# Patient Record
Sex: MALE | Race: OTHER | NOT HISPANIC OR LATINO
[De-identification: names, ages, dates, MRNs, and addresses within clinical notes are randomized per-mention and may not be internally consistent; named-entity substitution may affect disease eponyms.]

---

## 2017-10-09 ENCOUNTER — APPOINTMENT (OUTPATIENT)
Dept: UROLOGY | Facility: CLINIC | Age: 67
End: 2017-10-09
Payer: COMMERCIAL

## 2017-10-09 VITALS
WEIGHT: 150 LBS | TEMPERATURE: 97.8 F | RESPIRATION RATE: 17 BRPM | BODY MASS INDEX: 21 KG/M2 | SYSTOLIC BLOOD PRESSURE: 171 MMHG | HEIGHT: 71 IN | HEART RATE: 51 BPM | DIASTOLIC BLOOD PRESSURE: 96 MMHG

## 2017-10-09 DIAGNOSIS — Z86.79 PERSONAL HISTORY OF OTHER DISEASES OF THE CIRCULATORY SYSTEM: ICD-10-CM

## 2017-10-09 DIAGNOSIS — N40.1 BENIGN PROSTATIC HYPERPLASIA WITH LOWER URINARY TRACT SYMPMS: ICD-10-CM

## 2017-10-09 DIAGNOSIS — F17.200 NICOTINE DEPENDENCE, UNSPECIFIED, UNCOMPLICATED: ICD-10-CM

## 2017-10-09 DIAGNOSIS — N13.8 BENIGN PROSTATIC HYPERPLASIA WITH LOWER URINARY TRACT SYMPMS: ICD-10-CM

## 2017-10-09 PROCEDURE — 99203 OFFICE O/P NEW LOW 30 MIN: CPT

## 2017-10-12 LAB — PSA SERPL-MCNC: 1.1 NG/ML

## 2017-12-06 ENCOUNTER — APPOINTMENT (OUTPATIENT)
Dept: UROLOGY | Facility: CLINIC | Age: 67
End: 2017-12-06
Payer: COMMERCIAL

## 2017-12-06 ENCOUNTER — OUTPATIENT (OUTPATIENT)
Dept: OUTPATIENT SERVICES | Facility: HOSPITAL | Age: 67
LOS: 1 days | End: 2017-12-06
Payer: COMMERCIAL

## 2017-12-06 VITALS
SYSTOLIC BLOOD PRESSURE: 171 MMHG | HEART RATE: 54 BPM | RESPIRATION RATE: 16 BRPM | TEMPERATURE: 98.7 F | DIASTOLIC BLOOD PRESSURE: 100 MMHG

## 2017-12-06 DIAGNOSIS — R35.0 FREQUENCY OF MICTURITION: ICD-10-CM

## 2017-12-06 PROCEDURE — 51797 INTRAABDOMINAL PRESSURE TEST: CPT | Mod: 26

## 2017-12-06 PROCEDURE — 51741 ELECTRO-UROFLOWMETRY FIRST: CPT | Mod: 26

## 2017-12-06 PROCEDURE — 51728 CYSTOMETROGRAM W/VP: CPT

## 2017-12-06 PROCEDURE — 51728 CYSTOMETROGRAM W/VP: CPT | Mod: 26

## 2017-12-06 PROCEDURE — 51797 INTRAABDOMINAL PRESSURE TEST: CPT

## 2017-12-06 PROCEDURE — 51784 ANAL/URINARY MUSCLE STUDY: CPT | Mod: 26

## 2017-12-06 PROCEDURE — 51741 ELECTRO-UROFLOWMETRY FIRST: CPT

## 2017-12-06 PROCEDURE — 51784 ANAL/URINARY MUSCLE STUDY: CPT

## 2017-12-07 DIAGNOSIS — N32.0 BLADDER-NECK OBSTRUCTION: ICD-10-CM

## 2018-04-09 ENCOUNTER — APPOINTMENT (OUTPATIENT)
Dept: CV DIAGNOSTICS | Facility: HOSPITAL | Age: 68
End: 2018-04-09

## 2018-06-11 ENCOUNTER — APPOINTMENT (OUTPATIENT)
Dept: UROLOGY | Facility: CLINIC | Age: 68
End: 2018-06-11

## 2021-03-30 ENCOUNTER — APPOINTMENT (OUTPATIENT)
Dept: UROLOGY | Facility: CLINIC | Age: 71
End: 2021-03-30

## 2021-10-01 ENCOUNTER — INPATIENT (INPATIENT)
Facility: HOSPITAL | Age: 71
LOS: 7 days | Discharge: ANOTHER HOSPITAL | End: 2021-10-09
Attending: INTERNAL MEDICINE | Admitting: INTERNAL MEDICINE
Payer: MEDICARE

## 2021-10-01 VITALS
TEMPERATURE: 98 F | HEART RATE: 54 BPM | WEIGHT: 160.06 LBS | HEIGHT: 71 IN | RESPIRATION RATE: 16 BRPM | SYSTOLIC BLOOD PRESSURE: 217 MMHG | DIASTOLIC BLOOD PRESSURE: 95 MMHG | OXYGEN SATURATION: 98 %

## 2021-10-01 LAB
ALBUMIN SERPL ELPH-MCNC: 3.8 G/DL — SIGNIFICANT CHANGE UP (ref 3.3–5)
ALP SERPL-CCNC: 109 U/L — SIGNIFICANT CHANGE UP (ref 40–120)
ALT FLD-CCNC: 21 U/L — SIGNIFICANT CHANGE UP (ref 12–78)
ANION GAP SERPL CALC-SCNC: 8 MMOL/L — SIGNIFICANT CHANGE UP (ref 5–17)
APTT BLD: 34.4 SEC — SIGNIFICANT CHANGE UP (ref 27.5–35.5)
AST SERPL-CCNC: 28 U/L — SIGNIFICANT CHANGE UP (ref 15–37)
BILIRUB SERPL-MCNC: 0.8 MG/DL — SIGNIFICANT CHANGE UP (ref 0.2–1.2)
BUN SERPL-MCNC: 35 MG/DL — HIGH (ref 7–23)
CALCIUM SERPL-MCNC: 9.5 MG/DL — SIGNIFICANT CHANGE UP (ref 8.5–10.1)
CHLORIDE SERPL-SCNC: 108 MMOL/L — SIGNIFICANT CHANGE UP (ref 96–108)
CO2 SERPL-SCNC: 23 MMOL/L — SIGNIFICANT CHANGE UP (ref 22–31)
CREAT SERPL-MCNC: 3.1 MG/DL — HIGH (ref 0.5–1.3)
FLUAV AG NPH QL: SIGNIFICANT CHANGE UP
FLUBV AG NPH QL: SIGNIFICANT CHANGE UP
GLUCOSE SERPL-MCNC: 97 MG/DL — SIGNIFICANT CHANGE UP (ref 70–99)
HCT VFR BLD CALC: 43.4 % — SIGNIFICANT CHANGE UP (ref 39–50)
HCT VFR BLD CALC: 44.3 % — SIGNIFICANT CHANGE UP (ref 39–50)
HGB BLD-MCNC: 14 G/DL — SIGNIFICANT CHANGE UP (ref 13–17)
HGB BLD-MCNC: 14.2 G/DL — SIGNIFICANT CHANGE UP (ref 13–17)
INR BLD: 1.14 RATIO — SIGNIFICANT CHANGE UP (ref 0.88–1.16)
MCHC RBC-ENTMCNC: 29.3 PG — SIGNIFICANT CHANGE UP (ref 27–34)
MCHC RBC-ENTMCNC: 29.5 PG — SIGNIFICANT CHANGE UP (ref 27–34)
MCHC RBC-ENTMCNC: 32.1 GM/DL — SIGNIFICANT CHANGE UP (ref 32–36)
MCHC RBC-ENTMCNC: 32.3 GM/DL — SIGNIFICANT CHANGE UP (ref 32–36)
MCV RBC AUTO: 91.4 FL — SIGNIFICANT CHANGE UP (ref 80–100)
MCV RBC AUTO: 91.5 FL — SIGNIFICANT CHANGE UP (ref 80–100)
NRBC # BLD: 0 /100 WBCS — SIGNIFICANT CHANGE UP (ref 0–0)
NRBC # BLD: 0 /100 WBCS — SIGNIFICANT CHANGE UP (ref 0–0)
PLATELET # BLD AUTO: 713 K/UL — HIGH (ref 150–400)
PLATELET # BLD AUTO: 717 K/UL — HIGH (ref 150–400)
POTASSIUM SERPL-MCNC: 4.3 MMOL/L — SIGNIFICANT CHANGE UP (ref 3.5–5.3)
POTASSIUM SERPL-SCNC: 4.3 MMOL/L — SIGNIFICANT CHANGE UP (ref 3.5–5.3)
PROT SERPL-MCNC: 8.8 GM/DL — HIGH (ref 6–8.3)
PROTHROM AB SERPL-ACNC: 13.1 SEC — SIGNIFICANT CHANGE UP (ref 10.6–13.6)
RBC # BLD: 4.75 M/UL — SIGNIFICANT CHANGE UP (ref 4.2–5.8)
RBC # BLD: 4.84 M/UL — SIGNIFICANT CHANGE UP (ref 4.2–5.8)
RBC # FLD: 15.9 % — HIGH (ref 10.3–14.5)
RBC # FLD: 15.9 % — HIGH (ref 10.3–14.5)
SARS-COV-2 RNA SPEC QL NAA+PROBE: SIGNIFICANT CHANGE UP
SODIUM SERPL-SCNC: 139 MMOL/L — SIGNIFICANT CHANGE UP (ref 135–145)
TROPONIN I SERPL-MCNC: 0.1 NG/ML — HIGH (ref 0.01–0.04)
WBC # BLD: 6.91 K/UL — SIGNIFICANT CHANGE UP (ref 3.8–10.5)
WBC # BLD: 7.68 K/UL — SIGNIFICANT CHANGE UP (ref 3.8–10.5)
WBC # FLD AUTO: 6.91 K/UL — SIGNIFICANT CHANGE UP (ref 3.8–10.5)
WBC # FLD AUTO: 7.68 K/UL — SIGNIFICANT CHANGE UP (ref 3.8–10.5)

## 2021-10-01 PROCEDURE — 70450 CT HEAD/BRAIN W/O DYE: CPT | Mod: 26,MA

## 2021-10-01 PROCEDURE — 99223 1ST HOSP IP/OBS HIGH 75: CPT

## 2021-10-01 PROCEDURE — 93010 ELECTROCARDIOGRAM REPORT: CPT

## 2021-10-01 PROCEDURE — 99285 EMERGENCY DEPT VISIT HI MDM: CPT

## 2021-10-01 PROCEDURE — 93010 ELECTROCARDIOGRAM REPORT: CPT | Mod: 77

## 2021-10-01 RX ORDER — HEPARIN SODIUM 5000 [USP'U]/ML
5000 INJECTION INTRAVENOUS; SUBCUTANEOUS EVERY 12 HOURS
Refills: 0 | Status: DISCONTINUED | OUTPATIENT
Start: 2021-10-01 | End: 2021-10-09

## 2021-10-01 RX ORDER — HYDRALAZINE HCL 50 MG
20 TABLET ORAL ONCE
Refills: 0 | Status: COMPLETED | OUTPATIENT
Start: 2021-10-01 | End: 2021-10-01

## 2021-10-01 RX ORDER — HYDRALAZINE HCL 50 MG
50 TABLET ORAL THREE TIMES A DAY
Refills: 0 | Status: DISCONTINUED | OUTPATIENT
Start: 2021-10-01 | End: 2021-10-02

## 2021-10-01 RX ORDER — AMLODIPINE BESYLATE 2.5 MG/1
10 TABLET ORAL DAILY
Refills: 0 | Status: DISCONTINUED | OUTPATIENT
Start: 2021-10-01 | End: 2021-10-05

## 2021-10-01 RX ORDER — SODIUM CHLORIDE 9 MG/ML
1000 INJECTION INTRAMUSCULAR; INTRAVENOUS; SUBCUTANEOUS
Refills: 0 | Status: DISCONTINUED | OUTPATIENT
Start: 2021-10-01 | End: 2021-10-01

## 2021-10-01 RX ORDER — LABETALOL HCL 100 MG
10 TABLET ORAL ONCE
Refills: 0 | Status: COMPLETED | OUTPATIENT
Start: 2021-10-01 | End: 2021-10-01

## 2021-10-01 RX ORDER — ASPIRIN/CALCIUM CARB/MAGNESIUM 324 MG
81 TABLET ORAL DAILY
Refills: 0 | Status: DISCONTINUED | OUTPATIENT
Start: 2021-10-01 | End: 2021-10-09

## 2021-10-01 RX ORDER — ATORVASTATIN CALCIUM 80 MG/1
20 TABLET, FILM COATED ORAL AT BEDTIME
Refills: 0 | Status: DISCONTINUED | OUTPATIENT
Start: 2021-10-01 | End: 2021-10-09

## 2021-10-01 RX ORDER — HYDRALAZINE HCL 50 MG
10 TABLET ORAL ONCE
Refills: 0 | Status: COMPLETED | OUTPATIENT
Start: 2021-10-01 | End: 2021-10-01

## 2021-10-01 RX ADMIN — Medication 10 MILLIGRAM(S): at 14:49

## 2021-10-01 RX ADMIN — Medication 10 MILLIGRAM(S): at 23:20

## 2021-10-01 RX ADMIN — Medication 50 MILLIGRAM(S): at 21:52

## 2021-10-01 RX ADMIN — ATORVASTATIN CALCIUM 20 MILLIGRAM(S): 80 TABLET, FILM COATED ORAL at 21:52

## 2021-10-01 RX ADMIN — AMLODIPINE BESYLATE 10 MILLIGRAM(S): 2.5 TABLET ORAL at 21:53

## 2021-10-01 RX ADMIN — HEPARIN SODIUM 5000 UNIT(S): 5000 INJECTION INTRAVENOUS; SUBCUTANEOUS at 21:51

## 2021-10-01 RX ADMIN — Medication 0.2 MILLIGRAM(S): at 22:00

## 2021-10-01 RX ADMIN — Medication 20 MILLIGRAM(S): at 16:37

## 2021-10-01 NOTE — H&P ADULT - ASSESSMENT
70 year old male PMH HTN, "heart problems" presenting for multiple falls since yesterday.  Patient is poor historian and is not providing context about what is causing the falls. Thinks he struck his head. Not sure of LOC. Poor historian, denies any meds at home, denies any medical problems. Denies all other symptoms. Prefers to sleep   in ER.     Plan: Admit to medicine, frequent falls r/o CVA. MRI brain non-contrast ordered. CT head non-contrast on arrival notes old infarcts, no bleed. Will start   ASA 81 mg/day and Lipitor 20 mg/day for old CVA on initial scan. PT eval requested. COVID negative. EKG is NSR. Add Norvasc 5 mg/day for HTN.     Renal: No prior for comparison. Elevated creatinine 3.10 appears baseline, no anemia of chronic disease.     Will hydrate normal saline at 60/h, follow in AM. Renal sonogram ordered to r/o MRD or hydro.        70 year old male PMH HTN, "heart problems" presenting for multiple falls since yesterday.  Patient is poor historian and is not providing context about what is causing the falls. Thinks he struck his head. Not sure of LOC. Poor historian, denies any meds at home, denies any medical problems. Denies all other symptoms. Prefers to sleep   in ER.     Plan: Admit to medicine, frequent falls r/o CVA. MRI brain non-contrast ordered. CT head non-contrast on arrival notes old infarcts, no bleed. Will start   ASA 81 mg/day and Lipitor 20 mg/day for old CVA on initial scan. PT eval requested. COVID negative. EKG is NSR. Add Norvasc 5 mg/day for HTN.     Renal: No prior for comparison. Elevated creatinine 3.10 on arrival, CKD IV appears baseline, no anemia of chronic disease. Will ordered   renal protein/creatinine ratio. No DM history.   Glucose 97.     Will hydrate normal saline at 60/h, follow in AM. Renal sonogram ordered to r/o MRD or hydro.    Called family contacts in [place], no number is listed.         70 year old male PMH HTN, "heart problems" presenting for multiple falls since yesterday.  Patient is poor historian and is not providing context about what is causing the falls. Thinks he struck his head. Not sure of LOC. Poor historian, denies any meds at home, denies any medical problems. Denies all other symptoms. Prefers to sleep   in ER.     Plan: Admit to medicine, frequent falls r/o CVA. MRI brain non-contrast ordered. CT head non-contrast on arrival notes old infarcts, no bleed. Will start   ASA 81 mg/day and Lipitor 20 mg/day for old CVA on initial scan. PT eval requested. COVID negative. EKG is NSR.     Add Norvasc 10 mg/day for HTN along with hydralazine 50 mg tid and Clonidine .2 mg bid, BP elevated in ER.      Renal: No prior for comparison. Elevated creatinine 3.10 on arrival, CKD IV appears baseline, no anemia of chronic disease. Will ordered   renal protein/creatinine ratio. No DM history.   Glucose 97. Renal sonogram ordered to r/o MRD or hydro.    Called family contacts in [place], no number is listed.         70 year old male PMH HTN, "heart problems" presenting for multiple falls since yesterday.  Patient is poor historian and is not providing context about what is causing the falls. Thinks he struck his head. Not sure of LOC. Poor historian, denies any meds at home, denies any medical problems. Denies all other symptoms. Prefers to sleep   in ER.     Plan: Admit to medicine, frequent falls r/o CVA. MRI brain non-contrast ordered. CT head non-contrast on arrival notes old infarcts, no bleed. Will start   ASA 81 mg/day and Lipitor 20 mg/day for old CVA on initial scan. PT eval requested. COVID negative. EKG is NSR.     Add Norvasc 10 mg/day for HTN along with hydralazine 50 mg tid and Clonidine .2 mg bid, BP elevated in ER.      Renal: No prior for comparison. Elevated creatinine 3.10 on arrival, CKD IV appears baseline, no anemia of chronic disease. Will ordered   renal protein/creatinine ratio. No DM history.   Glucose 97. Renal sonogram ordered to r/o MRD or hydro.    Addendum: NIHSS was 0 on admission.     Called family contacts in [place], no number is listed.

## 2021-10-01 NOTE — ED PROVIDER NOTE - OBJECTIVE STATEMENT
70m pmhx htn, "heart problems" presenting for multiple falls since yesterday. patient poor historian and is not providing context about what is causing the falls. thinks he struck his head. unsure of loc. unsure if anticoagulated. poor historian. denies all other symptoms.

## 2021-10-01 NOTE — ED CLERICAL - NS ED CLERK UNITS
"Updated internal medicine Silvia about pt ongoing htn. Prn clonidine administered at 0850 for bp 179/113. Pt received daily medications at that time as well. BP recheck 149/96. He declines taking the newly ordered lisinopril \"I've heard too many bad side effects\" from this medication was his report. Updated Silvia as well about his declining the lisinopril. Will continue to monitor.      Vitals:    03/06/21 2356 03/07/21 0414 03/07/21 0814 03/07/21 1010   BP: (!) 161/110 (!) 154/98 (!) 179/133 (!) 149/96   BP Location:   Left arm Left arm   Pulse: 70 72 65 67   Resp: 16 18 18    Temp: 97.6  F (36.4  C) 98  F (36.7  C) 97.3  F (36.3  C) 95.9  F (35.5  C)   TempSrc: Temporal Temporal Temporal Temporal   SpO2:   99% 95%     " tele 240W/2C

## 2021-10-01 NOTE — ED PROVIDER NOTE - CLINICAL SUMMARY MEDICAL DECISION MAKING FREE TEXT BOX
poor historian. neurologically grossly intact. w/u significant for significant kidney injury, unknown baseline. patinet will require admission for further evlauation, pt eval and mangement of kidney injury in patient with frequent falls

## 2021-10-01 NOTE — H&P ADULT - NSHPLABSRESULTS_GEN_ALL_CORE
LABS:                        14.0   7.68  )-----------( 717      ( 01 Oct 2021 16:59 )             43.4     10-01    139  |  108  |  35<H>  ----------------------------<  97  4.3   |  23  |  3.10<H>    Ca    9.5      01 Oct 2021 16:59    TPro  8.8<H>  /  Alb  3.8  /  TBili  0.8  /  DBili  x   /  AST  28  /  ALT  21  /  AlkPhos  109  10-01    PT/INR - ( 01 Oct 2021 16:59 )   PT: 13.1 sec;   INR: 1.14 ratio         PTT - ( 01 Oct 2021 16:59 )  PTT:34.4 sec        RADIOLOGY & ADDITIONAL TESTS:

## 2021-10-01 NOTE — ED ADULT NURSE NOTE - OBJECTIVE STATEMENT
pt during the assessment would fall asleep-stated that he was tired and he wanted something to eat. Pt stated that he fell while in the store.

## 2021-10-01 NOTE — H&P ADULT - NSHPPHYSICALEXAM_GEN_ALL_CORE
PHYSICAL EXAMINATION:  Vital Signs Last 24 Hrs  T(C): 36.7 (01 Oct 2021 17:38), Max: 36.7 (01 Oct 2021 17:38)  T(F): 98.1 (01 Oct 2021 17:38), Max: 98.1 (01 Oct 2021 17:38)  HR: 65 (01 Oct 2021 17:38) (54 - 65)  BP: 183/98 (01 Oct 2021 19:46) (124/103 - 217/95)  BP(mean): --  RR: 16 (01 Oct 2021 17:38) (16 - 16)  SpO2: 97% (01 Oct 2021 17:38) (97% - 98%)  CAPILLARY BLOOD GLUCOSE          GENERAL: NAD, well-groomed, well-developed  HEAD:  atraumatic, normocephalic  EYES: sclera anicteric  ENMT: mucous membranes moist  NECK: supple, No JVD  CHEST/LUNG: clear to auscultation bilaterally; no rales, rhonchi, or wheezing b/l  HEART: normal S1, S2  ABDOMEN: BS+, soft, ND, NT   EXTREMITIES:  pulses palpable; no clubbing, cyanosis, or edema b/l LEs  NEURO: awake, alert, interactive; moves all extremities  SKIN: no rashes or lesions PHYSICAL EXAMINATION:  Vital Signs Last 24 Hrs  T(C): 36.7 (01 Oct 2021 17:38), Max: 36.7 (01 Oct 2021 17:38)  T(F): 98.1 (01 Oct 2021 17:38), Max: 98.1 (01 Oct 2021 17:38)  HR: 65 (01 Oct 2021 17:38) (54 - 65)  BP: 183/98 (01 Oct 2021 19:46) (124/103 - 217/95)  BP(mean): --  RR: 16 (01 Oct 2021 17:38) (16 - 16)  SpO2: 97% (01 Oct 2021 17:38) (97% - 98%)  CAPILLARY BLOOD GLUCOSE          GENERAL: NAD, comfortable, in bed, no CP, fevers or SOB  HEAD:  atraumatic, normocephalic  EYES: sclera anicteric  ENMT: mucous membranes moist  NECK: supple, No JVD  CHEST/LUNG: clear to auscultation bilaterally; no rales, rhonchi, or wheezing b/l  HEART: normal S1, S2  ABDOMEN: BS+, soft, ND, NT   EXTREMITIES:  pulses palpable; no clubbing, cyanosis, or edema b/l LEs  NEURO: awake, alert, interactive; moves all extremities  SKIN: no rashes or lesions

## 2021-10-01 NOTE — H&P ADULT - HISTORY OF PRESENT ILLNESS
70 year old Male PMH HTN, "heart problems" presenting for multiple falls since yesterday.  Patient is poor historian and is not providing context about what is causing the falls. Thinks he struck his head. Not sure of LOC. Poor historian. Denies all other symptoms. 70 year old male PMH HTN, "heart problems" presenting for multiple falls since yesterday.  Patient is poor historian and is not providing context about what is causing the falls. Thinks he struck his head. Not sure of LOC. Poor historian, denies any meds at home, denies any medical problems. Denies all other symptoms. Prefers to sleep   in ER.

## 2021-10-02 LAB
ANION GAP SERPL CALC-SCNC: 7 MMOL/L — SIGNIFICANT CHANGE UP (ref 5–17)
BUN SERPL-MCNC: 35 MG/DL — HIGH (ref 7–23)
CALCIUM SERPL-MCNC: 9.1 MG/DL — SIGNIFICANT CHANGE UP (ref 8.5–10.1)
CHLORIDE SERPL-SCNC: 111 MMOL/L — HIGH (ref 96–108)
CHOLEST SERPL-MCNC: 93 MG/DL — SIGNIFICANT CHANGE UP
CO2 SERPL-SCNC: 22 MMOL/L — SIGNIFICANT CHANGE UP (ref 22–31)
COVID-19 SPIKE DOMAIN AB INTERP: POSITIVE
COVID-19 SPIKE DOMAIN ANTIBODY RESULT: >250 U/ML — HIGH
CREAT SERPL-MCNC: 2.92 MG/DL — HIGH (ref 0.5–1.3)
GLUCOSE SERPL-MCNC: 102 MG/DL — HIGH (ref 70–99)
HCV AB S/CO SERPL IA: 12.82 S/CO — HIGH (ref 0–0.99)
HCV AB SERPL-IMP: REACTIVE
HDLC SERPL-MCNC: 50 MG/DL — SIGNIFICANT CHANGE UP
LIPID PNL WITH DIRECT LDL SERPL: 33 MG/DL — SIGNIFICANT CHANGE UP
NON HDL CHOLESTEROL: 42 MG/DL — SIGNIFICANT CHANGE UP
POTASSIUM SERPL-MCNC: 4.1 MMOL/L — SIGNIFICANT CHANGE UP (ref 3.5–5.3)
POTASSIUM SERPL-SCNC: 4.1 MMOL/L — SIGNIFICANT CHANGE UP (ref 3.5–5.3)
SARS-COV-2 IGG+IGM SERPL QL IA: >250 U/ML — HIGH
SARS-COV-2 IGG+IGM SERPL QL IA: POSITIVE
SODIUM SERPL-SCNC: 140 MMOL/L — SIGNIFICANT CHANGE UP (ref 135–145)
TRIGL SERPL-MCNC: 45 MG/DL — SIGNIFICANT CHANGE UP

## 2021-10-02 PROCEDURE — 76775 US EXAM ABDO BACK WALL LIM: CPT | Mod: 26

## 2021-10-02 PROCEDURE — 99232 SBSQ HOSP IP/OBS MODERATE 35: CPT

## 2021-10-02 RX ORDER — HYDRALAZINE HCL 50 MG
100 TABLET ORAL THREE TIMES A DAY
Refills: 0 | Status: DISCONTINUED | OUTPATIENT
Start: 2021-10-02 | End: 2021-10-09

## 2021-10-02 RX ADMIN — Medication 50 MILLIGRAM(S): at 05:03

## 2021-10-02 RX ADMIN — ATORVASTATIN CALCIUM 20 MILLIGRAM(S): 80 TABLET, FILM COATED ORAL at 21:17

## 2021-10-02 RX ADMIN — AMLODIPINE BESYLATE 10 MILLIGRAM(S): 2.5 TABLET ORAL at 05:03

## 2021-10-02 RX ADMIN — Medication 81 MILLIGRAM(S): at 11:28

## 2021-10-02 RX ADMIN — HEPARIN SODIUM 5000 UNIT(S): 5000 INJECTION INTRAVENOUS; SUBCUTANEOUS at 05:04

## 2021-10-02 RX ADMIN — HEPARIN SODIUM 5000 UNIT(S): 5000 INJECTION INTRAVENOUS; SUBCUTANEOUS at 17:42

## 2021-10-02 RX ADMIN — Medication 50 MILLIGRAM(S): at 21:17

## 2021-10-02 RX ADMIN — Medication 0.2 MILLIGRAM(S): at 05:05

## 2021-10-02 RX ADMIN — Medication 50 MILLIGRAM(S): at 13:31

## 2021-10-02 RX ADMIN — Medication 0.2 MILLIGRAM(S): at 17:42

## 2021-10-02 NOTE — PROGRESS NOTE ADULT - ASSESSMENT
70 year old male w/ CKD IV, HTN, "heart problems" presented w/ multiple falls.     Multiple falls  - CT head showed no acute intracranial hemorrhage or acute territorial infarction w/ extensive chronic microvascular ischemic changes and several chronic infarctions  - r/o CVA - MRI brain non-contrast ordered - tried to reach family to answer MRI questionnaire but could not reach them    - c/w ASA and Lipitor     HTN  - c/w Norvasc, hydralazine and Clonidine      Prophylaxis:  DVT: Heparin  GI: PO diet   70 year old male w/ CKD IV, HTN, "heart problems" presented w/ multiple falls.     Multiple falls  - CT head showed no acute intracranial hemorrhage or acute territorial infarction w/ extensive chronic microvascular ischemic changes and several chronic infarctions  - r/o CVA - MRI brain non-contrast ordered - tried to reach family to answer MRI questionnaire but could not reach them    - c/w ASA and Lipitor     HTN  - c/w Norvasc  - will lower Clonidine as it may be causing sinus bradycardia and increase hydralazine to compensate     Prophylaxis:  DVT: Heparin  GI: PO diet

## 2021-10-02 NOTE — PROGRESS NOTE ADULT - SUBJECTIVE AND OBJECTIVE BOX
70 year old male w/ CKD IV, HTN, "heart problems" presented w/ multiple falls. He is lying in bed in NAD.    MEDICATIONS  (STANDING):  amLODIPine   Tablet 10 milliGRAM(s) Oral daily  aspirin enteric coated 81 milliGRAM(s) Oral daily  atorvastatin 20 milliGRAM(s) Oral at bedtime  cloNIDine 0.2 milliGRAM(s) Oral two times a day  heparin   Injectable 5000 Unit(s) SubCutaneous every 12 hours  hydrALAZINE 50 milliGRAM(s) Oral three times a day    MEDICATIONS  (PRN):      Allergies    No Known Allergies    Intolerances        Vital Signs Last 24 Hrs  T(C): 36.8 (02 Oct 2021 11:16), Max: 36.9 (01 Oct 2021 21:30)  T(F): 98.3 (02 Oct 2021 11:16), Max: 98.4 (01 Oct 2021 21:30)  HR: 51 (02 Oct 2021 15:38) (48 - 65)  BP: 163/87 (02 Oct 2021 15:38) (124/103 - 184/88)   RR: 20 (02 Oct 2021 11:16) (16 - 20)  SpO2: 100% (02 Oct 2021 11:16) (96% - 100%)    PHYSICAL EXAM:  GENERAL: NAD, well-groomed, well-developed  HEAD:  Atraumatic, Normocephalic  EYES: EOMI, PERRLA   NECK: Supple   NERVOUS SYSTEM:  Alert & Oriented X3, Good concentration; Motor Strength 5/5 B/L upper and lower extremities; DTRs 2+ intact and symmetric  CHEST/LUNG: Clear to auscultation bilaterally; No rales, rhonchi, wheezing, or rubs  HEART: Regular rate and rhythm; No murmurs, rubs, or gallops  ABDOMEN: Soft, Nontender, Nondistended; Bowel sounds present  EXTREMITIES:  No clubbing, cyanosis, or edema     LABS:                        14.0   7.68  )-----------( 717      ( 01 Oct 2021 16:59 )             43.4     10-02    140  |  111<H>  |  35<H>  ----------------------------<  102<H>  4.1   |  22  |  2.92<H>    Ca    9.1      02 Oct 2021 07:24    TPro  8.8<H>  /  Alb  3.8  /  TBili  0.8  /  DBili  x   /  AST  28  /  ALT  21  /  AlkPhos  109  10-01    PT/INR - ( 01 Oct 2021 16:59 )   PT: 13.1 sec;   INR: 1.14 ratio         PTT - ( 01 Oct 2021 16:59 )  PTT:34.4 sec    CAPILLARY BLOOD GLUCOSE          RADIOLOGY & ADDITIONAL TESTS:    10-01-21 @ 07:01  -  10-02-21 @ 07:00  --------------------------------------------------------  IN:    Oral Fluid: 250 mL  Total IN: 250 mL    OUT:  Total OUT: 0 mL    Total NET: 250 mL       70 year old male w/ CKD IV, HTN, "heart problems" presented w/ multiple falls. He is lying in bed in NAD.    MEDICATIONS  (STANDING):  amLODIPine   Tablet 10 milliGRAM(s) Oral daily  aspirin enteric coated 81 milliGRAM(s) Oral daily  atorvastatin 20 milliGRAM(s) Oral at bedtime  cloNIDine 0.2 milliGRAM(s) Oral two times a day  heparin   Injectable 5000 Unit(s) SubCutaneous every 12 hours  hydrALAZINE 50 milliGRAM(s) Oral three times a day    MEDICATIONS  (PRN):      Allergies    No Known Allergies    Intolerances        Vital Signs Last 24 Hrs  T(C): 36.8 (02 Oct 2021 11:16), Max: 36.9 (01 Oct 2021 21:30)  T(F): 98.3 (02 Oct 2021 11:16), Max: 98.4 (01 Oct 2021 21:30)  HR: 51 (02 Oct 2021 15:38) (48 - 65)  BP: 163/87 (02 Oct 2021 15:38) (124/103 - 184/88)   RR: 20 (02 Oct 2021 11:16) (16 - 20)  SpO2: 100% (02 Oct 2021 11:16) (96% - 100%)    PHYSICAL EXAM:  GENERAL: NAD, well-groomed, well-developed  HEAD:  Atraumatic, Normocephalic  EYES: EOMI, PERRLA   NECK: Supple   NERVOUS SYSTEM:  Alert & confused  CHEST/LUNG: Clear to auscultation bilaterally; No rales, rhonchi, wheezing, or rubs  HEART: Regular rate and rhythm; No murmurs, rubs, or gallops  ABDOMEN: Soft, Nontender, Nondistended; Bowel sounds present  EXTREMITIES:  No clubbing, cyanosis, or edema     LABS:                        14.0   7.68  )-----------( 717      ( 01 Oct 2021 16:59 )             43.4     10-02    140  |  111<H>  |  35<H>  ----------------------------<  102<H>  4.1   |  22  |  2.92<H>    Ca    9.1      02 Oct 2021 07:24    TPro  8.8<H>  /  Alb  3.8  /  TBili  0.8  /  DBili  x   /  AST  28  /  ALT  21  /  AlkPhos  109  10-01    PT/INR - ( 01 Oct 2021 16:59 )   PT: 13.1 sec;   INR: 1.14 ratio         PTT - ( 01 Oct 2021 16:59 )  PTT:34.4 sec    CAPILLARY BLOOD GLUCOSE          RADIOLOGY & ADDITIONAL TESTS:    10-01-21 @ 07:01  -  10-02-21 @ 07:00  --------------------------------------------------------  IN:    Oral Fluid: 250 mL  Total IN: 250 mL    OUT:  Total OUT: 0 mL    Total NET: 250 mL

## 2021-10-03 LAB
ANION GAP SERPL CALC-SCNC: 9 MMOL/L — SIGNIFICANT CHANGE UP (ref 5–17)
BUN SERPL-MCNC: 42 MG/DL — HIGH (ref 7–23)
CALCIUM SERPL-MCNC: 9.5 MG/DL — SIGNIFICANT CHANGE UP (ref 8.5–10.1)
CHLORIDE SERPL-SCNC: 109 MMOL/L — HIGH (ref 96–108)
CO2 SERPL-SCNC: 21 MMOL/L — LOW (ref 22–31)
CREAT SERPL-MCNC: 3.08 MG/DL — HIGH (ref 0.5–1.3)
GLUCOSE SERPL-MCNC: 86 MG/DL — SIGNIFICANT CHANGE UP (ref 70–99)
MAGNESIUM SERPL-MCNC: 2.6 MG/DL — SIGNIFICANT CHANGE UP (ref 1.6–2.6)
PHOSPHATE SERPL-MCNC: 3.2 MG/DL — SIGNIFICANT CHANGE UP (ref 2.5–4.5)
POTASSIUM SERPL-MCNC: 3.8 MMOL/L — SIGNIFICANT CHANGE UP (ref 3.5–5.3)
POTASSIUM SERPL-SCNC: 3.8 MMOL/L — SIGNIFICANT CHANGE UP (ref 3.5–5.3)
SODIUM SERPL-SCNC: 139 MMOL/L — SIGNIFICANT CHANGE UP (ref 135–145)

## 2021-10-03 PROCEDURE — 99232 SBSQ HOSP IP/OBS MODERATE 35: CPT

## 2021-10-03 RX ADMIN — Medication 81 MILLIGRAM(S): at 12:37

## 2021-10-03 RX ADMIN — AMLODIPINE BESYLATE 10 MILLIGRAM(S): 2.5 TABLET ORAL at 05:52

## 2021-10-03 RX ADMIN — Medication 100 MILLIGRAM(S): at 21:26

## 2021-10-03 RX ADMIN — ATORVASTATIN CALCIUM 20 MILLIGRAM(S): 80 TABLET, FILM COATED ORAL at 21:26

## 2021-10-03 RX ADMIN — Medication 100 MILLIGRAM(S): at 05:52

## 2021-10-03 RX ADMIN — Medication 100 MILLIGRAM(S): at 14:34

## 2021-10-03 RX ADMIN — HEPARIN SODIUM 5000 UNIT(S): 5000 INJECTION INTRAVENOUS; SUBCUTANEOUS at 17:39

## 2021-10-03 RX ADMIN — Medication 0.1 MILLIGRAM(S): at 14:34

## 2021-10-03 RX ADMIN — HEPARIN SODIUM 5000 UNIT(S): 5000 INJECTION INTRAVENOUS; SUBCUTANEOUS at 05:52

## 2021-10-03 NOTE — PROGRESS NOTE ADULT - SUBJECTIVE AND OBJECTIVE BOX
70 year old male w/ CKD IV, HTN, "heart problems" presented w/ multiple falls. He is lying in bed in NAD.    MEDICATIONS  (STANDING):  amLODIPine   Tablet 10 milliGRAM(s) Oral daily  aspirin enteric coated 81 milliGRAM(s) Oral daily  atorvastatin 20 milliGRAM(s) Oral at bedtime  cloNIDine 0.1 milliGRAM(s) Oral three times a day  heparin   Injectable 5000 Unit(s) SubCutaneous every 12 hours  hydrALAZINE 100 milliGRAM(s) Oral three times a day    MEDICATIONS  (PRN):      Allergies    No Known Allergies    Intolerances        Vital Signs Last 24 Hrs  T(C): 36.6 (03 Oct 2021 16:15), Max: 36.8 (02 Oct 2021 23:39)  T(F): 97.8 (03 Oct 2021 16:15), Max: 98.3 (02 Oct 2021 23:39)  HR: 44 (03 Oct 2021 16:15) (42 - 52)  BP: 150/84 (03 Oct 2021 16:15) (142/81 - 159/83)   RR: 18 (03 Oct 2021 16:15) (18 - 18)  SpO2: 98% (03 Oct 2021 16:15) (98% - 98%)    PHYSICAL EXAM:  GENERAL: NAD, well-groomed, well-developed  HEAD:  Atraumatic, Normocephalic  EYES: EOMI, PERRLA   NECK: Supple   NERVOUS SYSTEM:  Alert & confused  CHEST/LUNG: Clear to auscultation bilaterally; No rales, rhonchi, wheezing, or rubs  HEART: Regular rate and rhythm; No murmurs, rubs, or gallops  ABDOMEN: Soft, Nontender, Nondistended; Bowel sounds present  EXTREMITIES:  No clubbing, cyanosis, or edema    LABS:    10-03    139  |  109<H>  |  42<H>  ----------------------------<  86  3.8   |  21<L>  |  3.08<H>    Ca    9.5      03 Oct 2021 08:49  Phos  3.2     10-03  Mg     2.6     10-03       RADIOLOGY & ADDITIONAL TESTS:    10-02-21 @ 07:01  -  10-03-21 @ 07:00  --------------------------------------------------------  IN:    Oral Fluid: 250 mL  Total IN: 250 mL    OUT:    Voided (mL): 450 mL  Total OUT: 450 mL    Total NET: -200 mL

## 2021-10-03 NOTE — PROGRESS NOTE ADULT - ASSESSMENT
70 year old male w/ CKD IV, HTN, "heart problems" presented w/ multiple falls.     Multiple falls  - CT head showed no acute intracranial hemorrhage or acute territorial infarction w/ extensive chronic microvascular ischemic changes and several chronic infarctions  - r/o CVA - MRI brain non-contrast ordered - tried to reach family to answer MRI questionnaire but could not reach them    - c/w ASA and Lipitor     HTN  - c/w Norvasc  - will lower Clonidine as it may be causing sinus bradycardia and increase hydralazine to compensate     Prophylaxis:  DVT: Heparin  GI: PO diet    70 year old male w/ CKD IV, HTN, CAD status post presented w/ multiple falls.     Multiple falls  - CT head showed no acute intracranial hemorrhage or acute territorial infarction w/ extensive chronic microvascular ischemic changes and several chronic infarctions  - r/o CVA - MRI brain non-contrast ordered - tried to reach family to answer MRI questionnaire but could not reach them    - c/w ASA and Lipitor   - suspect vascular dementia     HTN  - c/w Norvasc  - will lower Clonidine as it may be causing sinus bradycardia and increase hydralazine to compensate     Prophylaxis:  DVT: Heparin  GI: PO diet     Spoke with Melanie Orona, his niece who is coming up from [place] and is his NOK. I informed her about his status and plan of care.

## 2021-10-04 LAB
ANION GAP SERPL CALC-SCNC: 9 MMOL/L — SIGNIFICANT CHANGE UP (ref 5–17)
BUN SERPL-MCNC: 38 MG/DL — HIGH (ref 7–23)
CALCIUM SERPL-MCNC: 9.5 MG/DL — SIGNIFICANT CHANGE UP (ref 8.5–10.1)
CHLORIDE SERPL-SCNC: 106 MMOL/L — SIGNIFICANT CHANGE UP (ref 96–108)
CO2 SERPL-SCNC: 21 MMOL/L — LOW (ref 22–31)
CREAT SERPL-MCNC: 3.09 MG/DL — HIGH (ref 0.5–1.3)
GLUCOSE SERPL-MCNC: 80 MG/DL — SIGNIFICANT CHANGE UP (ref 70–99)
HCT VFR BLD CALC: 38 % — LOW (ref 39–50)
HGB BLD-MCNC: 12.1 G/DL — LOW (ref 13–17)
MAGNESIUM SERPL-MCNC: 2.4 MG/DL — SIGNIFICANT CHANGE UP (ref 1.6–2.6)
MCHC RBC-ENTMCNC: 29.4 PG — SIGNIFICANT CHANGE UP (ref 27–34)
MCHC RBC-ENTMCNC: 31.8 GM/DL — LOW (ref 32–36)
MCV RBC AUTO: 92.5 FL — SIGNIFICANT CHANGE UP (ref 80–100)
NRBC # BLD: 0 /100 WBCS — SIGNIFICANT CHANGE UP (ref 0–0)
PHOSPHATE SERPL-MCNC: 3 MG/DL — SIGNIFICANT CHANGE UP (ref 2.5–4.5)
PLATELET # BLD AUTO: 681 K/UL — HIGH (ref 150–400)
POTASSIUM SERPL-MCNC: 3.7 MMOL/L — SIGNIFICANT CHANGE UP (ref 3.5–5.3)
POTASSIUM SERPL-SCNC: 3.7 MMOL/L — SIGNIFICANT CHANGE UP (ref 3.5–5.3)
RBC # BLD: 4.11 M/UL — LOW (ref 4.2–5.8)
RBC # FLD: 15.8 % — HIGH (ref 10.3–14.5)
SODIUM SERPL-SCNC: 136 MMOL/L — SIGNIFICANT CHANGE UP (ref 135–145)
WBC # BLD: 5.33 K/UL — SIGNIFICANT CHANGE UP (ref 3.8–10.5)
WBC # FLD AUTO: 5.33 K/UL — SIGNIFICANT CHANGE UP (ref 3.8–10.5)

## 2021-10-04 PROCEDURE — 70551 MRI BRAIN STEM W/O DYE: CPT | Mod: 26

## 2021-10-04 PROCEDURE — 99232 SBSQ HOSP IP/OBS MODERATE 35: CPT

## 2021-10-04 RX ADMIN — AMLODIPINE BESYLATE 10 MILLIGRAM(S): 2.5 TABLET ORAL at 05:44

## 2021-10-04 RX ADMIN — Medication 100 MILLIGRAM(S): at 13:18

## 2021-10-04 RX ADMIN — HEPARIN SODIUM 5000 UNIT(S): 5000 INJECTION INTRAVENOUS; SUBCUTANEOUS at 18:07

## 2021-10-04 RX ADMIN — ATORVASTATIN CALCIUM 20 MILLIGRAM(S): 80 TABLET, FILM COATED ORAL at 21:06

## 2021-10-04 RX ADMIN — Medication 100 MILLIGRAM(S): at 21:06

## 2021-10-04 RX ADMIN — Medication 81 MILLIGRAM(S): at 13:18

## 2021-10-04 RX ADMIN — Medication 100 MILLIGRAM(S): at 05:44

## 2021-10-04 RX ADMIN — Medication 0.1 MILLIGRAM(S): at 18:07

## 2021-10-04 RX ADMIN — HEPARIN SODIUM 5000 UNIT(S): 5000 INJECTION INTRAVENOUS; SUBCUTANEOUS at 05:45

## 2021-10-04 NOTE — DIETITIAN INITIAL EVALUATION ADULT. - OTHER INFO
Pt w/ CKD 4 ; HTN ; Heart problems ; P/W multiple falls ; R/O CVA . Pt is a poor Historian. Alert / confused. Pt reports he rents a room with cooking facilities and he does the food shopping / cooking for himself. Poor dentition but can chew with his gums/ He denies he is food insecure.

## 2021-10-04 NOTE — PROGRESS NOTE ADULT - SUBJECTIVE AND OBJECTIVE BOX
70 year old male w/ CKD IV, HTN, "heart problems" presented w/ multiple falls. He is lying in bed in NAD.     MEDICATIONS  (STANDING):  amLODIPine   Tablet 10 milliGRAM(s) Oral daily  aspirin enteric coated 81 milliGRAM(s) Oral daily  atorvastatin 20 milliGRAM(s) Oral at bedtime  cloNIDine 0.1 milliGRAM(s) Oral two times a day  heparin   Injectable 5000 Unit(s) SubCutaneous every 12 hours  hydrALAZINE 100 milliGRAM(s) Oral three times a day    MEDICATIONS  (PRN):      Allergies    No Known Allergies    Intolerances        Vital Signs Last 24 Hrs  T(C): 36.6 (04 Oct 2021 16:00), Max: 36.6 (04 Oct 2021 11:24)  T(F): 97.8 (04 Oct 2021 16:00), Max: 97.9 (04 Oct 2021 11:24)  HR: 52 (04 Oct 2021 20:01) (48 - 52)  BP: 157/81 (04 Oct 2021 20:01) (144/69 - 170/89)  BP(mean): --  RR: 18 (04 Oct 2021 20:01) (18 - 18)  SpO2: 96% (04 Oct 2021 20:01) (96% - 99%)    PHYSICAL EXAM:  GENERAL: NAD, well-groomed, well-developed  HEAD:  Atraumatic, Normocephalic  EYES: EOMI, PERRLA   NECK: Supple   NERVOUS SYSTEM:  Alert & confused  CHEST/LUNG: Clear to auscultation bilaterally; No rales, rhonchi, wheezing, or rubs  HEART: Regular rate and rhythm; No murmurs, rubs, or gallops  ABDOMEN: Soft, Nontender, Nondistended; Bowel sounds present  EXTREMITIES:  No clubbing, cyanosis, or edema    LABS:                        12.1   5.33  )-----------( 681      ( 04 Oct 2021 07:39 )             38.0     10-04    136  |  106  |  38<H>  ----------------------------<  80  3.7   |  21<L>  |  3.09<H>    Ca    9.5      04 Oct 2021 07:39  Phos  3.0     10-04  Mg     2.4     10-04       RADIOLOGY & ADDITIONAL TESTS:    10-03-21 @ 07:01  -  10-04-21 @ 07:00  --------------------------------------------------------  IN:    Oral Fluid: 200 mL  Total IN: 200 mL    OUT:  Total OUT: 0 mL    Total NET: 200 mL

## 2021-10-04 NOTE — DIETITIAN INITIAL EVALUATION ADULT. - NUTRITION DIAGNOSITC TERMINOLOGY #1
Received request via: Pharmacy    Was the patient seen in the last year in this department? Yes    Does the patient have an active prescription (recently filled or refills available) for medication(s) requested? No  
Malnutrition...

## 2021-10-04 NOTE — DIETITIAN INITIAL EVALUATION ADULT. - PERTINENT MEDS FT
MEDICATIONS  (STANDING):  amLODIPine   Tablet 10 milliGRAM(s) Oral daily  aspirin enteric coated 81 milliGRAM(s) Oral daily  atorvastatin 20 milliGRAM(s) Oral at bedtime  cloNIDine 0.1 milliGRAM(s) Oral two times a day  heparin   Injectable 5000 Unit(s) SubCutaneous every 12 hours  hydrALAZINE 100 milliGRAM(s) Oral three times a day    MEDICATIONS  (PRN):

## 2021-10-04 NOTE — DIETITIAN INITIAL EVALUATION ADULT. - PHYSICAL ASSESSMENT TEMPLES
EXAMINATION TYPE: XR chest 1V portable

 

DATE OF EXAM: 6/29/2020

 

COMPARISON: Today

 

HISTORY: Check tube placement

 

TECHNIQUE:

 

FINDINGS: Endotracheal tube is 3 cm from the clive in good position. There is moderate pulmonary air
space edema. There is nasogastric tube in the stomach.

 

IMPRESSION: Endotracheal tube in good position. Pulmonary edema unchanged. severe

## 2021-10-04 NOTE — PROGRESS NOTE ADULT - ASSESSMENT
70 year old male w/ CKD IV, HTN, CAD status post presented w/ multiple falls.     Multiple falls  - CT head showed no acute intracranial hemorrhage or acute territorial infarction w/ extensive chronic microvascular ischemic changes and several chronic infarctions  - r/o CVA - MRI brain non-contrast ordered - tried to reach family to answer MRI questionnaire but could not reach them    - c/w ASA and Lipitor   - suspect vascular dementia     HTN  - c/w Norvasc & Hydralazine  - taper off Clonidine   - screening Echo     Prophylaxis:  DVT: Heparin  GI: PO diet     Spoke with pt's nephew at bedside today.

## 2021-10-04 NOTE — DIETITIAN INITIAL EVALUATION ADULT. - PERTINENT LABORATORY DATA
10-04 Na136 mmol/L Glu 80 mg/dL K+ 3.7 mmol/L Cr  3.09 mg/dL<H> BUN 38 mg/dL<H> 10-04 Phos 3.0 mg/dL 10-01 Alb 3.8 g/dL 10-02 Chol 93 mg/dL LDL --    HDL 50 mg/dL Trig 45 mg/dL10-01 ALT 21 U/L AST 28 U/L Alkaline Phosphatase 109 U/L

## 2021-10-04 NOTE — DIETITIAN INITIAL EVALUATION ADULT. - DIET TYPE
Suplena 1 can daily=425 calories, 10 grams protein/renal replacement pts:no protein restr,no conc K & phos, low sodium/supplement (specify)

## 2021-10-05 LAB
ANION GAP SERPL CALC-SCNC: 9 MMOL/L — SIGNIFICANT CHANGE UP (ref 5–17)
BUN SERPL-MCNC: 41 MG/DL — HIGH (ref 7–23)
CALCIUM SERPL-MCNC: 9.5 MG/DL — SIGNIFICANT CHANGE UP (ref 8.5–10.1)
CHLORIDE SERPL-SCNC: 109 MMOL/L — HIGH (ref 96–108)
CO2 SERPL-SCNC: 21 MMOL/L — LOW (ref 22–31)
CREAT SERPL-MCNC: 3.34 MG/DL — HIGH (ref 0.5–1.3)
GLUCOSE SERPL-MCNC: 86 MG/DL — SIGNIFICANT CHANGE UP (ref 70–99)
HCT VFR BLD CALC: 39 % — SIGNIFICANT CHANGE UP (ref 39–50)
HGB BLD-MCNC: 12.5 G/DL — LOW (ref 13–17)
MAGNESIUM SERPL-MCNC: 2.5 MG/DL — SIGNIFICANT CHANGE UP (ref 1.6–2.6)
MCHC RBC-ENTMCNC: 29.8 PG — SIGNIFICANT CHANGE UP (ref 27–34)
MCHC RBC-ENTMCNC: 32.1 GM/DL — SIGNIFICANT CHANGE UP (ref 32–36)
MCV RBC AUTO: 92.9 FL — SIGNIFICANT CHANGE UP (ref 80–100)
NRBC # BLD: 0 /100 WBCS — SIGNIFICANT CHANGE UP (ref 0–0)
PLATELET # BLD AUTO: 670 K/UL — HIGH (ref 150–400)
POTASSIUM SERPL-MCNC: 4 MMOL/L — SIGNIFICANT CHANGE UP (ref 3.5–5.3)
POTASSIUM SERPL-SCNC: 4 MMOL/L — SIGNIFICANT CHANGE UP (ref 3.5–5.3)
RBC # BLD: 4.2 M/UL — SIGNIFICANT CHANGE UP (ref 4.2–5.8)
RBC # FLD: 15.6 % — HIGH (ref 10.3–14.5)
SODIUM SERPL-SCNC: 139 MMOL/L — SIGNIFICANT CHANGE UP (ref 135–145)
WBC # BLD: 5.13 K/UL — SIGNIFICANT CHANGE UP (ref 3.8–10.5)
WBC # FLD AUTO: 5.13 K/UL — SIGNIFICANT CHANGE UP (ref 3.8–10.5)

## 2021-10-05 PROCEDURE — 99233 SBSQ HOSP IP/OBS HIGH 50: CPT

## 2021-10-05 PROCEDURE — 93306 TTE W/DOPPLER COMPLETE: CPT | Mod: 26

## 2021-10-05 RX ORDER — CLOPIDOGREL BISULFATE 75 MG/1
75 TABLET, FILM COATED ORAL DAILY
Refills: 0 | Status: DISCONTINUED | OUTPATIENT
Start: 2021-10-05 | End: 2021-10-09

## 2021-10-05 RX ORDER — NIFEDIPINE 30 MG
60 TABLET, EXTENDED RELEASE 24 HR ORAL DAILY
Refills: 0 | Status: DISCONTINUED | OUTPATIENT
Start: 2021-10-05 | End: 2021-10-09

## 2021-10-05 RX ADMIN — CLOPIDOGREL BISULFATE 75 MILLIGRAM(S): 75 TABLET, FILM COATED ORAL at 21:25

## 2021-10-05 RX ADMIN — HEPARIN SODIUM 5000 UNIT(S): 5000 INJECTION INTRAVENOUS; SUBCUTANEOUS at 05:06

## 2021-10-05 RX ADMIN — Medication 100 MILLIGRAM(S): at 13:10

## 2021-10-05 RX ADMIN — HEPARIN SODIUM 5000 UNIT(S): 5000 INJECTION INTRAVENOUS; SUBCUTANEOUS at 17:08

## 2021-10-05 RX ADMIN — Medication 100 MILLIGRAM(S): at 05:06

## 2021-10-05 RX ADMIN — Medication 81 MILLIGRAM(S): at 13:10

## 2021-10-05 RX ADMIN — Medication 100 MILLIGRAM(S): at 21:24

## 2021-10-05 RX ADMIN — ATORVASTATIN CALCIUM 20 MILLIGRAM(S): 80 TABLET, FILM COATED ORAL at 21:24

## 2021-10-05 RX ADMIN — Medication 60 MILLIGRAM(S): at 18:21

## 2021-10-05 RX ADMIN — AMLODIPINE BESYLATE 10 MILLIGRAM(S): 2.5 TABLET ORAL at 05:06

## 2021-10-05 NOTE — PROGRESS NOTE ADULT - ASSESSMENT
70 year old male w/ CKD IV, HTN, CAD status post presented w/ multiple falls.     Multiple falls  - CT head showed no acute intracranial hemorrhage or acute territorial infarction w/ extensive chronic microvascular ischemic changes and several chronic infarctions  - MRI showed an acute/subacute right-sided MCA distribution infarct with associated cytotoxic edema and without hemorrhagic transformation w/ multiple additional chronic lacunar infarcts and similar-appearing extensive chronic white matter microvascular type changes   - c/w ASA and Lipitor   - add Plavix  - check Hgb A1C, lipid panel, carotid US  - check Echo  - suspect vascular dementia   - consult neuro    HTN  - c/w Hydralazine  - taper off Clonidine due to bradycardia  - switch Norvasc to Procardia for better BP control  - screening Echo     Prophylaxis:  DVT: Heparin  GI: PO diet     Spoke with pt's niece over the phone and informed her about his stroke.

## 2021-10-05 NOTE — PROGRESS NOTE ADULT - SUBJECTIVE AND OBJECTIVE BOX
70 year old male w/ CKD IV, HTN, "heart problems" presented w/ multiple falls. He is lying in bed in NAD.     MEDICATIONS  (STANDING):  aspirin enteric coated 81 milliGRAM(s) Oral daily  atorvastatin 20 milliGRAM(s) Oral at bedtime  heparin   Injectable 5000 Unit(s) SubCutaneous every 12 hours  hydrALAZINE 100 milliGRAM(s) Oral three times a day  NIFEdipine XL 60 milliGRAM(s) Oral daily    MEDICATIONS  (PRN):      Allergies    No Known Allergies    Intolerances        Vital Signs Last 24 Hrs  T(C): 36.6 (05 Oct 2021 20:52), Max: 36.6 (04 Oct 2021 23:18)  T(F): 97.8 (05 Oct 2021 20:52), Max: 97.9 (04 Oct 2021 23:18)  HR: 50 (05 Oct 2021 20:52) (45 - 52)  BP: 169/77 (05 Oct 2021 20:52) (151/87 - 177/84)   RR: 18 (05 Oct 2021 20:52) (18 - 19)  SpO2: 99% (05 Oct 2021 20:52) (97% - 100%)    PHYSICAL EXAM:  GENERAL: NAD, well-groomed, well-developed  HEAD:  Atraumatic, Normocephalic  EYES: EOMI, PERRLA   NECK: Supple   NERVOUS SYSTEM:  Alert & confused  CHEST/LUNG: Clear to auscultation bilaterally; No rales, rhonchi, wheezing, or rubs  HEART: Regular rate and rhythm; No murmurs, rubs, or gallops  ABDOMEN: Soft, Nontender, Nondistended; Bowel sounds present  EXTREMITIES:  No clubbing, cyanosis, or edema    LABS:                        12.5   5.13  )-----------( 670      ( 05 Oct 2021 07:26 )             39.0     10-05    139  |  109<H>  |  41<H>  ----------------------------<  86  4.0   |  21<L>  |  3.34<H>    Ca    9.5      05 Oct 2021 07:26  Phos  3.0     10-04  Mg     2.5     10-05        RADIOLOGY & ADDITIONAL TESTS:    10-04-21 @ 07:01  -  10-05-21 @ 07:00  --------------------------------------------------------  IN:  Total IN: 0 mL    OUT:    Voided (mL): 400 mL  Total OUT: 400 mL    Total NET: -400 mL      10-05-21 @ 07:01  -  10-05-21 @ 21:03  --------------------------------------------------------  IN:  Total IN: 0 mL    OUT:    Voided (mL): 250 mL  Total OUT: 250 mL    Total NET: -250 mL

## 2021-10-06 LAB
A1C WITH ESTIMATED AVERAGE GLUCOSE RESULT: 5.5 % — SIGNIFICANT CHANGE UP (ref 4–5.6)
ANION GAP SERPL CALC-SCNC: 8 MMOL/L — SIGNIFICANT CHANGE UP (ref 5–17)
BUN SERPL-MCNC: 42 MG/DL — HIGH (ref 7–23)
CALCIUM SERPL-MCNC: 9.7 MG/DL — SIGNIFICANT CHANGE UP (ref 8.5–10.1)
CHLORIDE SERPL-SCNC: 106 MMOL/L — SIGNIFICANT CHANGE UP (ref 96–108)
CHOLEST SERPL-MCNC: 101 MG/DL — SIGNIFICANT CHANGE UP
CO2 SERPL-SCNC: 23 MMOL/L — SIGNIFICANT CHANGE UP (ref 22–31)
CREAT SERPL-MCNC: 3.29 MG/DL — HIGH (ref 0.5–1.3)
ESTIMATED AVERAGE GLUCOSE: 111 MG/DL — SIGNIFICANT CHANGE UP (ref 68–114)
FLUAV AG NPH QL: SIGNIFICANT CHANGE UP
FLUBV AG NPH QL: SIGNIFICANT CHANGE UP
GLUCOSE SERPL-MCNC: 88 MG/DL — SIGNIFICANT CHANGE UP (ref 70–99)
HCV RNA FLD QL NAA+PROBE: SIGNIFICANT CHANGE UP
HDLC SERPL-MCNC: 61 MG/DL — SIGNIFICANT CHANGE UP
LIPID PNL WITH DIRECT LDL SERPL: 25 MG/DL — SIGNIFICANT CHANGE UP
MAGNESIUM SERPL-MCNC: 2.3 MG/DL — SIGNIFICANT CHANGE UP (ref 1.6–2.6)
NON HDL CHOLESTEROL: 40 MG/DL — SIGNIFICANT CHANGE UP
PHOSPHATE SERPL-MCNC: 3.3 MG/DL — SIGNIFICANT CHANGE UP (ref 2.5–4.5)
POTASSIUM SERPL-MCNC: 4 MMOL/L — SIGNIFICANT CHANGE UP (ref 3.5–5.3)
POTASSIUM SERPL-SCNC: 4 MMOL/L — SIGNIFICANT CHANGE UP (ref 3.5–5.3)
SARS-COV-2 RNA SPEC QL NAA+PROBE: SIGNIFICANT CHANGE UP
SODIUM SERPL-SCNC: 137 MMOL/L — SIGNIFICANT CHANGE UP (ref 135–145)
TRIGL SERPL-MCNC: 74 MG/DL — SIGNIFICANT CHANGE UP

## 2021-10-06 PROCEDURE — 93880 EXTRACRANIAL BILAT STUDY: CPT | Mod: 26

## 2021-10-06 PROCEDURE — 70544 MR ANGIOGRAPHY HEAD W/O DYE: CPT | Mod: 26

## 2021-10-06 PROCEDURE — 99232 SBSQ HOSP IP/OBS MODERATE 35: CPT

## 2021-10-06 RX ADMIN — Medication 100 MILLIGRAM(S): at 05:41

## 2021-10-06 RX ADMIN — HEPARIN SODIUM 5000 UNIT(S): 5000 INJECTION INTRAVENOUS; SUBCUTANEOUS at 18:20

## 2021-10-06 RX ADMIN — Medication 100 MILLIGRAM(S): at 21:36

## 2021-10-06 RX ADMIN — CLOPIDOGREL BISULFATE 75 MILLIGRAM(S): 75 TABLET, FILM COATED ORAL at 12:50

## 2021-10-06 RX ADMIN — Medication 81 MILLIGRAM(S): at 12:50

## 2021-10-06 RX ADMIN — Medication 0.1 MILLIGRAM(S): at 21:36

## 2021-10-06 RX ADMIN — ATORVASTATIN CALCIUM 20 MILLIGRAM(S): 80 TABLET, FILM COATED ORAL at 21:36

## 2021-10-06 RX ADMIN — Medication 100 MILLIGRAM(S): at 13:35

## 2021-10-06 RX ADMIN — Medication 0.1 MILLIGRAM(S): at 18:20

## 2021-10-06 RX ADMIN — HEPARIN SODIUM 5000 UNIT(S): 5000 INJECTION INTRAVENOUS; SUBCUTANEOUS at 05:41

## 2021-10-06 NOTE — OCCUPATIONAL THERAPY INITIAL EVALUATION ADULT - GENERAL OBSERVATIONS, REHAB EVAL
Pt was seen for initial OT consult, encountered in bed chair in bed in NAD with IV heplock in place.  Pt moves BUE/ BLE and integrates both sides of his body despite recent CVA. Pt's speech is coherent; pt was AA&Ox4, cooperative & followed commands. Pt presents with generalized weakness and decreased muscle strength. These limit pt's activity tolerance ,balance, ADL management and functional mobility.

## 2021-10-06 NOTE — OCCUPATIONAL THERAPY INITIAL EVALUATION ADULT - ADDITIONAL COMMENTS
Prior to admission, pt was functioning in his roles, self sufficient & ambulating independently without any assistive devices. Presently, pt needs assistance with lower body self care tasks due to weakness, and decreased ROM from current CVA. Prior to admission, pt was functioning in his roles, self sufficient & ambulating independently without any assistive devices. Presently, pt needs assistance with lower body self care tasks due to weakness, and decreased ROM from current CVA. Pt is right hand dominant and wears glasses for reading. Pt is  able to reach out of base of support in sitting without loss of balance but unable to do it in standing.

## 2021-10-06 NOTE — PROGRESS NOTE ADULT - SUBJECTIVE AND OBJECTIVE BOX
CHIEF COMPLAINT: Follow up of acute stroke   No new focal deficit   no fever   no n/v/d      PHYSICAL EXAM:    GENERAL: Moderately built, no acute distress   CHEST/LUNG: Clear to ausculation bilaterally, no wheezing, no crackles   HEART: Regular rate and rhythm; No murmurs, rubs  ABDOMEN: Soft, Nontender, Nondistended; Bowel sounds present  EXTREMITIES:  Moving all four extremities spontaneously, No clubbing, cyanosis, or edema  NERVOUS SYSTEM:  Grossly non focal.  Psychiatry: Alert and awake.       OBJECTIVE DATA:   Vital Signs Last 24 Hrs  T(C): 36.4 (06 Oct 2021 10:52), Max: 36.7 (06 Oct 2021 04:50)  T(F): 97.6 (06 Oct 2021 10:52), Max: 98.1 (06 Oct 2021 04:50)  HR: 65 (06 Oct 2021 13:24) (50 - 71)  BP: 180/83 (06 Oct 2021 13:24) (143/66 - 180/83)  BP(mean): --  RR: 17 (06 Oct 2021 10:52) (17 - 18)  SpO2: 98% (06 Oct 2021 10:52) (98% - 100%)           Daily     Daily   LABS:                        12.5   5.13  )-----------( 670      ( 05 Oct 2021 07:26 )             39.0             10-06    137  |  106  |  42<H>  ----------------------------<  88  4.0   |  23  |  3.29<H>    Ca    9.7      06 Oct 2021 06:47  Phos  3.3     10-06  Mg     2.3     10-06       Interval Radiology studies: reviewed by me    < from: TTE Echo Complete w/o Contrast w/ Doppler (10.05.21 @ 14:11) >   1. Left ventricular ejection fraction, by visual estimation, is 65 to 70%.   2. Normal global left ventricular systolic function.   3. Severely enlarged left atrium.   4. Elevated left ventricular end-diastolic pressure.   5. Increased relative wall thickness with normal mass index consistent with left ventricular concentric remodeling.   6. Spectral Doppler shows restrictive pattern of left ventricular myocardial filling (Grade III diastolic dysfunction).   7. Normal right ventricular size and function.   8. Right atrial enlargement.   9. Mild mitral valve regurgitation.  10. Moderate mitral annular calcification.  11. Mild aortic regurgitation.  12. Sclerotic aortic valve with normal opening.  13. Estimated pulmonary artery systolic pressure is 50.0 mmHg assuming a right atrial pressure of 5 mmHg, which is consistent with mild pulmonary hypertension.  14. Color flow doppler and intravenous injection of agitated saline demonstrates the presence of an intact intra atrial septum.    < end of copied text >  < from: US Duplex Carotid Arteries Complete, Bilateral (10.06.21 @ 12:41) >   No significant hemodynamic stenosis of either carotid artery.    < end of copied text >    Tele reviewed by me showed sinus bradycardia     MEDICATIONS  (STANDING):  aspirin enteric coated 81 milliGRAM(s) Oral daily  atorvastatin 20 milliGRAM(s) Oral at bedtime  clopidogrel Tablet 75 milliGRAM(s) Oral daily  heparin   Injectable 5000 Unit(s) SubCutaneous every 12 hours  hydrALAZINE 100 milliGRAM(s) Oral three times a day  NIFEdipine XL 60 milliGRAM(s) Oral daily

## 2021-10-06 NOTE — OCCUPATIONAL THERAPY INITIAL EVALUATION ADULT - LIVES WITH, PROFILE
a roommate on the 2nd of a private house with a flight of stair with hand rail. All living amenities are located on one level. The bathroom has a tub/shower combination, fixed shower head  and standard toilet. a roommate on the 2nd of a private house with a flight of stairs with hand rail. All living amenities are located on one level. The bathroom has a tub/shower combination, fixed shower head  and standard toilet.

## 2021-10-06 NOTE — PHYSICAL THERAPY INITIAL EVALUATION ADULT - ADDITIONAL COMMENTS
Per patient, lives alone in 2nd floor of private home. All amenities at same level. Denies use of mobility devices prior to admission. Reports independent in all mobility and self-care.
Per patient, lives with roommate on 2nd floor of private home. All amenities at same level. Denies use of mobility devices prior to admission. Reports independent in all mobility and self-care. Pt states he his roommate cannot assist pt at home.

## 2021-10-06 NOTE — SWALLOW BEDSIDE ASSESSMENT ADULT - SWALLOW EVAL: PATIENT/FAMILY GOALS STATEMENT
pt reported "I didn't trip" ... "I just passed out" referring to reason for admission to the hospital

## 2021-10-06 NOTE — OCCUPATIONAL THERAPY INITIAL EVALUATION ADULT - PERTINENT HX OF CURRENT PROBLEM, REHAB EVAL
Pt is a 71 y/o male who presented to ER due to s/p fall with acute onset of neurological deficit. Pt is diagnosed with CVA. MRI on 10/4/21 results confirm  Acute/subacute right-sided MCA distribution infarct with associated cytotoxic edema and without hemorrhagic transformation.

## 2021-10-06 NOTE — PHYSICAL THERAPY INITIAL EVALUATION ADULT - STRENGTHENING, PT EVAL
GOAL: To demonstrate gross muscle strength of 4/5 or greater to both lower limbs, to enable transfers and gait across all surfaces, elevated and flat, without undue fatigue or falls risk but with good lower limb control and balance.
Patient will improve strength by 1 grade to improve overall functional mobility including gait, transfers, bed mobility and decrease risk of falls.

## 2021-10-06 NOTE — SWALLOW BEDSIDE ASSESSMENT ADULT - SLP GENERAL OBSERVATIONS
pt seen bedside, alert and oriented to 2-3. pt responded to simple questions for assessment, he verbalized needs and was able to follow one step directions. noted good speech intelligibility with low volume at times

## 2021-10-06 NOTE — PROGRESS NOTE ADULT - ASSESSMENT
70 year old male w/ CKD IV, HTN, CAD status post presented w/ multiple falls.     Multiple falls  - CT head showed no acute intracranial hemorrhage or acute territorial infarction w/ extensive chronic microvascular ischemic changes and several chronic infarctions  - MRI showed an acute/subacute right-sided MCA distribution infarct with associated cytotoxic edema and without hemorrhagic transformation w/ multiple additional chronic lacunar infarcts and similar-appearing extensive chronic white matter microvascular type changes   - c/w ASA and Lipitor, Plavix  - ECHO and carotid duplex reviewed. Neuro checks. Tele reviewed.   -  Contacted Dr Van.     HTN  - controlled. cont current meds and monitor.     CKD stage 4. Unknown baseline creatinine.      Prophylaxis:  DVT: Heparin  GI: PO diet     Niece is aware of her uncle's stroke.   COVID repeat PCR for placement.

## 2021-10-06 NOTE — PHYSICAL THERAPY INITIAL EVALUATION ADULT - GENERAL OBSERVATIONS, REHAB EVAL
Pt found semi supine in bed in NAD, +hep lock, agreeable to PT re-catia and RN Juan aware.
Patient supine in bed. (+) Cardiac monitor, PIV lock. Stable vital signs as charted. Cachectic.

## 2021-10-06 NOTE — PHYSICAL THERAPY INITIAL EVALUATION ADULT - IMPAIRMENTS CONTRIBUTING TO GAIT DEVIATIONS, PT EVAL
ataxic/impaired balance/narrow base of support/decreased strength
impaired balance/impaired postural control/decreased strength

## 2021-10-06 NOTE — PHYSICAL THERAPY INITIAL EVALUATION ADULT - IMPAIRED TRANSFERS: SIT/STAND, REHAB EVAL
impaired balance/narrow base of support/decreased strength
impaired balance/impaired postural control/decreased strength

## 2021-10-06 NOTE — PHYSICAL THERAPY INITIAL EVALUATION ADULT - MODALITIES TREATMENT COMMENTS
Four Stage Balance Test =8 seconds to parallel stance, unable to perform semi-tandem stance, tandem stance or one-leg stance without loss of balance -- indicating increased falls risk correlated to inability to sustain 10 seconds of tandem stance (Jerome et al, 1995).
CN II-XII grossly intact

## 2021-10-06 NOTE — CONSULT NOTE ADULT - SUBJECTIVE AND OBJECTIVE BOX
HPI: 70 year old man with hx of HTN, CKD, and HLD presenting with a fall. Patient says he passed out when he fell. He denies focal weakness at the time. MRI brain showed a right MCA stroke. He denies prior history of stroke.     PMHx: HTN, HLD  PSHx: none  FHx: none  Social Hx: current smoker, no etoh, no illicit drug use  Allergies: NKDA  Meds: See EMR  ROS: syncope, fall    Vitals: Temp 97.6F   RR 17    HR 71    /77  General: NAD  Neuro Exam: AOx2. Cannot recall the month. Mild psychomotor slowing. Cannot recall the President's name. No dysarthria. No aphasia. EOM intact. PERRL. BTT. No facial droop. Tongue is midline. Palate elevates symmetrically. Shoulder shrug is intact. Moving all four extremities. Finger to nose and heel to shin intact. Reflexes symmetrically. Toes are down. Gait exam deferred.     MRI brain, Echo, carotid doppler, and labs reviewed
done

## 2021-10-06 NOTE — PHYSICAL THERAPY INITIAL EVALUATION ADULT - DISCHARGE DISPOSITION, PT EVAL
Sub-Acute Rehab to further improve strength, balance and falls prevention. Patient demonstrates higher level of functional assistance on this encounter, compared to status pre-admission.
acute rehab

## 2021-10-06 NOTE — OCCUPATIONAL THERAPY INITIAL EVALUATION ADULT - TRANSFER TRAINING, PT EVAL
Pt will transfer to all surfaces, safely and independently with the  least restrictive adaptive device within 4-6 weeks.

## 2021-10-06 NOTE — PHYSICAL THERAPY INITIAL EVALUATION ADULT - CRITERIA FOR SKILLED THERAPEUTIC INTERVENTIONS
impairments found/functional limitations in following categories/risk reduction/prevention/anticipated equipment needs at discharge
impairments found/functional limitations in following categories/risk reduction/prevention/rehab potential/therapy frequency/predicted duration of therapy intervention/anticipated discharge recommendation

## 2021-10-06 NOTE — PHYSICAL THERAPY INITIAL EVALUATION ADULT - GAIT TRAINING, PT EVAL
GOAL: Patient will demonstrate independent and safe gait indoors with appropriate mobility/adaptive devices for =/> 500 feet, in 4 weeks.
Pt will be able to ambulate independently using assistive device up to 200 ft or more, be able to negotiate steps safely observing proper gait, posture and prevent falls.

## 2021-10-06 NOTE — OCCUPATIONAL THERAPY INITIAL EVALUATION ADULT - SOCIAL CONCERNS
Pt voiced concerns about his recovery at home./Complex psychosocial needs/coping issues Pt voiced concerns about his recovery at home. due to lack of support/Complex psychosocial needs/coping issues

## 2021-10-06 NOTE — OCCUPATIONAL THERAPY INITIAL EVALUATION ADULT - PLANNED THERAPY INTERVENTIONS, OT EVAL
energy conservation techniques/ADL retraining/IADL retraining/balance training/bed mobility training/neuromuscular re-education/parent/caregiver training.../strengthening/transfer training

## 2021-10-06 NOTE — PHYSICAL THERAPY INITIAL EVALUATION ADULT - BALANCE TRAINING, PT EVAL
GOAL: Patient will demonstrate independent performance of ADLS c low falls risk with appropriate mobility/adaptive devices, in 4 weeks.
Independent sitting, transfers, standing and ambulation with good balance using appropriate assistive device and prevent falls.

## 2021-10-06 NOTE — PHYSICAL THERAPY INITIAL EVALUATION ADULT - PLANNED THERAPY INTERVENTIONS, PT EVAL
balance training/gait training/neuromuscular re-education/strengthening
balance training/bed mobility training/gait training/strengthening/transfer training

## 2021-10-06 NOTE — PHYSICAL THERAPY INITIAL EVALUATION ADULT - GAIT DEVIATIONS NOTED, PT EVAL
decreased lester/decreased step length/decreased stride length
increased time in double stance/decreased step length

## 2021-10-06 NOTE — PHYSICAL THERAPY INITIAL EVALUATION ADULT - PERTINENT HX OF CURRENT PROBLEM, REHAB EVAL
70 year old man with hx of HTN, CKD, and HLD presenting with a fall. Patient says he passed out when he fell. He denies focal weakness at the time. MRI brain now showing a right MCA acute / subacute stroke.
10/1 Patient brought in due to falls at home. CT showed no acute changes but (+) small chronic infarction in the (R) cerebellar hemisphere, (L) thalamic lacunar infarct.

## 2021-10-06 NOTE — CONSULT NOTE ADULT - ASSESSMENT
Acute/subacute right MCA stroke  CKD  HTN  HLD  Tobacco abuse    - aspirin, Plavix, and low dose Lipitor for secondary stroke prevention. LDL is 25.  - MRA head wo mac  - EEG to rule out seizure  - neuro stable  - PT    Thank you for the consult.

## 2021-10-06 NOTE — SWALLOW BEDSIDE ASSESSMENT ADULT - H & P REVIEW
70 year old male PMH HTN, "heart problems" presenting for multiple falls since yesterday.  Patient is poor historian and is not providing context about what is causing the falls. Thinks he struck his head. Not sure of LOC. Poor historian, denies any meds at home, denies any medical problems. Denies all other symptoms. Prefers to sleep/yes

## 2021-10-06 NOTE — SWALLOW BEDSIDE ASSESSMENT ADULT - COMMENTS
MRI head no contrast 10/4/2021IMPRESSION: Acute/subacute right-sided MCA distribution infarct with associated cytotoxic edema and without hemorrhagic transformation.  Multiple additional chronic lacunar infarcts and similar-appearing extensive chronic white matter microvascular type changes, as discussed.

## 2021-10-07 PROCEDURE — 99232 SBSQ HOSP IP/OBS MODERATE 35: CPT

## 2021-10-07 PROCEDURE — 95816 EEG AWAKE AND DROWSY: CPT | Mod: 26

## 2021-10-07 RX ORDER — ISOSORBIDE MONONITRATE 60 MG/1
60 TABLET, EXTENDED RELEASE ORAL DAILY
Refills: 0 | Status: DISCONTINUED | OUTPATIENT
Start: 2021-10-07 | End: 2021-10-09

## 2021-10-07 RX ADMIN — HEPARIN SODIUM 5000 UNIT(S): 5000 INJECTION INTRAVENOUS; SUBCUTANEOUS at 05:20

## 2021-10-07 RX ADMIN — Medication 100 MILLIGRAM(S): at 05:21

## 2021-10-07 RX ADMIN — Medication 100 MILLIGRAM(S): at 21:25

## 2021-10-07 RX ADMIN — Medication 0.1 MILLIGRAM(S): at 21:25

## 2021-10-07 RX ADMIN — Medication 0.1 MILLIGRAM(S): at 05:20

## 2021-10-07 RX ADMIN — ATORVASTATIN CALCIUM 20 MILLIGRAM(S): 80 TABLET, FILM COATED ORAL at 21:25

## 2021-10-07 RX ADMIN — Medication 100 MILLIGRAM(S): at 13:29

## 2021-10-07 RX ADMIN — Medication 81 MILLIGRAM(S): at 12:20

## 2021-10-07 RX ADMIN — CLOPIDOGREL BISULFATE 75 MILLIGRAM(S): 75 TABLET, FILM COATED ORAL at 12:20

## 2021-10-07 RX ADMIN — HEPARIN SODIUM 5000 UNIT(S): 5000 INJECTION INTRAVENOUS; SUBCUTANEOUS at 17:30

## 2021-10-07 RX ADMIN — Medication 0.1 MILLIGRAM(S): at 13:29

## 2021-10-07 RX ADMIN — Medication 60 MILLIGRAM(S): at 05:20

## 2021-10-07 RX ADMIN — ISOSORBIDE MONONITRATE 60 MILLIGRAM(S): 60 TABLET, EXTENDED RELEASE ORAL at 12:20

## 2021-10-07 NOTE — EEG REPORT - NS EEG TEXT BOX
Routine 21-channel digital EEG was obtained to rule out any seizure activity or focal abnormalities.    FINDINGS: Background rhythm during awake stage shows well-organized, well-developed, average voltage 8.5  hertz alpha activity in the posterior regions. It blocks with eye opening and it is bilaterally synchronous and symmetrical. No spike-and-wave discharges or any lateralizing abnormalities are seen. Photic stimulation did not produce any abnormalities. No abnormalities were found during the procedure. Intermittent EMG artifacts were seen. Stage II sleep was not achieved.    IMPRESSION: Normal awake study. No epileptiform discharges or any other paroxysmal activities or focal abnormalities seen. Clinical correlation is recommended.    Raysa Jackson DO

## 2021-10-07 NOTE — PROGRESS NOTE ADULT - SUBJECTIVE AND OBJECTIVE BOX
CHIEF COMPLAINT: Follow up of acute stroke   No new focal deficit   no fever   no n/v/d      PHYSICAL EXAM:    GENERAL: Moderately built, no acute distress   CHEST/LUNG: Clear to ausculation bilaterally, no wheezing, no crackles   HEART: Regular rate and rhythm; No murmurs, rubs  ABDOMEN: Soft, Nontender, Nondistended; Bowel sounds present  EXTREMITIES:  Moving all four extremities spontaneously, No clubbing, cyanosis, or edema  NERVOUS SYSTEM:  Grossly non focal.  Psychiatry: Alert and awake.       OBJECTIVE DATA:     Vital Signs Last 24 Hrs  T(C): 36.8 (07 Oct 2021 10:56), Max: 36.8 (07 Oct 2021 10:56)  T(F): 98.2 (07 Oct 2021 10:56), Max: 98.2 (07 Oct 2021 10:56)  HR: 53 (07 Oct 2021 10:56) (50 - 75)  BP: 138/72 (07 Oct 2021 10:56) (138/72 - 186/96)  BP(mean): --  RR: 18 (07 Oct 2021 10:56) (17 - 18)  SpO2: 100% (07 Oct 2021 10:56) (97% - 100%)           Daily     Daily Weight in k.8 (07 Oct 2021 04:53)  LABS:            10-06    137  |  106  |  42<H>  ----------------------------<  88  4.0   |  23  |  3.29<H>    Ca    9.7      06 Oct 2021 06:47  Phos  3.3     10-06  Mg     2.3     10-06                       < from: MR Angio Head No Cont (10.06.21 @ 18:15) >    1. 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  2. Mild narrowing of the proximal right middle cerebral artery in the M1 region.  3. The vessels are otherwise patent, as outlined above.    < end of copied text >    MEDICATIONS  (STANDING):  aspirin enteric coated 81 milliGRAM(s) Oral daily  atorvastatin 20 milliGRAM(s) Oral at bedtime  cloNIDine 0.1 milliGRAM(s) Oral three times a day  clopidogrel Tablet 75 milliGRAM(s) Oral daily  heparin   Injectable 5000 Unit(s) SubCutaneous every 12 hours  hydrALAZINE 100 milliGRAM(s) Oral three times a day  isosorbide   mononitrate ER Tablet (IMDUR) 60 milliGRAM(s) Oral daily  NIFEdipine XL 60 milliGRAM(s) Oral daily    MEDICATIONS  (PRN):

## 2021-10-07 NOTE — PROGRESS NOTE ADULT - SUBJECTIVE AND OBJECTIVE BOX
Neurology Progress Note    No acute events.     Neuro exam: AOx3. Follows commands. No dysarthria. No facial droop. PERRL. Moving all four extremities.     MRI brain- acute/subacute right MCA stroke  MRA head- 8.9 x 7.3 mm right sided PCOMM aneurysm  LDL- 25  HgbA1c- 5.5  Echo- normal EF    A/P:  Acute/subacute right MCA stroke  Right sided PCOMM aneurysm  CKD  HTN  HLD  Tobacco abuse    - Neurosurgery consult. Will likely need intervention for aneurysm after acute phase of stroke.   - aspirin, Plavix, and low dose Lipitor for secondary stroke prevention. LDL is 25.  - EEG to rule out seizure  - neuro stable  - discharge planning

## 2021-10-07 NOTE — PROGRESS NOTE ADULT - ASSESSMENT
70 year old male w/ CKD IV, HTN, CAD status post presented w/ multiple falls.     Multiple falls  - CT head showed no acute intracranial hemorrhage or acute territorial infarction w/ extensive chronic microvascular ischemic changes and several chronic infarctions  - MRI showed an acute/subacute right-sided MCA distribution infarct with associated cytotoxic edema and without hemorrhagic transformation w/ multiple additional chronic lacunar infarcts and similar-appearing extensive chronic white matter microvascular type changes   - c/w ASA and Lipitor, Plavix  - ECHO and carotid duplex reviewed. Discussed with Dr Van about aneurysm and then contacted Dr Leigh>>> she recommended to follow Dr Benton. Called his office twice and left my cellphone>> waiting to hear back from him.     HTN  - controlled. cont current meds and monitor.     CKD stage 4. Unknown baseline creatinine.      Prophylaxis:  DVT: Heparin  GI: PO diet     COVID repeat negative.      70 year old male w/ CKD IV, HTN, CAD status post presented w/ multiple falls.     Multiple falls  - CT head showed no acute intracranial hemorrhage or acute territorial infarction w/ extensive chronic microvascular ischemic changes and several chronic infarctions  - MRI showed an acute/subacute right-sided MCA distribution infarct with associated cytotoxic edema and without hemorrhagic transformation w/ multiple additional chronic lacunar infarcts and similar-appearing extensive chronic white matter microvascular type changes   - c/w ASA and Lipitor, Plavix  - ECHO and carotid duplex reviewed. Discussed with Dr Van about aneurysm and then contacted Dr Leigh>>> she recommended to follow Dr Benton. Called his office twice and left my cellphone>> waiting to hear back from him.     HTN  - uncontrolled. Add imdur. cont current meds and monitor.     CKD stage 4. Unknown baseline creatinine.      Prophylaxis:  DVT: Heparin  GI: PO diet     COVID repeat negative.

## 2021-10-08 ENCOUNTER — TRANSCRIPTION ENCOUNTER (OUTPATIENT)
Age: 71
End: 2021-10-08

## 2021-10-08 PROCEDURE — 99232 SBSQ HOSP IP/OBS MODERATE 35: CPT

## 2021-10-08 RX ADMIN — Medication 100 MILLIGRAM(S): at 13:46

## 2021-10-08 RX ADMIN — ISOSORBIDE MONONITRATE 60 MILLIGRAM(S): 60 TABLET, EXTENDED RELEASE ORAL at 11:17

## 2021-10-08 RX ADMIN — CLOPIDOGREL BISULFATE 75 MILLIGRAM(S): 75 TABLET, FILM COATED ORAL at 11:17

## 2021-10-08 RX ADMIN — Medication 0.1 MILLIGRAM(S): at 21:00

## 2021-10-08 RX ADMIN — Medication 81 MILLIGRAM(S): at 11:17

## 2021-10-08 RX ADMIN — Medication 100 MILLIGRAM(S): at 05:49

## 2021-10-08 RX ADMIN — HEPARIN SODIUM 5000 UNIT(S): 5000 INJECTION INTRAVENOUS; SUBCUTANEOUS at 05:49

## 2021-10-08 RX ADMIN — HEPARIN SODIUM 5000 UNIT(S): 5000 INJECTION INTRAVENOUS; SUBCUTANEOUS at 17:13

## 2021-10-08 RX ADMIN — Medication 0.1 MILLIGRAM(S): at 05:49

## 2021-10-08 RX ADMIN — Medication 0.1 MILLIGRAM(S): at 13:57

## 2021-10-08 RX ADMIN — Medication 60 MILLIGRAM(S): at 05:49

## 2021-10-08 RX ADMIN — Medication 100 MILLIGRAM(S): at 21:00

## 2021-10-08 RX ADMIN — ATORVASTATIN CALCIUM 20 MILLIGRAM(S): 80 TABLET, FILM COATED ORAL at 21:00

## 2021-10-08 NOTE — PROGRESS NOTE ADULT - SUBJECTIVE AND OBJECTIVE BOX
Neurology Progress Note    No acute events.     Neuro exam: AOx3. Follows commands. No dysarthria. No facial droop. PERRL. Moving all four extremities.     MRI brain- acute/subacute right MCA stroke  MRA head- 8.9 x 7.3 mm right sided PCOMM aneurysm  LDL- 25  HgbA1c- 5.5  Echo- normal EF    A/P:  Acute/subacute right MCA stroke  Right sided PCOMM aneurysm  CKD  HTN  HLD  Tobacco abuse    - Neurosurgery consult. Will likely need intervention for aneurysm after acute phase of stroke.   - aspirin, Plavix, and low dose Lipitor for secondary stroke prevention. LDL is 25.  - EEG is normal  - neuro stable  - discharge planning

## 2021-10-08 NOTE — DISCHARGE NOTE PROVIDER - HOSPITAL COURSE
70 year old male w/ CKD IV, HTN, CAD status post presented w/ multiple falls.     Multiple falls and acute right MCA territory ischemic infarct.  - CT head showed no acute intracranial hemorrhage or acute territorial infarction w/ extensive chronic microvascular ischemic changes and several chronic infarctions  - MRI showed an acute/subacute right-sided MCA distribution infarct with associated cytotoxic edema and without hemorrhagic transformation w/ multiple additional chronic lacunar infarcts and similar-appearing extensive chronic white matter microvascular type changes   - c/w ASA and Lipitor, Plavix  - ECHO and carotid duplex reviewed. Discussed with Dr Van about aneurysm and then contacted Dr Leigh x 2. I called transfer center and spoke with Dr Greenberg today and he accepted patient for transfer. Pending bed availability at [facility].    HTN  - Better controlled. cont current meds and monitor.     CKD stage 4. Unknown baseline creatinine.      Prophylaxis:  DVT: Heparin  GI: PO diet    Follow discharge instructions at accepting facility.   DC time spent 38 mins      70 year old male w/ CKD IV, HTN, CAD status post presented w/ multiple falls.     Multiple falls and acute right MCA territory ischemic infarct.  - CT head showed no acute intracranial hemorrhage or acute territorial infarction w/ extensive chronic microvascular ischemic changes and several chronic infarctions  - MRI showed an acute/subacute right-sided MCA distribution infarct with associated cytotoxic edema and without hemorrhagic transformation w/ multiple additional chronic lacunar infarcts and similar-appearing extensive chronic white matter microvascular type changes   - c/w ASA and Lipitor, Plavix  - ECHO and carotid duplex reviewed. Discussed with Dr Van about aneurysm and then contacted Dr Leigh x 2. I called transfer center and spoke with Dr Greenberg today and he accepted patient for transfer. Pending bed availability at [facility].    HTN  - Better controlled. cont current meds and monitor.     CKD stage 4. Unknown baseline creatinine.      Prophylaxis:  DVT: Heparin  GI: PO diet    Follow discharge instructions at accepting facility.   DC time spent 38 mins.    NIHSS stroke scale on arrival was zero.

## 2021-10-08 NOTE — DISCHARGE NOTE PROVIDER - CARE PROVIDER_API CALL
follow dc instructions at accepting faciliity,   Phone: (   )    -  Fax: (   )    -  Follow Up Time:

## 2021-10-08 NOTE — DISCHARGE NOTE PROVIDER - NSDCMRMEDTOKEN_GEN_ALL_CORE_FT
aspirin 81 mg oral tablet: 1 tab(s) orally once a day  atorvastatin 20 mg oral tablet: 1 tab(s) orally once a day  cloNIDine 0.1 mg oral tablet: 1 tab(s) orally 3 times a day  hydrALAZINE 100 mg oral tablet: 1 tab(s) orally 3 times a day  isosorbide mononitrate 60 mg oral tablet, extended release: 1 tab(s) orally once a day (in the morning)  Nifedical XL 60 mg oral tablet, extended release: 1 tab(s) orally once a day  Plavix 75 mg oral tablet: 1 tab(s) orally once a day  rolling walker after coiling of Rt Pcomm aneurysm:

## 2021-10-08 NOTE — CHART NOTE - NSCHARTNOTEFT_GEN_A_CORE
Nutrition follow-up note:    Pt adm w/frequent falls, R/O CVA. PMHx includes HTN, Cardiac history. Pt s/p Swallow  evaluation (10/6) w/recommendation for Dysphagia 3, Soft, thin fluids. Pt noted w/good appetite & PO intake. No N/V/D/C reported. Per chart pt w/CKD stage IV. Noted elevated BUN/Cr. K+ and P remains WDL. Suggest liberalize diet and d/c K/P restrictions.     Factors impacting intake: [x ] none [ ] nausea  [ ] vomiting [ ] diarrhea [ ] constipation  [ ]chewing problems [ ] swallowing issues  [ ] other:     Diet Prescription: Diet, Renal Restrictions:   For patients receiving Renal Replacement - No Protein Restr, No Conc K, No Conc Phos, Low Sodium  Supplement Feeding Modality:  Oral  Suplena Cans or Servings Per Day:  1       Frequency:  Daily (10-04-21 @ 14:57)    Intake: 100% (occasionally less).    Current Weight: (10/8) 62.5 kg, (10/7) 60.8 kg, (10/5) 62.5 kg, (10/4) 62.5 kg, (10/2) 61.1 kg, (10/1) 72.6 kg??- likely inaccurate.  % Weight Change: 2% gain (1.4 kg) since (10/2).  No edema documented.    Pertinent Medications: MEDICATIONS  (STANDING):  aspirin enteric coated 81 milliGRAM(s) Oral daily  atorvastatin 20 milliGRAM(s) Oral at bedtime  cloNIDine 0.1 milliGRAM(s) Oral three times a day  clopidogrel Tablet 75 milliGRAM(s) Oral daily  heparin   Injectable 5000 Unit(s) SubCutaneous every 12 hours  hydrALAZINE 100 milliGRAM(s) Oral three times a day  isosorbide   mononitrate ER Tablet (IMDUR) 60 milliGRAM(s) Oral daily  NIFEdipine XL 60 milliGRAM(s) Oral daily    MEDICATIONS  (PRN):    Pertinent Labs: 10-06 Na137 mmol/L Glu 88 mg/dL K+ 4.0 mmol/L Cr  3.29 mg/dL<H> BUN 42 mg/dL<H> 10-06 Phos 3.3 mg/dL 10-01 Alb 3.8 g/dL 10-06 Chol 101 mg/dL LDL --    HDL 61 mg/dL Trig 74 mg/dL    10-06-21 @ 09:39A1C 5.5    Skin: no pressure ulcers. L-upper back cyst.    Estimated Needs:   [x ] no change since previous assessment for energy (10/4)  [x ] recalculated: for protein & fluids due to CKD IV:    based on IBW (78 kg):   protein: 0.8-1.0 gm/kg= 62-78 gm /day.  fluid: 25-30 ml/kg= 0242-5343 ml/day.    Previous Nutrition Diagnosis:   Malnutrition Severe Malnutrition In the context of chronic illness.     Etiology Inadequate energy/protein intake ; increased energy/protein needs related to CKD 4.     Signs/Symptoms Physical Findings: Severe subcutaneous fat loss and muscle wasting.     Goal/Expected Outcome Pt will Meet calorie and protein needs > 75% via meals / supplements.    Nutrition Diagnosis is [x ] ongoing  [ ] resolved [ ] not applicable     New Nutrition Diagnosis: [ x] not applicable      Interventions:   Recommend  [x ] Change Diet To: Low Sodium, Dysphagia 3, Soft, thin fluids.  [x ] Nutrition Supplement: Continue Suplena x 1/day (425 kcal + 10 gm protein).   [ ] Nutrition Support  [ ] Other:     Monitoring and Evaluation:   [x ] PO intake [ x ] Tolerance to diet prescription [ x ] weights [ x ] labs[ x ] follow up per protocol  [ ] other:

## 2021-10-09 ENCOUNTER — TRANSCRIPTION ENCOUNTER (OUTPATIENT)
Age: 71
End: 2021-10-09

## 2021-10-09 ENCOUNTER — INPATIENT (INPATIENT)
Facility: HOSPITAL | Age: 71
LOS: 24 days | Discharge: NURSING, REHAB OR LONG-TERM CARE | DRG: 25 | End: 2021-11-03
Attending: INTERNAL MEDICINE | Admitting: NEUROLOGICAL SURGERY
Payer: MEDICARE

## 2021-10-09 VITALS
OXYGEN SATURATION: 99 % | HEART RATE: 66 BPM | TEMPERATURE: 98 F | RESPIRATION RATE: 14 BRPM | SYSTOLIC BLOOD PRESSURE: 189 MMHG | DIASTOLIC BLOOD PRESSURE: 105 MMHG

## 2021-10-09 VITALS
TEMPERATURE: 98 F | DIASTOLIC BLOOD PRESSURE: 79 MMHG | RESPIRATION RATE: 18 BRPM | SYSTOLIC BLOOD PRESSURE: 143 MMHG | OXYGEN SATURATION: 97 % | HEART RATE: 60 BPM

## 2021-10-09 DIAGNOSIS — I67.1 CEREBRAL ANEURYSM, NONRUPTURED: ICD-10-CM

## 2021-10-09 DIAGNOSIS — Z01.818 ENCOUNTER FOR OTHER PREPROCEDURAL EXAMINATION: ICD-10-CM

## 2021-10-09 DIAGNOSIS — I72.9 ANEURYSM OF UNSPECIFIED SITE: ICD-10-CM

## 2021-10-09 DIAGNOSIS — N18.4 CHRONIC KIDNEY DISEASE, STAGE 4 (SEVERE): ICD-10-CM

## 2021-10-09 DIAGNOSIS — I10 ESSENTIAL (PRIMARY) HYPERTENSION: ICD-10-CM

## 2021-10-09 PROBLEM — R29.6 REPEATED FALLS: Chronic | Status: ACTIVE | Noted: 2021-10-01

## 2021-10-09 LAB
ALBUMIN SERPL ELPH-MCNC: 4.5 G/DL — SIGNIFICANT CHANGE UP (ref 3.3–5)
ALP SERPL-CCNC: 110 U/L — SIGNIFICANT CHANGE UP (ref 40–120)
ALT FLD-CCNC: 7 U/L — LOW (ref 10–45)
ANION GAP SERPL CALC-SCNC: 16 MMOL/L — SIGNIFICANT CHANGE UP (ref 5–17)
APTT BLD: 30.7 SEC — SIGNIFICANT CHANGE UP (ref 27.5–35.5)
AST SERPL-CCNC: 17 U/L — SIGNIFICANT CHANGE UP (ref 10–40)
BILIRUB SERPL-MCNC: 0.3 MG/DL — SIGNIFICANT CHANGE UP (ref 0.2–1.2)
BLD GP AB SCN SERPL QL: NEGATIVE — SIGNIFICANT CHANGE UP
BUN SERPL-MCNC: 46 MG/DL — HIGH (ref 7–23)
CALCIUM SERPL-MCNC: 10.2 MG/DL — SIGNIFICANT CHANGE UP (ref 8.4–10.5)
CHLORIDE SERPL-SCNC: 102 MMOL/L — SIGNIFICANT CHANGE UP (ref 96–108)
CO2 SERPL-SCNC: 21 MMOL/L — LOW (ref 22–31)
CREAT SERPL-MCNC: 3.03 MG/DL — HIGH (ref 0.5–1.3)
GLUCOSE SERPL-MCNC: 98 MG/DL — SIGNIFICANT CHANGE UP (ref 70–99)
INR BLD: 1.02 RATIO — SIGNIFICANT CHANGE UP (ref 0.88–1.16)
PA ADP PRP-ACNC: 37 PRU — LOW (ref 194–417)
PLATELET RESPONSE ASPIRIN RESULT: 375 ARU — SIGNIFICANT CHANGE UP (ref 350–700)
POTASSIUM SERPL-MCNC: 4.5 MMOL/L — SIGNIFICANT CHANGE UP (ref 3.5–5.3)
POTASSIUM SERPL-SCNC: 4.5 MMOL/L — SIGNIFICANT CHANGE UP (ref 3.5–5.3)
PROT SERPL-MCNC: 8.2 G/DL — SIGNIFICANT CHANGE UP (ref 6–8.3)
PROTHROM AB SERPL-ACNC: 12.2 SEC — SIGNIFICANT CHANGE UP (ref 10.6–13.6)
RH IG SCN BLD-IMP: POSITIVE — SIGNIFICANT CHANGE UP
SARS-COV-2 RNA SPEC QL NAA+PROBE: SIGNIFICANT CHANGE UP
SODIUM SERPL-SCNC: 139 MMOL/L — SIGNIFICANT CHANGE UP (ref 135–145)

## 2021-10-09 PROCEDURE — 99232 SBSQ HOSP IP/OBS MODERATE 35: CPT | Mod: GC

## 2021-10-09 PROCEDURE — 99239 HOSP IP/OBS DSCHRG MGMT >30: CPT

## 2021-10-09 PROCEDURE — 99223 1ST HOSP IP/OBS HIGH 75: CPT

## 2021-10-09 RX ORDER — OXYCODONE HYDROCHLORIDE 5 MG/1
10 TABLET ORAL EVERY 4 HOURS
Refills: 0 | Status: DISCONTINUED | OUTPATIENT
Start: 2021-10-09 | End: 2021-10-09

## 2021-10-09 RX ORDER — POLYETHYLENE GLYCOL 3350 17 G/17G
17 POWDER, FOR SOLUTION ORAL AT BEDTIME
Refills: 0 | Status: DISCONTINUED | OUTPATIENT
Start: 2021-10-09 | End: 2021-10-12

## 2021-10-09 RX ORDER — ENOXAPARIN SODIUM 100 MG/ML
40 INJECTION SUBCUTANEOUS DAILY
Refills: 0 | Status: DISCONTINUED | OUTPATIENT
Start: 2021-10-09 | End: 2021-10-09

## 2021-10-09 RX ORDER — INFLUENZA VIRUS VACCINE 15; 15; 15; 15 UG/.5ML; UG/.5ML; UG/.5ML; UG/.5ML
0.5 SUSPENSION INTRAMUSCULAR ONCE
Refills: 0 | Status: DISCONTINUED | OUTPATIENT
Start: 2021-10-09 | End: 2021-11-03

## 2021-10-09 RX ORDER — NIFEDIPINE 30 MG
60 TABLET, EXTENDED RELEASE 24 HR ORAL DAILY
Refills: 0 | Status: DISCONTINUED | OUTPATIENT
Start: 2021-10-09 | End: 2021-10-11

## 2021-10-09 RX ORDER — ACETAMINOPHEN 500 MG
650 TABLET ORAL EVERY 6 HOURS
Refills: 0 | Status: DISCONTINUED | OUTPATIENT
Start: 2021-10-09 | End: 2021-11-03

## 2021-10-09 RX ORDER — OXYCODONE HYDROCHLORIDE 5 MG/1
5 TABLET ORAL EVERY 4 HOURS
Refills: 0 | Status: DISCONTINUED | OUTPATIENT
Start: 2021-10-09 | End: 2021-10-09

## 2021-10-09 RX ORDER — SENNA PLUS 8.6 MG/1
2 TABLET ORAL AT BEDTIME
Refills: 0 | Status: DISCONTINUED | OUTPATIENT
Start: 2021-10-09 | End: 2021-11-03

## 2021-10-09 RX ORDER — HYDRALAZINE HCL 50 MG
100 TABLET ORAL THREE TIMES A DAY
Refills: 0 | Status: DISCONTINUED | OUTPATIENT
Start: 2021-10-09 | End: 2021-10-19

## 2021-10-09 RX ORDER — ASPIRIN/CALCIUM CARB/MAGNESIUM 324 MG
81 TABLET ORAL DAILY
Refills: 0 | Status: DISCONTINUED | OUTPATIENT
Start: 2021-10-09 | End: 2021-11-03

## 2021-10-09 RX ORDER — CLOPIDOGREL BISULFATE 75 MG/1
75 TABLET, FILM COATED ORAL DAILY
Refills: 0 | Status: DISCONTINUED | OUTPATIENT
Start: 2021-10-09 | End: 2021-11-03

## 2021-10-09 RX ORDER — ENOXAPARIN SODIUM 100 MG/ML
30 INJECTION SUBCUTANEOUS DAILY
Refills: 0 | Status: DISCONTINUED | OUTPATIENT
Start: 2021-10-09 | End: 2021-10-10

## 2021-10-09 RX ORDER — ISOSORBIDE MONONITRATE 60 MG/1
60 TABLET, EXTENDED RELEASE ORAL DAILY
Refills: 0 | Status: DISCONTINUED | OUTPATIENT
Start: 2021-10-09 | End: 2021-10-20

## 2021-10-09 RX ORDER — ATORVASTATIN CALCIUM 80 MG/1
20 TABLET, FILM COATED ORAL AT BEDTIME
Refills: 0 | Status: DISCONTINUED | OUTPATIENT
Start: 2021-10-09 | End: 2021-11-03

## 2021-10-09 RX ORDER — ASPIRIN/CALCIUM CARB/MAGNESIUM 324 MG
1 TABLET ORAL
Qty: 0 | Refills: 0 | DISCHARGE

## 2021-10-09 RX ADMIN — Medication 81 MILLIGRAM(S): at 11:31

## 2021-10-09 RX ADMIN — Medication 0.1 MILLIGRAM(S): at 21:07

## 2021-10-09 RX ADMIN — CLOPIDOGREL BISULFATE 75 MILLIGRAM(S): 75 TABLET, FILM COATED ORAL at 16:29

## 2021-10-09 RX ADMIN — Medication 100 MILLIGRAM(S): at 06:25

## 2021-10-09 RX ADMIN — ISOSORBIDE MONONITRATE 60 MILLIGRAM(S): 60 TABLET, EXTENDED RELEASE ORAL at 11:31

## 2021-10-09 RX ADMIN — CLOPIDOGREL BISULFATE 75 MILLIGRAM(S): 75 TABLET, FILM COATED ORAL at 11:31

## 2021-10-09 RX ADMIN — SENNA PLUS 2 TABLET(S): 8.6 TABLET ORAL at 21:07

## 2021-10-09 RX ADMIN — Medication 0.1 MILLIGRAM(S): at 06:26

## 2021-10-09 RX ADMIN — Medication 81 MILLIGRAM(S): at 16:29

## 2021-10-09 RX ADMIN — Medication 100 MILLIGRAM(S): at 16:30

## 2021-10-09 RX ADMIN — POLYETHYLENE GLYCOL 3350 17 GRAM(S): 17 POWDER, FOR SOLUTION ORAL at 21:07

## 2021-10-09 RX ADMIN — Medication 100 MILLIGRAM(S): at 20:38

## 2021-10-09 RX ADMIN — Medication 60 MILLIGRAM(S): at 06:26

## 2021-10-09 RX ADMIN — ATORVASTATIN CALCIUM 20 MILLIGRAM(S): 80 TABLET, FILM COATED ORAL at 21:07

## 2021-10-09 RX ADMIN — HEPARIN SODIUM 5000 UNIT(S): 5000 INJECTION INTRAVENOUS; SUBCUTANEOUS at 06:26

## 2021-10-09 NOTE — DISCHARGE NOTE NURSING/CASE MANAGEMENT/SOCIAL WORK - NSDCPEFALRISK_GEN_ALL_CORE
For information on Fall & injury Prevention, visit [email or web address]

## 2021-10-09 NOTE — PROGRESS NOTE ADULT - PROVIDER SPECIALTY LIST ADULT
Hospitalist
Neurology
Hospitalist
Neurology
Hospitalist

## 2021-10-09 NOTE — PROGRESS NOTE ADULT - REASON FOR ADMISSION
Frequent falls r/o CVA

## 2021-10-09 NOTE — CONSULT NOTE ADULT - SUBJECTIVE AND OBJECTIVE BOX
MARIA DE JESUS SIMMS  Male  MRN-[id number]    HPI: 71 yo male with a PMHx of CKD (baseline Cr > 3), HTN, and HLD who presented as a transfer from [facility] for higher level of care. Patient initially presented to [facility] on 10/01 after multiple falls the since the day prior. MRI Head at [facility] showed acute vs subacute R MCA territory infarct with associated cytotoxic edema. MRA H showed multilobulated right-sided P-comm aneurysm directed posteriorly and laterally, measuring 8.9 x 7.3 mm. Patient is admitted to neurosurgery service with plan for conventional angiogram on Monday (10/11).    NIHSS:  MRS:     ROS: All negative except as mentioned in HPI.    PAST MEDICAL & SURGICAL HISTORY:  Frequent falls    Hypertension    Chronic kidney disease, unspecified CKD stage    MEDICATIONS  (STANDING):  aspirin  chewable 81 milliGRAM(s) Oral daily  atorvastatin 20 milliGRAM(s) Oral at bedtime  cloNIDine 0.1 milliGRAM(s) Oral three times a day  clopidogrel Tablet 75 milliGRAM(s) Oral daily  hydrALAZINE 100 milliGRAM(s) Oral three times a day  influenza   Vaccine 0.5 milliLiter(s) IntraMuscular once  isosorbide   mononitrate ER Tablet (IMDUR) 60 milliGRAM(s) Oral daily  NIFEdipine XL 60 milliGRAM(s) Oral daily    Allergies    No Known Allergies    Vital Signs Last 24 Hrs  T(C): 36.4 (09 Oct 2021 14:21), Max: 36.6 (09 Oct 2021 11:20)  T(F): 97.5 (09 Oct 2021 14:21), Max: 97.9 (09 Oct 2021 11:20)  HR: 61 (09 Oct 2021 15:27) (53 - 76)  BP: 181/79 (09 Oct 2021 15:27) (132/63 - 189/105)  RR: 14 (09 Oct 2021 14:21) (14 - 18)  SpO2: 99% (09 Oct 2021 14:21) (97% - 100%)    General Exam:  Constitutional: Lying in bed, NAD.  Head: Normocephalic, atraumatic.  Extremities: No edema.    Neuro Exam:   MS: Alert, eyes open spontaneously. Oriented to self, age, location, month, year. Speech is fluent, not slurred. Able to name objects and repeat. Follows commands.  CN: PERRL. VFF. EOMI. V1-V3 intact. Face symmetric. Tongue midline.   Motor: All extremities antigravity without drift.   Sensory: Intact to LT throughout. No extinction.  Reflexes: 2+ throughout. Babinski absent bilaterally.   Coordination: No dysmetria on FNF or on HTS bilaterally.  Gait: Deferred.    RADIOLOGY:    -10/01 CTH: No acute intracranial hemorrhage or acute territorial infarction. Extensive chronic microvascular ischemic changes and several chronic infarctions as described. Further evaluation may be obtained with MRI of the brain if no contraindications.  -10/04 MRI Head: Acute/subacute right-sided MCA distribution infarct with associated cytotoxic edema and without hemorrhagic transformation. Multiple additional chronic lacunar infarcts and similar-appearing extensive chronic white matter microvascular type changes.  -10/06 MRA H: Multilobulated right-sided P-comm aneurysm directed posteriorly and laterally, measuring 8.9 x 7.3 mm. Mild narrowing of the proximal right middle cerebral artery in the M1 region. The vessels are otherwise patent.  -10/06 US Duplex Carotid Arteries: No significant hemodynamic stenosis of either carotid artery.       MARIA DE JESUS SIMMS  Male  MRN-[id number]    HPI: 71 yo male with a PMHx of CKD (baseline Cr > 3), HTN, and HLD who presented as a transfer from [facility] for higher level of care. Patient initially presented to [facility] on 10/01 after multiple falls the since the day prior. MRI Head at [facility] showed acute vs subacute R MCA territory infarct with associated cytotoxic edema. MRA H showed multilobulated right-sided P-comm aneurysm directed posteriorly and laterally, measuring 8.9 x 7.3 mm. Patient is admitted to neurosurgery service with plan for conventional angiogram on Monday (10/11). Patient is a poor historian. States he is in the hospital because he fell at the grocery, and then several more times at home. Does not know why he fell. States he never hit his head or lost consciousness. Denies HA, dizziness, blurry/double vision, numbness/tingling, weakness.    NIHSS: 2  MRS: ?2    ROS: All negative except as mentioned in HPI.    PAST MEDICAL & SURGICAL HISTORY:  Frequent falls    Hypertension    Chronic kidney disease, unspecified CKD stage    MEDICATIONS  (STANDING):  aspirin  chewable 81 milliGRAM(s) Oral daily  atorvastatin 20 milliGRAM(s) Oral at bedtime  cloNIDine 0.1 milliGRAM(s) Oral three times a day  clopidogrel Tablet 75 milliGRAM(s) Oral daily  hydrALAZINE 100 milliGRAM(s) Oral three times a day  influenza   Vaccine 0.5 milliLiter(s) IntraMuscular once  isosorbide   mononitrate ER Tablet (IMDUR) 60 milliGRAM(s) Oral daily  NIFEdipine XL 60 milliGRAM(s) Oral daily    Allergies    No Known Allergies    Vital Signs Last 24 Hrs  T(C): 36.4 (09 Oct 2021 14:21), Max: 36.6 (09 Oct 2021 11:20)  T(F): 97.5 (09 Oct 2021 14:21), Max: 97.9 (09 Oct 2021 11:20)  HR: 61 (09 Oct 2021 15:27) (53 - 76)  BP: 181/79 (09 Oct 2021 15:27) (132/63 - 189/105)  RR: 14 (09 Oct 2021 14:21) (14 - 18)  SpO2: 99% (09 Oct 2021 14:21) (97% - 100%)    General Exam:  Constitutional: Lying in bed, NAD.  Head: Normocephalic, atraumatic.  Extremities: No edema.    Neuro Exam:   MS: Alert, eyes open spontaneously. Oriented to self, location, month, year. States his age is 49. Speech is fluent, not slurred. Able to name simple objects and repeat. Follows simple commands.  CN: PERRL. VFF. EOMI. V1-V3 intact. Face symmetric. Tongue midline.   Motor: All extremities antigravity without drift.   Sensory: Intact to LT throughout. Extinguishes on the L to simultaneous stimulation.  Coordination: No dysmetria on FNF or on HTS bilaterally.  Gait: Deferred.    RADIOLOGY:    -10/01 CTH: No acute intracranial hemorrhage or acute territorial infarction. Extensive chronic microvascular ischemic changes and several chronic infarctions as described. Further evaluation may be obtained with MRI of the brain if no contraindications.  -10/04 MRI Head: Acute/subacute right-sided MCA distribution infarct with associated cytotoxic edema and without hemorrhagic transformation. Multiple additional chronic lacunar infarcts and similar-appearing extensive chronic white matter microvascular type changes.  -10/06 MRA H: Multilobulated right-sided P-comm aneurysm directed posteriorly and laterally, measuring 8.9 x 7.3 mm. Mild narrowing of the proximal right middle cerebral artery in the M1 region. The vessels are otherwise patent.  -10/06 US Duplex Carotid Arteries: No significant hemodynamic stenosis of either carotid artery.

## 2021-10-09 NOTE — CONSULT NOTE ADULT - ASSESSMENT
Assessment: 69 yo male with a PMHx of CKD (baseline Cr > 3), HTN, and HLD who presented as a transfer from [facility] for higher level of care. Patient initially presented to [facility] on 10/01 after multiple falls the since the day prior. MRI Head at [facility] showed acute vs subacute R MCA territory infarct with associated cytotoxic edema. MRA H showed multilobulated right-sided P-comm aneurysm directed posteriorly and laterally, measuring 8.9 x 7.3 mm. Patient is admitted to neurosurgery service with plan for conventional angiogram on Monday (10/11).    Impression:    Recommendations:  [] Neuro checks Q4HR.  [] Telemetry.  [] Angio next week.  [] c/w ASA 81MG PO QD, Plavix 75MG PO QD.   [] SBP Goal: 140-160.  [x] MRI brain without contrast: results above.  [x] TTE: EF 65-70%, severely enlarged L atrium.   [x] A1c 5.5, LDL 25.  [] Atorvastatin 40MG PO QHS.  [] Send TSH, B12, Folate.  [] PT/OT/ST evaluations.   [] DVT PPx: Lovenox 40MG SC QD.  [] Rest of care per neurosurgery.    Case to be discussed with stroke attending Dr. Pierre.     Assessment: 71 yo male with a PMHx of CKD (baseline Cr > 3), HTN, and HLD who presented as a transfer from [facility] for higher level of care. Patient initially presented to [facility] on 10/01 after multiple falls the since the day prior. MRI Head at [facility] showed acute vs subacute R MCA territory infarct with associated cytotoxic edema. MRA H showed multilobulated right-sided P-comm aneurysm directed posteriorly and laterally, measuring 8.9 x 7.3 mm. Patient is admitted to neurosurgery service with plan for conventional angiogram on Monday (10/11).    Impression: AMS + mild L hemineglect in setting of R MCA territory infarct seen on MRI Head, ESUS.    Recommendations:  [] Neuro checks Q4HR.  [] Telemetry.  [] Angio next week.  [] c/w ASA 81MG PO QD, Plavix 75MG PO QD.   [] SBP Goal: 140-160.  [x] MRI brain without contrast: results above.  [x] TTE: EF 65-70%, severely enlarged L atrium.   [] Will likely need long-term cardiac monitoring.  [x] A1c 5.5, LDL 25.  [] Atorvastatin 40MG PO QHS.  [] Send TSH, B12, Folate.  [] PT/OT/ST evaluations.   [] DVT PPx: Lovenox 40MG SC QD.  [] Rest of care per neurosurgery.    Case to be discussed with stroke attending Dr. Pierre.

## 2021-10-09 NOTE — PATIENT PROFILE ADULT - NSPROGENOTHERPROVIDER_GEN_A_NUR
Quality 226: Preventive Care And Screening: Tobacco Use: Screening And Cessation Intervention: Patient screened for tobacco and never smoked Detail Level: Detailed none

## 2021-10-09 NOTE — CONSULT NOTE ADULT - PROBLEM SELECTOR RECOMMENDATION 3
- uncontrolled at [facility] -- controlled w/ regimen as initiated prior to transfer  - continue Imdur, Hydralazine, Nifedipine, and Clonidine for now w/ close monitoring of hemodynamics  - SBP goals per neurology = 140-160 mmHg  - as above, will appreciate cardiology evaluation

## 2021-10-09 NOTE — CONSULT NOTE ADULT - SUBJECTIVE AND OBJECTIVE BOX
INCOMPLETE NOTE    Jaymie Hernandez M.D.  Division of Hospital Medicine  Available on TEAMS    HISTORY OF PRESENTING ILLNESS: 70yoM w/ PMHx of CKD with a baseline Cr >3, HTN, HLD presented to OSH after multiple falls. He was transferred from OSH for MRI/MRA findings concerning for aneurysm vs. thrombus with multiple infarcts. He is slightly confused, but denies pain, headache, blurry vision, nausea, vomiting, weakness, and changes in sensation.   CTH 10/1 hydro likely chronic, MRI 10/4 right-sided acute vs. subacute MCA infarcts, Carotid U/S 10/6 no sig stenosis, MRA 10/6 R pcomm aneurysm vs thrombus [id number], mild R M1 stenosis.  (09 Oct 2021 16:27)    PAST MEDICAL & SURGICAL HISTORY: Frequent falls; Hypertension; Chronic kidney disease, unspecified CKD stage    SOCIAL HISTORY:     FAMILY HISTORY:     ALLERGIES: No Known Allergies    HOME MEDICATIONS:  aspirin 81 mg oral tablet: 1 tab(s) orally once a day (09 Oct 2021 16:10)  atorvastatin 20 mg oral tablet: 1 tab(s) orally once a day (09 Oct 2021 16:10)  cloNIDine 0.1 mg oral tablet: 1 tab(s) orally 3 times a day (09 Oct 2021 16:10)  hydrALAZINE 100 mg oral tablet: 1 tab(s) orally 3 times a day (09 Oct 2021 16:12)  isosorbide mononitrate 60 mg oral tablet, extended release: 1 tab(s) orally once a day (in the morning) (09 Oct 2021 16:12)  Nifedical XL 60 mg oral tablet, extended release: 1 tab(s) orally once a day (09 Oct 2021 16:12)  Plavix 75 mg oral tablet: 1 tab(s) orally once a day (09 Oct 2021 16:12)    HOSPITAL COURSE:     SUBJECTIVE / ACUTE INTERVAL EVENTS:    Review of Systems  · General Symptoms: no fever; no chills  · Skin Symptoms: no rash; no itching  · Ophthalmologic Symptoms: no scleral injection L; no scleral injection R  · ENMT Symptoms: no throat pain; no dysphagia; no nasal congestion  · Respiratory and Thorax Symptoms: no cough; no wheezing; no pleuritic chest pain  · Cardiovascular Symptoms: no chest pain; no palpitations; no peripheral edema  · Gastrointestinal Symptoms: no nausea; no vomiting; no diarrhea; no constipation; no change in bowel habits; no abdominal pain  · General Genitourinary Symptoms: no flank pain L; no flank pain R; no dysuria  · Musculoskeletal Symptoms: no joint swelling; no neck pain; no back pain  · Neurological: negative  · Psychiatric: negative  · Hematology/Lymphatics: negative  · Endocrine: negative    OBJECTIVE:   Vital Signs Last 24 Hrs  T(F): 97.5 (09 Oct 2021 14:21), Max: 97.9 (09 Oct 2021 11:20)  HR: 61 (09 Oct 2021 15:27) (56 - 76)  BP: 181/79 (09 Oct 2021 15:27) (143/79 - 189/105)  RR: 14 (09 Oct 2021 14:21) (14 - 18)  SpO2: 99% (09 Oct 2021 14:21) (97% - 99%)    Physical Examination:  GEN: thin man, laying in stretcher in NAD  PSYCH: A&Ox3, mood and affect appear appropriate   SKIN: intact, no e/o rash  NEURO: no focal neurologic deficits appreciated; CN II-XII intact, sensation intact B/L, motor 5/5 in all mm groups  EYES: PERRL, anicteric, EOMI, no vertical/horizontal nystagmus  HEAD: NC, AT  NECK: supple  RESPI: no accessory muscle use, B/L air entry, CTAB   CARDIO: regular rate/rhythm, no LE edema B/L  ABD: soft, NT, ND, +BS  EXT: patient able to move all extremities spontaneously  VASC: peripheral pulses palpated    Labs:    PT/INR - ( 09 Oct 2021 17:21 )   PT: 12.2 sec;   INR: 1.02 ratio    PTT - ( 09 Oct 2021 17:21 )  PTT:30.7 sec    Imaging Personally Reviewed:    Consultant(s) Notes Reviewed:    Care Discussed with Consultants/Other Providers:    CURRENT MEDICATIONS  (STANDING):  aspirin  chewable 81 milliGRAM(s) Oral daily  atorvastatin 20 milliGRAM(s) Oral at bedtime  cloNIDine 0.1 milliGRAM(s) Oral three times a day  clopidogrel Tablet 75 milliGRAM(s) Oral daily  enoxaparin Injectable 40 milliGRAM(s) SubCutaneous daily  hydrALAZINE 100 milliGRAM(s) Oral three times a day  influenza   Vaccine 0.5 milliLiter(s) IntraMuscular once  isosorbide   mononitrate ER Tablet (IMDUR) 60 milliGRAM(s) Oral daily  NIFEdipine XL 60 milliGRAM(s) Oral daily  polyethylene glycol 3350 17 Gram(s) Oral at bedtime  senna 2 Tablet(s) Oral at bedtime    CURRENT MEDICATIONS  (PRN):  acetaminophen    Suspension .. 650 milliGRAM(s) Oral every 6 hours PRN Mild Pain (1 - 3)  oxyCODONE    IR 5 milliGRAM(s) Oral every 4 hours PRN Moderate Pain (4 - 6)  oxyCODONE    IR 10 milliGRAM(s) Oral every 4 hours PRN Severe Pain (7 - 10) Jaymie Hernandez M.D.  Division of Hospital Medicine  Available on TEAMS    HISTORY OF PRESENTING ILLNESS: 70yoM w/ PMHx of CKD with a baseline Cr >3, HTN, HLD presented to OSH after multiple falls. He was transferred from OSH for MRI/MRA findings concerning for aneurysm vs. thrombus with multiple infarcts. He is slightly confused, but denies pain, headache, blurry vision, nausea, vomiting, weakness, and changes in sensation.   CTH 10/1 hydro likely chronic, MRI 10/4 right-sided acute vs. subacute MCA infarcts, Carotid U/S 10/6 no sig stenosis, MRA 10/6 R pcomm aneurysm vs thrombus [id number], mild R M1 stenosis.  (09 Oct 2021 16:27)    PAST MEDICAL & SURGICAL HISTORY: Frequent falls; Hypertension; Chronic kidney disease, unspecified CKD stage    SOCIAL HISTORY:     FAMILY HISTORY:     ALLERGIES: No Known Allergies    HOME MEDICATIONS:  aspirin 81 mg oral tablet: 1 tab(s) orally once a day (09 Oct 2021 16:10)  atorvastatin 20 mg oral tablet: 1 tab(s) orally once a day (09 Oct 2021 16:10)  cloNIDine 0.1 mg oral tablet: 1 tab(s) orally 3 times a day (09 Oct 2021 16:10)  hydrALAZINE 100 mg oral tablet: 1 tab(s) orally 3 times a day (09 Oct 2021 16:12)  isosorbide mononitrate 60 mg oral tablet, extended release: 1 tab(s) orally once a day (in the morning) (09 Oct 2021 16:12)  Nifedical XL 60 mg oral tablet, extended release: 1 tab(s) orally once a day (09 Oct 2021 16:12)  Plavix 75 mg oral tablet: 1 tab(s) orally once a day (09 Oct 2021 16:12)    SUBJECTIVE / ACUTE INTERVAL EVENTS:    Review of Systems  · General Symptoms: no fever; no chills  · Skin Symptoms: no rash; no itching  · Ophthalmologic Symptoms: no scleral injection L; no scleral injection R  · ENMT Symptoms: no throat pain; no dysphagia; no nasal congestion  · Respiratory and Thorax Symptoms: no cough; no wheezing; no pleuritic chest pain  · Cardiovascular Symptoms: no chest pain; no palpitations; no peripheral edema  · Gastrointestinal Symptoms: no nausea; no vomiting; no diarrhea; no constipation; no change in bowel habits; no abdominal pain  · General Genitourinary Symptoms: no flank pain L; no flank pain R; no dysuria  · Musculoskeletal Symptoms: no joint swelling; no neck pain; no back pain  · Neurological: negative  · Psychiatric: negative  · Hematology/Lymphatics: negative  · Endocrine: negative    OBJECTIVE:   Vital Signs Last 24 Hrs  T(F): 97.5 (09 Oct 2021 14:21), Max: 97.9 (09 Oct 2021 11:20)  HR: 61 (09 Oct 2021 15:27) (56 - 76)  BP: 181/79 (09 Oct 2021 15:27) (143/79 - 189/105)  RR: 14 (09 Oct 2021 14:21) (14 - 18)  SpO2: 99% (09 Oct 2021 14:21) (97% - 99%)    Physical Examination:  GEN: thin man, laying in stretcher in NAD  PSYCH: A&Ox3, mood and affect appear appropriate   SKIN: intact, no e/o rash  NEURO: no focal neurologic deficits appreciated; CN II-XII intact, sensation intact B/L, motor 5/5 in all mm groups  EYES: PERRL, anicteric, EOMI, no vertical/horizontal nystagmus  HEAD: NC, AT  NECK: supple  RESPI: no accessory muscle use, B/L air entry, CTAB   CARDIO: regular rate/rhythm, no LE edema B/L  ABD: soft, NT, ND, +BS  EXT: patient able to move all extremities spontaneously  VASC: peripheral pulses palpated    Labs:  PT/INR - ( 09 Oct 2021 17:21 )   PT: 12.2 sec;   INR: 1.02 ratio    PTT - ( 09 Oct 2021 17:21 )  PTT:30.7 sec    Consultant(s) Notes Reviewed: OSH charts / documentation, Neurology  Care Discussed with Consultants/Other Providers: NSGY Resident    CURRENT MEDICATIONS  (STANDING):  aspirin  chewable 81 milliGRAM(s) Oral daily  atorvastatin 20 milliGRAM(s) Oral at bedtime  cloNIDine 0.1 milliGRAM(s) Oral three times a day  clopidogrel Tablet 75 milliGRAM(s) Oral daily  enoxaparin Injectable 40 milliGRAM(s) SubCutaneous daily  hydrALAZINE 100 milliGRAM(s) Oral three times a day  influenza   Vaccine 0.5 milliLiter(s) IntraMuscular once  isosorbide   mononitrate ER Tablet (IMDUR) 60 milliGRAM(s) Oral daily  NIFEdipine XL 60 milliGRAM(s) Oral daily  polyethylene glycol 3350 17 Gram(s) Oral at bedtime  senna 2 Tablet(s) Oral at bedtime    CURRENT MEDICATIONS  (PRN):  acetaminophen    Suspension .. 650 milliGRAM(s) Oral every 6 hours PRN Mild Pain (1 - 3)  oxyCODONE    IR 5 milliGRAM(s) Oral every 4 hours PRN Moderate Pain (4 - 6)  oxyCODONE    IR 10 milliGRAM(s) Oral every 4 hours PRN Severe Pain (7 - 10) Jaymie Hernandez M.D.  Division of Hospital Medicine  Available on TEAMS    HISTORY OF PRESENTING ILLNESS: 70yoM w/ PMHx of CKD with a baseline Cr >3, HTN, HLD presented to OSH after multiple falls. He was transferred from OSH for MRI/MRA findings concerning for aneurysm vs. thrombus with multiple infarcts. He is slightly confused, but denies pain, headache, blurry vision, nausea, vomiting, weakness, and changes in sensation.   CTH 10/1 hydro likely chronic, MRI 10/4 right-sided acute vs. subacute MCA infarcts, Carotid U/S 10/6 no sig stenosis, MRA 10/6 R pcomm aneurysm vs thrombus [id number], mild R M1 stenosis.  (09 Oct 2021 16:27)    PAST MEDICAL & SURGICAL HISTORY: Frequent falls; Hypertension; Chronic kidney disease, unspecified CKD stage    SOCIAL HISTORY: Unclear at this time.    FAMILY HISTORY: Unclear at this time.     ALLERGIES: No Known Allergies    HOME MEDICATIONS:  aspirin 81 mg oral tablet: 1 tab(s) orally once a day (09 Oct 2021 16:10)  atorvastatin 20 mg oral tablet: 1 tab(s) orally once a day (09 Oct 2021 16:10)  cloNIDine 0.1 mg oral tablet: 1 tab(s) orally 3 times a day (09 Oct 2021 16:10)  hydrALAZINE 100 mg oral tablet: 1 tab(s) orally 3 times a day (09 Oct 2021 16:12)  isosorbide mononitrate 60 mg oral tablet, extended release: 1 tab(s) orally once a day (in the morning) (09 Oct 2021 16:12)  Nifedical XL 60 mg oral tablet, extended release: 1 tab(s) orally once a day (09 Oct 2021 16:12)  Plavix 75 mg oral tablet: 1 tab(s) orally once a day (09 Oct 2021 16:12)    SUBJECTIVE / ACUTE INTERVAL EVENTS: Patient seen and examined; he is a poor historian and is unable to describe circumstances of his original presentation or clinical course since at this time; he denies on prompting active or recent chest pain, SOB/MANN, palpitations, LE edema, but unclear validity of statements.     Review of Systems  Patient states NO to all review of symptoms, but again, unclear validity of statements.    OBJECTIVE:   Vital Signs Last 24 Hrs  T(F): 97.5 (09 Oct 2021 14:21), Max: 97.9 (09 Oct 2021 11:20)  HR: 61 (09 Oct 2021 15:27) (56 - 76)  BP: 181/79 (09 Oct 2021 15:27) (143/79 - 189/105)  RR: 14 (09 Oct 2021 14:21) (14 - 18)  SpO2: 99% (09 Oct 2021 14:21) (97% - 99%)    Physical Examination:  GEN: elderly man, laying in bed in NAD  PSYCH: A&Ox2, mood and affect appear calm  SKIN: intact, no e/o rash  NEURO: follows simple commands, no focal neurologic deficits elucidated (though incomplete exam and gait not assessed)  EYES: PERRL, anicteric  HEAD: NC, AT  NECK: supple, no e/o elevated JVP  RESPI: no accessory muscle use, B/L air entry, mild crackles B/L  CARDIO: regular rate/rhythm  ABD: soft, NT, ND  EXT: patient able to move all extremities spontaneously  VASC: peripheral pulses palpated    Labs:  PT/INR - ( 09 Oct 2021 17:21 )   PT: 12.2 sec;   INR: 1.02 ratio    PTT - ( 09 Oct 2021 17:21 )  PTT:30.7 sec    Imaging Performed at [facility] prior to transfer:   - 10/01 CTH: No acute intracranial hemorrhage or acute territorial infarction. Extensive chronic microvascular ischemic changes and several chronic infarctions as described. Further evaluation may be obtained with MRI of the brain if no contraindications.  - 10/04 MRI Head: Acute/subacute right-sided MCA distribution infarct with associated cytotoxic edema and without hemorrhagic transformation. Multiple additional chronic lacunar infarcts and similar-appearing extensive chronic white matter microvascular type changes.  - 10/06 MRA H: Multilobulated right-sided P-comm aneurysm directed posteriorly and laterally, measuring 8.9 x 7.3 mm. Mild narrowing of the proximal right middle cerebral artery in the M1 region. The vessels are otherwise patent.  - 10/06 US Duplex Carotid Arteries: No significant hemodynamic stenosis of either carotid artery.    Consultant(s) Notes Reviewed: OSH charts / documentation, Neurology  Care Discussed with Consultants/Other Providers: NSGY Resident    CURRENT MEDICATIONS  (STANDING):  aspirin  chewable 81 milliGRAM(s) Oral daily  atorvastatin 20 milliGRAM(s) Oral at bedtime  cloNIDine 0.1 milliGRAM(s) Oral three times a day  clopidogrel Tablet 75 milliGRAM(s) Oral daily  enoxaparin Injectable 40 milliGRAM(s) SubCutaneous daily  hydrALAZINE 100 milliGRAM(s) Oral three times a day  influenza   Vaccine 0.5 milliLiter(s) IntraMuscular once  isosorbide   mononitrate ER Tablet (IMDUR) 60 milliGRAM(s) Oral daily  NIFEdipine XL 60 milliGRAM(s) Oral daily  polyethylene glycol 3350 17 Gram(s) Oral at bedtime  senna 2 Tablet(s) Oral at bedtime    CURRENT MEDICATIONS  (PRN):  acetaminophen    Suspension .. 650 milliGRAM(s) Oral every 6 hours PRN Mild Pain (1 - 3)  oxyCODONE    IR 5 milliGRAM(s) Oral every 4 hours PRN Moderate Pain (4 - 6)  oxyCODONE    IR 10 milliGRAM(s) Oral every 4 hours PRN Severe Pain (7 - 10)

## 2021-10-09 NOTE — CONSULT NOTE ADULT - PROBLEM SELECTOR RECOMMENDATION 4
- unknown baseline Cr  - stable at [facility]  - monitor UOP, renal function, electrolytes closely  - obtain urine electrolytes: Otto, UCr, UOsm + UA/microscopy  - consider nephrology consultation perioperatively given plan for angiography

## 2021-10-09 NOTE — CONSULT NOTE ADULT - PROBLEM SELECTOR RECOMMENDATION 2
- as above, plan for cerebral angiogram next week  - patient w/ unclear cardiac history, ?reported and documented CAD though unclear collateral leading to documentation  - elevated troponins at [facility] early in hospital course, no acute or ongoing CP or CP equivalents, and patient w/ CKD as below  - RCRI at this time = 2-4 (hx of CVA, Cr > 2, +/- hx of ischemic heart disease/congestive heart failure)  - TTE performed at [facility] w/ severely enlarged LA, stage III diastolic dysfunction  - recommendations for optimization prior to planned procedure: please obtain cardiology consultation, EKG, hsTrops, pBNP, CXR - as above, plan for cerebral angiogram next week  - patient w/ unclear cardiac history, ?reported and documented CAD though unclear collateral leading to documentation  - elevated troponins at [facility] early in hospital course, no acute or ongoing CP or CP equivalents, and patient w/ CKD as below  - RCRI at this time = 2-4 (hx of CVA, Cr > 2, +/- hx of ischemic heart disease/congestive heart failure)  - patient is unreliable historian re: CP and CP equivalents as well as METS  - TTE performed at [facility] w/ severely enlarged LA, stage III diastolic dysfunction  - recommendations for optimization prior to planned procedure: please obtain cardiology consultation, EKG, hsTrops, pBNP, CXR

## 2021-10-09 NOTE — H&P ADULT - ASSESSMENT
70M PMHx CKD Cr 3.3, HTN, HLD presented after falls, direct xferred from [facility] for incidental aneurysm/stroke. CTH 10/1 hydro likely chronic, MRI 10/4 right-sided acute vs. subacute MCA infarcts, Carotid U/S 10/6 no sig stenosis, MRA 10/6 R pcomm aneurysm [id number], mild R M1 stenosis. AAOx2, perrl, eomi, no facial, suarez 5/5, silt  -Adm 4Cohen,Dr. Greenberg  -Stroke neuro consulted  -Preop for angio Monday  -On ASA/Plavix, will get PRU/ARU  -Hospitalist/Nephro consult to plan for angio  -PT/OT eval

## 2021-10-09 NOTE — H&P ADULT - ATTENDING COMMENTS
70M with unruptured R Pcomm aneurysm, acute R MCA territory embolic appearing infarcts with no source identified, FLAIR signal within the aneurysm concerning for thrombus. With no other source of embolic infarcts identified, it is possible that thrombus within the aneurysm is responsible. Therefore would favor treatment of the aneurysm prior to dispo. Needs renal clearance.

## 2021-10-09 NOTE — H&P ADULT - HISTORY OF PRESENT ILLNESS
70M PMHx CKD with a baseline Cr >3, HTN, HLD presented to OSH after multiple falls. He was transferred from OSH for MRI/MRA findings concerning for aneurysm vs. thrombus with multiple infarcts. He is slightly confused, but denies pain, headache, blurry vision, nausea, vomiting, weakness, and changes in sensation.   CTH 10/1 hydro likely chronic, MRI 10/4 right-sided acute vs. subacute MCA infarcts, Carotid U/S 10/6 no sig stenosis, MRA 10/6 R pcomm aneurysm vs thrombus [id number], mild R M1 stenosis.  70M PMHx CKD with a baseline Cr >3, HTN, HLD presented to OSH after multiple falls. He was transferred from OSH for MRI/MRA findings concerning for aneurysm vs. thrombus with multiple infarcts. He is slightly confused, but denies pain, headache, blurry vision, nausea, vomiting, weakness, and changes in sensation.   CTH 10/1 hydro likely chronic, MRI 10/4 right-sided acute vs. subacute MCA infarcts, Carotid U/S 10/6 no sig stenosis, MRA 10/6 R pcomm aneurysm with possible intra-aneurysmal thrombus [id number], mild R M1 stenosis.

## 2021-10-09 NOTE — PROGRESS NOTE ADULT - SUBJECTIVE AND OBJECTIVE BOX
CHIEF COMPLAINT: Follow up of acute stroke   No new focal deficit   no fever   no n/v/d      PHYSICAL EXAM:    GENERAL: Moderately built, no acute distress   CHEST/LUNG: Clear to ausculation bilaterally, no wheezing, no crackles   HEART: Regular rate and rhythm; No murmurs, rubs  ABDOMEN: Soft, Nontender, Nondistended; Bowel sounds present  EXTREMITIES:  Moving all four extremities spontaneously, No clubbing, cyanosis, or edema  NERVOUS SYSTEM:  Grossly non focal.  Psychiatry: Alert and awake.       OBJECTIVE DATA:     Vital Signs Last 24 Hrs  T(C): 36.6 (09 Oct 2021 11:20), Max: 36.6 (09 Oct 2021 11:20)  T(F): 97.9 (09 Oct 2021 11:20), Max: 97.9 (09 Oct 2021 11:20)  HR: 60 (09 Oct 2021 11:20) (53 - 76)  BP: 146/86 (09 Oct 2021 11:20) (132/63 - 167/85)  BP(mean): --  RR: 18 (09 Oct 2021 11:20) (17 - 18)  SpO2: 99% (09 Oct 2021 11:20) (98% - 100%)           Daily     Daily Weight in k.5 (09 Oct 2021 05:36)    MEDICATIONS  (STANDING):  aspirin enteric coated 81 milliGRAM(s) Oral daily  atorvastatin 20 milliGRAM(s) Oral at bedtime  cloNIDine 0.1 milliGRAM(s) Oral three times a day  clopidogrel Tablet 75 milliGRAM(s) Oral daily  heparin   Injectable 5000 Unit(s) SubCutaneous every 12 hours  hydrALAZINE 100 milliGRAM(s) Oral three times a day  isosorbide   mononitrate ER Tablet (IMDUR) 60 milliGRAM(s) Oral daily  NIFEdipine XL 60 milliGRAM(s) Oral daily

## 2021-10-09 NOTE — H&P ADULT - NSICDXPASTMEDICALHX_GEN_ALL_CORE_FT
PAST MEDICAL HISTORY:  Chronic kidney disease, unspecified CKD stage     Frequent falls     Hypertension

## 2021-10-09 NOTE — PROGRESS NOTE ADULT - ASSESSMENT
70 year old male w/ CKD IV, HTN, CAD status post presented w/ multiple falls.     Multiple falls  - CT head showed no acute intracranial hemorrhage or acute territorial infarction w/ extensive chronic microvascular ischemic changes and several chronic infarctions  - MRI showed an acute/subacute right-sided MCA distribution infarct with associated cytotoxic edema and without hemorrhagic transformation w/ multiple additional chronic lacunar infarcts and similar-appearing extensive chronic white matter microvascular type changes   - c/w ASA and Lipitor, Plavix  - ECHO and carotid duplex reviewed. Discussed with Dr Van about aneurysm and then Dr Greenberg>>> transfer today to [facility].     HTN  - better controlled.      CKD stage 4. Unknown baseline creatinine.      Prophylaxis:  DVT: Heparin  GI: PO diet     COVID repeat negative.

## 2021-10-09 NOTE — CONSULT NOTE ADULT - ASSESSMENT
70yoM w/ PMHx of CKD IV (unknown baseline), uncontrolled HTN, ?CAD (patient states he has "heart problems;" TTE performed at [facility] on 10/5 w/ normal LVF, severely enlarged LA, Grade III diastolic dysfunction, mild pulm HTN) who was transfered from [facility] to where he presented w/ hx of multiple falls, found to have acute right MCA territory ischemic infarct w/ extensive chronic microvascular ischemic changes on CT, acute/subacute right-sided MCA distribution infarct with associated cytotoxic edema, on MRI, and Pcomm aneurysm vs thrombus [id number], mild R M1 stenosis on MRA. A1c 5.5, LDL 25 as resulted at [facility].

## 2021-10-09 NOTE — DISCHARGE NOTE NURSING/CASE MANAGEMENT/SOCIAL WORK - PATIENT PORTAL LINK FT
You can access the FollowMyHealth Patient Portal offered by [facility] by registering at the following website: [email or web address]. By joining [employer]’s FollowMyHealth portal, you will also be able to view your health information using other applications (apps) compatible with our system.

## 2021-10-10 LAB
ANION GAP SERPL CALC-SCNC: 14 MMOL/L — SIGNIFICANT CHANGE UP (ref 5–17)
APPEARANCE UR: ABNORMAL
BACTERIA # UR AUTO: ABNORMAL
BILIRUB UR-MCNC: NEGATIVE — SIGNIFICANT CHANGE UP
BLD GP AB SCN SERPL QL: NEGATIVE — SIGNIFICANT CHANGE UP
BUN SERPL-MCNC: 42 MG/DL — HIGH (ref 7–23)
CALCIUM SERPL-MCNC: 9.8 MG/DL — SIGNIFICANT CHANGE UP (ref 8.4–10.5)
CHLORIDE SERPL-SCNC: 103 MMOL/L — SIGNIFICANT CHANGE UP (ref 96–108)
CHLORIDE UR-SCNC: 24 MMOL/L — SIGNIFICANT CHANGE UP
CK MB BLD-MCNC: 2.5 % — SIGNIFICANT CHANGE UP (ref 0–3.5)
CK MB CFR SERPL CALC: 1.8 NG/ML — SIGNIFICANT CHANGE UP (ref 0–6.7)
CK SERPL-CCNC: 71 U/L — SIGNIFICANT CHANGE UP (ref 30–200)
CO2 SERPL-SCNC: 22 MMOL/L — SIGNIFICANT CHANGE UP (ref 22–31)
COLOR SPEC: YELLOW — SIGNIFICANT CHANGE UP
COVID-19 SPIKE DOMAIN AB INTERP: POSITIVE
COVID-19 SPIKE DOMAIN ANTIBODY RESULT: >250 U/ML — HIGH
CREAT ?TM UR-MCNC: 127 MG/DL — SIGNIFICANT CHANGE UP
CREAT ?TM UR-MCNC: 128 MG/DL — SIGNIFICANT CHANGE UP
CREAT SERPL-MCNC: 3.07 MG/DL — HIGH (ref 0.5–1.3)
DIFF PNL FLD: NEGATIVE — SIGNIFICANT CHANGE UP
EPI CELLS # UR: 1 /HPF — SIGNIFICANT CHANGE UP
GLUCOSE SERPL-MCNC: 106 MG/DL — HIGH (ref 70–99)
GLUCOSE UR QL: NEGATIVE — SIGNIFICANT CHANGE UP
HCT VFR BLD CALC: 39 % — SIGNIFICANT CHANGE UP (ref 39–50)
HCT VFR BLD CALC: 40.9 % — SIGNIFICANT CHANGE UP (ref 39–50)
HGB BLD-MCNC: 12.2 G/DL — LOW (ref 13–17)
HGB BLD-MCNC: 12.7 G/DL — LOW (ref 13–17)
KETONES UR-MCNC: NEGATIVE — SIGNIFICANT CHANGE UP
LACTATE SERPL-SCNC: 1.1 MMOL/L — SIGNIFICANT CHANGE UP (ref 0.7–2)
LEUKOCYTE ESTERASE UR-ACNC: ABNORMAL
MAGNESIUM SERPL-MCNC: 2.1 MG/DL — SIGNIFICANT CHANGE UP (ref 1.6–2.6)
MCHC RBC-ENTMCNC: 29.6 PG — SIGNIFICANT CHANGE UP (ref 27–34)
MCHC RBC-ENTMCNC: 29.8 PG — SIGNIFICANT CHANGE UP (ref 27–34)
MCHC RBC-ENTMCNC: 31.1 GM/DL — LOW (ref 32–36)
MCHC RBC-ENTMCNC: 31.3 GM/DL — LOW (ref 32–36)
MCV RBC AUTO: 95.3 FL — SIGNIFICANT CHANGE UP (ref 80–100)
MCV RBC AUTO: 95.4 FL — SIGNIFICANT CHANGE UP (ref 80–100)
NITRITE UR-MCNC: POSITIVE
NRBC # BLD: 0 /100 WBCS — SIGNIFICANT CHANGE UP (ref 0–0)
NRBC # BLD: 0 /100 WBCS — SIGNIFICANT CHANGE UP (ref 0–0)
NT-PROBNP SERPL-SCNC: 6674 PG/ML — HIGH (ref 0–300)
OSMOLALITY UR: 427 MOS/KG — SIGNIFICANT CHANGE UP (ref 300–900)
PH UR: 6 — SIGNIFICANT CHANGE UP (ref 5–8)
PHOSPHATE SERPL-MCNC: 3.3 MG/DL — SIGNIFICANT CHANGE UP (ref 2.5–4.5)
PLATELET # BLD AUTO: 729 K/UL — HIGH (ref 150–400)
PLATELET # BLD AUTO: 779 K/UL — HIGH (ref 150–400)
POTASSIUM SERPL-MCNC: 4.4 MMOL/L — SIGNIFICANT CHANGE UP (ref 3.5–5.3)
POTASSIUM SERPL-SCNC: 4.4 MMOL/L — SIGNIFICANT CHANGE UP (ref 3.5–5.3)
POTASSIUM UR-SCNC: 43 MMOL/L — SIGNIFICANT CHANGE UP
PROT ?TM UR-MCNC: 173 MG/DL — HIGH (ref 0–12)
PROT SERPL-MCNC: 7.2 G/DL — SIGNIFICANT CHANGE UP (ref 6–8.3)
PROT SERPL-MCNC: 7.2 G/DL — SIGNIFICANT CHANGE UP (ref 6–8.3)
PROT UR-MCNC: ABNORMAL
PROT/CREAT UR-RTO: 1.4 RATIO — HIGH (ref 0–0.2)
RBC # BLD: 4.09 M/UL — LOW (ref 4.2–5.8)
RBC # BLD: 4.29 M/UL — SIGNIFICANT CHANGE UP (ref 4.2–5.8)
RBC # FLD: 15.8 % — HIGH (ref 10.3–14.5)
RBC # FLD: 15.9 % — HIGH (ref 10.3–14.5)
RBC CASTS # UR COMP ASSIST: 2 /HPF — SIGNIFICANT CHANGE UP (ref 0–4)
RH IG SCN BLD-IMP: POSITIVE — SIGNIFICANT CHANGE UP
SARS-COV-2 IGG+IGM SERPL QL IA: >250 U/ML — HIGH
SARS-COV-2 IGG+IGM SERPL QL IA: POSITIVE
SODIUM SERPL-SCNC: 139 MMOL/L — SIGNIFICANT CHANGE UP (ref 135–145)
SODIUM UR-SCNC: 34 MMOL/L — SIGNIFICANT CHANGE UP
SP GR SPEC: 1.02 — SIGNIFICANT CHANGE UP (ref 1.01–1.02)
TROPONIN T, HIGH SENSITIVITY RESULT: 49 NG/L — SIGNIFICANT CHANGE UP (ref 0–51)
TROPONIN T, HIGH SENSITIVITY RESULT: 51 NG/L — SIGNIFICANT CHANGE UP (ref 0–51)
UROBILINOGEN FLD QL: NEGATIVE — SIGNIFICANT CHANGE UP
WBC # BLD: 6.42 K/UL — SIGNIFICANT CHANGE UP (ref 3.8–10.5)
WBC # BLD: 6.47 K/UL — SIGNIFICANT CHANGE UP (ref 3.8–10.5)
WBC # FLD AUTO: 6.42 K/UL — SIGNIFICANT CHANGE UP (ref 3.8–10.5)
WBC # FLD AUTO: 6.47 K/UL — SIGNIFICANT CHANGE UP (ref 3.8–10.5)
WBC UR QL: 434 /HPF — HIGH (ref 0–5)

## 2021-10-10 PROCEDURE — 93970 EXTREMITY STUDY: CPT | Mod: 26

## 2021-10-10 PROCEDURE — 99233 SBSQ HOSP IP/OBS HIGH 50: CPT

## 2021-10-10 PROCEDURE — 71045 X-RAY EXAM CHEST 1 VIEW: CPT | Mod: 26

## 2021-10-10 PROCEDURE — 93010 ELECTROCARDIOGRAM REPORT: CPT | Mod: 76

## 2021-10-10 PROCEDURE — 99232 SBSQ HOSP IP/OBS MODERATE 35: CPT

## 2021-10-10 RX ORDER — SODIUM CHLORIDE 9 MG/ML
1000 INJECTION INTRAMUSCULAR; INTRAVENOUS; SUBCUTANEOUS
Refills: 0 | Status: DISCONTINUED | OUTPATIENT
Start: 2021-10-10 | End: 2021-10-11

## 2021-10-10 RX ADMIN — Medication 0.1 MILLIGRAM(S): at 05:21

## 2021-10-10 RX ADMIN — Medication 0.1 MILLIGRAM(S): at 14:23

## 2021-10-10 RX ADMIN — Medication 0.1 MILLIGRAM(S): at 22:01

## 2021-10-10 RX ADMIN — CLOPIDOGREL BISULFATE 75 MILLIGRAM(S): 75 TABLET, FILM COATED ORAL at 11:18

## 2021-10-10 RX ADMIN — Medication 81 MILLIGRAM(S): at 11:15

## 2021-10-10 RX ADMIN — Medication 100 MILLIGRAM(S): at 14:23

## 2021-10-10 RX ADMIN — Medication 60 MILLIGRAM(S): at 07:53

## 2021-10-10 RX ADMIN — ATORVASTATIN CALCIUM 20 MILLIGRAM(S): 80 TABLET, FILM COATED ORAL at 22:01

## 2021-10-10 RX ADMIN — Medication 100 MILLIGRAM(S): at 22:01

## 2021-10-10 RX ADMIN — ISOSORBIDE MONONITRATE 60 MILLIGRAM(S): 60 TABLET, EXTENDED RELEASE ORAL at 11:14

## 2021-10-10 RX ADMIN — Medication 100 MILLIGRAM(S): at 05:22

## 2021-10-10 NOTE — PHYSICAL THERAPY INITIAL EVALUATION ADULT - ADDITIONAL COMMENTS
Pt lives in  alone, landlord downstairs, 10 steps to enter. PTA, pt was I with all ADLs and functional mobility without use of AD.

## 2021-10-10 NOTE — CONSULT NOTE ADULT - SUBJECTIVE AND OBJECTIVE BOX
[facility] Consult Note Nephrology - CONSULTATION NOTE    70yoM w/ PMHx of CKD with a baseline Cr >3, HTN, HLD presented to OSH after multiple falls. He was transferred from OSH for MRI/MRA findings concerning for aneurysm vs. thrombus with multiple infarcts. He is slightly confused, but denies pain, headache, blurry vision, nausea, vomiting, weakness, and changes in sensation.   CTH 10/1 hydro likely chronic, MRI 10/4 right-sided acute vs. subacute MCA infarcts, Carotid U/S 10/6 no sig stenosis, MRA 10/6 R pcomm aneurysm vs thrombus [id number], mild R M1 stenosis.  (09 Oct 2021 16:27)  Denies chest pain  or shortness of breath   denies any previous cardiac  issues.     Renal Consult for CKD stage 4  Cr now appears to be around 3mg/dl  Pt sees my colleague in clinic however has had poor follow up  denies any f/c/n/v/d/c    PAST MEDICAL & SURGICAL HISTORY:  Frequent falls    Hypertension    Chronic kidney disease, unspecified CKD stage      No Known Allergies    Home Medications Reviewed  Hospital Medications:   MEDICATIONS  (STANDING):  aspirin  chewable 81 milliGRAM(s) Oral daily  atorvastatin 20 milliGRAM(s) Oral at bedtime  cloNIDine 0.1 milliGRAM(s) Oral three times a day  clopidogrel Tablet 75 milliGRAM(s) Oral daily  enoxaparin Injectable 30 milliGRAM(s) SubCutaneous daily  hydrALAZINE 100 milliGRAM(s) Oral three times a day  influenza   Vaccine 0.5 milliLiter(s) IntraMuscular once  isosorbide   mononitrate ER Tablet (IMDUR) 60 milliGRAM(s) Oral daily  NIFEdipine XL 60 milliGRAM(s) Oral daily  polyethylene glycol 3350 17 Gram(s) Oral at bedtime  senna 2 Tablet(s) Oral at bedtime    SOCIAL HISTORY:  Denies ETOh,Smoking,   FAMILY HISTORY:    REVIEW OF SYSTEMS:  CONSTITUTIONAL: No weakness, fevers or chills  EYES/ENT: No visual changes;  No vertigo or throat pain   NECK: No pain or stiffness  RESPIRATORY: No cough, wheezing, hemoptysis; No shortness of breath  CARDIOVASCULAR: No chest pain or palpitations.  GASTROINTESTINAL: No abdominal or epigastric pain. No nausea, vomiting, or hematemesis; No diarrhea or constipation. No melena or hematochezia.  GENITOURINARY: No dysuria, frequency, foamy urine, urinary urgency, incontinence or hematuria  NEUROLOGICAL: No numbness or weakness  SKIN: No itching, burning, rashes, or lesions   VASCULAR: No bilateral lower extremity edema.   All other review of systems is negative unless indicated above.    VITALS:  T(F): 97.5 (10-10-21 @ 07:55), Max: 98.3 (10-09-21 @ 19:39)  HR: 49 (10-10-21 @ 11:19)  BP: 158/88 (10-10-21 @ 11:19)  RR: 18 (10-10-21 @ 07:55)  SpO2: 99% (10-10-21 @ 11:19)  Wt(kg): --    Height (cm): 182.9 (10-09 @ 15:27)  Weight (kg): 73.936 (10-09 @ 15:27)  BMI (kg/m2): 22.1 (10-09 @ 15:27)  BSA (m2): 1.95 (10-09 @ 15:27)  PHYSICAL EXAM:  Constitutional: NAD  HEENT: anicteric sclera, oropharynx clear, MMM  Neck: No JVD  Respiratory: CTAB, no wheezes, rales or rhonchi  Cardiovascular: S1, S2, RRR  Gastrointestinal: BS+, soft, NT/ND  Extremities: No cyanosis or clubbing. No peripheral edema  Neurological: A/O x 3, no focal deficits  Psychiatric: Normal mood, normal affect  : No CVA tenderness. No mendoza.   Skin: No rashes      LABS:  10-09    139  |  102  |  46<H>  ----------------------------<  98  4.5   |  21<L>  |  3.03<H>    Ca    10.2      09 Oct 2021 17:21    TPro  8.2  /  Alb  4.5  /  TBili  0.3  /  DBili      /  AST  17  /  ALT  7<L>  /  AlkPhos  110  10-09    Creatinine Trend: 3.03 <--, 3.29 <--, 3.34 <--, 3.09 <--                        12.2   6.47  )-----------( 729      ( 10 Oct 2021 08:19 )             39.0     Urine Studies:      RADIOLOGY & ADDITIONAL STUDIES:

## 2021-10-10 NOTE — PROGRESS NOTE ADULT - PROBLEM SELECTOR PLAN 2
- as above, plan for cerebral angiogram next week  - patient w/ unclear cardiac history, ?reported and documented CAD though unclear collateral leading to documentation  - elevated troponins at [facility] early in hospital course, no acute or ongoing CP or CP equivalents, and patient w/ CKD as below -- hsTrops WNL here  - RCRI at this time = 2-4 (hx of CVA, Cr > 2, +/- hx of ischemic heart disease/congestive heart failure)  - TTE performed at [facility] w/ severely enlarged LA, stage III diastolic dysfunction  - elevated pBNP here = 6674; f/u CXR; monitor UOP, daily standing weights  - per cardiology, patient is optimized for planned procedure from cardiac standpoint  - patient is also optimized from medical standpoint monitoring hemodynamics closely as below along w/ renal function as per nephrology

## 2021-10-10 NOTE — PROGRESS NOTE ADULT - PROBLEM SELECTOR PLAN 4
- unknown baseline Cr  - stable at [facility]  - monitor UOP, renal function, electrolytes closely  - f/u Ulytes / UA/micro, and renal US  - nephrology evaluation perioperatively given planned angiography appreciated

## 2021-10-10 NOTE — PROGRESS NOTE ADULT - PROBLEM SELECTOR PLAN 3
- uncontrolled at [facility] -- controlled w/ regimen as initiated prior to transfer  - continue Imdur, Hydralazine, Nifedipine, and Clonidine for now w/ close monitoring of hemodynamics  - SBP goals per neurology = 140-160 mmHg

## 2021-10-10 NOTE — PROGRESS NOTE ADULT - SUBJECTIVE AND OBJECTIVE BOX
SUBJECTIVE: Pt seen and examined, pt sitting up in bed, no complaints of pain     OVERNIGHT EVENTS: none    Vital Signs Last 24 Hrs  T(C): 36.6 (10 Oct 2021 15:02), Max: 36.8 (09 Oct 2021 19:39)  T(F): 97.9 (10 Oct 2021 15:02), Max: 98.3 (09 Oct 2021 19:39)  HR: 47 (10 Oct 2021 15:02) (47 - 68)  BP: 163/80 (10 Oct 2021 15:02) (136/64 - 166/84)  BP(mean): --  RR: 18 (10 Oct 2021 15:02) (16 - 18)  SpO2: 98% (10 Oct 2021 15:02) (98% - 99%)    PHYSICAL EXAM:    General: No Acute Distress     Neurological: Awake, alert oriented to person, place (with choices) and time, Following Commands, Face Symmetrical, Speech Fluent,  Muscle Strength normal in all four extremities, No Drift, Sensation to Light Touch Intact    Pulmonary: Clear to Auscultation, No Rales, No Rhonchi, No Wheezes     Cardiovascular: S1, S2, Regular Rate and Rhythm     Gastrointestinal: Soft, Nontender, Nondistended     Incision: none    LABS:                        12.2   6.47  )-----------( 729      ( 10 Oct 2021 08:19 )             39.0    10-09    139  |  102  |  46<H>  ----------------------------<  98  4.5   |  21<L>  |  3.03<H>    Ca    10.2      09 Oct 2021 17:21    TPro  8.2  /  Alb  4.5  /  TBili  0.3  /  DBili  x   /  AST  17  /  ALT  7<L>  /  AlkPhos  110  10-09  PT/INR - ( 09 Oct 2021 17:21 )   PT: 12.2 sec;   INR: 1.02 ratio         PTT - ( 09 Oct 2021 17:21 )  PTT:30.7 sec        MEDICATIONS:  Antibiotics:    Neuro:  acetaminophen    Suspension .. 650 milliGRAM(s) Oral every 6 hours PRN Mild Pain (1 - 3)  oxyCODONE    IR 5 milliGRAM(s) Oral every 4 hours PRN Moderate Pain (4 - 6)  oxyCODONE    IR 10 milliGRAM(s) Oral every 4 hours PRN Severe Pain (7 - 10)    Cardiac:  cloNIDine 0.1 milliGRAM(s) Oral three times a day  hydrALAZINE 100 milliGRAM(s) Oral three times a day  isosorbide   mononitrate ER Tablet (IMDUR) 60 milliGRAM(s) Oral daily  NIFEdipine XL 60 milliGRAM(s) Oral daily    Pulm:    GI/:  polyethylene glycol 3350 17 Gram(s) Oral at bedtime  senna 2 Tablet(s) Oral at bedtime    Other:   aspirin  chewable 81 milliGRAM(s) Oral daily  atorvastatin 20 milliGRAM(s) Oral at bedtime  clopidogrel Tablet 75 milliGRAM(s) Oral daily  enoxaparin Injectable 30 milliGRAM(s) SubCutaneous daily  influenza   Vaccine 0.5 milliLiter(s) IntraMuscular once    DIET: [] Regular [] CCD [x] Renal [] Puree [] Dysphagia [] Tube Feeds:     IMAGING:   < from: MR Angio Head No Cont (10.06.21 @ 18:15) >  IMPRESSION:    1. 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  2. Mild narrowing of the proximal right middle cerebral artery in the M1 region.  3. The vessels are otherwise patent, as outlined above.    < end of copied text >  < from: VA Duplex Lower Ext Vein Scan, Bilat (10.10.21 @ 11:06) >  IMPRESSION:  No evidence of deep venous thrombosis in either lower extremity.    < end of copied text >

## 2021-10-10 NOTE — CHART NOTE - NSCHARTNOTEFT_GEN_A_CORE
CAPRINI SCORE [CLOT] Score on Admission for     AGE RELATED RISK FACTORS                                                       MOBILITY RELATED FACTORS  [ ] Age 41-60 years                                            (1 Point)                  [ ] Bed rest                                                        (1 Point)  [ x] Age: 61-74 years                                           (2 Points)                 [ ] Plaster cast                                                   (2 Points)  [ ] Age= 75 years                                              (3 Points)                 [ ] Bed bound for more than 72 hours                 (2 Points)    DISEASE RELATED RISK FACTORS                                               GENDER SPECIFIC FACTORS  [ ] Edema in the lower extremities                       (1 Point)                  [ ] Pregnancy                                                     (1 Point)  [ ] Varicose veins                                               (1 Point)                  [ ] Post-partum < 6 weeks                                   (1 Point)             [ ] BMI > 25 Kg/m2                                            (1 Point)                  [ ] Hormonal therapy  or oral contraception          (1 Point)                 [ ] Sepsis (in the previous month)                        (1 Point)                  [ ] History of pregnancy complications                 (1 point)  [ ] Pneumonia or serious lung disease                                               [ ] Unexplained or recurrent                     (1 Point)           (in the previous month)                               (1 Point)  [ ] Abnormal pulmonary function test                     (1 Point)                 SURGERY RELATED RISK FACTORS (include planned surgeries)  [ ] Acute myocardial infarction                              (1 Point)                 [ ]  Section                                             (1 Point)  [ ] Congestive heart failure (in the previous month)  (1 Point)         [ ] Minor surgery                                                  (1 Point)   [ ] Inflammatory bowel disease                             (1 Point)                 [ ] Arthroscopic surgery                                        (2 Points)  [ ] Central venous access                                      (2 Points)                [ ] General surgery lasting more than 45 minutes   (2 Points)       [ ] Stroke (in the previous month)                          (5 Points)               [ ] Elective arthroplasty                                         (5 Points)            [ ] current or past malignancy                              (2 Points)                                                                                                       HEMATOLOGY RELATED FACTORS                                                 TRAUMA RELATED RISK FACTORS  [ ] Prior episodes of VTE                                     (3 Points)                [ ] Fracture of the hip, pelvis, or leg                       (5 Points)  [ ] Positive family history for VTE                         (3 Points)                 [ ] Acute spinal cord injury (in the previous month)  (5 Points)  [ ] Prothrombin 10655 A                                     (3 Points)                 [ ] Paralysis  (less than 1 month)                             (5 Points)  [ ] Factor V Leiden                                             (3 Points)                  [ ] Multiple Trauma within 1 month                        (5 Points)  [ ] Lupus anticoagulants                                     (3 Points)                                                           [ ] Anticardiolipin antibodies                               (3 Points)                                                       [ ] High homocysteine in the blood                      (3 Points)                                             [ ] Other congenital or acquired thrombophilia      (3 Points)                                                [ ] Heparin induced thrombocytopenia                  (3 Points)                                          Total Score [      2    ]    Risk:  Very low 0   Low 1 to 2   Moderate 3 to 4   High =5       VTE Prophylasix Recommednations:  [ x] mechanical pneumatic compression devices                                      [ ] contraindicated: _____________________  [ ] chemo prophylasix                                                                                   [ x] contraindicated ____________pre procedure_________    **** HIGH LIKELIHOOD DVT PRESENT ON ADMISSION  [ x] (please order LE dopplers within 24 hours of admission)

## 2021-10-10 NOTE — CONSULT NOTE ADULT - ASSESSMENT
70yoM w/ PMHx of CKD IV (unknown baseline), uncontrolled HTN, ?CAD (patient states he has "heart problems;" TTE performed at [facility] on 10/5 w/ normal LVF, severely enlarged LA, Grade III diastolic dysfunction, mild pulm HTN) who was transfered from [facility] to where he presented w/ hx of multiple falls, found to have acute right MCA territory ischemic infarct w/ extensive chronic microvascular ischemic changes on CT, acute/subacute right-sided MCA distribution infarct with associated cytotoxic edema, on MRI, and Pcomm aneurysm vs thrombus [id number], mild R M1 stenosis on MRA.

## 2021-10-10 NOTE — PROGRESS NOTE ADULT - ASSESSMENT
70M PMHx CKD with a baseline Cr >3, HTN, HLD presented to OSH after multiple falls. He was transferred from OSH for MRI/MRA findings concerning for aneurysm vs. thrombus with multiple infarcts. He is slightly confused, but denies pain, headache, blurry vision, nausea, vomiting, weakness, and changes in sensation.   CTH 10/1 hydro likely chronic, MRI 10/4 right-sided acute vs. subacute MCA infarcts, Carotid U/S 10/6 no sig stenosis, MRA 10/6 R pcomm aneurysm vs thrombus [id number], mild R M1 stenosis.        PLAN:  Neuro:   - plan for angiogram for right PCOMM aneurysm pending Renal clearance  - continue aspirin and plavix  - keep NPO after midnight for possible angio in am  - Hospitalist following for medical management  Respiratory:   - on room air  CV:  - HTN- continue clonidine, hydralazine, imdur and nifedipine  - CAD- get EKG, trops, CXR and BNP  - telemetry  - on statin         - cardiology consulted and cleared for angiogram  DVT ppx:   - venodynes  Renal:   - CKD stage 3  - Nephrology consulted for clearance for angio  - CT- abd/pelvis  - f/u renal labs  - urine lytes. UA   PT/OT:   - pending    "[employer] # [id number] 70M PMHx CKD with a baseline Cr >3, HTN, HLD presented to OSH after multiple falls, found to have R MCA territory thromboembolic infarcts with no identified source, and an unruptured 9mm R PComm aneurysm with concern for intra-aneurysmal thrombus. He is slightly confused, but denies pain, headache, blurry vision, nausea, vomiting, weakness, and changes in sensation.   CTH 10/1 hydro likely chronic, MRI 10/4 right-sided acute vs. subacute MCA infarcts, Carotid U/S 10/6 no sig stenosis, MRA 10/6 R pcomm aneurysm [id number], FLAIR signal within the aneurysm concerning for thrombus, mild R M1 stenosis.        PLAN:  Neuro:   - plan for angiogram for right PCOMM aneurysm pending Renal clearance  - continue aspirin and plavix  - keep NPO after midnight for possible angio in am  - Hospitalist following for medical management  Respiratory:   - on room air  CV:  - HTN- continue clonidine, hydralazine, imdur and nifedipine  - CAD- get EKG, trops, CXR and BNP  - telemetry  - on statin         - cardiology consulted and cleared for angiogram  DVT ppx:   - venodynes  Renal:   - CKD stage 3  - Nephrology consulted for clearance for angio  - CT- abd/pelvis  - f/u renal labs  - urine lytes. UA   PT/OT:   - pending    [employer] # [id number]

## 2021-10-10 NOTE — PHYSICAL THERAPY INITIAL EVALUATION ADULT - PERTINENT HX OF CURRENT PROBLEM, REHAB EVAL
70M PMHx CKD with a baseline Cr >3, HTN, HLD presented to OSH after multiple falls. He was transferred from OSH for MRI/MRA findings concerning for aneurysm vs. thrombus with multiple infarcts. He is slightly confused, but denies pain, headache, blurry vision, nausea, vomiting, weakness, and changes in sensation

## 2021-10-10 NOTE — PROGRESS NOTE ADULT - SUBJECTIVE AND OBJECTIVE BOX
Jaymie Hernandez M.D.  Division of Hospital Medicine  Available on TEAMS    Patient is a 70y old  Male who presents with a chief complaint of Transfer from [facility] for MRA findings (10 Oct 2021 16:44)    SUBJECTIVE / OVERNIGHT EVENTS: Patient seen and examined. No acute overnight events, no subjective complaints.    OBJECTIVE:  Vital Signs Last 24 Hrs  T(F): 97.9 (10 Oct 2021 15:02), Max: 98.3 (09 Oct 2021 19:39)  HR: 47 (10 Oct 2021 15:02) (47 - 68)  BP: 163/80 (10 Oct 2021 15:02) (136/64 - 166/84)  RR: 18 (10 Oct 2021 15:02) (16 - 18)  SpO2: 98% (10 Oct 2021 15:02) (98% - 99%)    Physical Examination:  GEN: elderly man, laying in bed in NAD  PSYCH: A&Ox2, mood and affect appear calm  SKIN: intact, no e/o rash  NEURO: follows simple commands, no focal neurologic deficits elucidated (though incomplete exam and gait not assessed)  NECK: supple, no e/o elevated JVP  RESPI: no accessory muscle use, B/L air entry  CARDIO: regular rate/rhythm  ABD: soft, NT, ND  EXT: patient able to move all extremities spontaneously  VASC: peripheral pulses palpated    Labs:                      12.2   6.47  )-----------( 729      ( 10 Oct 2021 08:19 )             39.0     10-09  139  |  102  |  46<H>  ----------------------------<  98  4.5   |  21<L>  |  3.03<H>    Ca    10.2      09 Oct 2021 17:21    TPro  8.2  /  Alb  4.5  /  TBili  0.3  /  DBili  x   /  AST  17  /  ALT  7<L>  /  AlkPhos  110  10-09    PT/INR - ( 09 Oct 2021 17:21 )   PT: 12.2 sec;   INR: 1.02 ratio     PTT - ( 09 Oct 2021 17:21 )  PTT:30.7 sec    Consultant(s) Notes Reviewed: Cardiology, Nephrology  Care Discussed with Consultants/Other Providers: ISSA Knapp    MEDICATIONS  (STANDING):  aspirin  chewable 81 milliGRAM(s) Oral daily  atorvastatin 20 milliGRAM(s) Oral at bedtime  cloNIDine 0.1 milliGRAM(s) Oral three times a day  clopidogrel Tablet 75 milliGRAM(s) Oral daily  hydrALAZINE 100 milliGRAM(s) Oral three times a day  influenza   Vaccine 0.5 milliLiter(s) IntraMuscular once  isosorbide   mononitrate ER Tablet (IMDUR) 60 milliGRAM(s) Oral daily  NIFEdipine XL 60 milliGRAM(s) Oral daily  polyethylene glycol 3350 17 Gram(s) Oral at bedtime  senna 2 Tablet(s) Oral at bedtime    MEDICATIONS  (PRN):  acetaminophen    Suspension .. 650 milliGRAM(s) Oral every 6 hours PRN Mild Pain (1 - 3)  oxyCODONE    IR 5 milliGRAM(s) Oral every 4 hours PRN Moderate Pain (4 - 6)  oxyCODONE    IR 10 milliGRAM(s) Oral every 4 hours PRN Severe Pain (7 - 10)

## 2021-10-10 NOTE — CONSULT NOTE ADULT - SUBJECTIVE AND OBJECTIVE BOX
[facility]  [phone]  NEPHROLOGY      INITIAL CONSULT NOTE  --------------------------------------------------------------------------------  HPI:      70M PMHx  presumed CKD with a baseline Cr >3, HTN, HLD presented to OSH after multiple falls. He was transferred from OSH for MRI/MRA findings concerning for aneurysm vs. thrombus with multiple infarcts. He is slightly confused,  and unable to give his medical hx except he has hx ot htn. denies any hx of ckd . on CTH 10/1 hydro likely chronic, MRI 10/4 right-sided acute vs. subacute MCA infarcts, Carotid U/S 10/6 no sig stenosis, MRA 10/6 R pcomm aneurysm vs thrombus [id number], mild R M1 stenosis. he is now being scheduled for neuro angio. given abn creatinine renal consult called.       PAST HISTORY  --------------------------------------------------------------------------------  PAST MEDICAL & SURGICAL HISTORY:  Frequent falls    Hypertension    Chronic kidney disease, unspecified CKD stage      FAMILY HISTORY:    denies ckd unable to give any further hx     PAST SOCIAL HISTORY:   + until recent tobacco use   ALLERGIES & MEDICATIONS  --------------------------------------------------------------------------------  Allergies    No Known Allergies    Intolerances      Standing Inpatient Medications  aspirin  chewable 81 milliGRAM(s) Oral daily  atorvastatin 20 milliGRAM(s) Oral at bedtime  cloNIDine 0.1 milliGRAM(s) Oral three times a day  clopidogrel Tablet 75 milliGRAM(s) Oral daily  hydrALAZINE 100 milliGRAM(s) Oral three times a day  influenza   Vaccine 0.5 milliLiter(s) IntraMuscular once  isosorbide   mononitrate ER Tablet (IMDUR) 60 milliGRAM(s) Oral daily  NIFEdipine XL 60 milliGRAM(s) Oral daily  polyethylene glycol 3350 17 Gram(s) Oral at bedtime  senna 2 Tablet(s) Oral at bedtime    PRN Inpatient Medications  acetaminophen    Suspension .. 650 milliGRAM(s) Oral every 6 hours PRN  oxyCODONE    IR 5 milliGRAM(s) Oral every 4 hours PRN  oxyCODONE    IR 10 milliGRAM(s) Oral every 4 hours PRN      REVIEW OF SYSTEMS  --------------------------------------------------------------------------------  Gen: No  fevers/chills   Skin: No rashes  Head/Eyes/Ears/Mouth: No headache; Normal hearing;  No sinus pain/discomfort, sore throat  Respiratory: No dyspnea, cough, wheezing, hemoptysis  CV: No chest pain, orthopnea  GI: No abdominal pain, diarrhea, nausea, vomiting, melena  : No dysuria, decrease urination or hesitancy urinating  hematuria, nocturia  MSK: No joint pain/swelling; no back pain  Neuro: No dizziness/lightheadedness,  Psych: No significant nervousness, anxiety or depression  also with no edema       VITALS/PHYSICAL EXAM  --------------------------------------------------------------------------------  T(C): 36.3 (10-10-21 @ 19:42), Max: 36.8 (10-10-21 @ 00:12)  HR: 49 (10-10-21 @ 19:42) (47 - 68)  BP: 149/73 (10-10-21 @ 19:42) (136/64 - 163/80)  RR: 18 (10-10-21 @ 19:42) (18 - 18)  SpO2: 99% (10-10-21 @ 19:42) (98% - 99%)  Wt(kg): --  Height (cm): 182.9 (10-09-21 @ 15:27)  Weight (kg): 73.936 (10-09-21 @ 15:27)  BMI (kg/m2): 22.1 (10-09-21 @ 15:27)  BSA (m2): 1.95 (10-09-21 @ 15:27)      Physical Exam:  	Gen: Non toxic comfortable appearing    	no jvd   	Pulm: decrease bs  no rales or ronchi or wheezing  	CV: RRR, S1S2; no rub  	Back: No CVA tenderness; no sacral edema  	Abd: +BS, soft, nontender/nondistended  	: No suprapubic tenderness  	UE: Warm, no cyanosis  no clubbing,  no edema; no asterixis  	LE: Warm, no cyanosis  no clubbing, no edema  	Neuro: oriented x 2 forgetful and poor historian  speech coherent   	Psych: Normal affect and mood  	Skin: Warm, no decrease skin turgor       LABS/STUDIES  --------------------------------------------------------------------------------              12.7   6.42  >-----------<  779      [10-10-21 @ 21:03]              40.9     139  |  103  |  42  ----------------------------<  106      [10-10-21 @ 21:03]  4.4   |  22  |  3.07        Ca     9.8     [10-10-21 @ 21:03]      Mg     2.1     [10-10-21 @ 21:03]      Phos  3.3     [10-10-21 @ 21:03]    TPro  7.2  /  Alb  x   /  TBili  x   /  DBili  x   /  AST  x   /  ALT  x   /  AlkPhos  x   [10-10-21 @ 17:59]    PT/INR: PT 12.2 , INR 1.02       [10-09-21 @ 17:21]  PTT: 30.7       [10-09-21 @ 17:21]    CK 71      [10-10-21 @ 21:03]    Creatinine Trend:  SCr 3.07 [10-10 @ 21:03]  SCr 3.03 [10-09 @ 17:21]  SCr 3.29 [10-06 @ 06:47]  SCr 3.34 [10-05 @ 07:26]  SCr 3.09 [10-04 @ 07:39]    Urinalysis - [10-10-21 @ 21:03]      Color Yellow / Appearance Slightly Turbid / SG 1.016 / pH 6.0      Gluc Negative / Ketone Negative  / Bili Negative / Urobili Negative       Blood Negative / Protein 300 mg/dL / Leuk Est Large / Nitrite Positive      RBC 2 /  / Hyaline  / Gran  / Sq Epi  / Non Sq Epi 1 / Bacteria Many    Urine Creatinine 127      [10-10-21 @ 21:03]  Urine Sodium 34      [10-10-21 @ 21:03]  Urine Potassium 43      [10-10-21 @ 21:03]  Urine Chloride 24      [10-10-21 @ 21:03]  Urine Osmolality 427      [10-10-21 @ 21:03]    Lipid: chol 101, TG 74, HDL 61, LDL --      [10-06-21 @ 10:53]    HCV 12.82, Reactive      [10-02-21 @ 12:13]

## 2021-10-10 NOTE — CONSULT NOTE ADULT - PROBLEM SELECTOR RECOMMENDATION 9
For cerebral angiogram tomorrow   acceptable cardiac risk to proceed   Normal LVEF and METS > 4 no anginal symptoms
- management per primary team, NSGY, including for monitoring, BP goals, intervention (?planned angiogram next week), ASA/Plavix, PPx, analgesia, and bowel regimen  - neurology evaluation and recommendations noted: send TSH, B12, Folate  - continue telemetry monitoring

## 2021-10-10 NOTE — PHYSICAL THERAPY INITIAL EVALUATION ADULT - ACTIVE RANGE OF MOTION EXAMINATION, REHAB EVAL
stephen. upper extremity Active ROM was WNL (within normal limits)/bilateral lower extremity Active ROM was WNL (within normal limits)

## 2021-10-10 NOTE — PROGRESS NOTE ADULT - ASSESSMENT
70yoM w/ PMHx of CKD IV (unknown baseline), uncontrolled HTN, ?CAD (patient states he has "heart problems;" TTE performed at [facility] on 10/5 w/ normal LVF, severely enlarged LA, Grade III diastolic dysfunction, mild pulm HTN) who was transfered from [facility] to where he presented w/ hx of multiple falls, found to have acute right MCA territory ischemic infarct w/ extensive chronic microvascular ischemic changes on CT, acute/subacute right-sided MCA distribution infarct with associated cytotoxic edema, on MRI, and Pcomm aneurysm vs thrombus [id number], mild R M1 stenosis on MRA. A1c 5.5, LDL 25 as resulted at [facility].

## 2021-10-10 NOTE — PROGRESS NOTE ADULT - PROBLEM SELECTOR PLAN 1
- management per primary team, NSGY, including for monitoring, BP goals, intervention (?planned angiogram next week), ASA/Plavix, PPx, analgesia, and bowel regimen  - neurology evaluation and recommendations on admission noted  - continue telemetry monitoring.

## 2021-10-10 NOTE — CONSULT NOTE ADULT - ASSESSMENT
70yoM w/ PMHx of CKD with a baseline Cr >3, HTN, HLD presented to OSH after multiple falls. He was transferred from OSH for MRI/MRA findings concerning for aneurysm vs. thrombus       1) CKD stage 4-  Likely has Hypertensive Nephropathy  B/l cr around 3mg/dl ( possibly new baseline)  will check ua  check urine na/cr/cl/osm/k  check renal US  avoid nephrotoxin such as ace/arb/nsaid  trend bmp    2) HTN-  bp elevated  If remains high then in nifedipine to 90mg po qd  Trend bp      Dr Hernadez

## 2021-10-10 NOTE — CONSULT NOTE ADULT - SUBJECTIVE AND OBJECTIVE BOX
CHIEF COMPLAINT:    HISTORY OF PRESENT ILLNESS:   70yoM w/ PMHx of CKD with a baseline Cr >3, HTN, HLD presented to OSH after multiple falls. He was transferred from OSH for MRI/MRA findings concerning for aneurysm vs. thrombus with multiple infarcts. He is slightly confused, but denies pain, headache, blurry vision, nausea, vomiting, weakness, and changes in sensation.   CTH 10/1 hydro likely chronic, MRI 10/4 right-sided acute vs. subacute MCA infarcts, Carotid U/S 10/6 no sig stenosis, MRA 10/6 R pcomm aneurysm vs thrombus [id number], mild R M1 stenosis.  (09 Oct 2021 16:27)  Denies chest pain  or shortness of breath   denies any previous cardiac  issues.       PAST MEDICAL & SURGICAL HISTORY:  Frequent falls    Hypertension    Chronic kidney disease, unspecified CKD stage            MEDICATIONS:  aspirin  chewable 81 milliGRAM(s) Oral daily  cloNIDine 0.1 milliGRAM(s) Oral three times a day  clopidogrel Tablet 75 milliGRAM(s) Oral daily  enoxaparin Injectable 30 milliGRAM(s) SubCutaneous daily  hydrALAZINE 100 milliGRAM(s) Oral three times a day  isosorbide   mononitrate ER Tablet (IMDUR) 60 milliGRAM(s) Oral daily  NIFEdipine XL 60 milliGRAM(s) Oral daily        acetaminophen    Suspension .. 650 milliGRAM(s) Oral every 6 hours PRN  oxyCODONE    IR 5 milliGRAM(s) Oral every 4 hours PRN  oxyCODONE    IR 10 milliGRAM(s) Oral every 4 hours PRN    polyethylene glycol 3350 17 Gram(s) Oral at bedtime  senna 2 Tablet(s) Oral at bedtime    atorvastatin 20 milliGRAM(s) Oral at bedtime    influenza   Vaccine 0.5 milliLiter(s) IntraMuscular once      FAMILY HISTORY:      SOCIAL HISTORY:    [ ] Non-smoker  [ ] Smoker  [ ] Alcohol    Allergies    No Known Allergies    Intolerances    	    REVIEW OF SYSTEMS:  CONSTITUTIONAL: No fever, weight loss, + fatigue  EYES: No eye pain, visual disturbances, or discharge  ENMT:  No difficulty hearing, tinnitus, vertigo; No sinus or throat pain  NECK: No pain or stiffness  RESPIRATORY: No cough, wheezing, chills or hemoptysis; No Shortness of Breath  CARDIOVASCULAR: No chest pain, palpitations, passing out, dizziness, or leg swelling  GASTROINTESTINAL: No abdominal or epigastric pain. No nausea, vomiting, or hematemesis; No diarrhea or constipation. No melena or hematochezia.  GENITOURINARY: No dysuria, frequency, hematuria, or incontinence  NEUROLOGICAL: No headaches, memory loss, loss of strength, numbness, or tremors  SKIN: No itching, burning, rashes, or lesions   LYMPH Nodes: No enlarged glands  ENDOCRINE: No heat or cold intolerance; No hair loss  MUSCULOSKELETAL: No joint pain or swelling; No muscle, back, or extremity pain  PSYCHIATRIC: No depression, anxiety, mood swings, or difficulty sleeping  HEME/LYMPH: No easy bruising, or bleeding gums  ALLERY AND IMMUNOLOGIC: No hives or eczema	    [ ] All others negative	  [ ] Unable to obtain    PHYSICAL EXAM:  T(C): 36.4 (10-10-21 @ 07:55), Max: 36.8 (10-09-21 @ 19:39)  HR: 49 (10-10-21 @ 11:19) (48 - 68)  BP: 158/88 (10-10-21 @ 11:19) (136/64 - 189/105)  RR: 18 (10-10-21 @ 07:55) (14 - 18)  SpO2: 99% (10-10-21 @ 11:19) (97% - 99%)  Wt(kg): --  I&O's Summary      Appearance: Normal	  HEENT:   Normal oral mucosa, PERRL, EOMI	  Lymphatic: No lymphadenopathy  Cardiovascular: Normal S1 S2, No JVD, No murmurs, No edema  Respiratory: Lungs clear to auscultation	  Psychiatry: A & O x 3, Mood & affect appropriate  Gastrointestinal:  Soft, Non-tender, + BS	  Skin: No rashes, No ecchymoses, No cyanosis	  Neurologic: Non-focal  Extremities: Normal range of motion, No clubbing, cyanosis or edema  Vascular: Peripheral pulses palpable 2+ bilaterally    TELEMETRY: 	    ECG:  	NSR septal infarct   < from: TTE Echo Complete w/o Contrast w/ Doppler (10.05.21 @ 14:11) >     EXAM:  ECHO TTE WO CON COMP W DOPP         PROCEDURE DATE:  10/05/2021        INTERPRETATION:  TRANSTHORACIC ECHOCARDIOGRAM REPORT        Patient Name:   MARIA DE JESUS SIMMS Patient Location: [facility] Rec #:  [id number]  Accession #:      [id number]  Account #:                  Height:           70.9 in 180.0 cm  YOB: 1950  Weight:           160.1 lb 72.60 kg  Patient Age:    70 years    BSA:              1.92 m²  Patient Gender: M           BP:               167/83 mmHg      Date of Exam:        10/5/2021 2:11:24 PM  Sonographer:         ROME  Referring Physician: ASIYA    Procedure:     2D Echo/Doppler/Color Doppler Complete and Saline Contrast                 (Bubble Study).  Indications:   Cerebral infarction due to unspecified occlusion or stenosis of                 unspecified cerebral artery - I63.50  Diagnosis:     Cerebral infarction due to unspecified occlusion or stenosis of                 unspecified cerebral artery - I63.50  Study Details: Technically good study.        2D AND M-MODE MEASUREMENTS (normal ranges within parentheses):  Left Ventricle:                  Normal   Aorta/Left Atrium:          Normal  IVSd (2D):              1.13 cm (0.7-1.1) Aortic Root (2D):  3.00 cm (2.4-3.7)  LVPWd (2D):       1.27 cm (0.7-1.1) Left Atrium (2D):  5.24 cm (1.9-4.0)  LVIDd (2D):             4.37 cm (3.4-5.7) Right Ventricle:  LVIDs (2D):             2.58 cm           TAPSE:           1.98 cm  LV FS (2D):             41.0 %   (>25%)  Relative Wall Thickness  0.58    (<0.42)    LV DIASTOLIC FUNCTION:  MV Peak E: 1.32 m/s Decel Time:  267 msec  MV Peak A: 0.40 m/s Septal E/e'  28.1  E/A Ratio: 3.32     Lateral E/e' 34.8  Septal e'  0.0 m/s  Lateral e' 0.0 m/s    SPECTRAL DOPPLER ANALYSIS (where applicable):  Mitral Valve:  MV P1/2 Time: 77.51 msec  MV Area, PHT: 2.84 cm²    Aortic Valve: AoV Max Jamar: 1.41 m/s AoV Peak P.0 mmHg AoV Mean P.1 mmHg    LVOT Vmax: 0.89 m/s LVOT VTI: 0.171 m LVOT Diameter: 2.01 cm    AoV Area, Vmax: 2.02 cm² AoVArea, VTI: 2.14 cm² AoV Area, Vmn: 1.76 cm²    Aortic Insufficiency:  AI Half-time:  893 msec  AI Decel Rate: 0.93 m/s²    Tricuspid Valve and PA/RV Systolic Pressure: TR Max Velocity: 3.35 m/s RA Pressure: 5 mmHg RVSP/PASP: 50.0 mmHg      PHYSICIAN INTERPRETATION:  Left Ventricle: Left ventricular wall thickness is increased. Increased relative wall thickness with normal mass index consistent with left ventricular concentric remodeling.  Global LV systolic function was normal. Left ventricular ejection fraction, by visual estimation, is 65 to 70%. Spectral Doppler shows restrictive pattern of left ventricular myocardial filling (Grade III diastolic dysfunction). Elevated left ventricular end-diastolic pressure.  Right Ventricle: Normal rightventricular size and function.  Left Atrium: Severely enlarged left atrium. Color flow doppler and intravenous injection of agitated saline demonstrates the presence of an intact intra atrial septum.  Right Atrium: Right atrial enlargement.  Pericardium: There is no evidence of pericardial effusion.  Mitral Valve: Thickening of the anterior and posterior mitral valve leaflets. There is moderate mitral annular calcification. Mild mitral valve regurgitation is seen.  Tricuspid Valve: Mild tricuspidregurgitation is visualized. Estimated pulmonary artery systolic pressure is 50.0 mmHg assuming a right atrial pressure of 5 mmHg, which is consistent with mild pulmonary hypertension.  Aortic Valve: The aortic valve is trileaflet. Sclerotic aortic valve with normal opening. Mild aortic valve regurgitation is seen.  Pulmonic Valve: The pulmonic valve is normal. Trace pulmonic valve regurgitation.  Aorta: Aortic root measured at Sinus of Valsalva is normal.  Pulmonary Artery: The pulmonary artery is not well seen.  Shunts: Agitated saline contrast was given intravenously to evaluate for intracardiac shunting.      Summary:   1. Left ventricular ejection fraction, by visual estimation, is 65 to 70%.   2. Normal global left ventricular systolic function.   3. Severely enlarged left atrium.   4. Elevated left ventricular end-diastolic pressure.   5. Increased relative wall thickness with normal mass index consistent with left ventricular concentric remodeling.   6. Spectral Doppler shows restrictive pattern of left ventricular myocardial filling (Grade III diastolic dysfunction).   7. Normal right ventricular size and function.   8. Right atrial enlargement.   9. Mild mitral valve regurgitation.  10. Moderate mitral annular calcification.  11. Mild aortic regurgitation.  12. Sclerotic aortic valve with normal opening.  13. Estimated pulmonary artery systolic pressure is 50.0 mmHg assuming a right atrial pressure of 5 mmHg, which is consistent with mild pulmonary hypertension.  14. Color flow doppler and intravenous injection of agitated saline demonstrates the presence of an intact intra atrial septum.      [id number] Negro Ponce MD, [facility]  Electronically signed on 10/6/2021 at 10:23:48 AM        *** Final ***                This document has been electronically signed. Oct  5 2021  2:11PM    < end of copied text >    RADIOLOGY: < from: CT Head No Cont (10.01.21 @ 18:37) >  EXAM:  CT BRAIN                            PROCEDURE DATE:  10/01/2021          INTERPRETATION:  CT OF THE HEAD WITHOUT CONTRAST    CLINICAL INDICATION: Weakness. Falls.    TECHNIQUE: Volumetric CT acquisition was performed through the brain and reviewed using brain and bone window technique. Dose optimization techniques were utilized including kVp/mA modulation along with iterative reconstructions.      COMPARISON: No prior studies have been submitted for comparison.    FINDINGS:    The ventricular and sulcal size and configuration is age appropriate.   There is no acute loss of gray-white differentiation. There are extensive patchy and confluent areas of hypodensity in the periventricular and subcortical white matter which are non-specific but likely related  chronic microangiopathic ischemic changes.  Small chronic infarction in the right cerebellar hemisphere. Chronic appearing left thalamic lacunar infarct.    There is no evidence of mass effect, midline shift, acute intracranial hemorrhage, or extra-axial collections.     The calvarium is intact. The paranasal sinuses are clear.The mastoid air cells are predominantly clear. The orbits are unremarkable.      IMPRESSION:  No acute intracranial hemorrhage or acute territorial infarction. Extensive chronic microvascular ischemic changes and several chronic infarctions as described. Further evaluation may be obtained with MRI of the brain if no contraindications.    --- End of Report ---    < end of copied text >    OTHER: 	  	  LABS:	 	    CARDIAC MARKERS:                                  12.2   6.47  )-----------( 729      ( 10 Oct 2021 08:19 )             39.0     10    139  |  102  |  46<H>  ----------------------------<  98  4.5   |  21<L>  |  3.03<H>    Ca    10.2      09 Oct 2021 17:21    TPro  8.2  /  Alb  4.5  /  TBili  0.3  /  DBili  x   /  AST  17  /  ALT  7<L>  /  AlkPhos  110  10-09    proBNP:   Lipid Profile:   HgA1c:   TSH:

## 2021-10-10 NOTE — PHYSICAL THERAPY INITIAL EVALUATION ADULT - PLANNED THERAPY INTERVENTIONS, PT EVAL
Goal:  Patient will increase strength 1/2 grade for ambulation and ADL's 2 weeks/balance training/bed mobility training/gait training

## 2021-10-10 NOTE — CONSULT NOTE ADULT - ASSESSMENT
70M PMHx  presumed CKD with a baseline Cr >3, HTN, HLD presented to OSH after multiple falls. He was transferred from OSH for MRI/MRA findings concerning for aneurysm vs. thrombus with multiple infarcts. He is slightly confused,  and unable to give his medical hx except he has hx ot htn. denies any hx of ckd . on CTH 10/1 hydro likely chronic, MRI 10/4 right-sided acute vs. subacute MCA infarcts, Carotid U/S 10/6 no sig stenosis, MRA 10/6 R pcomm aneurysm vs thrombus [id number], mild R M1 stenosis. he is now being scheduled for neuro angio.    1- stage IV ckd suspected with gfr estimated to be 19-23 cc/min.   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 he will stand at risk of worsening renal function including risk of renal replacement therapy   he has proteinuria as well. to have c3/c4/arnold/anca   to have urine pro/cr ratio   has hx of urinary retention as well.   bladder sono  hydralazine procardia and clonidine to cont as above   d/w ns team when seen earlier

## 2021-10-11 DIAGNOSIS — K76.9 LIVER DISEASE, UNSPECIFIED: ICD-10-CM

## 2021-10-11 DIAGNOSIS — I47.2 VENTRICULAR TACHYCARDIA: ICD-10-CM

## 2021-10-11 LAB
ANION GAP SERPL CALC-SCNC: 14 MMOL/L — SIGNIFICANT CHANGE UP (ref 5–17)
APTT BLD: 33.3 SEC — SIGNIFICANT CHANGE UP (ref 27.5–35.5)
BUN SERPL-MCNC: 42 MG/DL — HIGH (ref 7–23)
CALCIUM SERPL-MCNC: 9.8 MG/DL — SIGNIFICANT CHANGE UP (ref 8.4–10.5)
CHLORIDE SERPL-SCNC: 106 MMOL/L — SIGNIFICANT CHANGE UP (ref 96–108)
CO2 SERPL-SCNC: 20 MMOL/L — LOW (ref 22–31)
CREAT SERPL-MCNC: 2.9 MG/DL — HIGH (ref 0.5–1.3)
FOLATE SERPL-MCNC: 11.2 NG/ML — SIGNIFICANT CHANGE UP
GLUCOSE SERPL-MCNC: 87 MG/DL — SIGNIFICANT CHANGE UP (ref 70–99)
HCT VFR BLD CALC: 39.6 % — SIGNIFICANT CHANGE UP (ref 39–50)
HGB BLD-MCNC: 12.2 G/DL — LOW (ref 13–17)
INR BLD: 1.04 RATIO — SIGNIFICANT CHANGE UP (ref 0.88–1.16)
MCHC RBC-ENTMCNC: 29.3 PG — SIGNIFICANT CHANGE UP (ref 27–34)
MCHC RBC-ENTMCNC: 30.8 GM/DL — LOW (ref 32–36)
MCV RBC AUTO: 95.2 FL — SIGNIFICANT CHANGE UP (ref 80–100)
NRBC # BLD: 0 /100 WBCS — SIGNIFICANT CHANGE UP (ref 0–0)
PLATELET # BLD AUTO: 728 K/UL — HIGH (ref 150–400)
POTASSIUM SERPL-MCNC: 4.4 MMOL/L — SIGNIFICANT CHANGE UP (ref 3.5–5.3)
POTASSIUM SERPL-SCNC: 4.4 MMOL/L — SIGNIFICANT CHANGE UP (ref 3.5–5.3)
PROTHROM AB SERPL-ACNC: 12.4 SEC — SIGNIFICANT CHANGE UP (ref 10.6–13.6)
RBC # BLD: 4.16 M/UL — LOW (ref 4.2–5.8)
RBC # FLD: 15.9 % — HIGH (ref 10.3–14.5)
SARS-COV-2 RNA SPEC QL NAA+PROBE: SIGNIFICANT CHANGE UP
SODIUM SERPL-SCNC: 140 MMOL/L — SIGNIFICANT CHANGE UP (ref 135–145)
TSH SERPL-MCNC: 1.82 UIU/ML — SIGNIFICANT CHANGE UP (ref 0.27–4.2)
UUN UR-MCNC: 726 MG/DL — SIGNIFICANT CHANGE UP
VIT B12 SERPL-MCNC: 434 PG/ML — SIGNIFICANT CHANGE UP (ref 232–1245)
WBC # BLD: 6.42 K/UL — SIGNIFICANT CHANGE UP (ref 3.8–10.5)
WBC # FLD AUTO: 6.42 K/UL — SIGNIFICANT CHANGE UP (ref 3.8–10.5)

## 2021-10-11 PROCEDURE — 74176 CT ABD & PELVIS W/O CONTRAST: CPT | Mod: 26

## 2021-10-11 PROCEDURE — 99232 SBSQ HOSP IP/OBS MODERATE 35: CPT

## 2021-10-11 PROCEDURE — 76770 US EXAM ABDO BACK WALL COMP: CPT | Mod: 26

## 2021-10-11 RX ORDER — CIPROFLOXACIN LACTATE 400MG/40ML
250 VIAL (ML) INTRAVENOUS
Refills: 0 | Status: DISCONTINUED | OUTPATIENT
Start: 2021-10-11 | End: 2021-10-14

## 2021-10-11 RX ORDER — ACETYLCYSTEINE 200 MG/ML
1200 VIAL (ML) MISCELLANEOUS EVERY 12 HOURS
Refills: 0 | Status: COMPLETED | OUTPATIENT
Start: 2021-10-11 | End: 2021-10-13

## 2021-10-11 RX ORDER — SODIUM CHLORIDE 9 MG/ML
1000 INJECTION INTRAMUSCULAR; INTRAVENOUS; SUBCUTANEOUS
Refills: 0 | Status: DISCONTINUED | OUTPATIENT
Start: 2021-10-11 | End: 2021-10-11

## 2021-10-11 RX ORDER — NIFEDIPINE 30 MG
90 TABLET, EXTENDED RELEASE 24 HR ORAL DAILY
Refills: 0 | Status: DISCONTINUED | OUTPATIENT
Start: 2021-10-11 | End: 2021-10-28

## 2021-10-11 RX ORDER — SODIUM CHLORIDE 9 MG/ML
1000 INJECTION INTRAMUSCULAR; INTRAVENOUS; SUBCUTANEOUS
Refills: 0 | Status: DISCONTINUED | OUTPATIENT
Start: 2021-10-12 | End: 2021-10-12

## 2021-10-11 RX ADMIN — Medication 0.1 MILLIGRAM(S): at 05:11

## 2021-10-11 RX ADMIN — Medication 100 MILLIGRAM(S): at 21:21

## 2021-10-11 RX ADMIN — CLOPIDOGREL BISULFATE 75 MILLIGRAM(S): 75 TABLET, FILM COATED ORAL at 11:43

## 2021-10-11 RX ADMIN — ATORVASTATIN CALCIUM 20 MILLIGRAM(S): 80 TABLET, FILM COATED ORAL at 21:20

## 2021-10-11 RX ADMIN — Medication 81 MILLIGRAM(S): at 11:45

## 2021-10-11 RX ADMIN — Medication 250 MILLIGRAM(S): at 18:47

## 2021-10-11 RX ADMIN — Medication 0.1 MILLIGRAM(S): at 18:48

## 2021-10-11 RX ADMIN — Medication 100 MILLIGRAM(S): at 18:48

## 2021-10-11 RX ADMIN — Medication 0.1 MILLIGRAM(S): at 21:20

## 2021-10-11 RX ADMIN — Medication 100 MILLIGRAM(S): at 05:11

## 2021-10-11 RX ADMIN — ISOSORBIDE MONONITRATE 60 MILLIGRAM(S): 60 TABLET, EXTENDED RELEASE ORAL at 11:43

## 2021-10-11 RX ADMIN — Medication 1200 MILLIGRAM(S): at 21:21

## 2021-10-11 NOTE — PROGRESS NOTE ADULT - ASSESSMENT
70M PMHx CKD with a baseline Cr >3, HTN, HLD presented to OSH after multiple falls, found to have R MCA territory thromboembolic infarcts with no identified source, and an unruptured 9mm R PComm aneurysm with concern for intra-aneurysmal thrombus. He is slightly confused, but denies pain, headache, blurry vision, nausea, vomiting, weakness, and changes in sensation.   CTH 10/1 hydro likely chronic, MRI 10/4 right-sided acute vs. subacute MCA infarcts, Carotid U/S 10/6 no sig stenosis, MRA 10/6 R pcomm aneurysm [id number], FLAIR signal within the aneurysm concerning for thrombus, mild R M1 stenosis.        PLAN:  Neuro:   - pt cleared for angiogram per Renal for right PCOMM aneurysm   - continue aspirin and plavix  - keep NPO after midnight for angio/embo in am  - Hospitalist following for medical management  Respiratory:   - on room air  CV:  - HTN- continue clonidine, hydralazine, imdur and nifedipine  - CAD- get EKG, trops, CXR and BNP  - telemetry  - on statin         - cardiology consulted and cleared for angiogram  DVT ppx:   - venodynes  Renal:   - CKD stage 3  - Nephrology consulted for clearance for angio  - CT- abd/pelvis  - f/u renal labs  - urine lytes. UA   PT/OT:   - pending    [employer] # [id number]     70M PMHx CKD with a baseline Cr >3, HTN, HLD presented to OSH after multiple falls, found to have R MCA territory thromboembolic infarcts with no identified source, and an unruptured 9mm R PComm aneurysm with concern for intra-aneurysmal thrombus. He is slightly confused, but denies pain, headache, blurry vision, nausea, vomiting, weakness, and changes in sensation.   CTH 10/1 hydro likely chronic, MRI 10/4 right-sided acute vs. subacute MCA infarcts, Carotid U/S 10/6 no sig stenosis, MRA 10/6 R pcomm aneurysm [id number], FLAIR signal within the aneurysm concerning for thrombus, mild R M1 stenosis.        PLAN:  Neuro:   - pt cleared for angiogram/embo per Renal for right PCOMM aneurysm   - continue aspirin and plavix  - keep NPO after midnight for angio/embo in am  - Hospitalist following for medical management  Respiratory:   - on room air  CV:  - HTN- continue clonidine, hydralazine, imdur and nifedipine  - CAD- Grade 3 diastolic dysfunction  - telemetry  - on statin         - cardiology consulted and cleared for angiogram  DVT ppx:   - venodynes  Renal:   - UTI- started on cipro per renal recs, f/u culture  - CKD stage 3  - Cr at baseline  - Nephrology consulted for clearance for angio  - CT- abd/pelvis- 8.5 cm hepatic lesion favoring malignancy vs infection, recommend MRI w contrast and/or tissue sampling  - kidney/bladder U/S : stable renal cysts, renal disease  PT/OT:   - Home PT/OT    [employer] # [id number]

## 2021-10-11 NOTE — PROGRESS NOTE ADULT - SUBJECTIVE AND OBJECTIVE BOX
Eddie Dove   Pager [phone]  Office [phone]      CC: Patient is a 70y old  Male who presents with a chief complaint of Transfer from [facility] for MRA findings (11 Oct 2021 16:28)      SUBJECTIVE / OVERNIGHT EVENTS: pt feels good and denies any complaints on ROS. no h/o dysuria    MEDICATIONS  (STANDING):  acetylcysteine  Oral Solution 1200 milliGRAM(s) Oral every 12 hours  aspirin  chewable 81 milliGRAM(s) Oral daily  atorvastatin 20 milliGRAM(s) Oral at bedtime  ciprofloxacin     Tablet 250 milliGRAM(s) Oral two times a day  cloNIDine 0.1 milliGRAM(s) Oral three times a day  clopidogrel Tablet 75 milliGRAM(s) Oral daily  hydrALAZINE 100 milliGRAM(s) Oral three times a day  influenza   Vaccine 0.5 milliLiter(s) IntraMuscular once  isosorbide   mononitrate ER Tablet (IMDUR) 60 milliGRAM(s) Oral daily  NIFEdipine XL 90 milliGRAM(s) Oral daily  polyethylene glycol 3350 17 Gram(s) Oral at bedtime  senna 2 Tablet(s) Oral at bedtime  sodium chloride 0.9%. 1000 milliLiter(s) (100 mL/Hr) IV Continuous <Continuous>    MEDICATIONS  (PRN):  acetaminophen    Suspension .. 650 milliGRAM(s) Oral every 6 hours PRN Mild Pain (1 - 3)  oxyCODONE    IR 5 milliGRAM(s) Oral every 4 hours PRN Moderate Pain (4 - 6)  oxyCODONE    IR 10 milliGRAM(s) Oral every 4 hours PRN Severe Pain (7 - 10)      Vital Signs Last 24 Hrs  T(C): 36.6 (11 Oct 2021 12:00), Max: 36.8 (10 Oct 2021 23:41)  T(F): 97.8 (11 Oct 2021 12:00), Max: 98.2 (10 Oct 2021 23:41)  HR: 46 (11 Oct 2021 12:00) (46 - 53)  BP: 161/62 (11 Oct 2021 12:00) (138/68 - 176/86)  BP(mean): --  RR: 18 (11 Oct 2021 12:00) (18 - 18)  SpO2: 99% (11 Oct 2021 12:00) (99% - 100%)  CAPILLARY BLOOD GLUCOSE        I&O's Summary    tele: 13 bts WCT last night    PHYSICAL EXAM:    GENERAL: NAD   HEENT: EOMI, PERRL  PULM: Clear to auscultation bilaterally  CV: Regular rate and rhythm; nl S1, S2; No murmurs, rubs, or gallops  ABDOMEN: Soft, Nontender, Nondistended; Bowel sounds present  EXTREMITIES/MSK:  No edema, calf tenderness   PSYCH: AAOx3  NEUROLOGY: non-focal          LABS:                        12.2   6.42  )-----------( 728      ( 11 Oct 2021 07:09 )             39.6     10-11    140  |  106  |  42<H>  ----------------------------<  87  4.4   |  20<L>  |  2.90<H>    Ca    9.8      11 Oct 2021 07:09  Phos  3.3     10-10  Mg     2.1     10-10    TPro  7.2  /  Alb  x   /  TBili  x   /  DBili  x   /  AST  x   /  ALT  x   /  AlkPhos  x   10-10    PT/INR - ( 09 Oct 2021 17:21 )   PT: 12.2 sec;   INR: 1.02 ratio         PTT - ( 09 Oct 2021 17:21 )  PTT:30.7 sec  CARDIAC MARKERS ( 10 Oct 2021 21:03 )  x     / x     / 71 U/L / x     / 1.8 ng/mL      Urinalysis Basic - ( 10 Oct 2021 21:03 )    Color: Yellow / Appearance: Slightly Turbid / S.016 / pH: x  Gluc: x / Ketone: Negative  / Bili: Negative / Urobili: Negative   Blood: x / Protein: 300 mg/dL / Nitrite: Positive   Leuk Esterase: Large / RBC: 2 /hpf /  /HPF   Sq Epi: x / Non Sq Epi: 1 /hpf / Bacteria: Many          RADIOLOGY & ADDITIONAL TESTS:    Imaging Personally Reviewed:    Consultant(s) Notes Reviewed:      Care Discussed with Consultants/Other Providers: neurosurg   Eddie Dove   Pager [phone]  Office [phone]      CC: Patient is a 70y old  Male who presents with a chief complaint of Transfer from [facility] for MRA findings (11 Oct 2021 16:28)      SUBJECTIVE / OVERNIGHT EVENTS: pt feels good and denies any complaints on ROS. no h/o dysuria    MEDICATIONS  (STANDING):  acetylcysteine  Oral Solution 1200 milliGRAM(s) Oral every 12 hours  aspirin  chewable 81 milliGRAM(s) Oral daily  atorvastatin 20 milliGRAM(s) Oral at bedtime  ciprofloxacin     Tablet 250 milliGRAM(s) Oral two times a day  cloNIDine 0.1 milliGRAM(s) Oral three times a day  clopidogrel Tablet 75 milliGRAM(s) Oral daily  hydrALAZINE 100 milliGRAM(s) Oral three times a day  influenza   Vaccine 0.5 milliLiter(s) IntraMuscular once  isosorbide   mononitrate ER Tablet (IMDUR) 60 milliGRAM(s) Oral daily  NIFEdipine XL 90 milliGRAM(s) Oral daily  polyethylene glycol 3350 17 Gram(s) Oral at bedtime  senna 2 Tablet(s) Oral at bedtime  sodium chloride 0.9%. 1000 milliLiter(s) (100 mL/Hr) IV Continuous <Continuous>    MEDICATIONS  (PRN):  acetaminophen    Suspension .. 650 milliGRAM(s) Oral every 6 hours PRN Mild Pain (1 - 3)  oxyCODONE    IR 5 milliGRAM(s) Oral every 4 hours PRN Moderate Pain (4 - 6)  oxyCODONE    IR 10 milliGRAM(s) Oral every 4 hours PRN Severe Pain (7 - 10)      Vital Signs Last 24 Hrs  T(C): 36.6 (11 Oct 2021 12:00), Max: 36.8 (10 Oct 2021 23:41)  T(F): 97.8 (11 Oct 2021 12:00), Max: 98.2 (10 Oct 2021 23:41)  HR: 46 (11 Oct 2021 12:00) (46 - 53)  BP: 161/62 (11 Oct 2021 12:00) (138/68 - 176/86)  BP(mean): --  RR: 18 (11 Oct 2021 12:00) (18 - 18)  SpO2: 99% (11 Oct 2021 12:00) (99% - 100%)  CAPILLARY BLOOD GLUCOSE        I&O's Summary    tele: 13 bts WCT last night    PHYSICAL EXAM:    GENERAL: NAD   HEENT: EOMI, PERRL  PULM: Clear to auscultation bilaterally  CV: Regular rate and rhythm; nl S1, S2; No murmurs, rubs, or gallops  ABDOMEN: Soft, Nontender, Nondistended; Bowel sounds present  EXTREMITIES/MSK:  No edema, calf tenderness   PSYCH: AAOx3  NEUROLOGY: non-focal          LABS:                        12.2   6.42  )-----------( 728      ( 11 Oct 2021 07:09 )             39.6     10-11    140  |  106  |  42<H>  ----------------------------<  87  4.4   |  20<L>  |  2.90<H>    Ca    9.8      11 Oct 2021 07:09  Phos  3.3     10-10  Mg     2.1     10-10    TPro  7.2  /  Alb  x   /  TBili  x   /  DBili  x   /  AST  x   /  ALT  x   /  AlkPhos  x   10-10    PT/INR - ( 09 Oct 2021 17:21 )   PT: 12.2 sec;   INR: 1.02 ratio         PTT - ( 09 Oct 2021 17:21 )  PTT:30.7 sec  CARDIAC MARKERS ( 10 Oct 2021 21:03 )  x     / x     / 71 U/L / x     / 1.8 ng/mL      Urinalysis Basic - ( 10 Oct 2021 21:03 )    Color: Yellow / Appearance: Slightly Turbid / S.016 / pH: x  Gluc: x / Ketone: Negative  / Bili: Negative / Urobili: Negative   Blood: x / Protein: 300 mg/dL / Nitrite: Positive   Leuk Esterase: Large / RBC: 2 /hpf /  /HPF   Sq Epi: x / Non Sq Epi: 1 /hpf / Bacteria: Many    EKG reviewed by me: SB c inferolateral flip T waves, unchanged      RADIOLOGY & ADDITIONAL TESTS:    Imaging Personally Reviewed:    Consultant(s) Notes Reviewed:      Care Discussed with Consultants/Other Providers: neurosurg

## 2021-10-11 NOTE — PROGRESS NOTE ADULT - PROBLEM SELECTOR PLAN 3
- baseline Cr 3 per Renal  - monitor UOP, renal function, electrolytes closely  - f/u ANCA, ARMIDA, renal US  - periop IVF per Renal

## 2021-10-11 NOTE — PROGRESS NOTE ADULT - ASSESSMENT
70yoM w/ PMHx of CKD IV (unknown baseline), uncontrolled HTN, ?CAD (patient states he has "heart problems;" TTE performed at [facility] on 10/5 w/ normal LVF, severely enlarged LA, Grade III diastolic dysfunction, mild pulm HTN) who was transfered from [facility] to where he presented w/ hx of multiple falls, found to have acute right MCA territory ischemic infarct w/ extensive chronic microvascular ischemic changes on CT, acute/subacute right-sided MCA distribution infarct with associated cytotoxic edema, on MRI, and Pcomm aneurysm vs thrombus [id number], mild R M1 stenosis on MRA. A1c 5.5, LDL 25 as resulted at [facility].

## 2021-10-11 NOTE — PROGRESS NOTE ADULT - PROBLEM SELECTOR PLAN 1
- management per NSGY- plan for angio tomorrow. pt is optimized from a medical standpoint once optimized from a cardiology and renal perspective. pt is at risk for JUJU- IVF per Renal.  - continue telemetry monitoring.

## 2021-10-11 NOTE — PROGRESS NOTE ADULT - PROBLEM SELECTOR PLAN 1
For cerebral angiogram tomorrow   acceptable cardiac risk to proceed   Normal LVEF and METS > 4 no anginal symptoms.

## 2021-10-11 NOTE — PROGRESS NOTE ADULT - SUBJECTIVE AND OBJECTIVE BOX
Patient seen and examined  no complaints    No Known Allergies    Hospital Medications:   MEDICATIONS  (STANDING):  aspirin  chewable 81 milliGRAM(s) Oral daily  atorvastatin 20 milliGRAM(s) Oral at bedtime  ciprofloxacin     Tablet 250 milliGRAM(s) Oral two times a day  cloNIDine 0.1 milliGRAM(s) Oral three times a day  clopidogrel Tablet 75 milliGRAM(s) Oral daily  hydrALAZINE 100 milliGRAM(s) Oral three times a day  influenza   Vaccine 0.5 milliLiter(s) IntraMuscular once  isosorbide   mononitrate ER Tablet (IMDUR) 60 milliGRAM(s) Oral daily  NIFEdipine XL 60 milliGRAM(s) Oral daily  polyethylene glycol 3350 17 Gram(s) Oral at bedtime  senna 2 Tablet(s) Oral at bedtime  sodium chloride 0.9%. 1000 milliLiter(s) (100 mL/Hr) IV Continuous <Continuous>      VITALS:  T(F): 97.8 (10-11-21 @ 12:00), Max: 98.2 (10-10-21 @ 23:41)  HR: 46 (10-11-21 @ 12:00)  BP: 161/62 (10-11-21 @ 12:00)  RR: 18 (10-11-21 @ 12:00)  SpO2: 99% (10-11-21 @ 12:00)  Wt(kg): --    PHYSICAL EXAM:  Constitutional: NAD  HEENT: anicteric sclera, oropharynx clear, MMM  Neck: No JVD  Respiratory: CTAB, no wheezes, rales or rhonchi  Cardiovascular: S1, S2, RRR  Gastrointestinal: BS+, soft, NT/ND  Extremities: No cyanosis or clubbing. No peripheral edema  Neurological: A/O x 3, no focal deficits  Psychiatric: Normal mood, normal affect  : No CVA tenderness. No mendoza.   Skin: No rashes  LABS:  10-11    140  |  106  |  42<H>  ----------------------------<  87  4.4   |  20<L>  |  2.90<H>    Ca    9.8      11 Oct 2021 07:09  Phos  3.3     10-10  Mg     2.1     10-10    TPro  7.2  /  Alb      /  TBili      /  DBili      /  AST      /  ALT      /  AlkPhos      10-10    Creatinine Trend: 2.90 <--, 3.07 <--, 3.03 <--, 3.29 <--, 3.34 <--                        12.2   6.42  )-----------( 728      ( 11 Oct 2021 07:09 )             39.6     Urine Studies:  Urinalysis Basic - ( 10 Oct 2021 21:03 )    Color: Yellow / Appearance: Slightly Turbid / S.016 / pH:   Gluc:  / Ketone: Negative  / Bili: Negative / Urobili: Negative   Blood:  / Protein: 300 mg/dL / Nitrite: Positive   Leuk Esterase: Large / RBC: 2 /hpf /  /HPF   Sq Epi:  / Non Sq Epi: 1 /hpf / Bacteria: Many      Creatinine, Random Urine: 128 mg/dL (10-10 @ 21:03)  Protein/Creatinine Ratio Calculation: 1.4 Ratio (10-10 @ 21:03)  Potassium, Random Urine: 43 mmol/L (10-10 @ 21:03)  Osmolality, Random Urine: 427 mos/kg (10-10 @ 21:03)  Sodium, Random Urine: 34 mmol/L (10-10 @ 21:03)  Creatinine, Random Urine: 127 mg/dL (10-10 @ 21:03)  Chloride, Random Urine: 24 mmol/L (10-10 @ 21:03)    RADIOLOGY & ADDITIONAL STUDIES:

## 2021-10-11 NOTE — PROGRESS NOTE ADULT - ASSESSMENT
70yoM w/ PMHx of CKD with a baseline Cr >3, HTN, HLD presented to OSH after multiple falls. He was transferred from OSH for MRI/MRA findings concerning for aneurysm vs. thrombus       1) CKD stage 4-  Likely has Hypertensive Nephropathy  B/l cr around 3mg/dl   Ua and urine lytes reviewed--> +UTI- on cipro  urine pro/cr 1.4grams  Renal US --> right kidney 9.3cm and left 9.5 cm with b/l renal cysts  Pt is at risk for JUJU however needs angio for cerebral aneurysm---> Recommend IV Nacl @ 100cc , 12 hours pre and post angiogram  also give mucomyst 1200mg po bid   avoid nephrotoxin such as ace/arb/nsaid  trend bmp    2) HTN-  bp elevated  INc nifedipine to 90mg po qd from 60mg   Trend bp      Dr Hernadez

## 2021-10-11 NOTE — PROGRESS NOTE ADULT - PROBLEM SELECTOR PLAN 2
- uncontrolled at [facility] -- controlled w/ current regimen   - continue Imdur, Hydralazine, Nifedipine, and Clonidine for now w/ close monitoring of hemodynamics  - SBP goals per neurology = 140-160 mmHg

## 2021-10-11 NOTE — PROGRESS NOTE ADULT - ASSESSMENT
70yoM w/ PMHx of CKD IV (unknown baseline), uncontrolled HTN, ?CAD (patient states he has "heart problems;" TTE performed at [facility] on 10/5 w/ normal LVF, severely enlarged LA, Grade III diastolic dysfunction, mild pulm HTN) who was transfered from [facility] to where he presented w/ hx of multiple falls, found to have acute right MCA territory ischemic infarct w/ extensive chronic microvascular ischemic changes on CT, acute/subacute right-sided MCA distribution infarct with associated cytotoxic edema, on MRI, and Pcomm aneurysm vs thrombus [id number], mild R M1 stenosis on MRA.

## 2021-10-11 NOTE — PROGRESS NOTE ADULT - SUBJECTIVE AND OBJECTIVE BOX
SUBJECTIVE: Pt seen and examined, no complaints this morning. Angiogram rescheduled for tomorrow    OVERNIGHT EVENTS: none    Vital Signs Last 24 Hrs  T(C): 36.6 (11 Oct 2021 12:00), Max: 36.8 (10 Oct 2021 23:41)  T(F): 97.8 (11 Oct 2021 12:00), Max: 98.2 (10 Oct 2021 23:41)  HR: 46 (11 Oct 2021 12:00) (46 - 53)  BP: 161/62 (11 Oct 2021 12:00) (138/68 - 176/86)  BP(mean): --  RR: 18 (11 Oct 2021 12:00) (18 - 18)  SpO2: 99% (11 Oct 2021 12:00) (99% - 100%)    PHYSICAL EXAM:    General: No Acute Distress     Neurological: Awake, alert oriented to person, place (with choices) and time, Following Commands, Face Symmetrical, Speech Fluent,  Muscle Strength normal in all four extremities, No Drift, Sensation to Light Touch Intact    Pulmonary: Clear to Auscultation, No Rales, No Rhonchi, No Wheezes     Cardiovascular: S1, S2, Regular Rate and Rhythm     Gastrointestinal: Soft, Nontender, Nondistended     Incision: none    LABS:                        12.2   6.42  )-----------( 728      ( 11 Oct 2021 07:09 )             39.6    10-11    140  |  106  |  42<H>  ----------------------------<  87  4.4   |  20<L>  |  2.90<H>    Ca    9.8      11 Oct 2021 07:09  Phos  3.3     10-10  Mg     2.1     10-10    TPro  7.2  /  Alb  x   /  TBili  x   /  DBili  x   /  AST  x   /  ALT  x   /  AlkPhos  x   10-10  PT/INR - ( 09 Oct 2021 17:21 )   PT: 12.2 sec;   INR: 1.02 ratio         PTT - ( 09 Oct 2021 17:21 )  PTT:30.7 sec        MEDICATIONS:  Antibiotics:  ciprofloxacin     Tablet 250 milliGRAM(s) Oral two times a day    Neuro:  acetaminophen    Suspension .. 650 milliGRAM(s) Oral every 6 hours PRN Mild Pain (1 - 3)  oxyCODONE    IR 5 milliGRAM(s) Oral every 4 hours PRN Moderate Pain (4 - 6)  oxyCODONE    IR 10 milliGRAM(s) Oral every 4 hours PRN Severe Pain (7 - 10)    Cardiac:  cloNIDine 0.1 milliGRAM(s) Oral three times a day  hydrALAZINE 100 milliGRAM(s) Oral three times a day  isosorbide   mononitrate ER Tablet (IMDUR) 60 milliGRAM(s) Oral daily  NIFEdipine XL 90 milliGRAM(s) Oral daily    Pulm:    GI/:  polyethylene glycol 3350 17 Gram(s) Oral at bedtime  senna 2 Tablet(s) Oral at bedtime    Other:   acetylcysteine  Oral Solution 1200 milliGRAM(s) Oral every 12 hours  aspirin  chewable 81 milliGRAM(s) Oral daily  atorvastatin 20 milliGRAM(s) Oral at bedtime  clopidogrel Tablet 75 milliGRAM(s) Oral daily  influenza   Vaccine 0.5 milliLiter(s) IntraMuscular once  sodium chloride 0.9%. 1000 milliLiter(s) IV Continuous <Continuous>    DIET: [] Regular [] CCD [x] Renal [] Puree [] Dysphagia [] Tube Feeds:     IMAGING:   < from: MR Angio Head No Cont (10.06.21 @ 18:15) >  IMPRESSION:    1. 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  2. Mild narrowing of the proximal right middle cerebral artery in the M1 region.  3. The vessels are otherwise patent, as outlined above.    < end of copied text >  < from: [facility] Duplex Lower Ext Vein Scan, Bilat (10.10.21 @ 11:06) >  IMPRESSION:  No evidence of deep venous thrombosis in either lower extremity.    < end of copied text >   SUBJECTIVE: Pt seen and examined, no complaints this morning. Angiogram rescheduled for tomorrow    OVERNIGHT EVENTS: none    Vital Signs Last 24 Hrs  T(C): 36.6 (11 Oct 2021 12:00), Max: 36.8 (10 Oct 2021 23:41)  T(F): 97.8 (11 Oct 2021 12:00), Max: 98.2 (10 Oct 2021 23:41)  HR: 46 (11 Oct 2021 12:00) (46 - 53)  BP: 161/62 (11 Oct 2021 12:00) (138/68 - 176/86)  BP(mean): --  RR: 18 (11 Oct 2021 12:00) (18 - 18)  SpO2: 99% (11 Oct 2021 12:00) (99% - 100%)    PHYSICAL EXAM:    General: No Acute Distress     Neurological: Awake, alert oriented to person, place (with choices) and time, Following Commands, Face Symmetrical, Speech Fluent,  Muscle Strength normal in all four extremities, No Drift, Sensation to Light Touch Intact    Pulmonary: Clear to Auscultation, No Rales, No Rhonchi, No Wheezes     Cardiovascular: S1, S2, Regular Rate and Rhythm     Gastrointestinal: Soft, Nontender, Nondistended     Incision: none    LABS:                        12.2   6.42  )-----------( 728      ( 11 Oct 2021 07:09 )             39.6    10-11    140  |  106  |  42<H>  ----------------------------<  87  4.4   |  20<L>  |  2.90<H>    Ca    9.8      11 Oct 2021 07:09  Phos  3.3     10-10  Mg     2.1     10-10    TPro  7.2  /  Alb  x   /  TBili  x   /  DBili  x   /  AST  x   /  ALT  x   /  AlkPhos  x   10-10  PT/INR - ( 09 Oct 2021 17:21 )   PT: 12.2 sec;   INR: 1.02 ratio         PTT - ( 09 Oct 2021 17:21 )  PTT:30.7 sec        MEDICATIONS:  Antibiotics:  ciprofloxacin     Tablet 250 milliGRAM(s) Oral two times a day    Neuro:  acetaminophen    Suspension .. 650 milliGRAM(s) Oral every 6 hours PRN Mild Pain (1 - 3)  oxyCODONE    IR 5 milliGRAM(s) Oral every 4 hours PRN Moderate Pain (4 - 6)  oxyCODONE    IR 10 milliGRAM(s) Oral every 4 hours PRN Severe Pain (7 - 10)    Cardiac:  cloNIDine 0.1 milliGRAM(s) Oral three times a day  hydrALAZINE 100 milliGRAM(s) Oral three times a day  isosorbide   mononitrate ER Tablet (IMDUR) 60 milliGRAM(s) Oral daily  NIFEdipine XL 90 milliGRAM(s) Oral daily    Pulm:    GI/:  polyethylene glycol 3350 17 Gram(s) Oral at bedtime  senna 2 Tablet(s) Oral at bedtime    Other:   acetylcysteine  Oral Solution 1200 milliGRAM(s) Oral every 12 hours  aspirin  chewable 81 milliGRAM(s) Oral daily  atorvastatin 20 milliGRAM(s) Oral at bedtime  clopidogrel Tablet 75 milliGRAM(s) Oral daily  influenza   Vaccine 0.5 milliLiter(s) IntraMuscular once  sodium chloride 0.9%. 1000 milliLiter(s) IV Continuous <Continuous>    DIET: [] Regular [] CCD [x] Renal [] Puree [] Dysphagia [] Tube Feeds:     IMAGING:   < from: MR Angio Head No Cont (10.06.21 @ 18:15) >  IMPRESSION:    1. 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  2. Mild narrowing of the proximal right middle cerebral artery in the M1 region.  3. The vessels are otherwise patent, as outlined above.    < end of copied text >  < from: [facility] Duplex Lower Ext Vein Scan, Bilat (10.10.21 @ 11:06) >  IMPRESSION:  No evidence of deep venous thrombosis in either lower extremity.    < end of copied text >  < from: CT Abdomen and Pelvis No Cont (10.11.21 @ 09:05) >  IMPRESSION:  Limited noncontrast study.    An 8.5 cm hepatic lesion centered in segments 4 and 5, favoring malignancy over less likely infection. Further characterization may be obtained with contrast enhanced MR and/or tissue sampling.    The findings and recommendations were discussed by Dr. Roe with AUGUSTINE Diane at 10:37 AM on 10/11/2021 with repeat back verification.    < end of copied text >  < from: US Kidney and Bladder (10.11.21 @ 09:46) >  IMPRESSION:  Stable bilateral renal cysts.  Increased renal echogenicity bilaterally, indicative of medical renal disease.  Mild bladder wall hypertrophy.  Enlarged prostate.    < end of copied text >

## 2021-10-12 ENCOUNTER — TRANSCRIPTION ENCOUNTER (OUTPATIENT)
Age: 71
End: 2021-10-12

## 2021-10-12 LAB
ANION GAP SERPL CALC-SCNC: 15 MMOL/L — SIGNIFICANT CHANGE UP (ref 5–17)
BILIRUB SERPL-MCNC: 0.3 MG/DL — SIGNIFICANT CHANGE UP (ref 0.2–1.2)
BUN SERPL-MCNC: 40 MG/DL — HIGH (ref 7–23)
C3 SERPL-MCNC: 135 MG/DL — SIGNIFICANT CHANGE UP (ref 81–157)
C4 SERPL-MCNC: 38 MG/DL — SIGNIFICANT CHANGE UP (ref 13–39)
CALCIUM SERPL-MCNC: 9.3 MG/DL — SIGNIFICANT CHANGE UP (ref 8.4–10.5)
CHLORIDE SERPL-SCNC: 107 MMOL/L — SIGNIFICANT CHANGE UP (ref 96–108)
CO2 SERPL-SCNC: 17 MMOL/L — LOW (ref 22–31)
CREAT SERPL-MCNC: 2.78 MG/DL — HIGH (ref 0.5–1.3)
CREAT SERPL-MCNC: 2.82 MG/DL — HIGH (ref 0.5–1.3)
GLUCOSE SERPL-MCNC: 170 MG/DL — HIGH (ref 70–99)
HCT VFR BLD CALC: 39.3 % — SIGNIFICANT CHANGE UP (ref 39–50)
HGB BLD-MCNC: 12.1 G/DL — LOW (ref 13–17)
INR BLD: 1.12 RATIO — SIGNIFICANT CHANGE UP (ref 0.88–1.16)
MAGNESIUM SERPL-MCNC: 2 MG/DL — SIGNIFICANT CHANGE UP (ref 1.6–2.6)
MCHC RBC-ENTMCNC: 28.8 PG — SIGNIFICANT CHANGE UP (ref 27–34)
MCHC RBC-ENTMCNC: 30.8 GM/DL — LOW (ref 32–36)
MCV RBC AUTO: 93.6 FL — SIGNIFICANT CHANGE UP (ref 80–100)
MELD SCORE WITH DIALYSIS: 22 POINTS — SIGNIFICANT CHANGE UP
MELD SCORE WITHOUT DIALYSIS: 19 POINTS — SIGNIFICANT CHANGE UP
NRBC # BLD: 0 /100 WBCS — SIGNIFICANT CHANGE UP (ref 0–0)
PHOSPHATE SERPL-MCNC: 4.2 MG/DL — SIGNIFICANT CHANGE UP (ref 2.5–4.5)
PLATELET # BLD AUTO: 760 K/UL — HIGH (ref 150–400)
POTASSIUM SERPL-MCNC: 4.5 MMOL/L — SIGNIFICANT CHANGE UP (ref 3.5–5.3)
POTASSIUM SERPL-SCNC: 4.5 MMOL/L — SIGNIFICANT CHANGE UP (ref 3.5–5.3)
PROTHROM AB SERPL-ACNC: 13.4 SEC — SIGNIFICANT CHANGE UP (ref 10.6–13.6)
RBC # BLD: 4.2 M/UL — SIGNIFICANT CHANGE UP (ref 4.2–5.8)
RBC # FLD: 15.8 % — HIGH (ref 10.3–14.5)
SODIUM SERPL-SCNC: 135 MMOL/L — SIGNIFICANT CHANGE UP (ref 135–145)
SODIUM SERPL-SCNC: 139 MMOL/L — SIGNIFICANT CHANGE UP (ref 135–145)
WBC # BLD: 9.18 K/UL — SIGNIFICANT CHANGE UP (ref 3.8–10.5)
WBC # FLD AUTO: 9.18 K/UL — SIGNIFICANT CHANGE UP (ref 3.8–10.5)

## 2021-10-12 PROCEDURE — 36224 PLACE CATH CAROTD ART: CPT | Mod: RT

## 2021-10-12 PROCEDURE — 36228 PLACE CATH INTRACRANIAL ART: CPT | Mod: RT

## 2021-10-12 PROCEDURE — 75894 X-RAYS TRANSCATH THERAPY: CPT | Mod: 26

## 2021-10-12 PROCEDURE — 61624 TCAT PERM OCCLS/EMBOLJ CNS: CPT

## 2021-10-12 PROCEDURE — 75898 FOLLOW-UP ANGIOGRAPHY: CPT | Mod: 26

## 2021-10-12 PROCEDURE — 76377 3D RENDER W/INTRP POSTPROCES: CPT | Mod: 26

## 2021-10-12 PROCEDURE — 99291 CRITICAL CARE FIRST HOUR: CPT

## 2021-10-12 RX ORDER — HEPARIN SODIUM 5000 [USP'U]/ML
5000 INJECTION INTRAVENOUS; SUBCUTANEOUS EVERY 12 HOURS
Refills: 0 | Status: DISCONTINUED | OUTPATIENT
Start: 2021-10-12 | End: 2021-10-17

## 2021-10-12 RX ORDER — SODIUM CHLORIDE 9 MG/ML
1000 INJECTION INTRAMUSCULAR; INTRAVENOUS; SUBCUTANEOUS
Refills: 0 | Status: DISCONTINUED | OUTPATIENT
Start: 2021-10-12 | End: 2021-10-13

## 2021-10-12 RX ORDER — CHLORHEXIDINE GLUCONATE 213 G/1000ML
1 SOLUTION TOPICAL
Refills: 0 | Status: DISCONTINUED | OUTPATIENT
Start: 2021-10-12 | End: 2021-10-14

## 2021-10-12 RX ORDER — NICARDIPINE HYDROCHLORIDE 30 MG/1
5 CAPSULE, EXTENDED RELEASE ORAL
Qty: 40 | Refills: 0 | Status: DISCONTINUED | OUTPATIENT
Start: 2021-10-12 | End: 2021-10-13

## 2021-10-12 RX ORDER — POLYETHYLENE GLYCOL 3350 17 G/17G
17 POWDER, FOR SOLUTION ORAL EVERY 12 HOURS
Refills: 0 | Status: DISCONTINUED | OUTPATIENT
Start: 2021-10-12 | End: 2021-11-03

## 2021-10-12 RX ADMIN — HEPARIN SODIUM 5000 UNIT(S): 5000 INJECTION INTRAVENOUS; SUBCUTANEOUS at 18:54

## 2021-10-12 RX ADMIN — Medication 250 MILLIGRAM(S): at 06:01

## 2021-10-12 RX ADMIN — Medication 100 MILLIGRAM(S): at 06:02

## 2021-10-12 RX ADMIN — NICARDIPINE HYDROCHLORIDE 25 MG/HR: 30 CAPSULE, EXTENDED RELEASE ORAL at 20:19

## 2021-10-12 RX ADMIN — SODIUM CHLORIDE 100 MILLILITER(S): 9 INJECTION INTRAMUSCULAR; INTRAVENOUS; SUBCUTANEOUS at 20:19

## 2021-10-12 RX ADMIN — CLOPIDOGREL BISULFATE 75 MILLIGRAM(S): 75 TABLET, FILM COATED ORAL at 13:33

## 2021-10-12 RX ADMIN — Medication 81 MILLIGRAM(S): at 13:33

## 2021-10-12 RX ADMIN — Medication 100 MILLIGRAM(S): at 22:50

## 2021-10-12 RX ADMIN — Medication 1200 MILLIGRAM(S): at 06:00

## 2021-10-12 RX ADMIN — Medication 0.1 MILLIGRAM(S): at 06:02

## 2021-10-12 RX ADMIN — SENNA PLUS 2 TABLET(S): 8.6 TABLET ORAL at 22:51

## 2021-10-12 RX ADMIN — Medication 90 MILLIGRAM(S): at 08:01

## 2021-10-12 RX ADMIN — Medication 0.1 MILLIGRAM(S): at 13:32

## 2021-10-12 RX ADMIN — ISOSORBIDE MONONITRATE 60 MILLIGRAM(S): 60 TABLET, EXTENDED RELEASE ORAL at 13:32

## 2021-10-12 RX ADMIN — ATORVASTATIN CALCIUM 20 MILLIGRAM(S): 80 TABLET, FILM COATED ORAL at 22:51

## 2021-10-12 RX ADMIN — Medication 1200 MILLIGRAM(S): at 22:51

## 2021-10-12 RX ADMIN — Medication 0.1 MILLIGRAM(S): at 22:51

## 2021-10-12 RX ADMIN — Medication 250 MILLIGRAM(S): at 22:51

## 2021-10-12 NOTE — PROGRESS NOTE ADULT - SUBJECTIVE AND OBJECTIVE BOX
EVENTS:   No acute events overnight.    VITALS:  T(C): , Max: 36.9 (10-11-21 @ 19:52)  HR:  (46 - 68)  BP:  (159/77 - 172/79)  ABP:  (137/50 - 190/79)  RR:  (9 - 22)  SpO2:  (88% - 100%)  Wt(kg): --      10-12-21 @ 07:01  -  10-12-21 @ 18:58  --------------------------------------------------------  IN: 1062.5 mL / OUT: 369 mL / NET: 693.5 mL      LABS:  Na: 139 (10-12 @ 16:57), 135 (10-12 @ 06:25), 140 (10-11 @ 07:09), 139 (10-10 @ 21:03)  K: 4.5 (10-12 @ 16:57), 4.4 (10-11 @ 07:09), 4.4 (10-10 @ 21:03)  Cl: 107 (10-12 @ 16:57), 106 (10-11 @ 07:09), 103 (10-10 @ 21:03)  CO2: 17 (10-12 @ 16:57), 20 (10-11 @ 07:09), 22 (10-10 @ 21:03)  BUN: 40 (10-12 @ 16:57), 42 (10-11 @ 07:09), 42 (10-10 @ 21:03)  Cr: 2.82 (10-12 @ 16:57), 2.78 (10-12 @ 06:25), 2.90 (10-11 @ 07:09), 3.07 (10-10 @ 21:03)  Glu: 170(10-12 @ 16:57), 87(10-11 @ 07:09), 106(10-10 @ 21:03)    Hgb: 12.1 (10-12 @ 16:57), 12.2 (10-11 @ 07:09), 12.7 (10-10 @ 21:03), 12.2 (10-10 @ 08:19)  Hct: 39.3 (10-12 @ 16:57), 39.6 (10-11 @ 07:09), 40.9 (10-10 @ 21:03), 39.0 (10-10 @ 08:19)  WBC: 9.18 (10-12 @ 16:57), 6.42 (10-11 @ 07:09), 6.42 (10-10 @ 21:03), 6.47 (10-10 @ 08:19)  Plt: 760 (10-12 @ 16:57), 728 (10-11 @ 07:09), 779 (10-10 @ 21:03), 729 (10-10 @ 08:19)    INR: 1.12 10-12-21 @ 06:25, 1.04 10-11-21 @ 17:18  PTT: 33.3 10-11-21 @ 17:18    MEDICATIONS:  acetaminophen    Suspension .. 650 milliGRAM(s) Oral every 6 hours PRN  acetylcysteine  Oral Solution 1200 milliGRAM(s) Oral every 12 hours  aspirin  chewable 81 milliGRAM(s) Oral daily  atorvastatin 20 milliGRAM(s) Oral at bedtime  chlorhexidine 4% Liquid 1 Application(s) Topical <User Schedule>  ciprofloxacin     Tablet 250 milliGRAM(s) Oral two times a day  cloNIDine 0.1 milliGRAM(s) Oral three times a day  clopidogrel Tablet 75 milliGRAM(s) Oral daily  heparin   Injectable 5000 Unit(s) SubCutaneous every 12 hours  hydrALAZINE 100 milliGRAM(s) Oral three times a day  influenza   Vaccine 0.5 milliLiter(s) IntraMuscular once  isosorbide   mononitrate ER Tablet (IMDUR) 60 milliGRAM(s) Oral daily  niCARdipine Infusion 5 mG/Hr IV Continuous <Continuous>  NIFEdipine XL 90 milliGRAM(s) Oral daily  oxyCODONE    IR 5 milliGRAM(s) Oral every 4 hours PRN  oxyCODONE    IR 10 milliGRAM(s) Oral every 4 hours PRN  polyethylene glycol 3350 17 Gram(s) Oral every 12 hours  senna 2 Tablet(s) Oral at bedtime  sodium chloride 0.9%. 1000 milliLiter(s) IV Continuous <Continuous>    EXAMINATION:  General:  calm  HEENT:  MMM  Neuro:  awake, alert, oriented x 3, follows commands, moves all extremities  Cards:  RRR  Respiratory:  no respiratory distress  Abdomen:  soft  Extremities:  no edema    Assessment/Plan:   70M PMHx CKD with a baseline Cr >3, HTN, HLD presented to OSH after multiple falls. He was transferred from OSH for MRI/MRA findings concerning for aneurysm vs. thrombus with multiple infarcts. He is slightly confused, but denies pain, headache, blurry vision, nausea, vomiting, weakness, and changes in sensation.   CTH 10/1 hydro likely chronic, MRI 10/4 right-sided acute vs. subacute MCA infarcts, Carotid U/S 10/6 no sig stenosis, MRA 10/6 R pcomm aneurysm with possible intra-aneurysmal thrombus [id number], mild R M1 stenosis.    Underwent balloon assisted coiling of right posterior communicating artery aneurysm on 10/12/21. EVENTS:   No acute events this evening.     VITALS:  T(C): , Max: 36.9 (10-11-21 @ 19:52)  HR:  (46 - 68)  BP:  (159/77 - 172/79)  ABP:  (137/50 - 190/79)  RR:  (9 - 22)  SpO2:  (88% - 100%)  Wt(kg): --      10-12-21 @ 07:01  -  10-12-21 @ 18:58  --------------------------------------------------------  IN: 1062.5 mL / OUT: 369 mL / NET: 693.5 mL      LABS:  Na: 139 (10-12 @ 16:57), 135 (10-12 @ 06:25), 140 (10-11 @ 07:09), 139 (10-10 @ 21:03)  K: 4.5 (10-12 @ 16:57), 4.4 (10-11 @ 07:09), 4.4 (10-10 @ 21:03)  Cl: 107 (10-12 @ 16:57), 106 (10-11 @ 07:09), 103 (10-10 @ 21:03)  CO2: 17 (10-12 @ 16:57), 20 (10-11 @ 07:09), 22 (10-10 @ 21:03)  BUN: 40 (10-12 @ 16:57), 42 (10-11 @ 07:09), 42 (10-10 @ 21:03)  Cr: 2.82 (10-12 @ 16:57), 2.78 (10-12 @ 06:25), 2.90 (10-11 @ 07:09), 3.07 (10-10 @ 21:03)  Glu: 170(10-12 @ 16:57), 87(10-11 @ 07:09), 106(10-10 @ 21:03)    Hgb: 12.1 (10-12 @ 16:57), 12.2 (10-11 @ 07:09), 12.7 (10-10 @ 21:03), 12.2 (10-10 @ 08:19)  Hct: 39.3 (10-12 @ 16:57), 39.6 (10-11 @ 07:09), 40.9 (10-10 @ 21:03), 39.0 (10-10 @ 08:19)  WBC: 9.18 (10-12 @ 16:57), 6.42 (10-11 @ 07:09), 6.42 (10-10 @ 21:03), 6.47 (10-10 @ 08:19)  Plt: 760 (10-12 @ 16:57), 728 (10-11 @ 07:09), 779 (10-10 @ 21:03), 729 (10-10 @ 08:19)    INR: 1.12 10-12-21 @ 06:25, 1.04 10-11-21 @ 17:18  PTT: 33.3 10-11-21 @ 17:18    MEDICATIONS:  acetaminophen    Suspension .. 650 milliGRAM(s) Oral every 6 hours PRN  acetylcysteine  Oral Solution 1200 milliGRAM(s) Oral every 12 hours  aspirin  chewable 81 milliGRAM(s) Oral daily  atorvastatin 20 milliGRAM(s) Oral at bedtime  chlorhexidine 4% Liquid 1 Application(s) Topical <User Schedule>  ciprofloxacin     Tablet 250 milliGRAM(s) Oral two times a day  cloNIDine 0.1 milliGRAM(s) Oral three times a day  clopidogrel Tablet 75 milliGRAM(s) Oral daily  heparin   Injectable 5000 Unit(s) SubCutaneous every 12 hours  hydrALAZINE 100 milliGRAM(s) Oral three times a day  influenza   Vaccine 0.5 milliLiter(s) IntraMuscular once  isosorbide   mononitrate ER Tablet (IMDUR) 60 milliGRAM(s) Oral daily  niCARdipine Infusion 5 mG/Hr IV Continuous <Continuous>  NIFEdipine XL 90 milliGRAM(s) Oral daily  oxyCODONE    IR 5 milliGRAM(s) Oral every 4 hours PRN  oxyCODONE    IR 10 milliGRAM(s) Oral every 4 hours PRN  polyethylene glycol 3350 17 Gram(s) Oral every 12 hours  senna 2 Tablet(s) Oral at bedtime  sodium chloride 0.9%. 1000 milliLiter(s) IV Continuous <Continuous>    EXAMINATION:  General:  calm  HEENT:  MMM  Neuro:  awake, alert, oriented x 3, follows commands, moves all extremities  Cards:  RRR  Respiratory:  no respiratory distress  Abdomen:  soft  Extremities:  no edema    Assessment/Plan:   70M PMHx CKD with a baseline Cr >3, HTN, HLD presented to OSH after multiple falls. He was transferred from OSH for MRI/MRA findings concerning for aneurysm vs. thrombus with multiple infarcts. He is slightly confused, but denies pain, headache, blurry vision, nausea, vomiting, weakness, and changes in sensation.   CTH 10/1 hydro likely chronic, MRI 10/4 right-sided acute vs. subacute MCA infarcts, Carotid U/S 10/6 no sig stenosis, MRA 10/6 R pcomm aneurysm with possible intra-aneurysmal thrombus [id number], mild R M1 stenosis.    Underwent balloon assisted coiling of right posterior communicating artery aneurysm on 10/12/21. EVENTS:   No acute events this evening.     VITALS:  T(C): , Max: 36.9 (10-11-21 @ 19:52)  HR:  (46 - 68)  BP:  (159/77 - 172/79)  ABP:  (137/50 - 190/79)  RR:  (9 - 22)  SpO2:  (88% - 100%)  Wt(kg): --      10-12-21 @ 07:01  -  10-12-21 @ 18:58  --------------------------------------------------------  IN: 1062.5 mL / OUT: 369 mL / NET: 693.5 mL      LABS:  Na: 139 (10-12 @ 16:57), 135 (10-12 @ 06:25), 140 (10-11 @ 07:09), 139 (10-10 @ 21:03)  K: 4.5 (10-12 @ 16:57), 4.4 (10-11 @ 07:09), 4.4 (10-10 @ 21:03)  Cl: 107 (10-12 @ 16:57), 106 (10-11 @ 07:09), 103 (10-10 @ 21:03)  CO2: 17 (10-12 @ 16:57), 20 (10-11 @ 07:09), 22 (10-10 @ 21:03)  BUN: 40 (10-12 @ 16:57), 42 (10-11 @ 07:09), 42 (10-10 @ 21:03)  Cr: 2.82 (10-12 @ 16:57), 2.78 (10-12 @ 06:25), 2.90 (10-11 @ 07:09), 3.07 (10-10 @ 21:03)  Glu: 170(10-12 @ 16:57), 87(10-11 @ 07:09), 106(10-10 @ 21:03)    Hgb: 12.1 (10-12 @ 16:57), 12.2 (10-11 @ 07:09), 12.7 (10-10 @ 21:03), 12.2 (10-10 @ 08:19)  Hct: 39.3 (10-12 @ 16:57), 39.6 (10-11 @ 07:09), 40.9 (10-10 @ 21:03), 39.0 (10-10 @ 08:19)  WBC: 9.18 (10-12 @ 16:57), 6.42 (10-11 @ 07:09), 6.42 (10-10 @ 21:03), 6.47 (10-10 @ 08:19)  Plt: 760 (10-12 @ 16:57), 728 (10-11 @ 07:09), 779 (10-10 @ 21:03), 729 (10-10 @ 08:19)    INR: 1.12 10-12-21 @ 06:25, 1.04 10-11-21 @ 17:18  PTT: 33.3 10-11-21 @ 17:18    MEDICATIONS:  acetaminophen    Suspension .. 650 milliGRAM(s) Oral every 6 hours PRN  acetylcysteine  Oral Solution 1200 milliGRAM(s) Oral every 12 hours  aspirin  chewable 81 milliGRAM(s) Oral daily  atorvastatin 20 milliGRAM(s) Oral at bedtime  chlorhexidine 4% Liquid 1 Application(s) Topical <User Schedule>  ciprofloxacin     Tablet 250 milliGRAM(s) Oral two times a day  cloNIDine 0.1 milliGRAM(s) Oral three times a day  clopidogrel Tablet 75 milliGRAM(s) Oral daily  heparin   Injectable 5000 Unit(s) SubCutaneous every 12 hours  hydrALAZINE 100 milliGRAM(s) Oral three times a day  influenza   Vaccine 0.5 milliLiter(s) IntraMuscular once  isosorbide   mononitrate ER Tablet (IMDUR) 60 milliGRAM(s) Oral daily  niCARdipine Infusion 5 mG/Hr IV Continuous <Continuous>  NIFEdipine XL 90 milliGRAM(s) Oral daily  oxyCODONE    IR 5 milliGRAM(s) Oral every 4 hours PRN  oxyCODONE    IR 10 milliGRAM(s) Oral every 4 hours PRN  polyethylene glycol 3350 17 Gram(s) Oral every 12 hours  senna 2 Tablet(s) Oral at bedtime  sodium chloride 0.9%. 1000 milliLiter(s) IV Continuous <Continuous>    EXAMINATION:  General:  calm  HEENT:  MMM  Neuro:  awake, alert, oriented to hospital, month and year, follows commands, moves all extremities  Cards:  RRR  Respiratory:  no respiratory distress  Abdomen:  soft  Extremities:  no edema    Assessment/Plan:   70M PMHx CKD with a baseline Cr >3, HTN, HLD presented to OSH after multiple falls. He was transferred from OSH for MRI/MRA findings concerning for aneurysm vs. thrombus with multiple infarcts. He is slightly confused, but denies pain, headache, blurry vision, nausea, vomiting, weakness, and changes in sensation.   CTH 10/1 hydro likely chronic, MRI 10/4 right-sided acute vs. subacute MCA infarcts, Carotid U/S 10/6 no sig stenosis, MRA 10/6 R pcomm aneurysm with possible intra-aneurysmal thrombus [id number], mild R M1 stenosis.    Underwent balloon assisted coiling of right posterior communicating artery aneurysm on 10/12/21. EVENTS:   No acute events this evening.     VITALS:  T(C): , Max: 36.9 (10-11-21 @ 19:52)  HR:  (46 - 68)  BP:  (159/77 - 172/79)  ABP:  (137/50 - 190/79)  RR:  (9 - 22)  SpO2:  (88% - 100%)  Wt(kg): --      10-12-21 @ 07:01  -  10-12-21 @ 18:58  --------------------------------------------------------  IN: 1062.5 mL / OUT: 369 mL / NET: 693.5 mL      LABS:  Na: 139 (10-12 @ 16:57), 135 (10-12 @ 06:25), 140 (10-11 @ 07:09), 139 (10-10 @ 21:03)  K: 4.5 (10-12 @ 16:57), 4.4 (10-11 @ 07:09), 4.4 (10-10 @ 21:03)  Cl: 107 (10-12 @ 16:57), 106 (10-11 @ 07:09), 103 (10-10 @ 21:03)  CO2: 17 (10-12 @ 16:57), 20 (10-11 @ 07:09), 22 (10-10 @ 21:03)  BUN: 40 (10-12 @ 16:57), 42 (10-11 @ 07:09), 42 (10-10 @ 21:03)  Cr: 2.82 (10-12 @ 16:57), 2.78 (10-12 @ 06:25), 2.90 (10-11 @ 07:09), 3.07 (10-10 @ 21:03)  Glu: 170(10-12 @ 16:57), 87(10-11 @ 07:09), 106(10-10 @ 21:03)    Hgb: 12.1 (10-12 @ 16:57), 12.2 (10-11 @ 07:09), 12.7 (10-10 @ 21:03), 12.2 (10-10 @ 08:19)  Hct: 39.3 (10-12 @ 16:57), 39.6 (10-11 @ 07:09), 40.9 (10-10 @ 21:03), 39.0 (10-10 @ 08:19)  WBC: 9.18 (10-12 @ 16:57), 6.42 (10-11 @ 07:09), 6.42 (10-10 @ 21:03), 6.47 (10-10 @ 08:19)  Plt: 760 (10-12 @ 16:57), 728 (10-11 @ 07:09), 779 (10-10 @ 21:03), 729 (10-10 @ 08:19)    INR: 1.12 10-12-21 @ 06:25, 1.04 10-11-21 @ 17:18  PTT: 33.3 10-11-21 @ 17:18    MEDICATIONS:  acetaminophen    Suspension .. 650 milliGRAM(s) Oral every 6 hours PRN  acetylcysteine  Oral Solution 1200 milliGRAM(s) Oral every 12 hours  aspirin  chewable 81 milliGRAM(s) Oral daily  atorvastatin 20 milliGRAM(s) Oral at bedtime  chlorhexidine 4% Liquid 1 Application(s) Topical <User Schedule>  ciprofloxacin     Tablet 250 milliGRAM(s) Oral two times a day  cloNIDine 0.1 milliGRAM(s) Oral three times a day  clopidogrel Tablet 75 milliGRAM(s) Oral daily  heparin   Injectable 5000 Unit(s) SubCutaneous every 12 hours  hydrALAZINE 100 milliGRAM(s) Oral three times a day  influenza   Vaccine 0.5 milliLiter(s) IntraMuscular once  isosorbide   mononitrate ER Tablet (IMDUR) 60 milliGRAM(s) Oral daily  niCARdipine Infusion 5 mG/Hr IV Continuous <Continuous>  NIFEdipine XL 90 milliGRAM(s) Oral daily  oxyCODONE    IR 5 milliGRAM(s) Oral every 4 hours PRN  oxyCODONE    IR 10 milliGRAM(s) Oral every 4 hours PRN  polyethylene glycol 3350 17 Gram(s) Oral every 12 hours  senna 2 Tablet(s) Oral at bedtime  sodium chloride 0.9%. 1000 milliLiter(s) IV Continuous <Continuous>    EXAMINATION:  General:  calm  HEENT:  MMM  Neuro:  awake, alert, oriented to hospital, month and year, follows commands, EOMI, does not BTT on the L but able to see movement, with mild drift in L arm, able to lift b/l legs antigravity   Cards:  RRR  Respiratory:  no respiratory distress  Abdomen:  soft  Extremities:  no edema. Groin site without hematoma.     Assessment/Plan:   70M PMHx CKD with a baseline Cr >3, HTN, HLD presented to OSH after multiple falls. He was transferred from OSH for MRI/MRA findings concerning for aneurysm vs. thrombus with multiple infarcts. He is slightly confused, but denies pain, headache, blurry vision, nausea, vomiting, weakness, and changes in sensation.   CTH 10/1 hydro likely chronic, MRI 10/4 right-sided acute vs. subacute MCA infarcts, Carotid U/S 10/6 no sig stenosis, MRA 10/6 R pcomm aneurysm with possible intra-aneurysmal thrombus [id number], mild R M1 stenosis.    Underwent balloon assisted coiling of right posterior communicating artery aneurysm on 10/12/21. EVENTS:   No acute events this evening.     VITALS:  T(C): , Max: 36.9 (10-11-21 @ 19:52)  HR:  (46 - 68)  BP:  (159/77 - 172/79)  ABP:  (137/50 - 190/79)  RR:  (9 - 22)  SpO2:  (88% - 100%)  Wt(kg): --      10-12-21 @ 07:01  -  10-12-21 @ 18:58  --------------------------------------------------------  IN: 1062.5 mL / OUT: 369 mL / NET: 693.5 mL      LABS:  Na: 139 (10-12 @ 16:57), 135 (10-12 @ 06:25), 140 (10-11 @ 07:09), 139 (10-10 @ 21:03)  K: 4.5 (10-12 @ 16:57), 4.4 (10-11 @ 07:09), 4.4 (10-10 @ 21:03)  Cl: 107 (10-12 @ 16:57), 106 (10-11 @ 07:09), 103 (10-10 @ 21:03)  CO2: 17 (10-12 @ 16:57), 20 (10-11 @ 07:09), 22 (10-10 @ 21:03)  BUN: 40 (10-12 @ 16:57), 42 (10-11 @ 07:09), 42 (10-10 @ 21:03)  Cr: 2.82 (10-12 @ 16:57), 2.78 (10-12 @ 06:25), 2.90 (10-11 @ 07:09), 3.07 (10-10 @ 21:03)  Glu: 170(10-12 @ 16:57), 87(10-11 @ 07:09), 106(10-10 @ 21:03)    Hgb: 12.1 (10-12 @ 16:57), 12.2 (10-11 @ 07:09), 12.7 (10-10 @ 21:03), 12.2 (10-10 @ 08:19)  Hct: 39.3 (10-12 @ 16:57), 39.6 (10-11 @ 07:09), 40.9 (10-10 @ 21:03), 39.0 (10-10 @ 08:19)  WBC: 9.18 (10-12 @ 16:57), 6.42 (10-11 @ 07:09), 6.42 (10-10 @ 21:03), 6.47 (10-10 @ 08:19)  Plt: 760 (10-12 @ 16:57), 728 (10-11 @ 07:09), 779 (10-10 @ 21:03), 729 (10-10 @ 08:19)    INR: 1.12 10-12-21 @ 06:25, 1.04 10-11-21 @ 17:18  PTT: 33.3 10-11-21 @ 17:18    MEDICATIONS:  acetaminophen    Suspension .. 650 milliGRAM(s) Oral every 6 hours PRN  acetylcysteine  Oral Solution 1200 milliGRAM(s) Oral every 12 hours  aspirin  chewable 81 milliGRAM(s) Oral daily  atorvastatin 20 milliGRAM(s) Oral at bedtime  chlorhexidine 4% Liquid 1 Application(s) Topical <User Schedule>  ciprofloxacin     Tablet 250 milliGRAM(s) Oral two times a day  cloNIDine 0.1 milliGRAM(s) Oral three times a day  clopidogrel Tablet 75 milliGRAM(s) Oral daily  heparin   Injectable 5000 Unit(s) SubCutaneous every 12 hours  hydrALAZINE 100 milliGRAM(s) Oral three times a day  influenza   Vaccine 0.5 milliLiter(s) IntraMuscular once  isosorbide   mononitrate ER Tablet (IMDUR) 60 milliGRAM(s) Oral daily  niCARdipine Infusion 5 mG/Hr IV Continuous <Continuous>  NIFEdipine XL 90 milliGRAM(s) Oral daily  oxyCODONE    IR 5 milliGRAM(s) Oral every 4 hours PRN  oxyCODONE    IR 10 milliGRAM(s) Oral every 4 hours PRN  polyethylene glycol 3350 17 Gram(s) Oral every 12 hours  senna 2 Tablet(s) Oral at bedtime  sodium chloride 0.9%. 1000 milliLiter(s) IV Continuous <Continuous>    EXAMINATION:  General:  calm  HEENT:  MMM  Neuro:  awake, alert, oriented to hospital, month and year, follows commands, EOMI, does not BTT on the L but able to see movement, with mild drift in L arm, able to lift b/l legs antigravity   Cards:  RRR  Respiratory:  no respiratory distress  Abdomen:  soft  Extremities:  no edema. Groin site without hematoma.     Assessment/Plan:   70M PMHx CKD, HTN, HLD admitted with R MCA infarcts, in ICU s/p balloon assisted coiling of right posterior communicating artery aneurysm 10/12/21. At neurologic baseline.    No change in plan from daytime    [facility]  Neurocritical Care Attending

## 2021-10-12 NOTE — DISCHARGE NOTE NURSING/CASE MANAGEMENT/SOCIAL WORK - PATIENT PORTAL LINK FT
You can access the FollowMyHealth Patient Portal offered by [facility] by registering at the following website: [email or web address]. By joining [employer]’s FollowMyHealth portal, you will also be able to view your health information using other applications (apps) compatible with our system.

## 2021-10-12 NOTE — PROGRESS NOTE ADULT - SUBJECTIVE AND OBJECTIVE BOX
INTERVAL HISTORY: HPI:  70M PMHx CKD with a baseline Cr >3, HTN, HLD presented to OSH after multiple falls. He was transferred from OSH for MRI/MRA findings concerning for aneurysm vs. thrombus with multiple infarcts. He is slightly confused, but denies pain, headache, blurry vision, nausea, vomiting, weakness, and changes in sensation.   CTH 10/1 hydro likely chronic, MRI 10/4 right-sided acute vs. subacute MCA infarcts, Carotid U/S 10/6 no sig stenosis, MRA 10/6 R pcomm aneurysm with possible intra-aneurysmal thrombus [id number], mild R M1 stenosis.  (09 Oct 2021 16:27)      MEDICATIONS  (STANDING):  acetylcysteine  Oral Solution 1200 milliGRAM(s) Oral every 12 hours  aspirin  chewable 81 milliGRAM(s) Oral daily  atorvastatin 20 milliGRAM(s) Oral at bedtime  chlorhexidine 4% Liquid 1 Application(s) Topical <User Schedule>  ciprofloxacin     Tablet 250 milliGRAM(s) Oral two times a day  cloNIDine 0.1 milliGRAM(s) Oral three times a day  clopidogrel Tablet 75 milliGRAM(s) Oral daily  heparin   Injectable 5000 Unit(s) SubCutaneous every 12 hours  hydrALAZINE 100 milliGRAM(s) Oral three times a day  influenza   Vaccine 0.5 milliLiter(s) IntraMuscular once  isosorbide   mononitrate ER Tablet (IMDUR) 60 milliGRAM(s) Oral daily  niCARdipine Infusion 5 mG/Hr (25 mL/Hr) IV Continuous <Continuous>  NIFEdipine XL 90 milliGRAM(s) Oral daily  polyethylene glycol 3350 17 Gram(s) Oral every 12 hours  senna 2 Tablet(s) Oral at bedtime  sodium chloride 0.9%. 1000 milliLiter(s) (100 mL/Hr) IV Continuous <Continuous>    MEDICATIONS  (PRN):  acetaminophen    Suspension .. 650 milliGRAM(s) Oral every 6 hours PRN Mild Pain (1 - 3)  oxyCODONE    IR 5 milliGRAM(s) Oral every 4 hours PRN Moderate Pain (4 - 6)  oxyCODONE    IR 10 milliGRAM(s) Oral every 4 hours PRN Severe Pain (7 - 10)      Drug Dosing Weight  Height (cm): 182.9 (12 Oct 2021 08:23)  Weight (kg): 73.9 (12 Oct 2021 08:23)  BMI (kg/m2): 22.1 (12 Oct 2021 08:23)  BSA (m2): 1.95 (12 Oct 2021 08:23)    PAST MEDICAL & SURGICAL HISTORY:  Frequent falls  Hypertension  Chronic kidney disease, unspecified CKD stage    REVIEW OF SYSTEMS: [x] Unable to Assess due to neurologic exam   [ ] All ROS addressed below are non-contributory, except:  Neuro: [ ] Headache [ ] Back pain [ ] Numbness [ ] Weakness [ ] Ataxia [ ] Dizziness [ ] Aphasia [ ] Dysarthria [ ] Visual disturbance  Resp: [ ] Shortness of breath/dyspnea, [ ] Orthopnea [ ] Cough  CV: [ ] Chest pain [ ] Palpitation [ ] Lightheadedness [ ] Syncope  Renal: [ ] Thirst [ ] Edema  GI: [ ] Nausea [ ] Emesis [ ] Abdominal pain [ ] Constipation [ ] Diarrhea  Hem: [ ] Hematemesis [ ] bright red blood per rectum  ID: [ ] Fever [ ] Chills [ ] Dysuria  ENT: [ ] Rhinorrhea    PHYSICAL EXAM:    General: No Acute Distress   Neurological: Awake, alert oriented to person, place and time, Following Commands, PERRL, EOMI, Face Symmetrical, Speech Fluent, Moving all extremities, LUE 4+/5, No Drift, Sensation to Light Touch Intact  Pulmonary: Clear to Auscultation, No Rales, No Rhonchi, No Wheezes   Cardiovascular: S1, S2, Regular Rate and Rhythm   Gastrointestinal: Soft, Nontender, Nondistended   Extremities: No calf tenderness     ICU Vital Signs Last 24 Hrs  T(C): 36.8 (12 Oct 2021 11:15), Max: 36.9 (11 Oct 2021 19:52)  T(F): 98.3 (12 Oct 2021 11:15), Max: 98.5 (11 Oct 2021 19:52)  HR: 56 (12 Oct 2021 13:30) (46 - 60)  BP: 172/79 (12 Oct 2021 08:23) (159/77 - 172/79)  ABP: 163/57 (12 Oct 2021 13:30) (148/53 - 178/66)  ABP(mean): 91 (12 Oct 2021 13:30) (84 - 107)  RR: 17 (12 Oct 2021 13:30) (12 - 22)  SpO2: 99% (12 Oct 2021 13:30) (97% - 100%)      I&O's Detail    12 Oct 2021 07:01  -  12 Oct 2021 13:45  --------------------------------------------------------  IN:    NiCARdipine: 37.5 mL    sodium chloride 0.9%: 200 mL  Total IN: 237.5 mL    OUT:    Indwelling Catheter - Urethral (mL): 159 mL  Total OUT: 159 mL    Total NET: 78.5 mL      LABS:  CBC Full  -  ( 11 Oct 2021 07:09 )  WBC Count : 6.42 K/uL  RBC Count : 4.16 M/uL  Hemoglobin : 12.2 g/dL  Hematocrit : 39.6 %  Platelet Count - Automated : 728 K/uL  Mean Cell Volume : 95.2 fl  Mean Cell Hemoglobin : 29.3 pg  Mean Cell Hemoglobin Concentration : 30.8 gm/dL  Auto Neutrophil # : x  Auto Lymphocyte # : x  Auto Monocyte # : x  Auto Eosinophil # : x  Auto Basophil # : x  Auto Neutrophil % : x  Auto Lymphocyte % : x  Auto Monocyte % : x  Auto Eosinophil % : x  Auto Basophil % : x    10-12    135  |  x   |  x   ----------------------------<  x   x    |  x   |  2.78<H>    Ca    9.8      11 Oct 2021 07:09  Phos  3.3     10-10  Mg     2.1     10-10    TPro  x   /  Alb  x   /  TBili  0.3  /  DBili  x   /  AST  x   /  ALT  x   /  AlkPhos  x   10-12    PT/INR - ( 12 Oct 2021 06:25 )   PT: 13.4 sec;   INR: 1.12 ratio         PTT - ( 11 Oct 2021 17:18 )  PTT:33.3 sec  Urinalysis Basic - ( 10 Oct 2021 21:03 )    Color: Yellow / Appearance: Slightly Turbid / S.016 / pH: x  Gluc: x / Ketone: Negative  / Bili: Negative / Urobili: Negative   Blood: x / Protein: 300 mg/dL / Nitrite: Positive   Leuk Esterase: Large / RBC: 2 /hpf /  /HPF   Sq Epi: x / Non Sq Epi: 1 /hpf / Bacteria: Many        RADIOLOGY & ADDITIONAL STUDIES:   SUMMARY:  70M PMHx CKD with a baseline Cr >3, HTN, HLD presented to OSH after multiple falls. He was transferred from OSH for MRI/MRA findings concerning for aneurysm vs. thrombus with multiple infarcts. He is slightly confused, but denies pain, headache, blurry vision, nausea, vomiting, weakness, and changes in sensation.   CTH 10/1 hydro likely chronic, MRI 10/4 right-sided acute vs. subacute MCA infarcts, Carotid U/S 10/6 no sig stenosis, MRA 10/6 R pcomm aneurysm with possible intra-aneurysmal thrombus [id number], mild R M1 stenosis.    Underwent balloon assisted coiling of right posterior communicating artery aneurysm on 10/12/21.    24 HOUR EVENTS:  Admitted to NSCU.    VITALS/DATA/ORDERS: [x] Reviewed    MEDICATIONS  (STANDING):  acetylcysteine  Oral Solution 1200 milliGRAM(s) Oral every 12 hours  aspirin  chewable 81 milliGRAM(s) Oral daily  atorvastatin 20 milliGRAM(s) Oral at bedtime  chlorhexidine 4% Liquid 1 Application(s) Topical <User Schedule>  ciprofloxacin     Tablet 250 milliGRAM(s) Oral two times a day  cloNIDine 0.1 milliGRAM(s) Oral three times a day  clopidogrel Tablet 75 milliGRAM(s) Oral daily  heparin   Injectable 5000 Unit(s) SubCutaneous every 12 hours  hydrALAZINE 100 milliGRAM(s) Oral three times a day  influenza   Vaccine 0.5 milliLiter(s) IntraMuscular once  isosorbide   mononitrate ER Tablet (IMDUR) 60 milliGRAM(s) Oral daily  niCARdipine Infusion 5 mG/Hr (25 mL/Hr) IV Continuous <Continuous>  NIFEdipine XL 90 milliGRAM(s) Oral daily  polyethylene glycol 3350 17 Gram(s) Oral every 12 hours  senna 2 Tablet(s) Oral at bedtime  sodium chloride 0.9%. 1000 milliLiter(s) (100 mL/Hr) IV Continuous <Continuous>    MEDICATIONS  (PRN):  acetaminophen    Suspension .. 650 milliGRAM(s) Oral every 6 hours PRN Mild Pain (1 - 3)  oxyCODONE    IR 5 milliGRAM(s) Oral every 4 hours PRN Moderate Pain (4 - 6)  oxyCODONE    IR 10 milliGRAM(s) Oral every 4 hours PRN Severe Pain (7 - 10)      Drug Dosing Weight  Height (cm): 182.9 (12 Oct 2021 08:23)  Weight (kg): 73.9 (12 Oct 2021 08:23)  BMI (kg/m2): 22.1 (12 Oct 2021 08:23)  BSA (m2): 1.95 (12 Oct 2021 08:23)    PAST MEDICAL & SURGICAL HISTORY:  Frequent falls  Hypertension  Chronic kidney disease, unspecified CKD stage    REVIEW OF SYSTEMS: [x] Unable to Assess due to neurologic exam   [ ] All ROS addressed below are non-contributory, except:  Neuro: [ ] Headache [ ] Back pain [ ] Numbness [ ] Weakness [ ] Ataxia [ ] Dizziness [ ] Aphasia [ ] Dysarthria [ ] Visual disturbance  Resp: [ ] Shortness of breath/dyspnea, [ ] Orthopnea [ ] Cough  CV: [ ] Chest pain [ ] Palpitation [ ] Lightheadedness [ ] Syncope  Renal: [ ] Thirst [ ] Edema  GI: [ ] Nausea [ ] Emesis [ ] Abdominal pain [ ] Constipation [ ] Diarrhea  Hem: [ ] Hematemesis [ ] bright red blood per rectum  ID: [ ] Fever [ ] Chills [ ] Dysuria  ENT: [ ] Rhinorrhea    PHYSICAL EXAM:    General: No Acute Distress   Neurological: Awake, alert oriented to person, place and time, Following Commands, PERRL, EOMI, Face Symmetrical, Speech Fluent, Moving all extremities, LUE 4+/5, No Drift, Sensation to Light Touch Intact  Pulmonary: Clear to Auscultation, No Rales, No Rhonchi, No Wheezes   Cardiovascular: S1, S2, Regular Rate and Rhythm   Gastrointestinal: Soft, Nontender, Nondistended   Extremities: No calf tenderness     ICU Vital Signs Last 24 Hrs  T(C): 36.8 (12 Oct 2021 11:15), Max: 36.9 (11 Oct 2021 19:52)  T(F): 98.3 (12 Oct 2021 11:15), Max: 98.5 (11 Oct 2021 19:52)  HR: 56 (12 Oct 2021 13:30) (46 - 60)  BP: 172/79 (12 Oct 2021 08:23) (159/77 - 172/79)  ABP: 163/57 (12 Oct 2021 13:30) (148/53 - 178/66)  ABP(mean): 91 (12 Oct 2021 13:30) (84 - 107)  RR: 17 (12 Oct 2021 13:30) (12 - 22)  SpO2: 99% (12 Oct 2021 13:30) (97% - 100%)      I&O's Detail    12 Oct 2021 07:01  -  12 Oct 2021 13:45  --------------------------------------------------------  IN:    NiCARdipine: 37.5 mL    sodium chloride 0.9%: 200 mL  Total IN: 237.5 mL    OUT:    Indwelling Catheter - Urethral (mL): 159 mL  Total OUT: 159 mL    Total NET: 78.5 mL      LABS:  CBC Full  -  ( 11 Oct 2021 07:09 )  WBC Count : 6.42 K/uL  RBC Count : 4.16 M/uL  Hemoglobin : 12.2 g/dL  Hematocrit : 39.6 %  Platelet Count - Automated : 728 K/uL  Mean Cell Volume : 95.2 fl  Mean Cell Hemoglobin : 29.3 pg  Mean Cell Hemoglobin Concentration : 30.8 gm/dL  Auto Neutrophil # : x  Auto Lymphocyte # : x  Auto Monocyte # : x  Auto Eosinophil # : x  Auto Basophil # : x  Auto Neutrophil % : x  Auto Lymphocyte % : x  Auto Monocyte % : x  Auto Eosinophil % : x  Auto Basophil % : x    10-12    135  |  x   |  x   ----------------------------<  x   x    |  x   |  2.78<H>    Ca    9.8      11 Oct 2021 07:09  Phos  3.3     10-10  Mg     2.1     10-10    TPro  x   /  Alb  x   /  TBili  0.3  /  DBili  x   /  AST  x   /  ALT  x   /  AlkPhos  x   10-12    PT/INR - ( 12 Oct 2021 06:25 )   PT: 13.4 sec;   INR: 1.12 ratio         PTT - ( 11 Oct 2021 17:18 )  PTT:33.3 sec  Urinalysis Basic - ( 10 Oct 2021 21:03 )    Color: Yellow / Appearance: Slightly Turbid / S.016 / pH: x  Gluc: x / Ketone: Negative  / Bili: Negative / Urobili: Negative   Blood: x / Protein: 300 mg/dL / Nitrite: Positive   Leuk Esterase: Large / RBC: 2 /hpf /  /HPF   Sq Epi: x / Non Sq Epi: 1 /hpf / Bacteria: Many        RADIOLOGY & ADDITIONAL STUDIES:

## 2021-10-12 NOTE — CHART NOTE - NSCHARTNOTEFT_GEN_A_CORE
Interventional Neuro Radiology  Pre-Procedure Note    This is a 69yo male with PMHx CKD with a baseline Cr >3, HTN, HLD presented to OSH after multiple falls. He was transferred from OSH for MRI/MRA findings concerning for aneurysm vs. thrombus with multiple infarcts. He is slightly confused, but denies pain, headache, blurry vision, nausea, vomiting, weakness, and changes in sensation. CTH 10/1 hydro likely chronic, MRI 10/4 right-sided acute vs. subacute MCA infarcts, Carotid U/S 10/6 no sig stenosis, MRA 10/6 R pcomm aneurysm with possible intra-aneurysmal thrombus [id number], mild R M1 stenosis. Patient presents now to neuro IR for selective cerebral angiography and aneurysm embolization.       Neuro Exam:  Awake, alert oriented to person, place (with choices) and time, Following Commands, Face Symmetrical, Speech Fluent,  Muscle Strength normal in all four extremities, No Drift, Sensation to Light Touch Intact    PAST MEDICAL & SURGICAL HISTORY:  Frequent falls  Hypertension  Chronic kidney disease, CKD stage IV- IV hydration and Mucomyst as per renal       Social History:   Denies tobacco use    FAMILY HISTORY:  No pertinent family history    Allergies:   No Known Allergies      Current Medications:   acetaminophen    Suspension .. 650 milliGRAM(s) Oral every 6 hours PRN  acetylcysteine  Oral Solution 1200 milliGRAM(s) Oral every 12 hours  aspirin  chewable 81 milliGRAM(s) Oral daily  atorvastatin 20 milliGRAM(s) Oral at bedtime  ciprofloxacin     Tablet 250 milliGRAM(s) Oral two times a day  cloNIDine 0.1 milliGRAM(s) Oral three times a day  clopidogrel Tablet 75 milliGRAM(s) Oral daily  hydrALAZINE 100 milliGRAM(s) Oral three times a day  influenza   Vaccine 0.5 milliLiter(s) IntraMuscular once  isosorbide   mononitrate ER Tablet (IMDUR) 60 milliGRAM(s) Oral daily  NIFEdipine XL 90 milliGRAM(s) Oral daily  oxyCODONE    IR 5 milliGRAM(s) Oral every 4 hours PRN  oxyCODONE    IR 10 milliGRAM(s) Oral every 4 hours PRN  polyethylene glycol 3350 17 Gram(s) Oral at bedtime  senna 2 Tablet(s) Oral at bedtime  sodium chloride 0.9%. 1000 milliLiter(s) IV Continuous <Continuous>      Labs:                         12.2   6.42  )-----------( 728      ( 11 Oct 2021 07:09 )             39.6       10-12    135  |  x   |  x   ----------------------------<  x   x    |  x   |  2.78<H>    Ca    9.8      11 Oct 2021 07:09  Phos  3.3     10-10  Mg     2.1     10-10    TPro  x   /  Alb  x   /  TBili  0.3  /  DBili  x   /  AST  x   /  ALT  x   /  AlkPhos  x   10-12    Blood Bank: 10-10-21  A  --  Positive      Assessment/Plan:   This is a 69yo male  presents with right PCOMM artery aneurysm. Patient presents to neuro-IR for selective cerebral angiography and embolization. Procedure/ risks/ benefits/ goals/ alternatives were explained. Risks include but are not limited to stroke/ vessel injury/ hemorrhage/ groin hematoma. All questions answered. Informed content obtained from patient. Consent placed in chart.    Brianda Allred PA-C  [phone]

## 2021-10-12 NOTE — CHART NOTE - NSCHARTNOTEFT_GEN_A_CORE
Interventional Neuro- Radiology   Procedure Note    Procedure: Selective Cerebral Angiography   Pre- Procedure Diagnosis:  Right PCOMM artery aneurysm   Post- Procedure Diagnosis:    : Dr. Dionicio MD    Physician Assistant: Brianda Allred PA-C    RN: Tashi   Tech: Shorty/ Edwin     Anesthesia: Dr. Moses   (general anesthesia)    I/Os:  Fluids:  Rangel:  Contrast:  Estimated Blood Loss: <10cc      Preliminary Report:  Under general anesthesia, using a ___Fr short/long sheath to the right groin examination of left vertebral artery/ left internal carotid artery/ left external carotid artery/ right vertebral artery/ right internal carotid artery/ right external carotid artery via selective cerebral angiography demonstrates ________. ( Official note to follow).    Patient tolerated procedure well, vital signs stable, hemodynamically stable, no change in neurological status compared to baseline. Results discussed with patient and their family. Groin sheath d/c'ed, manual compression held to hemostasis, no active bleeding, no hematoma, Vascade applied, quick clot and safeguard balloon dressing applied at _____h.  Patient transferred to NSCU for further care/ monitoring. Interventional Neuro- Radiology   Procedure Note    Procedure: Selective Cerebral Angiography and aneurysm coiling   Pre- Procedure Diagnosis:  Right PCOMM artery aneurysm   Post- Procedure Diagnosis: s/p coiling of right PCOMM artery aneurysm     : Dr. Dionicio MD  Resident: Dr. Aurora MD   Physician Assistant: Brianda Allred PA-C    RN: Tashi   Tech: Shorty/ Edwin     Anesthesia: Dr. Moses   (general anesthesia)    I/Os:  Fluids:  Rangel:  Contrast:  Estimated Blood Loss: <10cc      Preliminary Report:  Under general anesthesia, using a 6r BMX sheath to the right groin examination of right internal carotid artery via selective cerebral angiography demonstrates right pcomm artery aneurysm, s/p balloon assisted coiling. ( Official note to follow).    Patient tolerated procedure well, vital signs stable, hemodynamically stable, no change in neurological status compared to baseline. Results discussed with patient and their family. Groin sheath d/c'ed, manual compression held to hemostasis, no active bleeding, no hematoma, Vascade applied, quick clot and safeguard balloon dressing applied at 1045h.  Patient transferred to NSCU for further care/ monitoring.    Brianda Allred PA-C  [phone] Interventional Neuro- Radiology   Procedure Note    Procedure: Selective Cerebral Angiography and aneurysm coiling   Pre- Procedure Diagnosis:  Right PCOMM artery aneurysm   Post- Procedure Diagnosis: s/p coiling of right PCOMM artery aneurysm     : Dr. Dionicio MD  Resident: Dr. Aurora MD   Physician Assistant: Brianda Allred PA-C    RN: Tashi   Tech: Shoryt/ Edwin     Anesthesia: Dr. Moses   (general anesthesia)    I/Os:  Fluids: 500cc  Rangel: 200cc   Contrast: 42cc - visipaque 270   Estimated Blood Loss: <10cc      Preliminary Report:  Under general anesthesia, using a 6r BMX sheath to the right groin examination of right internal carotid artery via selective cerebral angiography demonstrates right pcomm artery aneurysm, s/p balloon assisted coiling. ( Official note to follow).    Patient tolerated procedure well, vital signs stable, hemodynamically stable, no change in neurological status compared to baseline. Results discussed with patient and their family. Groin sheath d/c'ed, manual compression held to hemostasis, no active bleeding, no hematoma, Vascade applied, quick clot and safeguard balloon dressing applied at 1045h.  Patient transferred to NSCU for further care/ monitoring.    Brianda Allred PA-C  [phone]

## 2021-10-12 NOTE — PROGRESS NOTE ADULT - ASSESSMENT
70 CKD. HTN, ?CAD admitted for acute R MCA infarct found to have PCOMM aneurysm     NEURO:  PCOMM aneurysm s/p coiling   -neuro check q1  pain management   -CT head in AM   -PT/OT evaluation      PULMONARY:  saturating well on RA,   -continue to monitor on pulse o2   -incentive spirometry 10q/hr when awake     CARDIOVASCULAR:  HTN  wean off Cardene ggt ; c/w home medications clonidine 0.1mg TID, Nifedipine 90mg qd, hydralazine 100mg TID  monitor on telemetry   vitals q1  sbp goal 100-160    GASTROENTEROLOGY:  bedside speech & swallow if pass can start advancing diet as tolerated.   ensure BMs w/ Miralax & senna    RENAL/:  CKD stage 4 unknown baseline ; likely hypertensive nephropathy   u/s kidney preformed -right kidney 9.3cm and left 9.5 cm with b/l renal cysts  nephrology following - ARMIDA,   mucomyst 1200mg PO BID to reduce JUJU   strict i/o's    ENDOCRINE:  A1c-  TSH-    HEME/ONC:  DVT ppx: will hold chemical dvt ppx in setting of recent operation.         ADVANCE DIRECTIVES:    DNR  MOLST  [ ] YES [ ] NO                      [ ] Completed  Health Care Proxy [ ] YES  [ ] NO   [ ] Completed  Living Will  [ ] YES [ ] NO     70 CKD. HTN, ?CAD admitted for acute R MCA infarct found to have PCOMM aneurysm     NEURO:  PCOMM aneurysm s/p coiling   -neuro check q1  pain management   -CT head in AM   -PT/OT evaluation  MRA w/ narrowing of right proximal M1   c/w ASA 81mg, plavix 75mg         PULMONARY:  saturating well on RA,   -continue to monitor on pulse o2   -incentive spirometry 10q/hr when awake     CARDIOVASCULAR:  HTN  wean off Cardene ggt ; c/w home medications clonidine 0.1mg TID, Nifedipine 90mg qd, hydralazine 100mg TID  monitor on telemetry   vitals q1  sbp goal 100-160  TTE: EF 65-75%, dilated Right & left atrium     GASTROENTEROLOGY:  bedside speech & swallow if pass can start advancing diet as tolerated.   ensure BMs w/ Miralax & senna    RENAL/:  CKD stage 4 unknown baseline ; likely hypertensive nephropathy   u/s kidney preformed -right kidney 9.3cm and left 9.5 cm with b/l renal cysts  nephrology following - ARMIDA,   Mucomyst 1200mg PO BID to reduce JUJU   strict i/o's  C3,C4 wnl  IVF NaCl 0.9% @100cc/hr     ENDOCRINE:  A1c-5.5%  TSH-1.82    HEME/ONC:  DVT ppx:SCDs, heparin SQ     INFECTIOUS:  f/u urine cultures   UA (+) c/w ciprofloxacin  70 CKD. HTN, ?CAD admitted for acute R MCA infarct found to have incidental right PCOMM aneurysm     NEURO: unruptured cerebral aneurysm, intracranial atherosclerosis/stenosis (right M1)  right PCOMM aneurysm s/p coiling   -neuro check q1  pain management   -CT head in AM   -PT/OT evaluation  MRA w/ narrowing of right proximal M1 - dual antiplatelet  c/w ASA 81mg, plavix 75mg       PULMONARY:  saturating well on RA,   -continue to monitor on pulse o2   -incentive spirometry 10q/hr when awake     CARDIOVASCULAR: HTN  wean off Cardene ggt ; c/w home medications clonidine 0.1mg TID, Nifedipine 90mg qd, hydralazine 100mg TID  monitor on telemetry   vitals q1  sbp goal 100-160  TTE: EF 65-75%, dilated Right & left atrium     GASTROENTEROLOGY:  bedside speech & swallow if pass can start advancing diet as tolerated.   ensure BMs w/ Miralax & senna    RENAL/:  CKD stage 4 unknown baseline ; likely hypertensive nephropathy   u/s kidney preformed -right kidney 9.3cm and left 9.5 cm with b/l renal cysts  nephrology following - ARMIDA, etc. f/u   Mucomyst 1200mg PO BID to reduce JUJU   strict i/o's  C3,C4 wnl  IVF NaCl 0.9% @100cc/hr     ENDOCRINE:  A1c-5.5%  TSH-1.82    HEME/ONC:  DVT ppx:SCDs, heparin SQ     INFECTIOUS: UTI   f/u urine cultures   UA (+) c/w ciprofloxacin     At risk of death/neuro decline due to: risk of ischemic stroke/emboli, groin hematoma/hemorrhagic shock

## 2021-10-12 NOTE — PROGRESS NOTE ADULT - ASSESSMENT
70yoM w/ PMHx of CKD with a baseline Cr >3, HTN, HLD presented to OSH after multiple falls. He was transferred from OSH for MRI/MRA findings concerning for aneurysm vs. thrombus       1) CKD stage 4-  Likely has Hypertensive Nephropathy  B/l cr around 3mg/dl -> cr Now improving  Ua and urine lytes reviewed--> +UTI- on cipro  urine pro/cr 1.4grams  Renal US --> right kidney 9.3cm and left 9.5 cm with b/l renal cysts  s/p Coiling for PCOMM  monitor cr   avoid nephrotoxin such as ace/arb/nsaid  trend bmp    2) HTN-  bp elevated  nifedipine to 90mg po qd   on cardene gtt  Trend bp      Dr Hernadez   [phone]

## 2021-10-12 NOTE — PROGRESS NOTE ADULT - SUBJECTIVE AND OBJECTIVE BOX
Patient  seen and examined  no complaints    No Known Allergies    Hospital Medications:   MEDICATIONS  (STANDING):  acetylcysteine  Oral Solution 1200 milliGRAM(s) Oral every 12 hours  aspirin  chewable 81 milliGRAM(s) Oral daily  atorvastatin 20 milliGRAM(s) Oral at bedtime  chlorhexidine 4% Liquid 1 Application(s) Topical <User Schedule>  ciprofloxacin     Tablet 250 milliGRAM(s) Oral two times a day  cloNIDine 0.1 milliGRAM(s) Oral three times a day  clopidogrel Tablet 75 milliGRAM(s) Oral daily  heparin   Injectable 5000 Unit(s) SubCutaneous every 12 hours  hydrALAZINE 100 milliGRAM(s) Oral three times a day  influenza   Vaccine 0.5 milliLiter(s) IntraMuscular once  isosorbide   mononitrate ER Tablet (IMDUR) 60 milliGRAM(s) Oral daily  niCARdipine Infusion 5 mG/Hr (25 mL/Hr) IV Continuous <Continuous>  NIFEdipine XL 90 milliGRAM(s) Oral daily  polyethylene glycol 3350 17 Gram(s) Oral every 12 hours  senna 2 Tablet(s) Oral at bedtime  sodium chloride 0.9%. 1000 milliLiter(s) (100 mL/Hr) IV Continuous <Continuous>        VITALS:  T(F): 97.7 (10-12-21 @ 15:00), Max: 98.5 (10-11-21 @ 19:52)  HR: 54 (10-12-21 @ 15:15)  BP: 172/79 (10-12-21 @ 08:23)  RR: 12 (10-12-21 @ 15:15)  SpO2: 97% (10-12-21 @ 15:15)  Wt(kg): --    10-12 @ 07:01  -  10-12 @ 15:35  --------------------------------------------------------  IN: 787.5 mL / OUT: 239 mL / NET: 548.5 mL      Height (cm): 182.9 (10-12 @ 08:23)  Weight (kg): 73.9 (10-12 @ 08:23)  BMI (kg/m2): 22.1 (10-12 @ 08:23)  BSA (m2): 1.95 (10-12 @ 08:23)    LABS:  10-12    135  |  x   |  x   ----------------------------<  x   x    |  x   |  2.78<H>    Ca    9.8      11 Oct 2021 07:09  Phos  3.3     1010  Mg     2.1     10-10    TPro      /  Alb      /  TBili  0.3  /  DBili      /  AST      /  ALT      /  AlkPhos      10-12    Creatinine Trend: 2.78 <--, 2.90 <--, 3.07 <--, 3.03 <--, 3.29 <--                        12.2   6.42  )-----------( 728      ( 11 Oct 2021 07:09 )             39.6     Urine Studies:  Urinalysis Basic - ( 10 Oct 2021 21:03 )    Color: Yellow / Appearance: Slightly Turbid / S.016 / pH:   Gluc:  / Ketone: Negative  / Bili: Negative / Urobili: Negative   Blood:  / Protein: 300 mg/dL / Nitrite: Positive   Leuk Esterase: Large / RBC: 2 /hpf /  /HPF   Sq Epi:  / Non Sq Epi: 1 /hpf / Bacteria: Many      Creatinine, Random Urine: 128 mg/dL (10-10 @ 21:03)  Protein/Creatinine Ratio Calculation: 1.4 Ratio (10-10 @ 21:03)  Potassium, Random Urine: 43 mmol/L (10-10 @ 21:03)  Osmolality, Random Urine: 427 mos/kg (10-10 @ 21:03)  Sodium, Random Urine: 34 mmol/L (10-10 @ 21:03)  Creatinine, Random Urine: 127 mg/dL (10-10 @ 21:03)  Chloride, Random Urine: 24 mmol/L (10-10 @ 21:03)    RADIOLOGY & ADDITIONAL STUDIES:

## 2021-10-12 NOTE — PROGRESS NOTE ADULT - ATTENDING COMMENTS
70 year-old man with Stage 4 CKD, HTN, HLD, right M1 stenosis and incidental right posterior communicating artery aneurysm, post-operative day 0 from coiling.     Exam notable for right arm 4+/5 strength; no groin hematoma, left ankle ?cyst (baseline per patient)    ASA/clopidogrel for right M1 stenosis  q1hr neurovascular checks; monitor for groin hematoma  Mucomyst/hydration given contrast 70 year-old man with Stage 4 CKD, HTN, HLD, right M1 stenosis and incidental right posterior communicating artery aneurysm, post-operative day 0 from coiling.     Exam notable for right arm 4+/5 strength; no groin hematoma, left ankle ?cyst (baseline per patient)    ASA/clopidogrel for right M1 stenosis  q1hr neurovascular checks; monitor for groin hematoma  Mucomyst/hydration given contrast  -160mmHg, wean off nicardipine gtt with enteral BP meds

## 2021-10-13 LAB
% ALBUMIN: 49.8 % — SIGNIFICANT CHANGE UP
% ALPHA 1: 5.4 % — SIGNIFICANT CHANGE UP
% ALPHA 2: 14.5 % — SIGNIFICANT CHANGE UP
% BETA: 10.9 % — SIGNIFICANT CHANGE UP
% GAMMA: 19.4 % — SIGNIFICANT CHANGE UP
% M SPIKE: 8.4 % — SIGNIFICANT CHANGE UP
-  AMIKACIN: SIGNIFICANT CHANGE UP
-  AMOXICILLIN/CLAVULANIC ACID: SIGNIFICANT CHANGE UP
-  AMPICILLIN/SULBACTAM: SIGNIFICANT CHANGE UP
-  AMPICILLIN: SIGNIFICANT CHANGE UP
-  AZTREONAM: SIGNIFICANT CHANGE UP
-  CEFAZOLIN: SIGNIFICANT CHANGE UP
-  CEFEPIME: SIGNIFICANT CHANGE UP
-  CEFOXITIN: SIGNIFICANT CHANGE UP
-  CEFTRIAXONE: SIGNIFICANT CHANGE UP
-  CIPROFLOXACIN: SIGNIFICANT CHANGE UP
-  ERTAPENEM: SIGNIFICANT CHANGE UP
-  GENTAMICIN: SIGNIFICANT CHANGE UP
-  LEVOFLOXACIN: SIGNIFICANT CHANGE UP
-  MEROPENEM: SIGNIFICANT CHANGE UP
-  NITROFURANTOIN: SIGNIFICANT CHANGE UP
-  PIPERACILLIN/TAZOBACTAM: SIGNIFICANT CHANGE UP
-  TIGECYCLINE: SIGNIFICANT CHANGE UP
-  TOBRAMYCIN: SIGNIFICANT CHANGE UP
-  TRIMETHOPRIM/SULFAMETHOXAZOLE: SIGNIFICANT CHANGE UP
ALBUMIN SERPL ELPH-MCNC: 3.6 G/DL — SIGNIFICANT CHANGE UP (ref 3.6–5.5)
ALBUMIN/GLOB SERPL ELPH: 1 RATIO — SIGNIFICANT CHANGE UP
ALPHA1 GLOB SERPL ELPH-MCNC: 0.4 G/DL — SIGNIFICANT CHANGE UP (ref 0.1–0.4)
ALPHA2 GLOB SERPL ELPH-MCNC: 1 G/DL — SIGNIFICANT CHANGE UP (ref 0.5–1)
ANA PAT FLD IF-IMP: ABNORMAL
ANA TITR SER: ABNORMAL
ANION GAP SERPL CALC-SCNC: 15 MMOL/L — SIGNIFICANT CHANGE UP (ref 5–17)
ANION GAP SERPL CALC-SCNC: 16 MMOL/L — SIGNIFICANT CHANGE UP (ref 5–17)
AUTO DIFF PNL BLD: ABNORMAL
B-GLOBULIN SERPL ELPH-MCNC: 0.8 G/DL — SIGNIFICANT CHANGE UP (ref 0.5–1)
BUN SERPL-MCNC: 48 MG/DL — HIGH (ref 7–23)
BUN SERPL-MCNC: 49 MG/DL — HIGH (ref 7–23)
C-ANCA SER-ACNC: NEGATIVE — SIGNIFICANT CHANGE UP
CALCIUM SERPL-MCNC: 9.2 MG/DL — SIGNIFICANT CHANGE UP (ref 8.4–10.5)
CALCIUM SERPL-MCNC: 9.2 MG/DL — SIGNIFICANT CHANGE UP (ref 8.4–10.5)
CHLORIDE SERPL-SCNC: 108 MMOL/L — SIGNIFICANT CHANGE UP (ref 96–108)
CHLORIDE SERPL-SCNC: 108 MMOL/L — SIGNIFICANT CHANGE UP (ref 96–108)
CO2 SERPL-SCNC: 15 MMOL/L — LOW (ref 22–31)
CO2 SERPL-SCNC: 16 MMOL/L — LOW (ref 22–31)
CREAT SERPL-MCNC: 3.06 MG/DL — HIGH (ref 0.5–1.3)
CREAT SERPL-MCNC: 3.13 MG/DL — HIGH (ref 0.5–1.3)
CULTURE RESULTS: SIGNIFICANT CHANGE UP
GAMMA GLOBULIN: 1.4 G/DL — SIGNIFICANT CHANGE UP (ref 0.6–1.6)
GLUCOSE SERPL-MCNC: 125 MG/DL — HIGH (ref 70–99)
GLUCOSE SERPL-MCNC: 145 MG/DL — HIGH (ref 70–99)
HCT VFR BLD CALC: 33.6 % — LOW (ref 39–50)
HGB BLD-MCNC: 10.5 G/DL — LOW (ref 13–17)
INTERPRETATION SERPL IFE-IMP: SIGNIFICANT CHANGE UP
M-SPIKE: 0.6 G/DL — HIGH (ref 0–0)
MAGNESIUM SERPL-MCNC: 2 MG/DL — SIGNIFICANT CHANGE UP (ref 1.6–2.6)
MAGNESIUM SERPL-MCNC: 2 MG/DL — SIGNIFICANT CHANGE UP (ref 1.6–2.6)
MCHC RBC-ENTMCNC: 29.4 PG — SIGNIFICANT CHANGE UP (ref 27–34)
MCHC RBC-ENTMCNC: 31.3 GM/DL — LOW (ref 32–36)
MCV RBC AUTO: 94.1 FL — SIGNIFICANT CHANGE UP (ref 80–100)
METHOD TYPE: SIGNIFICANT CHANGE UP
NRBC # BLD: 0 /100 WBCS — SIGNIFICANT CHANGE UP (ref 0–0)
ORGANISM # SPEC MICROSCOPIC CNT: SIGNIFICANT CHANGE UP
ORGANISM # SPEC MICROSCOPIC CNT: SIGNIFICANT CHANGE UP
P-ANCA SER-ACNC: NEGATIVE — SIGNIFICANT CHANGE UP
PA ADP PRP-ACNC: 55 PRU — LOW (ref 194–417)
PHOSPHATE SERPL-MCNC: 3.7 MG/DL — SIGNIFICANT CHANGE UP (ref 2.5–4.5)
PHOSPHATE SERPL-MCNC: 3.8 MG/DL — SIGNIFICANT CHANGE UP (ref 2.5–4.5)
PLATELET # BLD AUTO: 696 K/UL — HIGH (ref 150–400)
PLATELET RESPONSE ASPIRIN RESULT: 350 ARU — SIGNIFICANT CHANGE UP (ref 350–700)
POTASSIUM SERPL-MCNC: 4.4 MMOL/L — SIGNIFICANT CHANGE UP (ref 3.5–5.3)
POTASSIUM SERPL-MCNC: 4.6 MMOL/L — SIGNIFICANT CHANGE UP (ref 3.5–5.3)
POTASSIUM SERPL-SCNC: 4.4 MMOL/L — SIGNIFICANT CHANGE UP (ref 3.5–5.3)
POTASSIUM SERPL-SCNC: 4.6 MMOL/L — SIGNIFICANT CHANGE UP (ref 3.5–5.3)
PROT PATTERN SERPL ELPH-IMP: SIGNIFICANT CHANGE UP
RBC # BLD: 3.57 M/UL — LOW (ref 4.2–5.8)
RBC # FLD: 16 % — HIGH (ref 10.3–14.5)
SODIUM SERPL-SCNC: 138 MMOL/L — SIGNIFICANT CHANGE UP (ref 135–145)
SODIUM SERPL-SCNC: 140 MMOL/L — SIGNIFICANT CHANGE UP (ref 135–145)
SPECIMEN SOURCE: SIGNIFICANT CHANGE UP
WBC # BLD: 13.25 K/UL — HIGH (ref 3.8–10.5)
WBC # FLD AUTO: 13.25 K/UL — HIGH (ref 3.8–10.5)

## 2021-10-13 PROCEDURE — 93010 ELECTROCARDIOGRAM REPORT: CPT | Mod: 76

## 2021-10-13 PROCEDURE — 70450 CT HEAD/BRAIN W/O DYE: CPT | Mod: 26

## 2021-10-13 PROCEDURE — 99233 SBSQ HOSP IP/OBS HIGH 50: CPT

## 2021-10-13 PROCEDURE — G0452: CPT | Mod: 26

## 2021-10-13 PROCEDURE — 71045 X-RAY EXAM CHEST 1 VIEW: CPT | Mod: 26

## 2021-10-13 RX ORDER — IPRATROPIUM/ALBUTEROL SULFATE 18-103MCG
3 AEROSOL WITH ADAPTER (GRAM) INHALATION ONCE
Refills: 0 | Status: COMPLETED | OUTPATIENT
Start: 2021-10-13 | End: 2021-10-13

## 2021-10-13 RX ADMIN — CHLORHEXIDINE GLUCONATE 1 APPLICATION(S): 213 SOLUTION TOPICAL at 21:25

## 2021-10-13 RX ADMIN — CLOPIDOGREL BISULFATE 75 MILLIGRAM(S): 75 TABLET, FILM COATED ORAL at 11:31

## 2021-10-13 RX ADMIN — Medication 100 MILLIGRAM(S): at 13:23

## 2021-10-13 RX ADMIN — ISOSORBIDE MONONITRATE 60 MILLIGRAM(S): 60 TABLET, EXTENDED RELEASE ORAL at 11:31

## 2021-10-13 RX ADMIN — Medication 250 MILLIGRAM(S): at 05:24

## 2021-10-13 RX ADMIN — Medication 250 MILLIGRAM(S): at 17:24

## 2021-10-13 RX ADMIN — HEPARIN SODIUM 5000 UNIT(S): 5000 INJECTION INTRAVENOUS; SUBCUTANEOUS at 05:24

## 2021-10-13 RX ADMIN — Medication 1200 MILLIGRAM(S): at 05:24

## 2021-10-13 RX ADMIN — SODIUM CHLORIDE 100 MILLILITER(S): 9 INJECTION INTRAMUSCULAR; INTRAVENOUS; SUBCUTANEOUS at 05:43

## 2021-10-13 RX ADMIN — NICARDIPINE HYDROCHLORIDE 25 MG/HR: 30 CAPSULE, EXTENDED RELEASE ORAL at 06:28

## 2021-10-13 RX ADMIN — Medication 0.2 MILLIGRAM(S): at 21:25

## 2021-10-13 RX ADMIN — SENNA PLUS 2 TABLET(S): 8.6 TABLET ORAL at 21:24

## 2021-10-13 RX ADMIN — Medication 0.1 MILLIGRAM(S): at 13:23

## 2021-10-13 RX ADMIN — Medication 81 MILLIGRAM(S): at 11:31

## 2021-10-13 RX ADMIN — Medication 3 MILLILITER(S): at 22:57

## 2021-10-13 RX ADMIN — ATORVASTATIN CALCIUM 20 MILLIGRAM(S): 80 TABLET, FILM COATED ORAL at 21:25

## 2021-10-13 RX ADMIN — Medication 100 MILLIGRAM(S): at 05:23

## 2021-10-13 RX ADMIN — Medication 100 MILLIGRAM(S): at 21:23

## 2021-10-13 RX ADMIN — HEPARIN SODIUM 5000 UNIT(S): 5000 INJECTION INTRAVENOUS; SUBCUTANEOUS at 17:15

## 2021-10-13 RX ADMIN — POLYETHYLENE GLYCOL 3350 17 GRAM(S): 17 POWDER, FOR SOLUTION ORAL at 17:15

## 2021-10-13 RX ADMIN — Medication 0.1 MILLIGRAM(S): at 05:24

## 2021-10-13 RX ADMIN — POLYETHYLENE GLYCOL 3350 17 GRAM(S): 17 POWDER, FOR SOLUTION ORAL at 05:24

## 2021-10-13 NOTE — PROGRESS NOTE ADULT - ASSESSMENT
70yoM w/ PMHx of CKD with a baseline Cr >3, HTN, HLD presented to OSH after multiple falls. He was transferred from OSH for MRI/MRA findings concerning for aneurysm vs. thrombus       1) CKD stage 4-  Likely has Hypertensive Nephropathy  B/l cr around 3mg/dl -> fluctuations expected  Ua and urine lytes reviewed--> +UTI- on cipro  urine pro/cr 1.4grams  Renal US --> right kidney 9.3cm and left 9.5 cm with b/l renal cysts  s/p Coiling for PCOMM  c/w gentle hydration -->IV nacl @ 50cc --> encourage po intake when possible  monitor cr   avoid nephrotoxin such as ace/arb/nsaid  trend bmp    2) HTN-  bp much improved  nifedipine to 90mg po qd   on cardene gtt  Trend bp      Dr Hernadez   [phone]

## 2021-10-13 NOTE — PROGRESS NOTE ADULT - SUBJECTIVE AND OBJECTIVE BOX
Neurology Progress Note    S: Patient seen and examined in neuro ICU. s/p angio with dr. newby doing well. s/p coil    Medication:  acetaminophen    Suspension .. 650 milliGRAM(s) Oral every 6 hours PRN  aspirin  chewable 81 milliGRAM(s) Oral daily  atorvastatin 20 milliGRAM(s) Oral at bedtime  chlorhexidine 4% Liquid 1 Application(s) Topical <User Schedule>  ciprofloxacin     Tablet 250 milliGRAM(s) Oral two times a day  cloNIDine 0.1 milliGRAM(s) Oral every 8 hours  clopidogrel Tablet 75 milliGRAM(s) Oral daily  heparin   Injectable 5000 Unit(s) SubCutaneous every 12 hours  hydrALAZINE 100 milliGRAM(s) Oral three times a day  influenza   Vaccine 0.5 milliLiter(s) IntraMuscular once  isosorbide   mononitrate ER Tablet (IMDUR) 60 milliGRAM(s) Oral daily  NIFEdipine XL 90 milliGRAM(s) Oral daily  oxyCODONE    IR 5 milliGRAM(s) Oral every 4 hours PRN  oxyCODONE    IR 10 milliGRAM(s) Oral every 4 hours PRN  polyethylene glycol 3350 17 Gram(s) Oral every 12 hours  senna 2 Tablet(s) Oral at bedtime      Vitals:  Vital Signs Last 24 Hrs  T(C): 35.8 (13 Oct 2021 11:00), Max: 36.9 (13 Oct 2021 00:00)  T(F): 96.4 (13 Oct 2021 11:00), Max: 98.4 (13 Oct 2021 00:00)  HR: 51 (13 Oct 2021 13:17) (47 - 65)  BP: --  BP(mean): --  RR: 17 (13 Oct 2021 13:17) (9 - 25)  SpO2: 97% (13 Oct 2021 13:17) (88% - 100%)    General Exam:   General Appearance: Appropriately dressed and in no acute distress       Head: Normocephalic, atraumatic and no dysmorphic features  Ear, Nose, and Throat: Moist mucous membranes  CVS: S1S2+  Resp: No SOB, no wheeze or rhonchi  Abd: soft NTND  Extremities: No edema, no cyanosis  Skin: No bruises, no rashes     Neurological Exam:  Mental Status: Awake, alert and oriented x 3.  Able to follow simple and complex verbal commands. Able to name and repeat. fluent speech. No obvious aphasia or dysarthria noted.   Cranial Nerves: PERRL, EOMI, VFFC, sensation V1-V3 intact,  no obvious facial asymmetry , equal elevation of palate, scm/trap 5/5, tongue is midline on protrusion. no obvious papilledema on fundoscopic exam. Hearing is grossly intact.   Motor: Normal bulk, tone and strength throughout. Fine finger movements were intact and symmetric. no tremors or drift noted except LUE with 4+/5 mild drift noted   Sensation: Intact to light touch and pinprick throughout. no right/left confusion. no extinction to tactile on DSS.    Reflexes: 1+ throughout at biceps, brachioradialis, triceps, patellars and ankles bilaterally and equal. No clonus. R toe and L toe were both downgoing.  Coordination: No dysmetria on FNF   Gait: deferred in ICU    I personally reviewed the below data/images/labs:      CBC Full  -  ( 12 Oct 2021 16:57 )  WBC Count : 9.18 K/uL  RBC Count : 4.20 M/uL  Hemoglobin : 12.1 g/dL  Hematocrit : 39.3 %  Platelet Count - Automated : 760 K/uL  Mean Cell Volume : 93.6 fl  Mean Cell Hemoglobin : 28.8 pg  Mean Cell Hemoglobin Concentration : 30.8 gm/dL  Auto Neutrophil # : x  Auto Lymphocyte # : x  Auto Monocyte # : x  Auto Eosinophil # : x  Auto Basophil # : x  Auto Neutrophil % : x  Auto Lymphocyte % : x  Auto Monocyte % : x  Auto Eosinophil % : x  Auto Basophil % : x    10-13    140  |  108  |  48<H>  ----------------------------<  145<H>  4.6   |  16<L>  |  3.13<H>    Ca    9.2      13 Oct 2021 02:31  Phos  3.7     10-13  Mg     2.0     10-13    TPro  x   /  Alb  x   /  TBili  0.3  /  DBili  x   /  AST  x   /  ALT  x   /  AlkPhos  x   10-12      PT/INR - ( 12 Oct 2021 06:25 )   PT: 13.4 sec;   INR: 1.12 ratio         PTT - ( 11 Oct 2021 17:18 )  PTT:33.3 sec    < from: CT Head No Cont (10.01.21 @ 18:37) >    EXAM:  CT BRAIN                            PROCEDURE DATE:  10/01/2021          INTERPRETATION:  CT OF THE HEAD WITHOUT CONTRAST    CLINICAL INDICATION: Weakness. Falls.    TECHNIQUE: Volumetric CT acquisition was performed through the brain and reviewed using brain and bone window technique. Dose optimization techniques were utilized including kVp/mA modulation along with iterative reconstructions.      COMPARISON: No prior studies have been submitted for comparison.    FINDINGS:    The ventricular and sulcal size and configuration is age appropriate.   There is no acute loss of gray-white differentiation. There are extensive patchy and confluent areas of hypodensity in the periventricular and subcortical white matter which are non-specific but likely related  chronic microangiopathic ischemic changes.  Small chronic infarction in the right cerebellar hemisphere. Chronic appearing left thalamic lacunar infarct.    There is no evidence of mass effect, midline shift, acute intracranial hemorrhage, or extra-axial collections.     The calvarium is intact. The paranasal sinuses are clear.The mastoid air cells are predominantly clear. The orbits are unremarkable.      IMPRESSION:  No acute intracranial hemorrhage or acute territorial infarction. Extensive chronic microvascular ischemic changes and several chronic infarctions as described. Further evaluation may be obtained with MRI of the brain if no contraindications.    --- End of Report ---            KAMILA SZYMANSKI MD; Attending Radiologist  This document has been electronically signed. Oct  1 2021  7:25PM    < end of copied text >  < from: MR Head No Cont (10.04.21 @ 18:09) >    EXAM:  MR BRAIN                            PROCEDURE DATE:  10/04/2021          INTERPRETATION:  .    CLINICAL INFORMATION: Frequent falls.    TECHNIQUE: Multiplanar multisequential MRI of the brain was acquired without the administration of IV gadolinium.    COMPARISON: Prior CT examination of the head dated 10/1/2021.    FINDINGS: Multiple areas of restricted diffusion are seen within the right MCA territory affecting the right frontal, parietal, and temporal lobes. Associated T2/FLAIR prolongation is seen, compatible with cytotoxic edema. No hemorrhagic transformation is noted.    Chronic lacunar infarcts are again seen within the right cerebellar hemisphere, left thalamus, and bilateral basal ganglia.    Multiple patchy confluent nonspecific T2/FLAIR hyperintense signal changes are noted throughout the bihemispheric white matter without associated mass effect or restricted diffusion.    Mild ventriculomegaly appears unchanged. No abnormal intra-axial fluid collections are notable. Flow-voids are noted throughout the major intracranial vessels, on the T2 weighted images, consistent with their patency. The sellar area appears unremarkable. The paranasal sinuses and mastoid air cells are grossly clear. Calvarial signal appears unremarkable. The orbits appear within normal limits.    IMPRESSION: Acute/subacute right-sided MCA distribution infarct with associated cytotoxic edema and without hemorrhagic transformation.    Multiple additional chronic lacunar infarcts and similar-appearing extensive chronic white matter microvascular type changes, as discussed.      --- End of Report ---            LUIS CARLOS BONNER MD; Attending Radiologist  This document has been electronically signed. Oct  5 2021  3:01PM    < end of copied text >  < from: MR Angio Head No Cont (10.06.21 @ 18:15) >    EXAM:  MR ANGIO BRAIN                            PROCEDURE DATE:  10/06/2021          INTERPRETATION:  INDICATION:  Right MCA infarct.    TECHNIQUE:  MR angiography of the brain was performed using three dimensional time-of-flight (3D-TOF) technique.  Imaging was performed on a 1.5 Lisa magnet.    FINDINGS:    INTERNAL CAROTID ARTERIES:  Bilaterally patent.    [place] [facility]:  A right-sided P-comm aneurysm is identified. The aneurysm is directed posteriorly and laterally, the aneurysm appears to be septated and/or bilobed., and measures 8.9 x 7.3 mm.    CEREBRAL ARTERIES:  Both anterior cerebral arteries are patent. Both A1 segments are well formed. Both middle cerebral arteries are patent. There is mild narrowing of the proximal right M1 segment.    VERTEBROBASILAR SYSTEM:  The vertebrobasilar system is patent. There are large posterior communicating arteries bilaterally with dominant supply of the posterior cerebral arteries. Left P1 segment is hypoplastic.    IMPRESSION:    1. 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  2. Mild narrowing of the proximal right middle cerebral artery in the M1 region.  3. The vessels are otherwise patent, as outlined above.    --- End of Report ---            MONSE CLEMENT MD; Attending Radiologist  This document has been electronically signed. Oct  6 2021  7:11PM    < end of copied text >  < from: CT Head No Cont (10.13.21 @ 09:28) >    EXAM:  CT BRAIN                            PROCEDURE DATE:  10/13/2021            INTERPRETATION:  Noncontrast CT of the brain.    CLINICAL INDICATION: Status post rt pcomm aneurysm coiling    TECHNIQUE : Axial CT scanning of the brain was obtainedfrom the skull base to the vertex without the administration of intravenous contrast. Sagittal and coronal reformats were provided.    COMPARISON: CT brain 10/1/2021. MRI brain 10/4/2021    FINDINGS:    Interval placement of embolic coil mass right parasellar region.    Similar mild ventricular dilatation.    No midline shift. Basal cisterns poorly evaluated due to streak artifact.    No acute intracranial hemorrhage, mass effect, or brain edema. Extensive white matter microvascular ischemic disease.    The visualized paranasal sinuses and mastoid air cells are clear.    IMPRESSION:    Interval placement of embolic coil mass right parasellar region.  No acute intracranial hemorrhage, mass effect, or brain edema.  Similar mild ventricular dilatation.        --- End of Report ---                AFIA DAILY MD; Attending Radiologist  This document has been electronically signed. Oct 13 2021  9:31AM    < end of copied text >

## 2021-10-13 NOTE — PROGRESS NOTE ADULT - SUBJECTIVE AND OBJECTIVE BOX
Subjective: Patient seen and examined. No new events except as noted.   In NSCU   POD 1 s/p balloon assisted coiling of right posterior communicating artery aneurysm on 10/12/21.    REVIEW OF SYSTEMS:    CONSTITUTIONAL: + weakness, fevers or chills  EYES/ENT: No visual changes;  No vertigo or throat pain   NECK: No pain or stiffness  RESPIRATORY: No cough, wheezing, hemoptysis; No shortness of breath  CARDIOVASCULAR: No chest pain or palpitations  GASTROINTESTINAL: No abdominal or epigastric pain. No nausea, vomiting, or hematemesis; No diarrhea or constipation. No melena or hematochezia.  GENITOURINARY: No dysuria, frequency or hematuria  NEUROLOGICAL: No numbness or weakness  SKIN: No itching, burning, rashes, or lesions   All other review of systems is negative unless indicated above.    MEDICATIONS:  MEDICATIONS  (STANDING):  aspirin  chewable 81 milliGRAM(s) Oral daily  atorvastatin 20 milliGRAM(s) Oral at bedtime  chlorhexidine 4% Liquid 1 Application(s) Topical <User Schedule>  ciprofloxacin     Tablet 250 milliGRAM(s) Oral two times a day  cloNIDine 0.3 milliGRAM(s) Oral three times a day  clopidogrel Tablet 75 milliGRAM(s) Oral daily  heparin   Injectable 5000 Unit(s) SubCutaneous every 12 hours  hydrALAZINE 100 milliGRAM(s) Oral three times a day  influenza   Vaccine 0.5 milliLiter(s) IntraMuscular once  isosorbide   mononitrate ER Tablet (IMDUR) 60 milliGRAM(s) Oral daily  niCARdipine Infusion 5 mG/Hr (25 mL/Hr) IV Continuous <Continuous>  NIFEdipine XL 90 milliGRAM(s) Oral daily  polyethylene glycol 3350 17 Gram(s) Oral every 12 hours  senna 2 Tablet(s) Oral at bedtime  sodium chloride 0.9%. 1000 milliLiter(s) (50 mL/Hr) IV Continuous <Continuous>      PHYSICAL EXAM:  T(C): 36.8 (10-13-21 @ 07:00), Max: 36.9 (10-13-21 @ 00:00)  HR: 59 (10-13-21 @ 08:30) (48 - 68)  BP: --  RR: 19 (10-13-21 @ 08:30) (9 - 25)  SpO2: 97% (10-13-21 @ 08:30) (88% - 100%)  Wt(kg): --  I&O's Summary    12 Oct 2021 07:01  -  13 Oct 2021 07:00  --------------------------------------------------------  IN: 3037.5 mL / OUT: 774 mL / NET: 2263.5 mL    13 Oct 2021 07:01  -  13 Oct 2021 09:51  --------------------------------------------------------  IN: 150 mL / OUT: 0 mL / NET: 150 mL          Appearance: NAD	  HEENT:   Dry  oral mucosa, PERRL, EOMI	  Lymphatic: No lymphadenopathy , no edema  Cardiovascular: Normal S1 S2, No JVD, No murmurs , Peripheral pulses palpable 2+ bilaterally  Respiratory: Lungs clear to auscultation, normal effort 	  Gastrointestinal:  Soft, Non-tender, + BS	  Skin: No rashes, No ecchymoses, No cyanosis, warm to touch  Musculoskeletal: Normal range of motion, normal strength  Psychiatry:  Mood & affect appropriate  Ext: No edema  Neurological: Awake, alert oriented to person, place and time, Following Commands, PERRL, EOMI, Face Symmetrical, Speech Fluent, Moving all extremities, LUE 4+/5, No Drift, Sensation to Light Touch Intact      LABS:    CARDIAC MARKERS:  CARDIAC MARKERS ( 10 Oct 2021 21:03 )  x     / x     / 71 U/L / x     / 1.8 ng/mL                                12.1   9.18  )-----------( 760      ( 12 Oct 2021 16:57 )             39.3     10-13    140  |  108  |  48<H>  ----------------------------<  145<H>  4.6   |  16<L>  |  3.13<H>    Ca    9.2      13 Oct 2021 02:31  Phos  3.7     10-13  Mg     2.0     10-13    TPro  x   /  Alb  x   /  TBili  0.3  /  DBili  x   /  AST  x   /  ALT  x   /  AlkPhos  x   10-12    proBNP:   Lipid Profile:   HgA1c:   TSH:     TELEMETRY: 	  SR  ECG:  	  RADIOLOGY: c< from: CT Head No Cont (10.13.21 @ 09:28) >    EXAM:  CT BRAIN                            PROCEDURE DATE:  10/13/2021            INTERPRETATION:  Noncontrast CT of the brain.    CLINICAL INDICATION: Status post rt pcomm aneurysm coiling    TECHNIQUE : Axial CT scanning of the brain was obtainedfrom the skull base to the vertex without the administration of intravenous contrast. Sagittal and coronal reformats were provided.    COMPARISON: CT brain 10/1/2021. MRI brain 10/4/2021    FINDINGS:    Interval placement of embolic coil mass right parasellar region.    Similar mild ventricular dilatation.    No midline shift. Basal cisterns poorly evaluated due to streak artifact.    No acute intracranial hemorrhage, mass effect, or brain edema. Extensive white matter microvascular ischemic disease.    The visualized paranasal sinuses and mastoid air cells are clear.    IMPRESSION:    Interval placement of embolic coil mass right parasellar region.  No acute intracranial hemorrhage, mass effect, or brain edema.  Similar mild ventricular dilatation.        --- End of Report ---                AFIA DAILY MD; Attending Radiologist  This document has been electronically signed. Oct 13 2021  9:31AM    < end of copied text >    DIAGNOSTIC TESTING:  [ ] Echocardiogram:  [ ]  Catheterization:  [ ] Stress Test:    OTHER:

## 2021-10-13 NOTE — SWALLOW BEDSIDE ASSESSMENT ADULT - SLP PERTINENT HISTORY OF CURRENT PROBLEM
H&P: 69 y/o M with PMH of CKD with a baseline Cr >3, HTN, HLD presented to OSH after multiple falls. He was transferred from OSH for MRI/MRA findings concerning for aneurysm vs. thrombus with multiple infarcts. He is slightly confused, but denies pain, headache, blurry vision, nausea, vomiting, weakness, and changes in sensation. CTH 10/1 hydro likely chronic, MRI 10/4 right-sided acute vs. subacute MCA infarcts, Carotid U/S 10/6 no sig stenosis, MRA 10/6 R pcomm aneurysm with possible intra-aneurysmal thrombus [id number], mild R M1 stenosis. Exam: AAOx2, perrl, eomi, no facial, suarez 5/5, silt. Patient is admitted to neurosurgery service with plan for conventional angiogram on Monday (10/11).

## 2021-10-13 NOTE — PROGRESS NOTE ADULT - SUBJECTIVE AND OBJECTIVE BOX
SUMMARY:  70M PMHx CKD with a baseline Cr >3, HTN, HLD presented to OSH after multiple falls. He was transferred from OSH for MRI/MRA findings concerning for aneurysm vs. thrombus with multiple infarcts. He is slightly confused, but denies pain, headache, blurry vision, nausea, vomiting, weakness, and changes in sensation.   CTH 10/1 hydro likely chronic, MRI 10/4 right-sided acute vs. subacute MCA infarcts, Carotid U/S 10/6 no sig stenosis, MRA 10/6 R pcomm aneurysm with possible intra-aneurysmal thrombus [id number], mild R M1 stenosis.    Underwent balloon assisted coiling of right posterior communicating artery aneurysm on 10/12/21.    24 HOUR EVENTS:  Admitted to [facility].    VITALS/DATA/ORDERS: [x] Reviewed    MEDICATIONS  (STANDING):  acetylcysteine  Oral Solution 1200 milliGRAM(s) Oral every 12 hours  aspirin  chewable 81 milliGRAM(s) Oral daily  atorvastatin 20 milliGRAM(s) Oral at bedtime  chlorhexidine 4% Liquid 1 Application(s) Topical <User Schedule>  ciprofloxacin     Tablet 250 milliGRAM(s) Oral two times a day  cloNIDine 0.1 milliGRAM(s) Oral three times a day  clopidogrel Tablet 75 milliGRAM(s) Oral daily  heparin   Injectable 5000 Unit(s) SubCutaneous every 12 hours  hydrALAZINE 100 milliGRAM(s) Oral three times a day  influenza   Vaccine 0.5 milliLiter(s) IntraMuscular once  isosorbide   mononitrate ER Tablet (IMDUR) 60 milliGRAM(s) Oral daily  niCARdipine Infusion 5 mG/Hr (25 mL/Hr) IV Continuous <Continuous>  NIFEdipine XL 90 milliGRAM(s) Oral daily  polyethylene glycol 3350 17 Gram(s) Oral every 12 hours  senna 2 Tablet(s) Oral at bedtime  sodium chloride 0.9%. 1000 milliLiter(s) (100 mL/Hr) IV Continuous <Continuous>    MEDICATIONS  (PRN):  acetaminophen    Suspension .. 650 milliGRAM(s) Oral every 6 hours PRN Mild Pain (1 - 3)  oxyCODONE    IR 5 milliGRAM(s) Oral every 4 hours PRN Moderate Pain (4 - 6)  oxyCODONE    IR 10 milliGRAM(s) Oral every 4 hours PRN Severe Pain (7 - 10)      Drug Dosing Weight  Height (cm): 182.9 (12 Oct 2021 08:23)  Weight (kg): 73.9 (12 Oct 2021 08:23)  BMI (kg/m2): 22.1 (12 Oct 2021 08:23)  BSA (m2): 1.95 (12 Oct 2021 08:23)    PAST MEDICAL & SURGICAL HISTORY:  Frequent falls  Hypertension  Chronic kidney disease, unspecified CKD stage    REVIEW OF SYSTEMS: [x] Unable to Assess due to neurologic exam   [ ] All ROS addressed below are non-contributory, except:  Neuro: [ ] Headache [ ] Back pain [ ] Numbness [ ] Weakness [ ] Ataxia [ ] Dizziness [ ] Aphasia [ ] Dysarthria [ ] Visual disturbance  Resp: [ ] Shortness of breath/dyspnea, [ ] Orthopnea [ ] Cough  CV: [ ] Chest pain [ ] Palpitation [ ] Lightheadedness [ ] Syncope  Renal: [ ] Thirst [ ] Edema  GI: [ ] Nausea [ ] Emesis [ ] Abdominal pain [ ] Constipation [ ] Diarrhea  Hem: [ ] Hematemesis [ ] bright red blood per rectum  ID: [ ] Fever [ ] Chills [ ] Dysuria  ENT: [ ] Rhinorrhea    PHYSICAL EXAM:    General: No Acute Distress   Neurological: Awake, alert oriented to person, place and time, Following Commands, PERRL, EOMI, Face Symmetrical, Speech Fluent, Moving all extremities, LUE 4+/5, No Drift, Sensation to Light Touch Intact  Pulmonary: Clear to Auscultation, No Rales, No Rhonchi, No Wheezes   Cardiovascular: S1, S2, Regular Rate and Rhythm   Gastrointestinal: Soft, Nontender, Nondistended   Extremities: No calf tenderness       ICU Vital Signs Last 24 Hrs  T(C): 36.9 (13 Oct 2021 00:00), Max: 36.9 (13 Oct 2021 00:00)  T(F): 98.4 (13 Oct 2021 00:00), Max: 98.4 (13 Oct 2021 00:00)  HR: 54 (13 Oct 2021 06:15) (46 - 68)  BP: 172/79 (12 Oct 2021 08:23) (172/79 - 172/79)  ABP: 133/52 (13 Oct 2021 06:15) (122/47 - 190/79)  ABP(mean): 76 (13 Oct 2021 06:15) (63 - 118)  RR: 16 (13 Oct 2021 06:15) (9 - 25)  SpO2: 92% (13 Oct 2021 06:15) (88% - 100%)      10-12-21 @ 07:01  -  10-13-21 @ 06:29  --------------------------------------------------------  IN: 2287.5 mL / OUT: 574 mL / NET: 1713.5 mL            acetaminophen    Suspension .. 650 milliGRAM(s) Oral every 6 hours PRN  aspirin  chewable 81 milliGRAM(s) Oral daily  atorvastatin 20 milliGRAM(s) Oral at bedtime  chlorhexidine 4% Liquid 1 Application(s) Topical <User Schedule>  ciprofloxacin     Tablet 250 milliGRAM(s) Oral two times a day  cloNIDine 0.1 milliGRAM(s) Oral three times a day  clopidogrel Tablet 75 milliGRAM(s) Oral daily  heparin   Injectable 5000 Unit(s) SubCutaneous every 12 hours  hydrALAZINE 100 milliGRAM(s) Oral three times a day  influenza   Vaccine 0.5 milliLiter(s) IntraMuscular once  isosorbide   mononitrate ER Tablet (IMDUR) 60 milliGRAM(s) Oral daily  niCARdipine Infusion 5 mG/Hr (25 mL/Hr) IV Continuous <Continuous>  NIFEdipine XL 90 milliGRAM(s) Oral daily  oxyCODONE    IR 5 milliGRAM(s) Oral every 4 hours PRN  oxyCODONE    IR 10 milliGRAM(s) Oral every 4 hours PRN  polyethylene glycol 3350 17 Gram(s) Oral every 12 hours  senna 2 Tablet(s) Oral at bedtime  sodium chloride 0.9%. 1000 milliLiter(s) (100 mL/Hr) IV Continuous <Continuous>      LABS:  Na: 140 (10-13 @ 02:31), 139 (10-12 @ 16:57), 135 (10-12 @ 06:25), 140 (10-11 @ 07:09), 139 (10-10 @ 21:03)  K: 4.6 (10-13 @ 02:31), 4.5 (10-12 @ 16:57), 4.4 (10-11 @ 07:09), 4.4 (10-10 @ 21:03)  Cl: 108 (10-13 @ 02:31), 107 (10-12 @ 16:57), 106 (10-11 @ 07:09), 103 (10-10 @ 21:03)  CO2: 16 (10-13 @ 02:31), 17 (10-12 @ 16:57), 20 (10-11 @ 07:09), 22 (10-10 @ 21:03)  BUN: 48 (10-13 @ 02:31), 40 (10-12 @ 16:57), 42 (10-11 @ 07:09), 42 (10-10 @ 21:03)  Cr: 3.13 (10-13 @ 02:31), 2.82 (10-12 @ 16:57), 2.78 (10-12 @ 06:25), 2.90 (10-11 @ 07:09), 3.07 (10-10 @ 21:03)  Glu: 145(10-13 @ 02:31), 170(10-12 @ 16:57), 87(10-11 @ 07:09), 106(10-10 @ 21:03)    Hgb: 12.1 (10-12 @ 16:57), 12.2 (10-11 @ 07:09), 12.7 (10-10 @ 21:03), 12.2 (10-10 @ 08:19)  Hct: 39.3 (10-12 @ 16:57), 39.6 (10-11 @ 07:09), 40.9 (10-10 @ 21:03), 39.0 (10-10 @ 08:19)  WBC: 9.18 (10-12 @ 16:57), 6.42 (10-11 @ 07:09), 6.42 (10-10 @ 21:03), 6.47 (10-10 @ 08:19)  Plt: 760 (10-12 @ 16:57), 728 (10-11 @ 07:09), 779 (10-10 @ 21:03), 729 (10-10 @ 08:19)    INR: 1.12 10-12-21 @ 06:25, 1.04 10-11-21 @ 17:18  PTT: 33.3 10-11-21 @ 17:18                 SUMMARY:  70M PMHx CKD with a baseline Cr >3, HTN, HLD presented to OSH after multiple falls. He was transferred from OSH for MRI/MRA findings concerning for aneurysm vs. thrombus with multiple infarcts. He is slightly confused, but denies pain, headache, blurry vision, nausea, vomiting, weakness, and changes in sensation.   CTH 10/1 hydro likely chronic, MRI 10/4 right-sided acute vs. subacute MCA infarcts, Carotid U/S 10/6 no sig stenosis, MRA 10/6 R pcomm aneurysm with possible intra-aneurysmal thrombus [id number], mild R M1 stenosis.    Underwent balloon assisted coiling of right posterior communicating artery aneurysm on 10/12/21.    10/12: t balloon assisted coiling of right posterior communicating artery aneurysm     24 HOUR EVENTS:  no acute events     VITALS/DATA/ORDERS: [x] Reviewed    MEDICATIONS  (STANDING):  acetylcysteine  Oral Solution 1200 milliGRAM(s) Oral every 12 hours  aspirin  chewable 81 milliGRAM(s) Oral daily  atorvastatin 20 milliGRAM(s) Oral at bedtime  chlorhexidine 4% Liquid 1 Application(s) Topical <User Schedule>  ciprofloxacin     Tablet 250 milliGRAM(s) Oral two times a day  cloNIDine 0.1 milliGRAM(s) Oral three times a day  clopidogrel Tablet 75 milliGRAM(s) Oral daily  heparin   Injectable 5000 Unit(s) SubCutaneous every 12 hours  hydrALAZINE 100 milliGRAM(s) Oral three times a day  influenza   Vaccine 0.5 milliLiter(s) IntraMuscular once  isosorbide   mononitrate ER Tablet (IMDUR) 60 milliGRAM(s) Oral daily  niCARdipine Infusion 5 mG/Hr (25 mL/Hr) IV Continuous <Continuous>  NIFEdipine XL 90 milliGRAM(s) Oral daily  polyethylene glycol 3350 17 Gram(s) Oral every 12 hours  senna 2 Tablet(s) Oral at bedtime  sodium chloride 0.9%. 1000 milliLiter(s) (100 mL/Hr) IV Continuous <Continuous>    MEDICATIONS  (PRN):  acetaminophen    Suspension .. 650 milliGRAM(s) Oral every 6 hours PRN Mild Pain (1 - 3)  oxyCODONE    IR 5 milliGRAM(s) Oral every 4 hours PRN Moderate Pain (4 - 6)  oxyCODONE    IR 10 milliGRAM(s) Oral every 4 hours PRN Severe Pain (7 - 10)      Drug Dosing Weight  Height (cm): 182.9 (12 Oct 2021 08:23)  Weight (kg): 73.9 (12 Oct 2021 08:23)  BMI (kg/m2): 22.1 (12 Oct 2021 08:23)  BSA (m2): 1.95 (12 Oct 2021 08:23)    PAST MEDICAL & SURGICAL HISTORY:  Frequent falls  Hypertension  Chronic kidney disease, unspecified CKD stage    REVIEW OF SYSTEMS: [x] Unable to Assess due to neurologic exam   [ ] All ROS addressed below are non-contributory, except:  Neuro: [ ] Headache [ ] Back pain [ ] Numbness [ ] Weakness [ ] Ataxia [ ] Dizziness [ ] Aphasia [ ] Dysarthria [ ] Visual disturbance  Resp: [ ] Shortness of breath/dyspnea, [ ] Orthopnea [ ] Cough  CV: [ ] Chest pain [ ] Palpitation [ ] Lightheadedness [ ] Syncope  Renal: [ ] Thirst [ ] Edema  GI: [ ] Nausea [ ] Emesis [ ] Abdominal pain [ ] Constipation [ ] Diarrhea  Hem: [ ] Hematemesis [ ] bright red blood per rectum  ID: [ ] Fever [ ] Chills [ ] Dysuria  ENT: [ ] Rhinorrhea    PHYSICAL EXAM:    General: No Acute Distress   Neurological: Awake, alert oriented to person, place and time, Following Commands, PERRL, EOMI, Face Symmetrical, Speech Fluent, Moving all extremities, LUE 4+/5, No Drift, Sensation to Light Touch Intact  Pulmonary: Clear to Auscultation, No Rales, No Rhonchi, No Wheezes   Cardiovascular: S1, S2, Regular Rate and Rhythm   Gastrointestinal: Soft, Nontender, Nondistended   Extremities: No calf tenderness       ICU Vital Signs Last 24 Hrs  T(C): 36.9 (13 Oct 2021 00:00), Max: 36.9 (13 Oct 2021 00:00)  T(F): 98.4 (13 Oct 2021 00:00), Max: 98.4 (13 Oct 2021 00:00)  HR: 54 (13 Oct 2021 06:15) (46 - 68)  BP: 172/79 (12 Oct 2021 08:23) (172/79 - 172/79)  ABP: 133/52 (13 Oct 2021 06:15) (122/47 - 190/79)  ABP(mean): 76 (13 Oct 2021 06:15) (63 - 118)  RR: 16 (13 Oct 2021 06:15) (9 - 25)  SpO2: 92% (13 Oct 2021 06:15) (88% - 100%)      10-12-21 @ 07:01  -  10-13-21 @ 06:29  --------------------------------------------------------  IN: 2287.5 mL / OUT: 574 mL / NET: 1713.5 mL            acetaminophen    Suspension .. 650 milliGRAM(s) Oral every 6 hours PRN  aspirin  chewable 81 milliGRAM(s) Oral daily  atorvastatin 20 milliGRAM(s) Oral at bedtime  chlorhexidine 4% Liquid 1 Application(s) Topical <User Schedule>  ciprofloxacin     Tablet 250 milliGRAM(s) Oral two times a day  cloNIDine 0.1 milliGRAM(s) Oral three times a day  clopidogrel Tablet 75 milliGRAM(s) Oral daily  heparin   Injectable 5000 Unit(s) SubCutaneous every 12 hours  hydrALAZINE 100 milliGRAM(s) Oral three times a day  influenza   Vaccine 0.5 milliLiter(s) IntraMuscular once  isosorbide   mononitrate ER Tablet (IMDUR) 60 milliGRAM(s) Oral daily  niCARdipine Infusion 5 mG/Hr (25 mL/Hr) IV Continuous <Continuous>  NIFEdipine XL 90 milliGRAM(s) Oral daily  oxyCODONE    IR 5 milliGRAM(s) Oral every 4 hours PRN  oxyCODONE    IR 10 milliGRAM(s) Oral every 4 hours PRN  polyethylene glycol 3350 17 Gram(s) Oral every 12 hours  senna 2 Tablet(s) Oral at bedtime  sodium chloride 0.9%. 1000 milliLiter(s) (100 mL/Hr) IV Continuous <Continuous>      LABS:  Na: 140 (10-13 @ 02:31), 139 (10-12 @ 16:57), 135 (10-12 @ 06:25), 140 (10-11 @ 07:09), 139 (10-10 @ 21:03)  K: 4.6 (10-13 @ 02:31), 4.5 (10-12 @ 16:57), 4.4 (10-11 @ 07:09), 4.4 (10-10 @ 21:03)  Cl: 108 (10-13 @ 02:31), 107 (10-12 @ 16:57), 106 (10-11 @ 07:09), 103 (10-10 @ 21:03)  CO2: 16 (10-13 @ 02:31), 17 (10-12 @ 16:57), 20 (10-11 @ 07:09), 22 (10-10 @ 21:03)  BUN: 48 (10-13 @ 02:31), 40 (10-12 @ 16:57), 42 (10-11 @ 07:09), 42 (10-10 @ 21:03)  Cr: 3.13 (10-13 @ 02:31), 2.82 (10-12 @ 16:57), 2.78 (10-12 @ 06:25), 2.90 (10-11 @ 07:09), 3.07 (10-10 @ 21:03)  Glu: 145(10-13 @ 02:31), 170(10-12 @ 16:57), 87(10-11 @ 07:09), 106(10-10 @ 21:03)    Hgb: 12.1 (10-12 @ 16:57), 12.2 (10-11 @ 07:09), 12.7 (10-10 @ 21:03), 12.2 (10-10 @ 08:19)  Hct: 39.3 (10-12 @ 16:57), 39.6 (10-11 @ 07:09), 40.9 (10-10 @ 21:03), 39.0 (10-10 @ 08:19)  WBC: 9.18 (10-12 @ 16:57), 6.42 (10-11 @ 07:09), 6.42 (10-10 @ 21:03), 6.47 (10-10 @ 08:19)  Plt: 760 (10-12 @ 16:57), 728 (10-11 @ 07:09), 779 (10-10 @ 21:03), 729 (10-10 @ 08:19)    INR: 1.12 10-12-21 @ 06:25, 1.04 10-11-21 @ 17:18  PTT: 33.3 10-11-21 @ 17:18                 SUMMARY:  70M PMHx CKD with a baseline Cr >3, HTN, HLD presented to OSH after multiple falls. He was transferred from OSH for MRI/MRA findings concerning for aneurysm vs. thrombus with multiple infarcts. He is slightly confused, but denies pain, headache, blurry vision, nausea, vomiting, weakness, and changes in sensation.   CTH 10/1 hydro likely chronic, MRI 10/4 right-sided acute vs. subacute MCA infarcts, Carotid U/S 10/6 no sig stenosis, MRA 10/6 R pcomm aneurysm with possible intra-aneurysmal thrombus [id number], mild R M1 stenosis.    Underwent balloon assisted coiling of right posterior communicating artery aneurysm on 10/12/21.    10/12: t balloon assisted coiling of right posterior communicating artery aneurysm     24 HOUR EVENTS:  no acute events     VITALS/DATA/ORDERS: [x] Reviewed    MEDICATIONS  (STANDING):  acetylcysteine  Oral Solution 1200 milliGRAM(s) Oral every 12 hours  aspirin  chewable 81 milliGRAM(s) Oral daily  atorvastatin 20 milliGRAM(s) Oral at bedtime  chlorhexidine 4% Liquid 1 Application(s) Topical <User Schedule>  ciprofloxacin     Tablet 250 milliGRAM(s) Oral two times a day  cloNIDine 0.1 milliGRAM(s) Oral three times a day  clopidogrel Tablet 75 milliGRAM(s) Oral daily  heparin   Injectable 5000 Unit(s) SubCutaneous every 12 hours  hydrALAZINE 100 milliGRAM(s) Oral three times a day  influenza   Vaccine 0.5 milliLiter(s) IntraMuscular once  isosorbide   mononitrate ER Tablet (IMDUR) 60 milliGRAM(s) Oral daily  niCARdipine Infusion 5 mG/Hr (25 mL/Hr) IV Continuous <Continuous>  NIFEdipine XL 90 milliGRAM(s) Oral daily  polyethylene glycol 3350 17 Gram(s) Oral every 12 hours  senna 2 Tablet(s) Oral at bedtime  sodium chloride 0.9%. 1000 milliLiter(s) (100 mL/Hr) IV Continuous <Continuous>    MEDICATIONS  (PRN):  acetaminophen    Suspension .. 650 milliGRAM(s) Oral every 6 hours PRN Mild Pain (1 - 3)  oxyCODONE    IR 5 milliGRAM(s) Oral every 4 hours PRN Moderate Pain (4 - 6)  oxyCODONE    IR 10 milliGRAM(s) Oral every 4 hours PRN Severe Pain (7 - 10)      Drug Dosing Weight  Height (cm): 182.9 (12 Oct 2021 08:23)  Weight (kg): 73.9 (12 Oct 2021 08:23)  BMI (kg/m2): 22.1 (12 Oct 2021 08:23)  BSA (m2): 1.95 (12 Oct 2021 08:23)    PAST MEDICAL & SURGICAL HISTORY:  Frequent falls  Hypertension  Chronic kidney disease, unspecified CKD stage    REVIEW OF SYSTEMS: [x] Unable to Assess due to neurologic exam   [ ] All ROS addressed below are non-contributory, except:  Neuro: [ ] Headache [ ] Back pain [ ] Numbness [ ] Weakness [ ] Ataxia [ ] Dizziness [ ] Aphasia [ ] Dysarthria [ ] Visual disturbance  Resp: [ ] Shortness of breath/dyspnea, [ ] Orthopnea [ ] Cough  CV: [ ] Chest pain [ ] Palpitation [ ] Lightheadedness [ ] Syncope  Renal: [ ] Thirst [ ] Edema  GI: [ ] Nausea [ ] Emesis [ ] Abdominal pain [ ] Constipation [ ] Diarrhea  Hem: [ ] Hematemesis [ ] bright red blood per rectum  ID: [ ] Fever [ ] Chills [ ] Dysuria  ENT: [ ] Rhinorrhea    PHYSICAL EXAM:    General: Cooperative  Neurological: Awake, alert oriented to person, place and time, Following Commands, PERRL, EOMI, Face Symmetrical, Speech Fluent, Moving all extremities, LUE 4+/5, No Drift, Sensation to Light Touch Intact  Pulmonary: Clear to Auscultation, No Rales, No Rhonchi, No Wheezes   Cardiovascular: S1, S2, Regular Rate and Rhythm   Gastrointestinal: Soft, Nontender, Nondistended   Extremities: No calf tenderness       ICU Vital Signs Last 24 Hrs  T(C): 36.9 (13 Oct 2021 00:00), Max: 36.9 (13 Oct 2021 00:00)  T(F): 98.4 (13 Oct 2021 00:00), Max: 98.4 (13 Oct 2021 00:00)  HR: 54 (13 Oct 2021 06:15) (46 - 68)  BP: 172/79 (12 Oct 2021 08:23) (172/79 - 172/79)  ABP: 133/52 (13 Oct 2021 06:15) (122/47 - 190/79)  ABP(mean): 76 (13 Oct 2021 06:15) (63 - 118)  RR: 16 (13 Oct 2021 06:15) (9 - 25)  SpO2: 92% (13 Oct 2021 06:15) (88% - 100%)      10-12-21 @ 07:01  -  10-13-21 @ 06:29  --------------------------------------------------------  IN: 2287.5 mL / OUT: 574 mL / NET: 1713.5 mL            acetaminophen    Suspension .. 650 milliGRAM(s) Oral every 6 hours PRN  aspirin  chewable 81 milliGRAM(s) Oral daily  atorvastatin 20 milliGRAM(s) Oral at bedtime  chlorhexidine 4% Liquid 1 Application(s) Topical <User Schedule>  ciprofloxacin     Tablet 250 milliGRAM(s) Oral two times a day  cloNIDine 0.1 milliGRAM(s) Oral three times a day  clopidogrel Tablet 75 milliGRAM(s) Oral daily  heparin   Injectable 5000 Unit(s) SubCutaneous every 12 hours  hydrALAZINE 100 milliGRAM(s) Oral three times a day  influenza   Vaccine 0.5 milliLiter(s) IntraMuscular once  isosorbide   mononitrate ER Tablet (IMDUR) 60 milliGRAM(s) Oral daily  niCARdipine Infusion 5 mG/Hr (25 mL/Hr) IV Continuous <Continuous>  NIFEdipine XL 90 milliGRAM(s) Oral daily  oxyCODONE    IR 5 milliGRAM(s) Oral every 4 hours PRN  oxyCODONE    IR 10 milliGRAM(s) Oral every 4 hours PRN  polyethylene glycol 3350 17 Gram(s) Oral every 12 hours  senna 2 Tablet(s) Oral at bedtime  sodium chloride 0.9%. 1000 milliLiter(s) (100 mL/Hr) IV Continuous <Continuous>      LABS:  Na: 140 (10-13 @ 02:31), 139 (10-12 @ 16:57), 135 (10-12 @ 06:25), 140 (10-11 @ 07:09), 139 (10-10 @ 21:03)  K: 4.6 (10-13 @ 02:31), 4.5 (10-12 @ 16:57), 4.4 (10-11 @ 07:09), 4.4 (10-10 @ 21:03)  Cl: 108 (10-13 @ 02:31), 107 (10-12 @ 16:57), 106 (10-11 @ 07:09), 103 (10-10 @ 21:03)  CO2: 16 (10-13 @ 02:31), 17 (10-12 @ 16:57), 20 (10-11 @ 07:09), 22 (10-10 @ 21:03)  BUN: 48 (10-13 @ 02:31), 40 (10-12 @ 16:57), 42 (10-11 @ 07:09), 42 (10-10 @ 21:03)  Cr: 3.13 (10-13 @ 02:31), 2.82 (10-12 @ 16:57), 2.78 (10-12 @ 06:25), 2.90 (10-11 @ 07:09), 3.07 (10-10 @ 21:03)  Glu: 145(10-13 @ 02:31), 170(10-12 @ 16:57), 87(10-11 @ 07:09), 106(10-10 @ 21:03)    Hgb: 12.1 (10-12 @ 16:57), 12.2 (10-11 @ 07:09), 12.7 (10-10 @ 21:03), 12.2 (10-10 @ 08:19)  Hct: 39.3 (10-12 @ 16:57), 39.6 (10-11 @ 07:09), 40.9 (10-10 @ 21:03), 39.0 (10-10 @ 08:19)  WBC: 9.18 (10-12 @ 16:57), 6.42 (10-11 @ 07:09), 6.42 (10-10 @ 21:03), 6.47 (10-10 @ 08:19)  Plt: 760 (10-12 @ 16:57), 728 (10-11 @ 07:09), 779 (10-10 @ 21:03), 729 (10-10 @ 08:19)    INR: 1.12 10-12-21 @ 06:25, 1.04 10-11-21 @ 17:18  PTT: 33.3 10-11-21 @ 17:18

## 2021-10-13 NOTE — PROGRESS NOTE ADULT - ASSESSMENT
70yoM w/ PMHx of CKD IV (unknown baseline), uncontrolled HTN, ?CAD (patient states he has "heart problems;" TTE performed at [facility] on 10/5 w/ normal LVF, severely enlarged LA, Grade III diastolic dysfunction, mild pulm HTN) who was transfered from [facility] to where he presented w/ hx of multiple falls, found to have acute right MCA territory ischemic infarct w/ extensive chronic microvascular ischemic changes on CT, acute/subacute right-sided MCA distribution infarct with associated cytotoxic edema, on MRI, and Pcomm aneurysm vs thrombus [id number], mild R M1 stenosis on MRA.

## 2021-10-13 NOTE — SWALLOW BEDSIDE ASSESSMENT ADULT - SLP GENERAL OBSERVATIONS
Pt was received at bedside awake and alert. +room air, +tele (HR: 54, O2: 98%). Pt was AAOx4 and cooperative. Pt reported he was tired. He was able to follow all commands and answer all questions.

## 2021-10-13 NOTE — PROGRESS NOTE ADULT - ATTENDING COMMENTS
post-operative day 1 from elective coiling of right [id number] communicating artery aneurysm.    Exam notable for subtle left arm weakness.     DAPT for MCA stenosis  Passed Speech and Swallow for soft diet  Monitor creatinine and urine output  Monitor off nicardipine; adjust BP meds as needed

## 2021-10-13 NOTE — SWALLOW BEDSIDE ASSESSMENT ADULT - SPECIFY REASON(S)
to clinically evaluate pt's yara-pharyngeal mechanism and r/o dysphagia. Per [facility] Multidisciplinary Rounds, pt choked on his burger last night to clinically evaluate pt's yara-pharyngeal mechanism and r/o dysphagia. Per [facility] Multidisciplinary Rounds, pt "choked on his burger last night."

## 2021-10-13 NOTE — SWALLOW BEDSIDE ASSESSMENT ADULT - SWALLOW EVAL: RECOMMENDED FEEDING/EATING TECHNIQUES
slow rate; chew completely/maintain upright posture during/after eating for 30 mins/oral hygiene/position upright (90 degrees)/small sips/bites

## 2021-10-13 NOTE — PROGRESS NOTE ADULT - SUBJECTIVE AND OBJECTIVE BOX
Patient seen and examined  no complaints    No Known Allergies    Hospital Medications:   MEDICATIONS  (STANDING):  aspirin  chewable 81 milliGRAM(s) Oral daily  atorvastatin 20 milliGRAM(s) Oral at bedtime  chlorhexidine 4% Liquid 1 Application(s) Topical <User Schedule>  ciprofloxacin     Tablet 250 milliGRAM(s) Oral two times a day  cloNIDine 0.1 milliGRAM(s) Oral every 8 hours  clopidogrel Tablet 75 milliGRAM(s) Oral daily  heparin   Injectable 5000 Unit(s) SubCutaneous every 12 hours  hydrALAZINE 100 milliGRAM(s) Oral three times a day  influenza   Vaccine 0.5 milliLiter(s) IntraMuscular once  isosorbide   mononitrate ER Tablet (IMDUR) 60 milliGRAM(s) Oral daily  niCARdipine Infusion 5 mG/Hr (25 mL/Hr) IV Continuous <Continuous>  NIFEdipine XL 90 milliGRAM(s) Oral daily  polyethylene glycol 3350 17 Gram(s) Oral every 12 hours  senna 2 Tablet(s) Oral at bedtime  sodium chloride 0.9%. 1000 milliLiter(s) (50 mL/Hr) IV Continuous <Continuous>        VITALS:  T(F): 96.4 (10-13-21 @ 11:00), Max: 98.4 (10-13-21 @ 00:00)  HR: 49 (10-13-21 @ 11:45)  BP: --  RR: 15 (10-13-21 @ 11:45)  SpO2: 93% (10-13-21 @ 11:45)  Wt(kg): --    10-12 @ :01  -  10-13 @ 07:00  --------------------------------------------------------  IN: 3037.5 mL / OUT: 774 mL / NET: 2263.5 mL    10-13 @ 07:  -  10-13 @ 11:58  --------------------------------------------------------  IN: 586 mL / OUT: 100 mL / NET: 486 mL        PHYSICAL EXAM:  Constitutional: NAD  HEENT: anicteric sclera, oropharynx clear, MMM  Neck: No JVD  Respiratory: CTAB, no wheezes, rales or rhonchi  Cardiovascular: S1, S2, RRR  Gastrointestinal: BS+, soft, NT/ND  Extremities: No cyanosis or clubbing. No peripheral edema  Neurological: A/O x 3, no focal deficits  Psychiatric: Normal mood, normal affect      LABS:  10-13    140  |  108  |  48<H>  ----------------------------<  145<H>  4.6   |  16<L>  |  3.13<H>    Ca    9.2      13 Oct 2021 02:31  Phos  3.7     10-13  Mg     2.0     10-13    TPro      /  Alb      /  TBili  0.3  /  DBili      /  AST      /  ALT      /  AlkPhos      10-12    Creatinine Trend: 3.13 <--, 2.82 <--, 2.78 <--, 2.90 <--, 3.07 <--, 3.03 <--                        12.1   9.18  )-----------( 760      ( 12 Oct 2021 16:57 )             39.3     Urine Studies:  Urinalysis Basic - ( 10 Oct 2021 21:03 )    Color: Yellow / Appearance: Slightly Turbid / S.016 / pH:   Gluc:  / Ketone: Negative  / Bili: Negative / Urobili: Negative   Blood:  / Protein: 300 mg/dL / Nitrite: Positive   Leuk Esterase: Large / RBC: 2 /hpf /  /HPF   Sq Epi:  / Non Sq Epi: 1 /hpf / Bacteria: Many      Creatinine, Random Urine: 128 mg/dL (10-10 @ 21:03)  Protein/Creatinine Ratio Calculation: 1.4 Ratio (10-10 @ 21:03)  Potassium, Random Urine: 43 mmol/L (10-10 @ 21:03)  Osmolality, Random Urine: 427 mos/kg (10-10 @ 21:03)  Sodium, Random Urine: 34 mmol/L (10-10 @ 21:03)  Creatinine, Random Urine: 127 mg/dL (10-10 @ 21:03)  Chloride, Random Urine: 24 mmol/L (10-10 @ 21:03)    RADIOLOGY & ADDITIONAL STUDIES:

## 2021-10-13 NOTE — PROGRESS NOTE ADULT - SUBJECTIVE AND OBJECTIVE BOX
EVENTS:   No acute events overnight.    VITALS:  T(C): , Max: 36.9 (10-13-21 @ 00:00)  HR:  (47 - 65)  BP: --  ABP:  (118/51 - 167/67)  RR:  (12 - 25)  SpO2:  (88% - 100%)  Wt(kg): --      10-12-21 @ 07:01  -  10-13-21 @ 07:00  --------------------------------------------------------  IN: 3037.5 mL / OUT: 774 mL / NET: 2263.5 mL    10-13-21 @ 07:01  -  10-13-21 @ 19:12  --------------------------------------------------------  IN: 779 mL / OUT: 400 mL / NET: 379 mL      LABS:  Na: 140 (10-13 @ 02:31), 139 (10-12 @ 16:57), 135 (10-12 @ 06:25), 140 (10-11 @ 07:09), 139 (10-10 @ 21:03)  K: 4.6 (10-13 @ 02:31), 4.5 (10-12 @ 16:57), 4.4 (10-11 @ 07:09), 4.4 (10-10 @ 21:03)  Cl: 108 (10-13 @ 02:31), 107 (10-12 @ 16:57), 106 (10-11 @ 07:09), 103 (10-10 @ 21:03)  CO2: 16 (10-13 @ 02:31), 17 (10-12 @ 16:57), 20 (10-11 @ 07:09), 22 (10-10 @ 21:03)  BUN: 48 (10-13 @ 02:31), 40 (10-12 @ 16:57), 42 (10-11 @ 07:09), 42 (10-10 @ 21:03)  Cr: 3.13 (10-13 @ 02:31), 2.82 (10-12 @ 16:57), 2.78 (10-12 @ 06:25), 2.90 (10-11 @ 07:09), 3.07 (10-10 @ 21:03)  Glu: 145(10-13 @ 02:31), 170(10-12 @ 16:57), 87(10-11 @ 07:09), 106(10-10 @ 21:03)    Hgb: 12.1 (10-12 @ 16:57), 12.2 (10-11 @ 07:09), 12.7 (10-10 @ 21:03)  Hct: 39.3 (10-12 @ 16:57), 39.6 (10-11 @ 07:09), 40.9 (10-10 @ 21:03)  WBC: 9.18 (10-12 @ 16:57), 6.42 (10-11 @ 07:09), 6.42 (10-10 @ 21:03)  Plt: 760 (10-12 @ 16:57), 728 (10-11 @ 07:09), 779 (10-10 @ 21:03)    INR: 1.12 10-12-21 @ 06:25, 1.04 10-11-21 @ 17:18  PTT: 33.3 10-11-21 @ 17:18    MEDICATIONS:  acetaminophen    Suspension .. 650 milliGRAM(s) Oral every 6 hours PRN  aspirin  chewable 81 milliGRAM(s) Oral daily  atorvastatin 20 milliGRAM(s) Oral at bedtime  chlorhexidine 4% Liquid 1 Application(s) Topical <User Schedule>  ciprofloxacin     Tablet 250 milliGRAM(s) Oral two times a day  cloNIDine 0.1 milliGRAM(s) Oral every 8 hours  clopidogrel Tablet 75 milliGRAM(s) Oral daily  heparin   Injectable 5000 Unit(s) SubCutaneous every 12 hours  hydrALAZINE 100 milliGRAM(s) Oral three times a day  influenza   Vaccine 0.5 milliLiter(s) IntraMuscular once  isosorbide   mononitrate ER Tablet (IMDUR) 60 milliGRAM(s) Oral daily  NIFEdipine XL 90 milliGRAM(s) Oral daily  oxyCODONE    IR 5 milliGRAM(s) Oral every 4 hours PRN  oxyCODONE    IR 10 milliGRAM(s) Oral every 4 hours PRN  polyethylene glycol 3350 17 Gram(s) Oral every 12 hours  senna 2 Tablet(s) Oral at bedtime    EXAMINATION:  General:  calm  HEENT:  MMM  Neuro:  awake, alert, oriented to hospital, month and year, follows commands, EOMI, does not BTT on the L but able to see movement, with mild drift in L arm, able to lift b/l legs antigravity   Cards:  RRR  Respiratory:  no respiratory distress  Abdomen:  soft  Extremities:  no edema. Groin site without hematoma.     Assessment/Plan:   70M PMHx CKD, HTN, HLD admitted with R MCA infarcts, in ICU s/p balloon assisted coiling of right posterior communicating artery aneurysm 10/12/21. At neurologic baseline.    No change in plan from daytime    Alejandra Freed  Neurocritical Care Attending     EVENTS:   No acute events this evening.  Off cardene.     VITALS:  T(C): , Max: 36.9 (10-13-21 @ 00:00)  HR:  (47 - 65)  BP: --  ABP:  (118/51 - 167/67)  RR:  (12 - 25)  SpO2:  (88% - 100%)  Wt(kg): --      10-12-21 @ 07:01  -  10-13-21 @ 07:00  --------------------------------------------------------  IN: 3037.5 mL / OUT: 774 mL / NET: 2263.5 mL    10-13-21 @ 07:01  -  10-13-21 @ 19:12  --------------------------------------------------------  IN: 779 mL / OUT: 400 mL / NET: 379 mL      LABS:  Na: 140 (10-13 @ 02:31), 139 (10-12 @ 16:57), 135 (10-12 @ 06:25), 140 (10-11 @ 07:09), 139 (10-10 @ 21:03)  K: 4.6 (10-13 @ 02:31), 4.5 (10-12 @ 16:57), 4.4 (10-11 @ 07:09), 4.4 (10-10 @ 21:03)  Cl: 108 (10-13 @ 02:31), 107 (10-12 @ 16:57), 106 (10-11 @ 07:09), 103 (10-10 @ 21:03)  CO2: 16 (10-13 @ 02:31), 17 (10-12 @ 16:57), 20 (10-11 @ 07:09), 22 (10-10 @ 21:03)  BUN: 48 (10-13 @ 02:31), 40 (10-12 @ 16:57), 42 (10-11 @ 07:09), 42 (10-10 @ 21:03)  Cr: 3.13 (10-13 @ 02:31), 2.82 (10-12 @ 16:57), 2.78 (10-12 @ 06:25), 2.90 (10-11 @ 07:09), 3.07 (10-10 @ 21:03)  Glu: 145(10-13 @ 02:31), 170(10-12 @ 16:57), 87(10-11 @ 07:09), 106(10-10 @ 21:03)    Hgb: 12.1 (10-12 @ 16:57), 12.2 (10-11 @ 07:09), 12.7 (10-10 @ 21:03)  Hct: 39.3 (10-12 @ 16:57), 39.6 (10-11 @ 07:09), 40.9 (10-10 @ 21:03)  WBC: 9.18 (10-12 @ 16:57), 6.42 (10-11 @ 07:09), 6.42 (10-10 @ 21:03)  Plt: 760 (10-12 @ 16:57), 728 (10-11 @ 07:09), 779 (10-10 @ 21:03)    INR: 1.12 10-12-21 @ 06:25, 1.04 10-11-21 @ 17:18  PTT: 33.3 10-11-21 @ 17:18    MEDICATIONS:  acetaminophen    Suspension .. 650 milliGRAM(s) Oral every 6 hours PRN  aspirin  chewable 81 milliGRAM(s) Oral daily  atorvastatin 20 milliGRAM(s) Oral at bedtime  chlorhexidine 4% Liquid 1 Application(s) Topical <User Schedule>  ciprofloxacin     Tablet 250 milliGRAM(s) Oral two times a day  cloNIDine 0.1 milliGRAM(s) Oral every 8 hours  clopidogrel Tablet 75 milliGRAM(s) Oral daily  heparin   Injectable 5000 Unit(s) SubCutaneous every 12 hours  hydrALAZINE 100 milliGRAM(s) Oral three times a day  influenza   Vaccine 0.5 milliLiter(s) IntraMuscular once  isosorbide   mononitrate ER Tablet (IMDUR) 60 milliGRAM(s) Oral daily  NIFEdipine XL 90 milliGRAM(s) Oral daily  oxyCODONE    IR 5 milliGRAM(s) Oral every 4 hours PRN  oxyCODONE    IR 10 milliGRAM(s) Oral every 4 hours PRN  polyethylene glycol 3350 17 Gram(s) Oral every 12 hours  senna 2 Tablet(s) Oral at bedtime    EXAMINATION:  General:  calm  HEENT:  MMM  Neuro:  awake, alert, oriented to hospital, month and year, follows commands, EOMI, does not BTT on the L but able to see movement, with mild drift in L arm, able to lift b/l legs antigravity   Cards:  RRR  Respiratory:  no respiratory distress  Abdomen:  soft  Extremities:  no edema.    Assessment/Plan:   69yo man PMHx CKD, HTN, HLD admitted with R MCA infarcts, in ICU s/p balloon assisted coiling of right posterior communicating artery aneurysm 10/12/21. At neurologic baseline. Awaiting floor.     No change in plan from daytime except increased clonidine to .2mg TID for HTN  soft diet  heparin for DVT ppx    Alejandra Freed  Neurocritical Care Attending     EVENTS:   No acute events this evening.  Off cardene.     VITALS:  T(C): , Max: 36.9 (10-13-21 @ 00:00)  HR:  (47 - 65)  BP: --  ABP:  (118/51 - 167/67)  RR:  (12 - 25)  SpO2:  (88% - 100%)  Wt(kg): --      10-12-21 @ 07:01  -  10-13-21 @ 07:00  --------------------------------------------------------  IN: 3037.5 mL / OUT: 774 mL / NET: 2263.5 mL    10-13-21 @ 07:01  -  10-13-21 @ 19:12  --------------------------------------------------------  IN: 779 mL / OUT: 400 mL / NET: 379 mL      LABS:  Na: 140 (10-13 @ 02:31), 139 (10-12 @ 16:57), 135 (10-12 @ 06:25), 140 (10-11 @ 07:09), 139 (10-10 @ 21:03)  K: 4.6 (10-13 @ 02:31), 4.5 (10-12 @ 16:57), 4.4 (10-11 @ 07:09), 4.4 (10-10 @ 21:03)  Cl: 108 (10-13 @ 02:31), 107 (10-12 @ 16:57), 106 (10-11 @ 07:09), 103 (10-10 @ 21:03)  CO2: 16 (10-13 @ 02:31), 17 (10-12 @ 16:57), 20 (10-11 @ 07:09), 22 (10-10 @ 21:03)  BUN: 48 (10-13 @ 02:31), 40 (10-12 @ 16:57), 42 (10-11 @ 07:09), 42 (10-10 @ 21:03)  Cr: 3.13 (10-13 @ 02:31), 2.82 (10-12 @ 16:57), 2.78 (10-12 @ 06:25), 2.90 (10-11 @ 07:09), 3.07 (10-10 @ 21:03)  Glu: 145(10-13 @ 02:31), 170(10-12 @ 16:57), 87(10-11 @ 07:09), 106(10-10 @ 21:03)    Hgb: 12.1 (10-12 @ 16:57), 12.2 (10-11 @ 07:09), 12.7 (10-10 @ 21:03)  Hct: 39.3 (10-12 @ 16:57), 39.6 (10-11 @ 07:09), 40.9 (10-10 @ 21:03)  WBC: 9.18 (10-12 @ 16:57), 6.42 (10-11 @ 07:09), 6.42 (10-10 @ 21:03)  Plt: 760 (10-12 @ 16:57), 728 (10-11 @ 07:09), 779 (10-10 @ 21:03)    INR: 1.12 10-12-21 @ 06:25, 1.04 10-11-21 @ 17:18  PTT: 33.3 10-11-21 @ 17:18    MEDICATIONS:  acetaminophen    Suspension .. 650 milliGRAM(s) Oral every 6 hours PRN  aspirin  chewable 81 milliGRAM(s) Oral daily  atorvastatin 20 milliGRAM(s) Oral at bedtime  chlorhexidine 4% Liquid 1 Application(s) Topical <User Schedule>  ciprofloxacin     Tablet 250 milliGRAM(s) Oral two times a day  cloNIDine 0.1 milliGRAM(s) Oral every 8 hours  clopidogrel Tablet 75 milliGRAM(s) Oral daily  heparin   Injectable 5000 Unit(s) SubCutaneous every 12 hours  hydrALAZINE 100 milliGRAM(s) Oral three times a day  influenza   Vaccine 0.5 milliLiter(s) IntraMuscular once  isosorbide   mononitrate ER Tablet (IMDUR) 60 milliGRAM(s) Oral daily  NIFEdipine XL 90 milliGRAM(s) Oral daily  oxyCODONE    IR 5 milliGRAM(s) Oral every 4 hours PRN  oxyCODONE    IR 10 milliGRAM(s) Oral every 4 hours PRN  polyethylene glycol 3350 17 Gram(s) Oral every 12 hours  senna 2 Tablet(s) Oral at bedtime    EXAMINATION:  General:  calm  HEENT:  MMM  Neuro:  awake, alert, oriented to hospital, speech is fluent, follows commands, EOMI, with mild drift in L arm, 4+/5 in L biceps and triceps, able to lift b/l legs antigravity, 5/5 in dorsiflexion b/l   Cards:  RRR  Respiratory:  no respiratory distress  Abdomen:  soft  Extremities:  no edema.    Assessment/Plan:   69yo man PMHx CKD, HTN, HLD admitted with R MCA infarcts, in ICU s/p balloon assisted coiling of right posterior communicating artery aneurysm 10/12/21. At neurologic baseline. Awaiting floor.     No change in plan from daytime except increased clonidine to .2mg TID for HTN. Remove A line.   soft diet  heparin for DVT ppx    Alejandra Freed  Neurocritical Care Attending

## 2021-10-13 NOTE — PROGRESS NOTE ADULT - ASSESSMENT
71 yo male with CKD (baseline Cr > 3), HTN, and HLD who presented as a transfer from [facility] for higher level of care. Patient initially presented to [facility] on 10/01 after multiple falls the since the day prior. MRI Head at [facility] showed acute vs subacute R MCA territory infarct with associated cytotoxic edema. MRA H showed multilobulated right-sided P-comm aneurysm directed posteriorly and laterally, measuring 8.9 x 7.3 mm.   A1c 5.5, LDL 25.  TTE: EF 65-70%, severely enlarged L atrium.   MRi with R MCA territory infarct   vessels with PCOM on R aneurysm    s/p angio and coil of PCOM aneurysm.   Impression: AMS + mild L hemineglect in setting of R MCA territory infarct seen on MRI Head, ESUS.  o/e 10/13 LUE drift   - Dual antiplatelet therapy with ASA 81mg PO daily and Clopidogrel 75mg PO daily x 3 weeks followed by monotherapy with ASA 81mg PO daily.  - lipitor 40  - cardio for ILR   - telemetry  - PT/OT/SS/SLP, OOBC  - check FS, glucose control <180  - GI/DVT ppx  - Counseling on diet, exercise, and medication adherence was done  - Counseling on smoking cessation and alcohol consumption offered when appropriate.  - Pain assessed and judicious use of narcotics when appropriate was discussed.    - Stroke education given when appropriate.  - Importance of fall prevention discussed.   - Differential diagnosis and plan of care discussed with patient and/or family and primary team  - Thank you for allowing me to participate in the care of this patient. Call with questions.   - step down when able. spoke with nsx dr marlow.   Sukh Pierre MD  Vascular Neurology  Office: [phone] .

## 2021-10-13 NOTE — SWALLOW BEDSIDE ASSESSMENT ADULT - SWALLOW EVAL: PATIENT/FAMILY GOALS STATEMENT
"Yesterday right after my procedure I eat a burger and when I was washing it down the water went down the wrong pipe. I was tired. I don't have any trouble swallowing except for that one time."

## 2021-10-13 NOTE — SWALLOW BEDSIDE ASSESSMENT ADULT - SWALLOW EVAL: DIAGNOSIS
69 y/o M with PMH of CKD, HTN, and HLD presented to OSH after multiple falls, transferred to [facility] and admitted for acute R MCA infarct found to have incidental right PCOMM aneurysm s/p coiling 10/12. Pt was seen today for bedside swallow evaluation which revealed an overtly functional yara-pharyngeal swallow mechanism for a soft texture diet and thin liquids. The oral prep and oral stage were unremarkable. Timely mastication of soft textures. Hard solids deferred 2/2 essentially edentulous status. No overt clinical s/s of aspiration or penetration with thin liquids, puree textures, and soft solids. Timely trigger of pharyngeal swallow. Of note, pt noted to be impulsive (fast rate of consumption of PO). Safe swallow recommendations discussed in detail including slow rate, small single bites/sips, and chew completely.

## 2021-10-13 NOTE — SWALLOW BEDSIDE ASSESSMENT ADULT - COMMENTS
Hx cont:   10/10: Cardiology consulted for Cardiac Clearance. Acceptable cardiac risk to proceed. Nephrology consulted for MARIANNA on CKD stage 4 and renal clearance for angio. Per nephrology Cr is quite elevated. He will stand at risk of worsening renal function including risk of renal replacement therapy.   10/11: Tele monitor shows 13 beats wide complex and pt HR sustaining high 40s. Angiogram rescheduled for tomorrow.   10/12: s/p balloon assisted coiling of right posterior communicating artery aneurysm.     IMAGING:  CT HEAD: 10/13/21  IMPRESSION:  Interval placement of embolic coil mass right parasellar region. No acute intracranial hemorrhage, mass effect, or brain edema. Similar mild ventricular dilatation.    CXR: 10/10/21  IMPRESSION:  Mild congestive changes.    Pt known to the Speech Pathology service from bedside swallow evaluation completed recently (10/6/21) at [facility]. Per report, "oropharyngeal phases of swallow grossly WNL, no overt signs of aspiration." Recommendations at that time: soft diet and thin liquids. Pt noted to eat at a fast rate.

## 2021-10-13 NOTE — PROGRESS NOTE ADULT - ASSESSMENT
70 CKD. HTN, ?CAD admitted for acute R MCA infarct found to have incidental right PCOMM aneurysm     NEURO: unruptured cerebral aneurysm, intracranial atherosclerosis/stenosis (right M1)  right PCOMM aneurysm s/p coiling   -neuro check q1  pain management   -CT head in AM   -PT/OT evaluation  MRA w/ narrowing of right proximal M1 - dual antiplatelet  c/w ASA 81mg, plavix 75mg       PULMONARY:  saturating well on RA,   -continue to monitor on pulse o2   -incentive spirometry 10q/hr when awake     CARDIOVASCULAR: HTN  wean off Cardene ggt ; c/w home medications clonidine 0.1mg TID, Nifedipine 90mg qd, hydralazine 100mg TID  monitor on telemetry   vitals q1  sbp goal 100-160  TTE: EF 65-75%, dilated Right & left atrium     GASTROENTEROLOGY:  bedside speech & swallow if pass can start advancing diet as tolerated.   ensure BMs w/ Miralax & senna    RENAL/:  CKD stage 4 unknown baseline ; likely hypertensive nephropathy   u/s kidney preformed -right kidney 9.3cm and left 9.5 cm with b/l renal cysts  nephrology following - ARMIDA, etc. f/u   Mucomyst 1200mg PO BID to reduce JUJU   strict i/o's  C3,C4 wnl  IVF NaCl 0.9% @100cc/hr     ENDOCRINE:  A1c-5.5%  TSH-1.82    HEME/ONC:  DVT ppx:SCDs, heparin SQ     INFECTIOUS: UTI   f/u urine cultures   UA (+) c/w ciprofloxacin     At risk of death/neuro decline due to: risk of ischemic stroke/emboli, groin hematoma/hemorrhagic shock  70 CKD. HTN, ?CAD admitted for acute R MCA infarct found to have incidental right PCOMM aneurysm     NEURO: unruptured cerebral aneurysm, intracranial atherosclerosis/stenosis (right M1)  right PCOMM aneurysm s/p coiling   -neuro check q4  pain management   -CT head stable  -PT/OT evaluation  MRA w/ narrowing of right proximal M1 - dual antiplatelet  c/w ASA 81mg, plavix 75mg - check p2y12 & ARU      PULMONARY:  saturating well on RA,   -continue to monitor on pulse o2   -incentive spirometry 10q/hr when awake     CARDIOVASCULAR: HTN  wean off Cardene ggt ; c/w home medications increase clonidine 0.3mg TID, Nifedipine 90mg qd, hydralazine 100mg TID  monitor on telemetry   vitals q1  sbp goal 100-160  TTE: EF 65-75%, dilated Right & left atrium     GASTROENTEROLOGY:  nephro diet   ensure BMs w/ Miralax & senna    RENAL/:  CKD stage 4 unknown baseline ; likely hypertensive nephropathy   u/s kidney preformed -right kidney 9.3cm and left 9.5 cm with b/l renal cysts  nephrology following - ARMIDA, etc. f/u   s/p Mucomyst 1200mg PO BID to reduce JUJU   strict i/o's  C3,C4 wnl  IVF NaCl 0.9% @50cc/hr   TOV/bladder scan    ENDOCRINE:  A1c-5.5%  TSH-1.82    HEME/ONC:  DVT ppx: SCDs, heparin SQ     INFECTIOUS: UTI   f/u urine cultures serratia f/u sensitivities   UA (+) c/w ciprofloxacin     At risk of death/neuro decline due to: risk of ischemic stroke/emboli, groin hematoma/hemorrhagic shock

## 2021-10-13 NOTE — PROGRESS NOTE ADULT - PROBLEM SELECTOR PLAN 1
s/p balloon assisted coiling of right posterior communicating artery aneurysm on 10/12/21.  DAPT   CT head stable   Neuro check   PT

## 2021-10-14 ENCOUNTER — TRANSCRIPTION ENCOUNTER (OUTPATIENT)
Age: 71
End: 2021-10-14

## 2021-10-14 LAB
-  AMIKACIN: SIGNIFICANT CHANGE UP
-  AMOXICILLIN/CLAVULANIC ACID: SIGNIFICANT CHANGE UP
-  AMPICILLIN/SULBACTAM: SIGNIFICANT CHANGE UP
-  AMPICILLIN: SIGNIFICANT CHANGE UP
-  AZTREONAM: SIGNIFICANT CHANGE UP
-  CEFAZOLIN: SIGNIFICANT CHANGE UP
-  CEFEPIME: SIGNIFICANT CHANGE UP
-  CEFOXITIN: SIGNIFICANT CHANGE UP
-  CEFTRIAXONE: SIGNIFICANT CHANGE UP
-  CIPROFLOXACIN: SIGNIFICANT CHANGE UP
-  ERTAPENEM: SIGNIFICANT CHANGE UP
-  GENTAMICIN: SIGNIFICANT CHANGE UP
-  LEVOFLOXACIN: SIGNIFICANT CHANGE UP
-  MEROPENEM: SIGNIFICANT CHANGE UP
-  NITROFURANTOIN: SIGNIFICANT CHANGE UP
-  PIPERACILLIN/TAZOBACTAM: SIGNIFICANT CHANGE UP
-  TIGECYCLINE: SIGNIFICANT CHANGE UP
-  TOBRAMYCIN: SIGNIFICANT CHANGE UP
-  TRIMETHOPRIM/SULFAMETHOXAZOLE: SIGNIFICANT CHANGE UP
ANION GAP SERPL CALC-SCNC: 15 MMOL/L — SIGNIFICANT CHANGE UP (ref 5–17)
BUN SERPL-MCNC: 40 MG/DL — HIGH (ref 7–23)
CALCIUM SERPL-MCNC: 9.8 MG/DL — SIGNIFICANT CHANGE UP (ref 8.4–10.5)
CHLORIDE SERPL-SCNC: 106 MMOL/L — SIGNIFICANT CHANGE UP (ref 96–108)
CO2 SERPL-SCNC: 16 MMOL/L — LOW (ref 22–31)
CREAT SERPL-MCNC: 2.74 MG/DL — HIGH (ref 0.5–1.3)
CULTURE RESULTS: SIGNIFICANT CHANGE UP
GLUCOSE SERPL-MCNC: 111 MG/DL — HIGH (ref 70–99)
HCT VFR BLD CALC: 36.5 % — LOW (ref 39–50)
HGB BLD-MCNC: 11.6 G/DL — LOW (ref 13–17)
MAGNESIUM SERPL-MCNC: 1.9 MG/DL — SIGNIFICANT CHANGE UP (ref 1.6–2.6)
MCHC RBC-ENTMCNC: 29.5 PG — SIGNIFICANT CHANGE UP (ref 27–34)
MCHC RBC-ENTMCNC: 31.8 GM/DL — LOW (ref 32–36)
MCV RBC AUTO: 92.9 FL — SIGNIFICANT CHANGE UP (ref 80–100)
METHOD TYPE: SIGNIFICANT CHANGE UP
NRBC # BLD: 0 /100 WBCS — SIGNIFICANT CHANGE UP (ref 0–0)
ORGANISM # SPEC MICROSCOPIC CNT: SIGNIFICANT CHANGE UP
ORGANISM # SPEC MICROSCOPIC CNT: SIGNIFICANT CHANGE UP
PHOSPHATE SERPL-MCNC: 2.6 MG/DL — SIGNIFICANT CHANGE UP (ref 2.5–4.5)
PLATELET # BLD AUTO: 729 K/UL — HIGH (ref 150–400)
POTASSIUM SERPL-MCNC: 4.5 MMOL/L — SIGNIFICANT CHANGE UP (ref 3.5–5.3)
POTASSIUM SERPL-SCNC: 4.5 MMOL/L — SIGNIFICANT CHANGE UP (ref 3.5–5.3)
RBC # BLD: 3.93 M/UL — LOW (ref 4.2–5.8)
RBC # FLD: 15.9 % — HIGH (ref 10.3–14.5)
SODIUM SERPL-SCNC: 137 MMOL/L — SIGNIFICANT CHANGE UP (ref 135–145)
SPECIMEN SOURCE: SIGNIFICANT CHANGE UP
WBC # BLD: 12.88 K/UL — HIGH (ref 3.8–10.5)
WBC # FLD AUTO: 12.88 K/UL — HIGH (ref 3.8–10.5)

## 2021-10-14 PROCEDURE — 99233 SBSQ HOSP IP/OBS HIGH 50: CPT

## 2021-10-14 PROCEDURE — 99231 SBSQ HOSP IP/OBS SF/LOW 25: CPT | Mod: GC

## 2021-10-14 RX ORDER — ACETAMINOPHEN 500 MG
1000 TABLET ORAL ONCE
Refills: 0 | Status: COMPLETED | OUTPATIENT
Start: 2021-10-14 | End: 2021-10-14

## 2021-10-14 RX ORDER — CIPROFLOXACIN LACTATE 400MG/40ML
250 VIAL (ML) INTRAVENOUS EVERY 12 HOURS
Refills: 0 | Status: COMPLETED | OUTPATIENT
Start: 2021-10-14 | End: 2021-10-17

## 2021-10-14 RX ORDER — CHLORHEXIDINE GLUCONATE 213 G/1000ML
1 SOLUTION TOPICAL
Refills: 0 | Status: DISCONTINUED | OUTPATIENT
Start: 2021-10-14 | End: 2021-10-14

## 2021-10-14 RX ORDER — METOPROLOL TARTRATE 50 MG
12.5 TABLET ORAL
Refills: 0 | Status: DISCONTINUED | OUTPATIENT
Start: 2021-10-14 | End: 2021-10-14

## 2021-10-14 RX ORDER — DOXAZOSIN MESYLATE 4 MG
4 TABLET ORAL AT BEDTIME
Refills: 0 | Status: DISCONTINUED | OUTPATIENT
Start: 2021-10-14 | End: 2021-11-03

## 2021-10-14 RX ORDER — METOPROLOL TARTRATE 50 MG
12.5 TABLET ORAL
Refills: 0 | Status: DISCONTINUED | OUTPATIENT
Start: 2021-10-14 | End: 2021-10-18

## 2021-10-14 RX ORDER — DIAZEPAM 5 MG
5 TABLET ORAL ONCE
Refills: 0 | Status: DISCONTINUED | OUTPATIENT
Start: 2021-10-14 | End: 2021-10-15

## 2021-10-14 RX ADMIN — Medication 400 MILLIGRAM(S): at 01:30

## 2021-10-14 RX ADMIN — Medication 100 MILLIGRAM(S): at 06:08

## 2021-10-14 RX ADMIN — Medication 12.5 MILLIGRAM(S): at 18:32

## 2021-10-14 RX ADMIN — ISOSORBIDE MONONITRATE 60 MILLIGRAM(S): 60 TABLET, EXTENDED RELEASE ORAL at 11:18

## 2021-10-14 RX ADMIN — HEPARIN SODIUM 5000 UNIT(S): 5000 INJECTION INTRAVENOUS; SUBCUTANEOUS at 05:20

## 2021-10-14 RX ADMIN — Medication 90 MILLIGRAM(S): at 05:48

## 2021-10-14 RX ADMIN — Medication 0.2 MILLIGRAM(S): at 16:34

## 2021-10-14 RX ADMIN — Medication 100 MILLIGRAM(S): at 21:38

## 2021-10-14 RX ADMIN — HEPARIN SODIUM 5000 UNIT(S): 5000 INJECTION INTRAVENOUS; SUBCUTANEOUS at 18:31

## 2021-10-14 RX ADMIN — Medication 100 MILLIGRAM(S): at 15:00

## 2021-10-14 RX ADMIN — Medication 250 MILLIGRAM(S): at 05:20

## 2021-10-14 RX ADMIN — Medication 81 MILLIGRAM(S): at 11:18

## 2021-10-14 RX ADMIN — POLYETHYLENE GLYCOL 3350 17 GRAM(S): 17 POWDER, FOR SOLUTION ORAL at 18:31

## 2021-10-14 RX ADMIN — CLOPIDOGREL BISULFATE 75 MILLIGRAM(S): 75 TABLET, FILM COATED ORAL at 11:18

## 2021-10-14 RX ADMIN — POLYETHYLENE GLYCOL 3350 17 GRAM(S): 17 POWDER, FOR SOLUTION ORAL at 05:40

## 2021-10-14 RX ADMIN — Medication 4 MILLIGRAM(S): at 21:40

## 2021-10-14 RX ADMIN — Medication 250 MILLIGRAM(S): at 18:31

## 2021-10-14 RX ADMIN — Medication 1000 MILLIGRAM(S): at 02:00

## 2021-10-14 RX ADMIN — SENNA PLUS 2 TABLET(S): 8.6 TABLET ORAL at 21:39

## 2021-10-14 RX ADMIN — ATORVASTATIN CALCIUM 20 MILLIGRAM(S): 80 TABLET, FILM COATED ORAL at 21:38

## 2021-10-14 RX ADMIN — Medication 0.2 MILLIGRAM(S): at 22:58

## 2021-10-14 RX ADMIN — Medication 0.2 MILLIGRAM(S): at 05:20

## 2021-10-14 NOTE — DISCHARGE NOTE PROVIDER - INSTRUCTIONS
1) Soft diet and thin liquids.  2: Set up assistance and intermittent feeding supervision. Ensure eating/drinking upright at 90 degrees, small single sips, slow rate, chew completly. 3) Excellent oral care and aspiration precautions. Monitor for signs and symptoms of aspiration or penetration (coughing, choking, wet/gurgly vocal qualiy). Please notify your primary care physician if any signs are observed. 1) Soft diet and thin liquids.  2) Set up assistance and intermittent feeding supervision. Ensure eating/drinking upright at 90 degrees, small single sips, slow rate, chew completely. 3) Excellent oral care and aspiration precautions. Monitor for signs and symptoms of aspiration or penetration (coughing, choking, wet/gurgly vocal quality). Please notify your primary care physician if any signs are observed.

## 2021-10-14 NOTE — DISCHARGE NOTE PROVIDER - NSDCMRMEDTOKEN_GEN_ALL_CORE_FT
aspirin 81 mg oral tablet: 1 tab(s) orally once a day  atorvastatin 20 mg oral tablet: 1 tab(s) orally once a day  cloNIDine 0.1 mg oral tablet: 1 tab(s) orally 3 times a day  hydrALAZINE 100 mg oral tablet: 1 tab(s) orally 3 times a day  isosorbide mononitrate 60 mg oral tablet, extended release: 1 tab(s) orally once a day (in the morning)  Nifedical XL 60 mg oral tablet, extended release: 1 tab(s) orally once a day  Plavix 75 mg oral tablet: 1 tab(s) orally once a day   aspirin 81 mg oral tablet: 1 tab(s) orally once a day  atorvastatin 20 mg oral tablet: 1 tab(s) orally once a day  cloNIDine 0.1 mg oral tablet: 1 tab(s) orally 3 times a day  hydrALAZINE 100 mg oral tablet: 1 tab(s) orally 3 times a day  isosorbide mononitrate 60 mg oral tablet, extended release: 1 tab(s) orally once a day (in the morning)  Nifedical XL 60 mg oral tablet, extended release: 1 tab(s) orally once a day  Plavix 75 mg oral tablet: 1 tab(s) orally once a day  rolling walker after coiling of Rt Pcomm aneurysm:    aspirin 81 mg oral tablet: 1 tab(s) orally once a day  atorvastatin 20 mg oral tablet: 1 tab(s) orally once a day  cloNIDine 0.1 mg oral tablet: 1 tab(s) orally 3 times a day  cloNIDine 0.3 mg oral tablet: 1 tab(s) orally every 8 hours  doxazosin 4 mg oral tablet: 1 tab(s) orally once a day (at bedtime)  hydrALAZINE 100 mg oral tablet: 1 tab(s) orally 3 times a day  hydrALAZINE 100 mg oral tablet: 1 tab(s) orally   isosorbide mononitrate 120 mg oral tablet, extended release: 1 tab(s) orally once a day  isosorbide mononitrate 60 mg oral tablet, extended release: 1 tab(s) orally once a day (in the morning)  Lipitor 20 mg oral tablet: 1 tab(s) orally once a day (at bedtime)  metoprolol succinate 25 mg oral tablet, extended release: 1 tab(s) orally once a day  Procardia XL 60 mg oral tablet, extended release: 2 tab(s) orally once a day  rolling walker after coiling of Rt Pcomm aneurysm:    aspirin 81 mg oral tablet: 1 tab(s) orally once a day  bisacodyl 5 mg oral delayed release tablet: 1 tab(s) orally once a day (at bedtime)  cloNIDine 0.3 mg oral tablet: 1 tab(s) orally every 8 hours  doxazosin 4 mg oral tablet: 1 tab(s) orally once a day (at bedtime)  hydrALAZINE 100 mg oral tablet: 1 tab(s) orally   isosorbide mononitrate 120 mg oral tablet, extended release: 1 tab(s) orally once a day  lactulose 10 g/15 mL oral syrup: 30 milliliter(s) orally 2 times a day  Lipitor 20 mg oral tablet: 1 tab(s) orally once a day (at bedtime)  polyethylene glycol 3350 oral powder for reconstitution: 17 gram(s) orally every 12 hours  Procardia XL 60 mg oral tablet, extended release: 2 tab(s) orally once a day  rolling walker after coiling of Rt Pcomm aneurysm:   senna oral tablet: 2 tab(s) orally once a day (at bedtime)  simethicone 80 mg oral tablet, chewable: 1 tab(s) orally 2 times a day, As needed, Upset Stomach  sodium bicarbonate 650 mg oral tablet: 1 tab(s) orally 3 times a day

## 2021-10-14 NOTE — PROGRESS NOTE ADULT - ASSESSMENT
69 yo male with CKD (baseline Cr > 3), HTN, and HLD who presented as a transfer from [facility] for higher level of care. Patient initially presented to [facility] on 10/01 after multiple falls the since the day prior. MRI Head at [facility] showed acute vs subacute R MCA territory infarct with associated cytotoxic edema. MRA H showed multilobulated right-sided P-comm aneurysm directed posteriorly and laterally, measuring 8.9 x 7.3 mm.   A1c 5.5, LDL 25.  TTE: EF 65-70%, severely enlarged L atrium.   MRi with R MCA territory infarct   vessels with PCOM on R aneurysm    s/p angio and coil of PCOM aneurysm.   Impression: AMS + mild L hemineglect in setting of R MCA territory infarct seen on MRI Head, ESUS.  o/e 10/13 LUE drift   - Dual antiplatelet therapy with ASA 81mg PO daily and Clopidogrel 75mg PO daily x 3 weeks followed by monotherapy with ASA 81mg PO daily.  - lipitor 40  - ARU and PRU therapeutic   - cardio for ILR. cardio following   - telemetry  - PT/OT/SS/SLP, OOBC  - check FS, glucose control <180  - GI/DVT ppx  - Counseling on diet, exercise, and medication adherence was done  - Counseling on smoking cessation and alcohol consumption offered when appropriate.  - Pain assessed and judicious use of narcotics when appropriate was discussed.    - Stroke education given when appropriate.  - Importance of fall prevention discussed.   - Differential diagnosis and plan of care discussed with patient and/or family and primary team  - Thank you for allowing me to participate in the care of this patient. Call with questions.   - awaiting floor bed.  d/c planning ILR in or outpt. outpt f/u on d/c 1-2 weeks   Sukh Pierre MD  Vascular Neurology  Office: [phone] .

## 2021-10-14 NOTE — PROGRESS NOTE ADULT - SUBJECTIVE AND OBJECTIVE BOX
EVENTS:   No acute events overnight.    VITALS:  T(C): , Max: 36.9 (10-14-21 @ 15:00)  HR:  (56 - 102)  BP:  (134/67 - 181/88)  ABP:  (145/51 - 166/55)  RR:  (13 - 25)  SpO2:  (91% - 100%)  Wt(kg): --      10-13-21 @ 07:01  -  10-14-21 @ 07:00  --------------------------------------------------------  IN: 1129 mL / OUT: 1650 mL / NET: -521 mL    10-14-21 @ 07:01  -  10-14-21 @ 18:43  --------------------------------------------------------  IN: 480 mL / OUT: 950 mL / NET: -470 mL      LABS:  Na: 138 (10-13 @ 20:52), 140 (10-13 @ 02:31), 139 (10-12 @ 16:57), 135 (10-12 @ 06:25)  K: 4.4 (10-13 @ 20:52), 4.6 (10-13 @ 02:31), 4.5 (10-12 @ 16:57)  Cl: 108 (10-13 @ 20:52), 108 (10-13 @ 02:31), 107 (10-12 @ 16:57)  CO2: 15 (10-13 @ 20:52), 16 (10-13 @ 02:31), 17 (10-12 @ 16:57)  BUN: 49 (10-13 @ 20:52), 48 (10-13 @ 02:31), 40 (10-12 @ 16:57)  Cr: 3.06 (10-13 @ 20:52), 3.13 (10-13 @ 02:31), 2.82 (10-12 @ 16:57), 2.78 (10-12 @ 06:25)  Glu: 125(10-13 @ 20:52), 145(10-13 @ 02:31), 170(10-12 @ 16:57)    Hgb: 10.5 (10-13 @ 20:51), 12.1 (10-12 @ 16:57)  Hct: 33.6 (10-13 @ 20:51), 39.3 (10-12 @ 16:57)  WBC: 13.25 (10-13 @ 20:51), 9.18 (10-12 @ 16:57)  Plt: 696 (10-13 @ 20:51), 760 (10-12 @ 16:57)    INR: 1.12 10-12-21 @ 06:25  PTT:     MEDICATIONS:  acetaminophen    Suspension .. 650 milliGRAM(s) Oral every 6 hours PRN  aspirin  chewable 81 milliGRAM(s) Oral daily  atorvastatin 20 milliGRAM(s) Oral at bedtime  chlorhexidine 4% Liquid 1 Application(s) Topical <User Schedule>  ciprofloxacin     Tablet 250 milliGRAM(s) Oral every 12 hours  cloNIDine 0.2 milliGRAM(s) Oral every 8 hours  clopidogrel Tablet 75 milliGRAM(s) Oral daily  diazepam    Tablet 5 milliGRAM(s) Oral once  doxazosin 4 milliGRAM(s) Oral at bedtime  heparin   Injectable 5000 Unit(s) SubCutaneous every 12 hours  hydrALAZINE 100 milliGRAM(s) Oral three times a day  influenza   Vaccine 0.5 milliLiter(s) IntraMuscular once  isosorbide   mononitrate ER Tablet (IMDUR) 60 milliGRAM(s) Oral daily  metoprolol succinate ER 12.5 milliGRAM(s) Oral two times a day  NIFEdipine XL 90 milliGRAM(s) Oral daily  oxyCODONE    IR 5 milliGRAM(s) Oral every 4 hours PRN  oxyCODONE    IR 10 milliGRAM(s) Oral every 4 hours PRN  polyethylene glycol 3350 17 Gram(s) Oral every 12 hours  senna 2 Tablet(s) Oral at bedtime    EXAMINATION:  General:  calm  HEENT:  MMM  Neuro:  awake, alert, oriented to hospital, speech is fluent, follows commands, EOMI, with mild drift in L arm, 4+/5 in L biceps and triceps, able to lift b/l legs antigravity, 5/5 in dorsiflexion b/l   Cards:  RRR  Respiratory:  no respiratory distress  Abdomen:  soft  Extremities:  no edema.    Assessment/Plan:   69yo man PMHx CKD, HTN, HLD admitted with R MCA infarcts, in ICU s/p balloon assisted coiling of right posterior communicating artery aneurysm 10/12/21. At neurologic baseline. Awaiting floor.     No change in plan from daytime except increased clonidine to .2mg TID for HTN. Remove A line.   soft diet  heparin for DVT ppx    Alejandra Freed  Neurocritical Care Attending

## 2021-10-14 NOTE — PROGRESS NOTE ADULT - ATTENDING COMMENTS
s/p balloon assisted coil embo of unruptured R pcomm aneurysm, doing well. Needs MRI per BALT study protocol.

## 2021-10-14 NOTE — PROGRESS NOTE ADULT - ATTENDING COMMENTS
post-operative day 2 from elective balloon assisted coiling of right posterior communicating artery aneurysm    Exam notable for mild left arm weakness    DAPT for M1 stenosis  -160mmHg  Coordinate with family re: care at home for dispo planning  CKD - monitor urine output  Retention: Flomax

## 2021-10-14 NOTE — DISCHARGE NOTE PROVIDER - NPI NUMBER (FOR SYSADMIN USE ONLY) :
[[id number]] [[id number]],[[id number]],[[id number] [[id number]],[[id number]],[[id number]],[[id number]],[[id number]] [[id number]],[[id number]],[[id number]],[[id number]],[[id number]],[[id number]]

## 2021-10-14 NOTE — PROGRESS NOTE ADULT - SUBJECTIVE AND OBJECTIVE BOX
SUMMARY:  70M PMHx CKD with a baseline Cr >3, HTN, HLD presented to OSH after multiple falls. He was transferred from OSH for MRI/MRA findings concerning for aneurysm vs. thrombus with multiple infarcts. He is slightly confused, but denies pain, headache, blurry vision, nausea, vomiting, weakness, and changes in sensation.   CTH 10/1 hydro likely chronic, MRI 10/4 right-sided acute vs. subacute MCA infarcts, Carotid U/S 10/6 no sig stenosis, MRA 10/6 R pcomm aneurysm with possible intra-aneurysmal thrombus 3bha2kg, mild R M1 stenosis.    Underwent balloon assisted coiling of right posterior communicating artery aneurysm on 10/12/21.    10/12: t balloon assisted coiling of right posterior communicating artery aneurysm     24 HOUR EVENTS:  no acute events     VITALS/DATA/ORDERS: [x] Reviewed    Drug Dosing Weight  Height (cm): 182.9 (12 Oct 2021 08:23)  Weight (kg): 73.9 (12 Oct 2021 08:23)  BMI (kg/m2): 22.1 (12 Oct 2021 08:23)  BSA (m2): 1.95 (12 Oct 2021 08:23)    PAST MEDICAL & SURGICAL HISTORY:  Frequent falls  Hypertension  Chronic kidney disease, unspecified CKD stage    REVIEW OF SYSTEMS: [x] Unable to Assess due to neurologic exam   [ ] All ROS addressed below are non-contributory, except:  Neuro: [ ] Headache [ ] Back pain [ ] Numbness [ ] Weakness [ ] Ataxia [ ] Dizziness [ ] Aphasia [ ] Dysarthria [ ] Visual disturbance  Resp: [ ] Shortness of breath/dyspnea, [ ] Orthopnea [ ] Cough  CV: [ ] Chest pain [ ] Palpitation [ ] Lightheadedness [ ] Syncope  Renal: [ ] Thirst [ ] Edema  GI: [ ] Nausea [ ] Emesis [ ] Abdominal pain [ ] Constipation [ ] Diarrhea  Hem: [ ] Hematemesis [ ] bright red blood per rectum  ID: [ ] Fever [ ] Chills [ ] Dysuria  ENT: [ ] Rhinorrhea    PHYSICAL EXAM:    General: Cooperative  Neurological: Awake, alert oriented to person, place and time, Following Commands, PERRL, EOMI, Face Symmetrical, Speech Fluent, Moving all extremities, LUE 4+/5, No Drift, Sensation to Light Touch Intact  Pulmonary: Clear to Auscultation, No Rales, No Rhonchi, No Wheezes   Cardiovascular: S1, S2, Regular Rate and Rhythm   Gastrointestinal: Soft, Nontender, Nondistended   Extremities: No calf tenderness       ICU Vital Signs Last 24 Hrs  T(C): 36.2 (14 Oct 2021 03:00), Max: 36.8 (13 Oct 2021 07:00)  T(F): 97.2 (14 Oct 2021 03:00), Max: 98.2 (13 Oct 2021 07:00)  HR: 59 (14 Oct 2021 06:00) (47 - 94)  BP: 149/76 (14 Oct 2021 06:00) (134/67 - 181/87)  BP(mean): 98 (14 Oct 2021 06:00) (84 - 148)  ABP: 166/55 (13 Oct 2021 21:00) (118/51 - 166/55)  ABP(mean): 88 (13 Oct 2021 21:00) (69 - 103)  RR: 19 (14 Oct 2021 06:00) (12 - 25)  SpO2: 99% (14 Oct 2021 06:00) (88% - 100%)      10-12-21 @ 07:01  -  10-13-21 @ 07:00  --------------------------------------------------------  IN: 3037.5 mL / OUT: 774 mL / NET: 2263.5 mL    10-13-21 @ 07:01  -  10-14-21 @ 06:26  --------------------------------------------------------  IN: 1129 mL / OUT: 1650 mL / NET: -521 mL    acetaminophen    Suspension .. 650 milliGRAM(s) Oral every 6 hours PRN  aspirin  chewable 81 milliGRAM(s) Oral daily  atorvastatin 20 milliGRAM(s) Oral at bedtime  chlorhexidine 4% Liquid 1 Application(s) Topical <User Schedule>  ciprofloxacin     Tablet 250 milliGRAM(s) Oral two times a day  cloNIDine 0.2 milliGRAM(s) Oral every 8 hours  clopidogrel Tablet 75 milliGRAM(s) Oral daily  heparin   Injectable 5000 Unit(s) SubCutaneous every 12 hours  hydrALAZINE 100 milliGRAM(s) Oral three times a day  influenza   Vaccine 0.5 milliLiter(s) IntraMuscular once  isosorbide   mononitrate ER Tablet (IMDUR) 60 milliGRAM(s) Oral daily  NIFEdipine XL 90 milliGRAM(s) Oral daily  oxyCODONE    IR 5 milliGRAM(s) Oral every 4 hours PRN  oxyCODONE    IR 10 milliGRAM(s) Oral every 4 hours PRN  polyethylene glycol 3350 17 Gram(s) Oral every 12 hours  senna 2 Tablet(s) Oral at bedtime      LABS:  Na: 138 (10-13 @ 20:52), 140 (10-13 @ 02:31), 139 (10-12 @ 16:57), 135 (10-12 @ 06:25), 140 (10-11 @ 07:09)  K: 4.4 (10-13 @ 20:52), 4.6 (10-13 @ 02:31), 4.5 (10-12 @ 16:57), 4.4 (10-11 @ 07:09)  Cl: 108 (10-13 @ 20:52), 108 (10-13 @ 02:31), 107 (10-12 @ 16:57), 106 (10-11 @ 07:09)  CO2: 15 (10-13 @ 20:52), 16 (10-13 @ 02:31), 17 (10-12 @ 16:57), 20 (10-11 @ 07:09)  BUN: 49 (10-13 @ 20:52), 48 (10-13 @ 02:31), 40 (10-12 @ 16:57), 42 (10-11 @ 07:09)  Cr: 3.06 (10-13 @ 20:52), 3.13 (10-13 @ 02:31), 2.82 (10-12 @ 16:57), 2.78 (10-12 @ 06:25), 2.90 (10-11 @ 07:09)  Glu: 125(10-13 @ 20:52), 145(10-13 @ 02:31), 170(10-12 @ 16:57), 87(10-11 @ 07:09)    Hgb: 10.5 (10-13 @ 20:51), 12.1 (10-12 @ 16:57), 12.2 (10-11 @ 07:09)  Hct: 33.6 (10-13 @ 20:51), 39.3 (10-12 @ 16:57), 39.6 (10-11 @ 07:09)  WBC: 13.25 (10-13 @ 20:51), 9.18 (10-12 @ 16:57), 6.42 (10-11 @ 07:09)  Plt: 696 (10-13 @ 20:51), 760 (10-12 @ 16:57), 728 (10-11 @ 07:09)    INR: 1.12 10-12-21 @ 06:25, 1.04 10-11-21 @ 17:18  PTT: 33.3 10-11-21 @ 17:18                       SUMMARY:  70M PMHx CKD with a baseline Cr >3, HTN, HLD presented to OSH after multiple falls. He was transferred from OSH for MRI/MRA findings concerning for aneurysm vs. thrombus with multiple infarcts. He is slightly confused, but denies pain, headache, blurry vision, nausea, vomiting, weakness, and changes in sensation.   CTH 10/1 hydro likely chronic, MRI 10/4 right-sided acute vs. subacute MCA infarcts, Carotid U/S 10/6 no sig stenosis, MRA 10/6 R pcomm aneurysm with possible intra-aneurysmal thrombus 4bid0nt, mild R M1 stenosis.    Underwent balloon assisted coiling of right posterior communicating artery aneurysm on 10/12/21.    10/12: t balloon assisted coiling of right posterior communicating artery aneurysm     24 HOUR EVENTS:  increased clonodine to 0.2, desaturation resolved w/ duonebs, IVF discontinued     VITALS/DATA/ORDERS: [x] Reviewed    Drug Dosing Weight  Height (cm): 182.9 (12 Oct 2021 08:23)  Weight (kg): 73.9 (12 Oct 2021 08:23)  BMI (kg/m2): 22.1 (12 Oct 2021 08:23)  BSA (m2): 1.95 (12 Oct 2021 08:23)    PAST MEDICAL & SURGICAL HISTORY:  Frequent falls  Hypertension  Chronic kidney disease, unspecified CKD stage    REVIEW OF SYSTEMS: [x] Unable to Assess due to neurologic exam   [ ] All ROS addressed below are non-contributory, except:  Neuro: [ ] Headache [ ] Back pain [ ] Numbness [ ] Weakness [ ] Ataxia [ ] Dizziness [ ] Aphasia [ ] Dysarthria [ ] Visual disturbance  Resp: [ ] Shortness of breath/dyspnea, [ ] Orthopnea [ ] Cough  CV: [ ] Chest pain [ ] Palpitation [ ] Lightheadedness [ ] Syncope  Renal: [ ] Thirst [ ] Edema  GI: [ ] Nausea [ ] Emesis [ ] Abdominal pain [ ] Constipation [ ] Diarrhea  Hem: [ ] Hematemesis [ ] bright red blood per rectum  ID: [ ] Fever [ ] Chills [ ] Dysuria  ENT: [ ] Rhinorrhea    PHYSICAL EXAM:    General: Cooperative  Neurological: Awake, alert oriented to person, place and time, Following Commands, PERRL, EOMI, Face Symmetrical, Speech Fluent, Moving all extremities, LUE 4+/5, No Drift, Sensation to Light Touch Intact  Pulmonary: Clear to Auscultation, No Rales, No Rhonchi, No Wheezes   Cardiovascular: S1, S2, Regular Rate and Rhythm   Gastrointestinal: Soft, Nontender, Nondistended   Extremities: No calf tenderness       ICU Vital Signs Last 24 Hrs  T(C): 36.2 (14 Oct 2021 03:00), Max: 36.8 (13 Oct 2021 07:00)  T(F): 97.2 (14 Oct 2021 03:00), Max: 98.2 (13 Oct 2021 07:00)  HR: 59 (14 Oct 2021 06:00) (47 - 94)  BP: 149/76 (14 Oct 2021 06:00) (134/67 - 181/87)  BP(mean): 98 (14 Oct 2021 06:00) (84 - 148)  ABP: 166/55 (13 Oct 2021 21:00) (118/51 - 166/55)  ABP(mean): 88 (13 Oct 2021 21:00) (69 - 103)  RR: 19 (14 Oct 2021 06:00) (12 - 25)  SpO2: 99% (14 Oct 2021 06:00) (88% - 100%)      10-12-21 @ 07:01  -  10-13-21 @ 07:00  --------------------------------------------------------  IN: 3037.5 mL / OUT: 774 mL / NET: 2263.5 mL    10-13-21 @ 07:01  -  10-14-21 @ 06:26  --------------------------------------------------------  IN: 1129 mL / OUT: 1650 mL / NET: -521 mL    acetaminophen    Suspension .. 650 milliGRAM(s) Oral every 6 hours PRN  aspirin  chewable 81 milliGRAM(s) Oral daily  atorvastatin 20 milliGRAM(s) Oral at bedtime  chlorhexidine 4% Liquid 1 Application(s) Topical <User Schedule>  ciprofloxacin     Tablet 250 milliGRAM(s) Oral two times a day  cloNIDine 0.2 milliGRAM(s) Oral every 8 hours  clopidogrel Tablet 75 milliGRAM(s) Oral daily  heparin   Injectable 5000 Unit(s) SubCutaneous every 12 hours  hydrALAZINE 100 milliGRAM(s) Oral three times a day  influenza   Vaccine 0.5 milliLiter(s) IntraMuscular once  isosorbide   mononitrate ER Tablet (IMDUR) 60 milliGRAM(s) Oral daily  NIFEdipine XL 90 milliGRAM(s) Oral daily  oxyCODONE    IR 5 milliGRAM(s) Oral every 4 hours PRN  oxyCODONE    IR 10 milliGRAM(s) Oral every 4 hours PRN  polyethylene glycol 3350 17 Gram(s) Oral every 12 hours  senna 2 Tablet(s) Oral at bedtime      LABS:  Na: 138 (10-13 @ 20:52), 140 (10-13 @ 02:31), 139 (10-12 @ 16:57), 135 (10-12 @ 06:25), 140 (10-11 @ 07:09)  K: 4.4 (10-13 @ 20:52), 4.6 (10-13 @ 02:31), 4.5 (10-12 @ 16:57), 4.4 (10-11 @ 07:09)  Cl: 108 (10-13 @ 20:52), 108 (10-13 @ 02:31), 107 (10-12 @ 16:57), 106 (10-11 @ 07:09)  CO2: 15 (10-13 @ 20:52), 16 (10-13 @ 02:31), 17 (10-12 @ 16:57), 20 (10-11 @ 07:09)  BUN: 49 (10-13 @ 20:52), 48 (10-13 @ 02:31), 40 (10-12 @ 16:57), 42 (10-11 @ 07:09)  Cr: 3.06 (10-13 @ 20:52), 3.13 (10-13 @ 02:31), 2.82 (10-12 @ 16:57), 2.78 (10-12 @ 06:25), 2.90 (10-11 @ 07:09)  Glu: 125(10-13 @ 20:52), 145(10-13 @ 02:31), 170(10-12 @ 16:57), 87(10-11 @ 07:09)    Hgb: 10.5 (10-13 @ 20:51), 12.1 (10-12 @ 16:57), 12.2 (10-11 @ 07:09)  Hct: 33.6 (10-13 @ 20:51), 39.3 (10-12 @ 16:57), 39.6 (10-11 @ 07:09)  WBC: 13.25 (10-13 @ 20:51), 9.18 (10-12 @ 16:57), 6.42 (10-11 @ 07:09)  Plt: 696 (10-13 @ 20:51), 760 (10-12 @ 16:57), 728 (10-11 @ 07:09)    INR: 1.12 10-12-21 @ 06:25, 1.04 10-11-21 @ 17:18  PTT: 33.3 10-11-21 @ 17:18

## 2021-10-14 NOTE — DISCHARGE NOTE PROVIDER - CARE PROVIDER_API CALL
Sean Greenberg; MS)  Unallocated  [address] [facility]  [place] [address]  Phone: [phone]  Fax: [phone]  Follow Up Time: 2 weeks    Kenneth Evangelista ()  Cardiology; Internal Medicine  [address] [facility], [address]  [place] [address]  Phone: [phone]  Fax: [phone]  Follow Up Time: 2 weeks    Sukh Pierre)  Neurology; Vascular Neurology  [address] [facility], [address]  [place] [address]  Phone: [phone]  Fax: [phone]  Follow Up Time: 2 weeks    Litzy Hernadez)  Internal Medicine  [address] [place] [address]  Phone: [phone]  Fax: [phone]  Follow Up Time: 2 weeks   Sean Greenberg (MD; MS)  Unallocated  [address] [facility]  [place] [address]  Phone: [phone]  Fax: [phone]  Follow Up Time: 2 weeks    Kenneth Evangelista ()  Cardiology; Internal Medicine  [address] [facility], [address]  [place] [address]  Phone: [phone]  Fax: [phone]  Follow Up Time: 2 weeks    Sukh Pierre)  Neurology; Vascular Neurology  [address] [facility], [address]  [place] [address]  Phone: [phone]  Fax: [phone]  Follow Up Time: 2 weeks    Litzy Hernadez)  Internal Medicine  [address] [place] [address]  Phone: [phone]  Fax: [phone]  Follow Up Time: 2 weeks    Mike Roe)  Surgery  [address] [facility], [place] [address]  Phone: [phone]  Fax: [phone])-  Follow Up Time:    Sean Greenberg (MD; MS)  Unallocated  [address] [facility]  [place] [address]  Phone: [phone]  Fax: [phone]  Follow Up Time: 2 weeks    Kenneth Evangelista ()  Cardiology; Internal Medicine  [address] [facility], [address]  [place] [address]  Phone: [phone]  Fax: [phone]  Follow Up Time: 2 weeks    Sukh Pierre)  Neurology; Vascular Neurology  [address] [facility], [address]  [place] [address]  Phone: [phone]  Fax: [phone]  Follow Up Time: 2 weeks    Litzy Hernadez)  Internal Medicine  [address] [place] [address]  Phone: [phone]  Fax: [phone]  Follow Up Time: 2 weeks    Mike Roe)  Surgery  [address] [facility], [place] [address]  Phone: [phone]  Fax: [phone])-  Follow Up Time:     Beth Franklin)  Gastroenterology; Internal Medicine  [address] [facility], [address]  [place], [place] [id number]  Phone: [phone]  Fax: [phone]  Follow Up Time:    Sean Greenberg (MD; MS)  Unallocated  [address] [facility]  [place] [address]  Phone: [phone]  Fax: [phone]  Follow Up Time: 2 weeks    Kenneth Evangelista ()  Cardiology; Internal Medicine  [address] [facility], [address]  [place] [address]  Phone: [phone]  Fax: [phone]  Follow Up Time: 2 weeks    Sukh Pierre)  Neurology; Vascular Neurology  [address] [facility], [address]  [place] [address]  Phone: [phone]  Fax: [phone]  Follow Up Time: 2 weeks    Litzy Hernadez)  Internal Medicine  [address] [place] [address]  Phone: [phone]  Fax: [phone]  Follow Up Time: 2 weeks    Mike Roe)  Surgery  [address] [facility], [place] [address]  Phone: [phone]  Fax: [phone])-  Follow Up Time: 2 weeks    Beth Franklin)  Gastroenterology; Internal Medicine  [address] [facility], [address]  [place], [place] [address]  Phone: [phone]  Fax: [phone]  Follow Up Time: 1 week   Sean Greenberg (MD; MS)  Unallocated  [address] [facility]  [place] [address]  Phone: [phone]  Fax: [phone]  Follow Up Time: 2 weeks    Kenneth Evangelista ()  Cardiology; Internal Medicine  [address] [facility], [address]  [place] [address]  Phone: [phone]  Fax: [phone]  Follow Up Time: 2 weeks    Sukh Pierre)  Neurology; Vascular Neurology  [address] [facility], [address]  [place] [address]  Phone: [phone]  Fax: [phone]  Follow Up Time: 2 weeks    Mike Roe)  Surgery  [address] [facility], [place] [address]  Phone: [phone]  Fax: ()-  Follow Up Time: 2 weeks    Beth Franklin)  Gastroenterology; Internal Medicine  [address] [facility], [address]  [place], [place] [address]  Phone: [phone]  Fax: [phone]  Follow Up Time: 1 week    Nathan Maddox  INTERNAL MEDICINE  [address] [place] [address]  Phone: [phone]  Fax: [phone]  Established Patient  Follow Up Time: 2 weeks

## 2021-10-14 NOTE — PROGRESS NOTE ADULT - ASSESSMENT
70 CKD. HTN, ?CAD admitted for acute R MCA infarct found to have incidental right PCOMM aneurysm     NEURO: unruptured cerebral aneurysm, intracranial atherosclerosis/stenosis (right M1)  right PCOMM aneurysm s/p coiling   -neuro check q4  pain management   -CT head stable  -PT/OT evaluation  MRA w/ narrowing of right proximal M1 - dual antiplatelet  c/w ASA 81mg, plavix 75mg - check p2y12 & ARU      PULMONARY:  saturating well on RA,   -continue to monitor on pulse o2   -incentive spirometry 10q/hr when awake     CARDIOVASCULAR: HTN  wean off Cardene ggt ; c/w home medications increase clonidine 0.3mg TID, Nifedipine 90mg qd, hydralazine 100mg TID  monitor on telemetry   vitals q1  sbp goal 100-160  TTE: EF 65-75%, dilated Right & left atrium     GASTROENTEROLOGY:  nephro diet   ensure BMs w/ Miralax & senna    RENAL/:  CKD stage 4 unknown baseline ; likely hypertensive nephropathy   u/s kidney preformed -right kidney 9.3cm and left 9.5 cm with b/l renal cysts  nephrology following - ARMIDA, etc. f/u   s/p Mucomyst 1200mg PO BID to reduce JUJU   strict i/o's  C3,C4 wnl  IVF NaCl 0.9% @50cc/hr   TOV/bladder scan    ENDOCRINE:  A1c-5.5%  TSH-1.82    HEME/ONC:  DVT ppx: SCDs, heparin SQ     INFECTIOUS: UTI   f/u urine cultures serratia f/u sensitivities   UA (+) c/w ciprofloxacin     At risk of death/neuro decline due to: risk of ischemic stroke/emboli, groin hematoma/hemorrhagic shock  70 CKD. HTN, ?CAD admitted for acute R MCA infarct found to have incidental right PCOMM aneurysm s/p coiling     NEURO: unruptured cerebral aneurysm, intracranial atherosclerosis/stenosis (right M1)  right PCOMM aneurysm s/p coiling   -neuro check q4  pain management   -CT head stable  -PT/OT evaluation  MRA w/ narrowing of right proximal M1 - dual antiplatelet  c/w ASA 81mg, plavix 75mg - check p2y12 & ARU      PULMONARY:  saturating well on RA,   -continue to monitor on pulse o2   -incentive spirometry 10q/hr when awake     CARDIOVASCULAR: HTN  wean off Cardene ggt ; c/w home medications clonidine 0.2mg TID, Nifedipine 90mg qd, hydralazine 100mg TID  monitor on telemetry   vitals q1  sbp goal 100-160  TTE: EF 65-75%, dilated Right & left atrium     GASTROENTEROLOGY:  nephro diet   ensure BMs w/ Miralax & senna    RENAL/:  CKD stage 4 unknown baseline ; likely hypertensive nephropathy   u/s kidney preformed -right kidney 9.3cm and left 9.5 cm with b/l renal cysts  nephrology following - ARMIDA, etc. f/u   s/p Mucomyst 1200mg PO BID to reduce JUJU   strict i/o's  C3,C4 wnl  IVL  add Cardura 4mg qd  TOV/bladder scan    ENDOCRINE:  A1c-5.5%  TSH-1.82    HEME/ONC:  DVT ppx: SCDs, heparin SQ       INFECTIOUS: UTI   f/u urine cultures serratia  UA (+) c/w ciprofloxacin total 7 days     non-critical d/c home w/ social work assistance

## 2021-10-14 NOTE — DISCHARGE NOTE PROVIDER - CARE PROVIDERS DIRECT ADDRESSES
,DirectAddress_Unknown,DirectAddress_Unknown,DirectAddress_Unknown,DirectAddress_Unknown ,DirectAddress_Unknown,DirectAddress_Unknown,DirectAddress_Unknown,DirectAddress_Unknown,[email or web address] ,DirectAddress_Unknown,DirectAddress_Unknown,DirectAddress_Unknown,DirectAddress_Unknown,[email or web address],DirectAddress_Unknown ,DirectAddress_Unknown,DirectAddress_Unknown,DirectAddress_Unknown,[email or web address],DirectAddress_[email or web address]

## 2021-10-14 NOTE — DISCHARGE NOTE PROVIDER - NSDCCPCAREPLAN_GEN_ALL_CORE_FT
PRINCIPAL DISCHARGE DIAGNOSIS  Diagnosis: Cerebral aneurysm  Assessment and Plan of Treatment: - You underwent cerebral angiogram for Rt Pcomm aneurysm coiling on 10/12/2021.  - You have a skin puncture on your groin where the procedure was done, please monitor for excessive bruising, bleeding, palpable mass under the site and seek medical advice if present.  - Please continue to take Aspirin and Plavix daily   - Please take over the counter stool softeners/laxatives to prevent constipation.  - Please have family to supervise and assist while walking to prevent falls and rolling walker is providered for balance.  - Please follow up with Dr. Greenberg within 1-2 weeks after discharge for post angiogram evaluations.  - Please also follow up with neurology within 1-2 weeks after discharge.      SECONDARY DISCHARGE DIAGNOSES  Diagnosis: Hypertension  Assessment and Plan of Treatment: - Please continue to take Imdur, clonidine, and hydralazine at home for blood pressure control.  - Please follow up with your primary care physician within 1-2 weeks after discharge for medication adjustments.    Diagnosis: NSVT (nonsustained ventricular tachycardia)  Assessment and Plan of Treatment: - Please continue to take Nifedipine for rate control.  - Please follow up with cardiology, Dr. Evangelista, within 1-2 weeks after discharge.    Diagnosis: Stage 4 chronic kidney disease  Assessment and Plan of Treatment: - Your creatinine level is at baseline.  - Please follow up with your renal doctor, Dr. Hernadez, within 1-2 weeks after discharge.    Diagnosis: Hyperlipidemia  Assessment and Plan of Treatment: - Please continue to take statin  - Please follow up with your primary care physician within 1-2 weeks after discharge for medication adjustments.    Diagnosis: Dysphagia  Assessment and Plan of Treatment: - Speech and swallow team saw you during this admission and recommended the followin) Soft diet and thin liquids.   2: Set up assistance and intermittent feeding supervision. Ensure eating/drinking upright at 90 degrees, small single sips, slow rate, chew completly.  3) Excellent oral care and aspiration precautions. Monitor for signs and symptoms of aspiration or penetration (coughing, choking, wet/gurgly vocal qualiy). Please notify your primary care physician if any signs are observed.      Diagnosis: Urinary retention  Assessment and Plan of Treatment: - Please continue cardura at home for urinary retention.  - Please follow up with your primary care physician within 1-2 weeks after discharge for medication adjustments.     PRINCIPAL DISCHARGE DIAGNOSIS  Diagnosis: Cerebral aneurysm  Assessment and Plan of Treatment: - You underwent cerebral angiogram for Rt Pcomm aneurysm coiling on 10/12/2021. You were part of [facility]T trial.   - You have a skin puncture on your groin where the procedure was done, please monitor for excessive bruising, bleeding, palpable mass under the site and seek medical advice if present.  - Please continue to take Aspirin and Plavix daily   - Please take over the counter stool softeners/laxatives to prevent constipation.  - Please have family to supervise and assist while walking to prevent falls and rolling walker is providered for balance.  - Please follow up with Dr. Greenberg within 1-2 weeks after discharge for post angiogram evaluations.  - Please also follow up with neurology within 1-2 weeks after discharge.      SECONDARY DISCHARGE DIAGNOSES  Diagnosis: Hypertension  Assessment and Plan of Treatment: - Please continue to take Imdur, clonidine, and hydralazine at home for blood pressure control.  - Please follow up with your primary care physician within 1-2 weeks after discharge for medication adjustments.    Diagnosis: NSVT (nonsustained ventricular tachycardia)  Assessment and Plan of Treatment: - Please continue to take lopressor and Nifedipine for rate control.  - Please follow up with cardiology, Dr. Evangelista, within 1-2 weeks after discharge.    Diagnosis: Stage 4 chronic kidney disease  Assessment and Plan of Treatment: - Your creatinine level is at baseline.  - Please follow up with your renal doctor, Dr. Hernadez, within 1-2 weeks after discharge.    Diagnosis: Hyperlipidemia  Assessment and Plan of Treatment: - Please continue to take statin  - Please follow up with your primary care physician within 1-2 weeks after discharge for medication adjustments.    Diagnosis: Dysphagia  Assessment and Plan of Treatment: - Speech and swallow team saw you during this admission and recommended the followin) Soft diet and thin liquids.   2: Set up assistance and intermittent feeding supervision. Ensure eating/drinking upright at 90 degrees, small single sips, slow rate, chew completly.  3) Excellent oral care and aspiration precautions. Monitor for signs and symptoms of aspiration or penetration (coughing, choking, wet/gurgly vocal qualiy). Please notify your primary care physician if any signs are observed.      Diagnosis: Urinary retention  Assessment and Plan of Treatment: - Please continue cardura at home for urinary retention.  - Please follow up with your primary care physician within 1-2 weeks after discharge for medication adjustments.     PRINCIPAL DISCHARGE DIAGNOSIS  Diagnosis: Cerebral aneurysm  Assessment and Plan of Treatment: - You underwent cerebral angiogram for Rt Pcomm aneurysm coiling on 10/12/2021. You were part of [facility] trial.   - You have a skin puncture on your groin where the procedure was done, please monitor for excessive bruising, bleeding, palpable mass under the site and seek medical advice if present.  - Please continue to take Aspirin and Plavix daily. Do not stop without discussion with Dr Greenberg. At risk for stent occlusion .  Follow up with Dr Greenberg for possible date to stop Plavix   - Please take over the counter stool softeners/laxatives to prevent constipation.  - Please have family to supervise and assist while walking to prevent falls and rolling walker is providered for balance.  - Please follow up with Dr. Greenberg within 2 weeks after discharge for post angiogram evaluations.  - Please also follow up with stroke  neurology within 2 weeks after discharge.      SECONDARY DISCHARGE DIAGNOSES  Diagnosis: Hypertension  Assessment and Plan of Treatment: - Please continue to take Imdur, clonidine, and hydralazine & Nifedipine   for blood pressure control.  - Please follow up with your primary care physician within 1-2 weeks after discharge for medication adjustments.    Diagnosis: Stage 4 chronic kidney disease  Assessment and Plan of Treatment: - Your creatinine level is at baseline. <3.0   Avoid nephrotoxic agents  Continue bicarb tabs  Repeat BMP weekly while in rehab   - Please follow up with your renal doctor, Dr. Hernadez, within 1-2 weeks after discharge.    Diagnosis: NSVT (nonsustained ventricular tachycardia)  Assessment and Plan of Treatment: - Please continue to take lopressor and Nifedipine for rate control.  - Please follow up with cardiology, Dr. Evangelista, within 1-2 weeks after discharge.    Diagnosis: Hyperlipidemia  Assessment and Plan of Treatment: - Please continue to take statin  - Please follow up with your primary care physician within 1-2 weeks after discharge for medication adjustments.    Diagnosis: Dysphagia  Assessment and Plan of Treatment: - Speech and swallow team saw you during this admission and recommended the followin) Soft diet and thin liquids.   2: Set up assistance and intermittent feeding supervision. Ensure eating/drinking upright at 90 degrees, small single sips, slow rate, chew completly.  3) Excellent oral care and aspiration precautions. Monitor for signs and symptoms of aspiration or penetration (coughing, choking, wet/gurgly vocal qualiy). Please notify your primary care physician if any signs are observed.      Diagnosis: Urinary retention  Assessment and Plan of Treatment: - Please continue cardura at home for urinary retention.  - Please follow up with your primary care physician within 1-2 weeks after discharge for medication adjustments.    Diagnosis: Lesion of liver with high risk for malignant neoplasm  Assessment and Plan of Treatment: CT abdomen with 8 cm large hypodense lesion  MR abdomen  10/22  Follow up with General surgery in 2-3 weeks for further treatment plans   Needs GI screening for malignancy    Diagnosis: Ischemic stroke  Assessment and Plan of Treatment: Right MCA stroke Continue Aspirin  Follow up with stroke neurology     PRINCIPAL DISCHARGE DIAGNOSIS  Diagnosis: Cerebral aneurysm  Assessment and Plan of Treatment: - You underwent cerebral angiogram for Rt Pcomm aneurysm coiling on 10/12/2021. You were part of [facility] trial.   - You have a skin puncture on your groin where the procedure was done, please monitor for excessive bruising, bleeding, palpable mass under the site and seek medical advice if present.  - Please continue to take Aspirin and Plavix daily. Do not stop without discussion with Dr Greenberg. At risk for stent occlusion .  Follow up with Dr Greenberg for possible date to stop Plavix   - Please take over the counter stool softeners/laxatives to prevent constipation.  - Please have family to supervise and assist while walking to prevent falls and rolling walker is providered for balance.  - Please follow up with Dr. Greenberg within 2 weeks after discharge for post angiogram evaluations.  - Please also follow up with stroke  neurology within 2 weeks after discharge.      SECONDARY DISCHARGE DIAGNOSES  Diagnosis: Hypertension  Assessment and Plan of Treatment: - Please continue to take Imdur, clonidine, and hydralazine & Nifedipine   for blood pressure control.  - Please follow up with your primary care physician within 1-2 weeks after discharge for medication adjustments.    Diagnosis: Stage 4 chronic kidney disease  Assessment and Plan of Treatment: - Your creatinine level is at baseline. <3.0   Avoid nephrotoxic agents  Continue bicarb tabs  Repeat BMP weekly while in rehab   - Please follow up with your renal doctor, Dr. Hernadez, within 1-2 weeks after discharge.    Diagnosis: NSVT (nonsustained ventricular tachycardia)  Assessment and Plan of Treatment: - Please continue to take lopressor and Nifedipine for rate control.  - Please follow up with cardiology, Dr. Evangelista, within 1-2 weeks after discharge.    Diagnosis: Hyperlipidemia  Assessment and Plan of Treatment: - Please continue to take statin  - Please follow up with your primary care physician within 1-2 weeks after discharge for medication adjustments.    Diagnosis: Dysphagia  Assessment and Plan of Treatment: - Speech and swallow team saw you during this admission and recommended the followin) Soft diet and thin liquids.   2: Set up assistance and intermittent feeding supervision. Ensure eating/drinking upright at 90 degrees, small single sips, slow rate, chew completly.  3) Excellent oral care and aspiration precautions. Monitor for signs and symptoms of aspiration or penetration (coughing, choking, wet/gurgly vocal qualiy). Please notify your primary care physician if any signs are observed.      Diagnosis: Urinary retention  Assessment and Plan of Treatment: - Please continue cardura at home for urinary retention.  - Please follow up with your primary care physician within 1-2 weeks after discharge for medication adjustments.    Diagnosis: Lesion of liver with high risk for malignant neoplasm  Assessment and Plan of Treatment: CT abdomen with 8 cm large hypodense lesion  MR abdomen  10/22  Follow up with Dr. Roe of Surgical Oncology in 2-3 weeks for further treatment plans   Needs GI screening for malignancy  Also should obtain a PET Scan as part of the workup    Diagnosis: Ischemic stroke  Assessment and Plan of Treatment: Right MCA stroke Continue Aspirin  Follow up with stroke neurology     PRINCIPAL DISCHARGE DIAGNOSIS  Diagnosis: Cerebral aneurysm  Assessment and Plan of Treatment: - You underwent cerebral angiogram for Rt Pcomm aneurysm coiling on 10/12/2021. You were part of [facility] trial.   - You have a skin puncture on your groin where the procedure was done, please monitor for excessive bruising, bleeding, palpable mass under the site and seek medical advice if present.  - Please continue to take Aspirin and Plavix daily.  Plavix can be stopped  10/30/21 ( 3 weeks total)   - Please take over the counter stool softeners/laxatives to prevent constipation.  - Please have family to supervise and assist while walking to prevent falls and rolling walker is providered for balance.  - Please follow up with Dr. Greenberg within 2 weeks after discharge for post angiogram evaluations.  - Please also follow up with stroke  neurology within 2 weeks after discharge.      SECONDARY DISCHARGE DIAGNOSES  Diagnosis: Hypertension  Assessment and Plan of Treatment: - Please continue to take Imdur, clonidine, and hydralazine & Nifedipine   for blood pressure control.  - Please follow up with your primary care physician within 1-2 weeks after discharge for medication adjustments.    Diagnosis: Stage 4 chronic kidney disease  Assessment and Plan of Treatment: - Your creatinine level is at baseline. <3.0   Avoid nephrotoxic agents  Continue bicarb tabs  Repeat BMP weekly while in rehab   - Please follow up with your renal doctor, Dr. Hernadez, within 1-2 weeks after discharge.    Diagnosis: NSVT (nonsustained ventricular tachycardia)  Assessment and Plan of Treatment: - Please continue to take lopressor and Nifedipine for rate control.  - Please follow up with cardiology, Dr. Evangelista, within 1-2 weeks after discharge.    Diagnosis: Hyperlipidemia  Assessment and Plan of Treatment: - Please continue to take statin  - Please follow up with your primary care physician within 1-2 weeks after discharge for medication adjustments.    Diagnosis: Dysphagia  Assessment and Plan of Treatment: - Speech and swallow team saw you during this admission and recommended the followin) Soft diet and thin liquids.   2: Set up assistance and intermittent feeding supervision. Ensure eating/drinking upright at 90 degrees, small single sips, slow rate, chew completly.  3) Excellent oral care and aspiration precautions. Monitor for signs and symptoms of aspiration or penetration (coughing, choking, wet/gurgly vocal qualiy). Please notify your primary care physician if any signs are observed.      Diagnosis: Urinary retention  Assessment and Plan of Treatment: - Please continue cardura at home for urinary retention.  - Please follow up with your primary care physician within 1-2 weeks after discharge for medication adjustments.    Diagnosis: Lesion of liver with high risk for malignant neoplasm  Assessment and Plan of Treatment: MR abdomen Hepatic lesion concerning for cholangiocarcinoma Adenopathy in the peripancreatic, savage hepatis and portacaval regions.  Follow up with Dr. Roe of Surgical Oncology in 2 weeks for further treatment plans   follow up with GI for EGD & biopsy of lymphnodes . Needs to be 5 days off Plavix for procedure   Also should obtain a outpatient PET Scan as part of the workup    Diagnosis: Ischemic stroke  Assessment and Plan of Treatment: Right MCA stroke Continue Aspirin  Follow up with stroke neurology     PRINCIPAL DISCHARGE DIAGNOSIS  Diagnosis: Cerebral aneurysm  Assessment and Plan of Treatment: - You underwent cerebral angiogram for right PCOMM aneurysm coiling on 10/12/2021. You were part of [facility] trial.   - You have a skin puncture on your groin where the procedure was done, please monitor for excessive bruising, bleeding, palpable mass under the site and seek medical advice if present.  - Please take over the counter stool softeners/laxatives to prevent constipation.  - Please have family to supervise and assist while walking to prevent falls and rolling walker is providered for balance.  - Please follow up with Dr. Greenberg within 2 weeks after discharge for post angiogram evaluations.  - Please also follow up with stroke  neurology within 2 weeks after discharge.  Please return to the ED if you have any dizziness, imbalance, significant changes in mental status, loss of consciousness or worsening headaches. Please take all medications as directed.      SECONDARY DISCHARGE DIAGNOSES  Diagnosis: Lesion of liver with high risk for malignant neoplasm  Assessment and Plan of Treatment: While you were admitted for your PCOMM aneurysm, you had abdominal imaging done which found a liver lesion concerning for cholangiocarcinoma along with inflamed lymph nodes. Interventional radiology was consulted for a biopsy but decided against a biopsy as you were on dual antiplatelet therapy and at an increased risk for bleeding. The advanced gastroenterology team also decided against a biopsy as they were afraid of the cancer possibly seeding (or spreading) through the biopsy. Instead, we decided to have you undergo a biopsy at a later time after you were off dual antiplatelet therapy. We opted for a colonoscopy to see if you had any colonic masses or metastases from the liver lesion. We were unable to complete the colonoscopy as your colon was too angulated for the colonoscope to pass; because of this, radiology decided on performing a CT colonography, or a virtual colonoscopy, which did not find any masses. A recommendation was made for an outpatient PET CT or CT with contrast based on these results.  - Please follow up with Dr. Roe of Surgical Oncology in 2 weeks for further treatment plans   - Please follow up with GI for endoscopy & biopsy of your lymph nodes.    Diagnosis: Stage 4 chronic kidney disease  Assessment and Plan of Treatment: - Your creatinine level is at baseline. <3.0   Avoid nephrotoxic agents  Continue bicarb tabs  Repeat BMP weekly while in rehab   - Please follow up with your renal doctor, Dr. Hernadez, within 1-2 weeks after discharge.    Diagnosis: NSVT (nonsustained ventricular tachycardia)  Assessment and Plan of Treatment: - Please continue to take lopressor and Nifedipine for rate control.  - Please follow up with cardiology, Dr. Evangelista, within 1-2 weeks after discharge.    Diagnosis: Hyperlipidemia  Assessment and Plan of Treatment: - Please continue to take statin  - Please follow up with your primary care physician within 1-2 weeks after discharge for medication adjustments.    Diagnosis: Dysphagia  Assessment and Plan of Treatment: - Speech and swallow team saw you during this admission and recommended the followin) Soft diet and thin liquids.   2) Set up assistance and intermittent feeding supervision. Ensure eating/drinking upright at 90 degrees, small single sips, slow rate, chew completly.  3) Excellent oral care and aspiration precautions. Monitor for signs and symptoms of aspiration or penetration (coughing, choking, wet/gurgly vocal qualiy). Please notify your primary care physician if any signs are observed.      Diagnosis: Urinary retention  Assessment and Plan of Treatment: - Please continue cardura at home for urinary retention.  - Please follow up with your primary care physician within 1-2 weeks after discharge for medication adjustments.  - You were found to have thickening of your bladder and prostatic enlargement on imaging. Please follow up with urology within 2 weeks of discharge for further workup.    Diagnosis: Ischemic stroke  Assessment and Plan of Treatment: Right MCA stroke   Continue Aspirin  Follow up with stroke neurology    Diagnosis: Hypertension  Assessment and Plan of Treatment: - Please continue to take Imdur, clonidine, and hydralazine & Nifedipine for blood pressure control.  - Please follow up with your primary care physician within 1-2 weeks after discharge for medication adjustments.     PRINCIPAL DISCHARGE DIAGNOSIS  Diagnosis: Cerebral aneurysm  Assessment and Plan of Treatment: - You underwent cerebral angiogram for right PCOMM aneurysm coiling on 10/12/2021. You were part of [facility]T trial.   - You have a skin puncture on your groin where the procedure was done, please monitor for excessive bruising, bleeding, palpable mass under the site and seek medical advice if present.  - Please take over the counter stool softeners/laxatives to prevent constipation.  - Please have family to supervise and assist while walking to prevent falls and rolling walker is providered for balance.  - Please follow up with Dr. Greenberg within 2 weeks after discharge for post angiogram evaluations.  - Please also follow up with stroke  neurology within 2 weeks after discharge.  Please return to the ED if you have any dizziness, imbalance, significant changes in mental status, loss of consciousness or worsening headaches. Please take all medications as directed.      SECONDARY DISCHARGE DIAGNOSES  Diagnosis: Hypertension  Assessment and Plan of Treatment: You have very elevated blood pressure requiring several different medications which were changed during this admission.   - Please continue to take Imdur, clonidine, and hydralazine & Nifedipine for blood pressure control.  - Please follow up with your primary care physician within 1-2 weeks after discharge for medication adjustments.    Diagnosis: Stage 4 chronic kidney disease  Assessment and Plan of Treatment: You have a history of chronic kidney disease.   - Your creatinine level is at baseline 3.0  - You should continue to take your bicarbonate tabs.   - You should have your kidney doctor do a repeat BMP weekly while in rehab.   - Please follow up with your kidney doctor, Dr. Hernadez, within 1-2 weeks after discharge.    Diagnosis: NSVT (nonsustained ventricular tachycardia)  Assessment and Plan of Treatment: - Your heart rate was very slow so you were not getting your metoprolo. After a while you were not having any further episodes of this arrhythmia.   - Please follow up with cardiology, Dr. Evangelista, within 1-2 weeks after discharge.    Diagnosis: Hyperlipidemia  Assessment and Plan of Treatment: - Please continue to take statin  - Please follow up with your primary care physician within 1-2 weeks after discharge for medication adjustments.    Diagnosis: Dysphagia  Assessment and Plan of Treatment: - Speech and swallow team saw you during this admission and recommended the followin) Soft diet and thin liquids.   2) Set up assistance and intermittent feeding supervision. Ensure eating/drinking upright at 90 degrees, small single sips, slow rate, chew completly.  3) Excellent oral care and aspiration precautions. Monitor for signs and symptoms of aspiration or penetration (coughing, choking, wet/gurgly vocal qualiy). Please notify your primary care physician if any signs are observed.      Diagnosis: Urinary retention  Assessment and Plan of Treatment: - Please continue cardura at home for urinary retention.  - Please follow up with your primary care physician within 1-2 weeks after discharge for medication adjustments.  - You were found to have thickening of your bladder and prostatic enlargement on imaging. Please follow up with urology within 2 weeks of discharge for further workup.    Diagnosis: Ischemic stroke  Assessment and Plan of Treatment: Right MCA stroke   Continue Aspirin  Follow up with stroke neurology    Diagnosis: Lesion of liver with high risk for malignant neoplasm  Assessment and Plan of Treatment: While you were admitted for your PCOMM aneurysm, you had abdominal imaging done which found a liver lesion concerning for cholangiocarcinoma along with inflamed lymph nodes. Interventional radiology was consulted for a biopsy but decided against a biopsy as you were on dual antiplatelet therapy and at an increased risk for bleeding. The advanced gastroenterology team also decided against a biopsy as they were afraid of the cancer possibly seeding (or spreading) through the biopsy. Instead, we decided to have you undergo a biopsy at a later time after you were off dual antiplatelet therapy. We opted for a colonoscopy to see if you had any colonic masses or metastases from the liver lesion. We were unable to complete the colonoscopy as your colon was too narrow for the colonoscope to pass; because of this, radiology decided on performing a CT colonography, or a virtual colonoscopy, which did not find any obvious masses. There was one area of a possible soft tissue lesion. A recommendation was made for an outpatient PET CT or CT with contrast based on these results.  - Please follow up with Dr. Roe of Surgical Oncology in 2 weeks for further treatment plans   - Please follow up with GI for endoscopy & biopsy of your lymph nodes.

## 2021-10-14 NOTE — PROGRESS NOTE ADULT - SUBJECTIVE AND OBJECTIVE BOX
Patient seen and examined at bedside.    --Anticoagulation--  aspirin  chewable 81 milliGRAM(s) Oral daily  clopidogrel Tablet 75 milliGRAM(s) Oral daily  heparin   Injectable 5000 Unit(s) SubCutaneous every 12 hours    T(C): 36.2 (10-13-21 @ 23:00), Max: 36.8 (10-13-21 @ 07:00)  HR: 65 (10-14-21 @ 02:00) (47 - 94)  BP: 134/67 (10-14-21 @ 02:00) (134/67 - 181/87)  RR: 19 (10-14-21 @ 02:00) (12 - 25)  SpO2: 96% (10-14-21 @ 02:00) (88% - 100%)  Wt(kg): --    Exam:  AOX3, no facial, FC, RUE 5/5, LUE 4+/5, mild LUE drift, b/l LE 5/5    Labs:                        10.5   13.25 )-----------( 696      ( 13 Oct 2021 20:51 )             33.6     10-13    138  |  108  |  49<H>  ----------------------------<  125<H>  4.4   |  15<L>  |  3.06<H>    Ca    9.2      13 Oct 2021 20:52  Phos  3.8     10-13  Mg     2.0     10-13    TPro  x   /  Alb  x   /  TBili  0.3  /  DBili  x   /  AST  x   /  ALT  x   /  AlkPhos  x   10-12

## 2021-10-14 NOTE — PROGRESS NOTE ADULT - ASSESSMENT
70yoM w/ PMHx of CKD with a baseline Cr >3, HTN, HLD presented to OSH after multiple falls. He was transferred from OSH for MRI/MRA findings concerning for aneurysm vs. thrombus       1) CKD stage 4-  Likely has Hypertensive Nephropathy  B/l cr around 3mg/dl -> fluctuations expected  Ua and urine lytes reviewed--> +UTI- on cipro  urine pro/cr 1.4grams  Renal US --> right kidney 9.3cm and left 9.5 cm with b/l renal cysts  s/p Coiling for PCOMM  s/p IV nacl @ 50cc --> encourage po intake  monitor cr   avoid nephrotoxin such as ace/arb/nsaid  trend bmp    2) HTN-  bp high  nifedipine to 90mg po qd   clonidine inc to 0.2mg po tid  Trend bp    3) metabolic acidosis- likely from ckd  start na bicarb 650mg po tid  trend bicarb      Dr Hernadez   [phone]

## 2021-10-14 NOTE — PROGRESS NOTE ADULT - ASSESSMENT
70M s/p elective pcom aneurysm coiling doing well postop.   - pending xfer to floor  - BALT enrollment  - 1:1   - on IVF  - desats with some wheezing on duonebs  - PT home w/ home PT  - ARU / PRU therapeutic

## 2021-10-14 NOTE — DISCHARGE NOTE PROVIDER - PROVIDER TOKENS
PROVIDER:[TOKEN:[[id number]],FOLLOWUP:[2 weeks]],PROVIDER:[TOKEN:[[id number]],FOLLOWUP:[2 weeks]],PROVIDER:[TOKEN:[[id number]],FOLLOWUP:[2 weeks]],PROVIDER:[TOKEN:[[id number]],FOLLOWUP:[2 weeks]] PROVIDER:[TOKEN:[[id number]],FOLLOWUP:[2 weeks]],PROVIDER:[TOKEN:[[id number]],FOLLOWUP:[2 weeks]],PROVIDER:[TOKEN:[[id number]],FOLLOWUP:[2 weeks]],PROVIDER:[TOKEN:[[id number]],FOLLOWUP:[2 weeks]],PROVIDER:[TOKEN:[[id number]]] PROVIDER:[TOKEN:[[id number]],FOLLOWUP:[2 weeks]],PROVIDER:[TOKEN:[[id number]],FOLLOWUP:[2 weeks]],PROVIDER:[TOKEN:[[id number]],FOLLOWUP:[2 weeks]],PROVIDER:[TOKEN:[[id number]],FOLLOWUP:[2 weeks]],PROVIDER:[TOKEN:[[id number]]],PROVIDER:[TOKEN:[[id number]]] PROVIDER:[TOKEN:[[id number]],FOLLOWUP:[2 weeks]],PROVIDER:[TOKEN:[[id number]],FOLLOWUP:[2 weeks]],PROVIDER:[TOKEN:[[id number]],FOLLOWUP:[2 weeks]],PROVIDER:[TOKEN:[[id number]],FOLLOWUP:[2 weeks]],PROVIDER:[TOKEN:[[id number]],FOLLOWUP:[2 weeks]],PROVIDER:[TOKEN:[[id number]],FOLLOWUP:[1 week]] PROVIDER:[TOKEN:[[id number]],FOLLOWUP:[2 weeks]],PROVIDER:[TOKEN:[[id number]],FOLLOWUP:[2 weeks]],PROVIDER:[TOKEN:[[id number]],FOLLOWUP:[2 weeks]],PROVIDER:[TOKEN:[[id number]],FOLLOWUP:[2 weeks]],PROVIDER:[TOKEN:[[id number]],FOLLOWUP:[1 week]],PROVIDER:[TOKEN:[[id number]],FOLLOWUP:[2 weeks],ESTABLISHEDPATIENT:[T]]

## 2021-10-14 NOTE — PROGRESS NOTE ADULT - SUBJECTIVE AND OBJECTIVE BOX
Neurology Progress Note    S: Patient seen and examined in neuro ICU. s/p angio with dr. newby doing well. s/p coil ARU/PRU therapeutic     Medication:  MEDICATIONS  (STANDING):  aspirin  chewable 81 milliGRAM(s) Oral daily  atorvastatin 20 milliGRAM(s) Oral at bedtime  chlorhexidine 4% Liquid 1 Application(s) Topical <User Schedule>  ciprofloxacin     Tablet 250 milliGRAM(s) Oral every 12 hours  cloNIDine 0.2 milliGRAM(s) Oral every 8 hours  clopidogrel Tablet 75 milliGRAM(s) Oral daily  doxazosin 4 milliGRAM(s) Oral at bedtime  heparin   Injectable 5000 Unit(s) SubCutaneous every 12 hours  hydrALAZINE 100 milliGRAM(s) Oral three times a day  influenza   Vaccine 0.5 milliLiter(s) IntraMuscular once  isosorbide   mononitrate ER Tablet (IMDUR) 60 milliGRAM(s) Oral daily  NIFEdipine XL 90 milliGRAM(s) Oral daily  polyethylene glycol 3350 17 Gram(s) Oral every 12 hours  senna 2 Tablet(s) Oral at bedtime    MEDICATIONS  (PRN):  acetaminophen    Suspension .. 650 milliGRAM(s) Oral every 6 hours PRN Mild Pain (1 - 3)  oxyCODONE    IR 5 milliGRAM(s) Oral every 4 hours PRN Moderate Pain (4 - 6)  oxyCODONE    IR 10 milliGRAM(s) Oral every 4 hours PRN Severe Pain (7 - 10)      Vitals:  ICU Vital Signs Last 24 Hrs  T(C): 36.5 (14 Oct 2021 07:00), Max: 36.5 (14 Oct 2021 07:00)  T(F): 97.7 (14 Oct 2021 07:00), Max: 97.7 (14 Oct 2021 07:00)  HR: 58 (14 Oct 2021 08:00) (47 - 94)  BP: 150/86 (14 Oct 2021 08:00) (134/67 - 181/88)  BP(mean): 105 (14 Oct 2021 08:00) (84 - 148)  ABP: 166/55 (13 Oct 2021 21:00) (118/51 - 166/55)  ABP(mean): 88 (13 Oct 2021 21:00) (69 - 92)  RR: 19 (14 Oct 2021 08:00) (12 - 25)  SpO2: 100% (14 Oct 2021 08:00) (90% - 100%)          General Exam:   General Appearance: Appropriately dressed and in no acute distress       Head: Normocephalic, atraumatic and no dysmorphic features  Ear, Nose, and Throat: Moist mucous membranes  CVS: S1S2+  Resp: No SOB, no wheeze or rhonchi  Abd: soft NTND  Extremities: No edema, no cyanosis  Skin: No bruises, no rashes     Neurological Exam:  Mental Status: Awake, alert and oriented x 3.  Able to follow simple and complex verbal commands. Able to name and repeat. fluent speech. No obvious aphasia or dysarthria noted.   Cranial Nerves: PERRL, EOMI, VFFC, sensation V1-V3 intact,  no obvious facial asymmetry , equal elevation of palate, scm/trap 5/5, tongue is midline on protrusion. no obvious papilledema on fundoscopic exam. Hearing is grossly intact.   Motor: Normal bulk, tone and strength throughout. Fine finger movements were intact and symmetric. no tremors or drift noted except LUE with 4+/5 mild drift noted   Sensation: Intact to light touch and pinprick throughout. no right/left confusion. no extinction to tactile on DSS.    Reflexes: 1+ throughout at biceps, brachioradialis, triceps, patellars and ankles bilaterally and equal. No clonus. R toe and L toe were both downgoing.  Coordination: No dysmetria on FNF   Gait: deferred in ICU    I personally reviewed the below data/images/labs:      CBC Full  -  ( 13 Oct 2021 20:51 )  WBC Count : 13.25 K/uL  RBC Count : 3.57 M/uL  Hemoglobin : 10.5 g/dL  Hematocrit : 33.6 %  Platelet Count - Automated : 696 K/uL  Mean Cell Volume : 94.1 fl  Mean Cell Hemoglobin : 29.4 pg  Mean Cell Hemoglobin Concentration : 31.3 gm/dL  Auto Neutrophil # : x  Auto Lymphocyte # : x  Auto Monocyte # : x  Auto Eosinophil # : x  Auto Basophil # : x  Auto Neutrophil % : x  Auto Lymphocyte % : x  Auto Monocyte % : x  Auto Eosinophil % : x  Auto Basophil % : x      10-13    138  |  108  |  49<H>  ----------------------------<  125<H>  4.4   |  15<L>  |  3.06<H>    Ca    9.2      13 Oct 2021 20:52  Phos  3.8     10-13  Mg     2.0     10-13        < from: CT Head No Cont (10.01.21 @ 18:37) >    EXAM:  CT BRAIN                            PROCEDURE DATE:  10/01/2021          INTERPRETATION:  CT OF THE HEAD WITHOUT CONTRAST    CLINICAL INDICATION: Weakness. Falls.    TECHNIQUE: Volumetric CT acquisition was performed through the brain and reviewed using brain and bone window technique. Dose optimization techniques were utilized including kVp/mA modulation along with iterative reconstructions.      COMPARISON: No prior studies have been submitted for comparison.    FINDINGS:    The ventricular and sulcal size and configuration is age appropriate.   There is no acute loss of gray-white differentiation. There are extensive patchy and confluent areas of hypodensity in the periventricular and subcortical white matter which are non-specific but likely related  chronic microangiopathic ischemic changes.  Small chronic infarction in the right cerebellar hemisphere. Chronic appearing left thalamic lacunar infarct.    There is no evidence of mass effect, midline shift, acute intracranial hemorrhage, or extra-axial collections.     The calvarium is intact. The paranasal sinuses are clear.The mastoid air cells are predominantly clear. The orbits are unremarkable.      IMPRESSION:  No acute intracranial hemorrhage or acute territorial infarction. Extensive chronic microvascular ischemic changes and several chronic infarctions as described. Further evaluation may be obtained with MRI of the brain if no contraindications.    --- End of Report ---            KAMILA SZYMANSKI MD; Attending Radiologist  This document has been electronically signed. Oct  1 2021  7:25PM    < end of copied text >  < from: MR Head No Cont (10.04.21 @ 18:09) >    EXAM:  MR BRAIN                            PROCEDURE DATE:  10/04/2021          INTERPRETATION:  .    CLINICAL INFORMATION: Frequent falls.    TECHNIQUE: Multiplanar multisequential MRI of the brain was acquired without the administration of IV gadolinium.    COMPARISON: Prior CT examination of the head dated 10/1/2021.    FINDINGS: Multiple areas of restricted diffusion are seen within the right MCA territory affecting the right frontal, parietal, and temporal lobes. Associated T2/FLAIR prolongation is seen, compatible with cytotoxic edema. No hemorrhagic transformation is noted.    Chronic lacunar infarcts are again seen within the right cerebellar hemisphere, left thalamus, and bilateral basal ganglia.    Multiple patchy confluent nonspecific T2/FLAIR hyperintense signal changes are noted throughout the bihemispheric white matter without associated mass effect or restricted diffusion.    Mild ventriculomegaly appears unchanged. No abnormal intra-axial fluid collections are notable. Flow-voids are noted throughout the major intracranial vessels, on the T2 weighted images, consistent with their patency. The sellar area appears unremarkable. The paranasal sinuses and mastoid air cells are grossly clear. Calvarial signal appears unremarkable. The orbits appear within normal limits.    IMPRESSION: Acute/subacute right-sided MCA distribution infarct with associated cytotoxic edema and without hemorrhagic transformation.    Multiple additional chronic lacunar infarcts and similar-appearing extensive chronic white matter microvascular type changes, as discussed.      --- End of Report ---            LUIS CARLOS BONNER MD; Attending Radiologist  This document has been electronically signed. Oct  5 2021  3:01PM    < end of copied text >  < from: MR Angio Head No Cont (10.06.21 @ 18:15) >    EXAM:  MR ANGIO BRAIN                            PROCEDURE DATE:  10/06/2021          INTERPRETATION:  INDICATION:  Right MCA infarct.    TECHNIQUE:  MR angiography of the brain was performed using three dimensional time-of-flight (3D-TOF) technique.  Imaging was performed on a 1.5 Lisa magnet.    FINDINGS:    INTERNAL CAROTID ARTERIES:  Bilaterally patent.    [place] [facility]:  A right-sided P-comm aneurysm is identified. The aneurysm is directed posteriorly and laterally, the aneurysm appears to be septated and/or bilobed., and measures 8.9 x 7.3 mm.    CEREBRAL ARTERIES:  Both anterior cerebral arteries are patent. Both A1 segments are well formed. Both middle cerebral arteries are patent. There is mild narrowing of the proximal right M1 segment.    VERTEBROBASILAR SYSTEM:  The vertebrobasilar system is patent. There are large posterior communicating arteries bilaterally with dominant supply of the posterior cerebral arteries. Left P1 segment is hypoplastic.    IMPRESSION:    1. 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  2. Mild narrowing of the proximal right middle cerebral artery in the M1 region.  3. The vessels are otherwise patent, as outlined above.    --- End of Report ---            MONSE CLEMENT MD; Attending Radiologist  This document has been electronically signed. Oct  6 2021  7:11PM    < end of copied text >  < from: CT Head No Cont (10.13.21 @ 09:28) >    EXAM:  CT BRAIN                            PROCEDURE DATE:  10/13/2021            INTERPRETATION:  Noncontrast CT of the brain.    CLINICAL INDICATION: Status post rt pcomm aneurysm coiling    TECHNIQUE : Axial CT scanning of the brain was obtainedfrom the skull base to the vertex without the administration of intravenous contrast. Sagittal and coronal reformats were provided.    COMPARISON: CT brain 10/1/2021. MRI brain 10/4/2021    FINDINGS:    Interval placement of embolic coil mass right parasellar region.    Similar mild ventricular dilatation.    No midline shift. Basal cisterns poorly evaluated due to streak artifact.    No acute intracranial hemorrhage, mass effect, or brain edema. Extensive white matter microvascular ischemic disease.    The visualized paranasal sinuses and mastoid air cells are clear.    IMPRESSION:    Interval placement of embolic coil mass right parasellar region.  No acute intracranial hemorrhage, mass effect, or brain edema.  Similar mild ventricular dilatation.        --- End of Report ---                AFIA DAILY MD; Attending Radiologist  This document has been electronically signed. Oct 13 2021  9:31AM    < end of copied text >

## 2021-10-14 NOTE — PROGRESS NOTE ADULT - SUBJECTIVE AND OBJECTIVE BOX
Patient seen and examined  no complaints    No Known Allergies    Hospital Medications:   MEDICATIONS  (STANDING):  aspirin  chewable 81 milliGRAM(s) Oral daily  atorvastatin 20 milliGRAM(s) Oral at bedtime  chlorhexidine 4% Liquid 1 Application(s) Topical <User Schedule>  ciprofloxacin     Tablet 250 milliGRAM(s) Oral every 12 hours  cloNIDine 0.2 milliGRAM(s) Oral every 8 hours  clopidogrel Tablet 75 milliGRAM(s) Oral daily  doxazosin 4 milliGRAM(s) Oral at bedtime  heparin   Injectable 5000 Unit(s) SubCutaneous every 12 hours  hydrALAZINE 100 milliGRAM(s) Oral three times a day  influenza   Vaccine 0.5 milliLiter(s) IntraMuscular once  isosorbide   mononitrate ER Tablet (IMDUR) 60 milliGRAM(s) Oral daily  metoprolol succinate ER 12.5 milliGRAM(s) Oral two times a day  NIFEdipine XL 90 milliGRAM(s) Oral daily  polyethylene glycol 3350 17 Gram(s) Oral every 12 hours  senna 2 Tablet(s) Oral at bedtime        VITALS:  T(F): 97.7 (10-14-21 @ 11:00), Max: 97.7 (10-14-21 @ 07:00)  HR: 75 (10-14-21 @ 12:00)  BP: 162/80 (10-14-21 @ 12:00)  RR: 20 (10-14-21 @ 12:00)  SpO2: 93% (10-14-21 @ 12:00)  Wt(kg): --    10-13 @ :  -  10-14 @ 07:00  --------------------------------------------------------  IN: 1129 mL / OUT: 1650 mL / NET: -521 mL    10-14 @ :  -  10-14 @ 12:12  --------------------------------------------------------  IN: 240 mL / OUT: 300 mL / NET: -60 mL        PHYSICAL EXAM:  Constitutional: NAD  HEENT: anicteric sclera, oropharynx clear, MMM  Neck: No JVD  Respiratory: CTAB, no wheezes, rales or rhonchi  Cardiovascular: S1, S2, RRR  Gastrointestinal: BS+, soft, NT/ND  Extremities: No cyanosis or clubbing. No peripheral edema  Neurological: A/O x 3, no focal deficits  Psychiatric: Normal mood, normal affect    LABS:  10-13    138  |  108  |  49<H>  ----------------------------<  125<H>  4.4   |  15<L>  |  3.06<H>    Ca    9.2      13 Oct 2021 20:52  Phos  3.8     10-13  Mg     2.0     10-13      Creatinine Trend: 3.06 <--, 3.13 <--, 2.82 <--, 2.78 <--, 2.90 <--, 3.07 <--, 3.03 <--                        10.5   13. )-----------( 696      ( 13 Oct 2021 20:51 )             33.6     Urine Studies:  Urinalysis Basic - ( 10 Oct 2021 21:03 )    Color: Yellow / Appearance: Slightly Turbid / S.016 / pH:   Gluc:  / Ketone: Negative  / Bili: Negative / Urobili: Negative   Blood:  / Protein: 300 mg/dL / Nitrite: Positive   Leuk Esterase: Large / RBC: 2 /hpf /  /HPF   Sq Epi:  / Non Sq Epi: 1 /hpf / Bacteria: Many      Creatinine, Random Urine: 128 mg/dL (10-10 @ 21:03)  Protein/Creatinine Ratio Calculation: 1.4 Ratio (10-10 @ 21:03)  Potassium, Random Urine: 43 mmol/L (10-10 @ 21:03)  Osmolality, Random Urine: 427 mos/kg (10-10 @ 21:03)  Sodium, Random Urine: 34 mmol/L (10-10 @ 21:03)  Creatinine, Random Urine: 127 mg/dL (10-10 @ 21:03)  Chloride, Random Urine: 24 mmol/L (10-10 @ 21:03)    RADIOLOGY & ADDITIONAL STUDIES:

## 2021-10-14 NOTE — DISCHARGE NOTE PROVIDER - NSDCFUADDAPPT_GEN_ALL_CORE_FT
Plavix can be stopped  10/30/21 ( 3 weeks total)   Follow up with [facility] for EGD/ EUS biopsy lymphnodes  Plavix can be stopped 10/30/21 ( 3 weeks total)   Follow up with [facility] outpatient for EGD/ EUS biopsy lymph nodes  Plavix can be stopped 10/30/21 (3 weeks total)   Follow up with [facility] outpatient for EGD/ EUS biopsy lymph nodes  Plavix can be stopped 10/30/21 (3 weeks total)   Follow up with [facility] outpatient for EGD/ EUS biopsy lymph nodes     The [facility]:   [address] [place] [address].   Appointment: Dr. Leiva November 17th at 1:40 PM   Please Call [phone] prior to Attendance to address the insurance issues and contact your insurance company to address coverage.

## 2021-10-14 NOTE — DISCHARGE NOTE PROVIDER - NSFOLLOWUPCLINICS_GEN_ALL_ED_FT
[facility] General Internal Medicine  General Internal Medicine  [address] [id number]  Phone: [phone]  Fax:   Follow Up Time: 2 weeks

## 2021-10-14 NOTE — PROGRESS NOTE ADULT - ASSESSMENT
70yoM w/ PMHx of CKD IV (unknown baseline), uncontrolled HTN, ?CAD (patient states he has "heart problems;" TTE performed at [facility] on 10/5 w/ normal LVF, severely enlarged LA, Grade III diastolic dysfunction, mild pulm HTN) who was transfered from [facility] to where he presented w/ hx of multiple falls, found to have acute right MCA territory ischemic infarct w/ extensive chronic microvascular ischemic changes on CT, acute/subacute right-sided MCA distribution infarct with associated cytotoxic edema, on MRI, and Pcomm aneurysm vs thrombus [id number], mild R M1 stenosis on MRA.

## 2021-10-14 NOTE — DISCHARGE NOTE PROVIDER - HOSPITAL COURSE
A 70 year old male with past medical history of CKD with a baseline Cr >3, HTN, HLD presented to OSH after multiple falls. He was transferred from OSH for MRI/MRA findings concerning for aneurysm vs. thrombus with multiple infarcts. He was slightly confused, but denies pain, headache, blurry vision, nausea, vomiting, weakness, and changes in sensation. CTH on 10/1 hydro likely chronic, MRI 10/4 right-sided acute vs. subacute MCA infarcts, Carotid U/S 10/6 no sig stenosis, MRA on 10/6 showed Rt pcomm aneurysm with possible intra-aneurysmal thrombus [id number], mild R M1 stenosis. Patient was admitted under neurosurgery service on 10/9/2021. Hospitalist saw patient upon admission and cleared patient for cerebral angiogram procedure. A 70 year old male with past medical history of CKD with a baseline Cr >3, HTN, HLD presented to OSH after multiple falls. He was transferred from OSH for MRI/MRA findings concerning for aneurysm vs. thrombus with multiple infarcts. He was slightly confused, but denies pain, headache, blurry vision, nausea, vomiting, weakness, and changes in sensation. CTH on 10/1 hydro likely chronic, MRI 10/4 right-sided acute vs. subacute MCA infarcts, Carotid U/S 10/6 no sig stenosis, MRA on 10/6 showed Rt pcomm aneurysm with possible intra-aneurysmal thrombus [id number], mild R M1 stenosis. Patient was admitted under neurosurgery service on 10/9/2021. Neurology was consulted and patient was started on ASA/Plavix. Hospitalist saw patient upon admission and cleared patient for cerebral angiogram procedure. Nephrology was also consulted prior to angiogram given history of stage 4 CKD. Patient then underwent cerebral angiogram for Rt Pcomm aneurysm coiling on 10/12/2021. Patient was then closely monitored in NSCU after procedure.    Patient's blood pressure regimens were adjusted in NSCU. Post angiogram creatinine was stable at baseline. Hospital course was complicated with UTI and patient was treated with antibiotics.      Physical therapy and occupational therapy evaluations were appreciated and recommended home PT/OT with rolling walker. On the day of discharge, patient was medically cleared and stable for discharge.    More than 30 minutes were spent educating the patient and family regarding condition, medications, follow up plans, signs and symptoms to be concerned with, preparing paperwork, and questions answered regarding discharge. 69 yo male with CKD (baseline Cr > 3), HTN, and HLD who presented as a transfer from [facility] for higher level of care. Patient initially presented to [facility] on 10/01 after multiple falls the since the day prior. MRI Head at [facility] showed acute vs subacute R MCA territory infarct with associated cytotoxic edema. MRA H showed multilobulated right-sided P-comm aneurysm directed posteriorly and laterally, measuring 8.9 x 7.3 mm.TTE: EF 65-70%, severely enlarged L atrium. CT abdomen 10/11  reveals large hypodense 8.5 cm hepatic lesion concerning for malignancy .    10/12 s/p coiling right PCOMM aneurysm. Post op CT head with evolution of previous infarcts in right MCA, now with new acute lacunar infarctions in the right temporal lobe and cerebellum. Remains on  DAPT . Seen &  followed by nephrology. Slight worsening in renal function after angiogram which corrected back to baseline. Avoid nephrotoxic agents . Seen by general surgery for liver lesion MR abdomen 10/22/21            No acute surgical intervention Needs General surgery & GI follow up outpatient for further plans . 71 yo male with CKD (baseline Cr > 3), HTN, and HLD who presented as a transfer from [facility] for higher level of care. Patient initially presented to [facility] on 10/01 after multiple falls the since the day prior. MRI Head at [facility] showed acute vs subacute R MCA territory infarct with associated cytotoxic edema. MRA H showed multilobulated right-sided P-comm aneurysm directed posteriorly and laterally, measuring 8.9 x 7.3 mm.TTE: EF 65-70%, severely enlarged L atrium. CT abdomen 10/11  reveals large hypodense 8.5 cm hepatic lesion concerning for malignancy .    10/12 s/p coiling right PCOMM aneurysm. Post op CT head with evolution of previous infarcts in right MCA, now with new acute lacunar infarctions in the right temporal lobe and cerebellum. Remains on  DAPT . Seen &  followed by nephrology. Slight worsening in renal function after angiogram which corrected back to baseline. Avoid nephrotoxic agents . Seen by Surgical oncology  for liver lesion MR abdomen 10/22/21. MR abd- Central hepatic mass primary differential consideration is cholangiocarcinoma. Adenopathy in the peripancreatic, savage hepatis and portacaval regions.Advanced GI consulted Plans for colonoscopy 10/27/21. Outpatient EGD/ EUS biopsy lymph node as outpatient once plavix held for 5 days . GI recommends against biopsy of liver lesion.               No acute surgical intervention Needs surgical oncology  & GI follow up outpatient for further plans . 71 yo male with CKD (baseline Cr > 3), HTN, and HLD who presented as a transfer from [facility] for higher level of care. Patient initially presented to [facility] on 10/01 after multiple falls the since the day prior. MRI Head at [facility] showed acute vs subacute R MCA territory infarct with associated cytotoxic edema. MRA H showed multilobulated right-sided P-comm aneurysm directed posteriorly and laterally, measuring 8.9 x 7.3 mm, s/p coiling as of 10/12. Post op CT head with evolution of previous infarcts in right MCA, now with new acute lacunar infarctions in the right temporal lobe and cerebellum. Remains on  DAPT. TTE: EF 65-70%, severely enlarged L atrium. CT abdomen 10/11 revealed a large hypodense 8.5 cm hepatic lesion concerning for malignancy. MR abd significant for central hepatic mass, primary differential consideration cholangiocarcinoma. Adenopathy in the peripancreatic, savage hepatis and portacaval regions. IR consulted for bx, advised against as patient is currently on DAPT and requires at least 3 weeks of therapy per neurosurgery. Advanced GI consulted, recommended against EUS/FNA out of c/f seeding. Plans for colonoscopy for 10/29 to assess for primary lesion.  Hematology/oncology following. M-spike was found to be elevated at 0.6, c/f MGUS and [place]/Lambda free light chains sent, found to be slightly elevated, continued monitoring per nephrology. Serum IgA, IgM, and IgG wnl. Elevated CA 19-9, and CEA with nml AFP on labs. Slight worsening in renal function after angiogram which corrected back to baseline. Seen & followed by nephrology. Seen by Surgical oncology  for liver lesion MR abdomen 10/22/21, recommended no surgical intervention at this time.     Outpatient EGD/ EUS biopsy lymph node as outpatient once Plavix held for 5 days. Patient requires surgical oncology & GI follow up outpatient for further plans.     Patient is now hemodynamically stable and medically optimized for discharge to ?[facility]? with referrals to appropriate clinics. 71 yo male with CKD (baseline Cr > 3), HTN, and HLD who presented as a transfer from [facility] for higher level of care. Patient initially presented to [facility] on 10/01 after multiple falls the since the day prior. MRI Head at [facility] showed acute vs subacute R MCA territory infarct with associated cytotoxic edema. MRA H showed multilobulated right-sided P-comm aneurysm directed posteriorly and laterally, measuring 8.9 x 7.3 mm, s/p coiling as of 10/12. Post op CT head with evolution of previous infarcts in right MCA, now with new acute lacunar infarctions in the right temporal lobe and cerebellum. Remains on  DAPT. TTE: EF 65-70%, severely enlarged L atrium. CT abdomen 10/11 revealed a large hypodense 8.5 cm hepatic lesion concerning for malignancy. MR abd significant for central hepatic mass, primary differential consideration cholangiocarcinoma. Adenopathy in the peripancreatic, savage hepatis and portacaval regions. IR consulted for bx, advised against as patient is currently on DAPT and requires at least 3 weeks of therapy per neurosurgery. Advanced GI consulted, recommended against EUS/FNA out of c/f seeding. Plans for colonoscopy for 10/29 to assess for primary lesion.  Hematology/oncology following. M-spike was found to be elevated at 0.6, c/f MGUS and [place]/Lambda free light chains sent, found to be slightly elevated, continued monitoring per nephrology. Serum IgA, IgM, and IgG wnl. Elevated CA 19-9, and CEA with nml AFP on labs. Slight worsening in renal function after angiogram which corrected back to baseline. Seen & followed by nephrology. Seen by Surgical oncology  for liver lesion MR abdomen 10/22/21, recommended no surgical intervention at this time. Attempt at colonoscopy on 10/30 unsuccessful as colonic angle too acute, converted to flex-sig and plan was made for CT colonography. Now s/p CT colonography, which showed no intrinsic colonic polyp or mass, questionable soft tissue lesion along R colon, vague low-density fociu in L hepatic lobe lateral segment new from prior possibly related to streak artifact, as well as circumferential bladder wall thickening and prostatomegaly. Recs made for PET CT vs. contrast-enganced CT A/P for further eval.     Outpatient EGD/ EUS biopsy lymph node as outpatient once Plavix held for 5 days. Patient requires surgical oncology & GI follow up outpatient for further plans.     Patient is now hemodynamically stable and medically optimized for discharge to [facility] with referrals to appropriate clinics.

## 2021-10-14 NOTE — PROGRESS NOTE ADULT - SUBJECTIVE AND OBJECTIVE BOX
Subjective: Patient seen and examined. No new events except as noted.   remains in NSCU   5 beats NSVT   feels ok   no cp or sob     REVIEW OF SYSTEMS:    CONSTITUTIONAL: +weakness, fevers or chills  EYES/ENT: No visual changes;  No vertigo or throat pain   NECK: No pain or stiffness  RESPIRATORY: No cough, wheezing, hemoptysis; No shortness of breath  CARDIOVASCULAR: No chest pain or palpitations  GASTROINTESTINAL: No abdominal or epigastric pain. No nausea, vomiting, or hematemesis; No diarrhea or constipation. No melena or hematochezia.  GENITOURINARY: No dysuria, frequency or hematuria  NEUROLOGICAL: No numbness or weakness  SKIN: No itching, burning, rashes, or lesions   All other review of systems is negative unless indicated above.    MEDICATIONS:  MEDICATIONS  (STANDING):  aspirin  chewable 81 milliGRAM(s) Oral daily  atorvastatin 20 milliGRAM(s) Oral at bedtime  chlorhexidine 4% Liquid 1 Application(s) Topical <User Schedule>  ciprofloxacin     Tablet 250 milliGRAM(s) Oral every 12 hours  cloNIDine 0.2 milliGRAM(s) Oral every 8 hours  clopidogrel Tablet 75 milliGRAM(s) Oral daily  doxazosin 4 milliGRAM(s) Oral at bedtime  heparin   Injectable 5000 Unit(s) SubCutaneous every 12 hours  hydrALAZINE 100 milliGRAM(s) Oral three times a day  influenza   Vaccine 0.5 milliLiter(s) IntraMuscular once  isosorbide   mononitrate ER Tablet (IMDUR) 60 milliGRAM(s) Oral daily  metoprolol succinate ER 12.5 milliGRAM(s) Oral two times a day  NIFEdipine XL 90 milliGRAM(s) Oral daily  polyethylene glycol 3350 17 Gram(s) Oral every 12 hours  senna 2 Tablet(s) Oral at bedtime      PHYSICAL EXAM:  T(C): 36.5 (10-14-21 @ 07:00), Max: 36.5 (10-14-21 @ 07:00)  HR: 58 (10-14-21 @ 08:00) (49 - 94)  BP: 150/86 (10-14-21 @ 08:00) (134/67 - 181/88)  RR: 19 (10-14-21 @ 08:00) (12 - 25)  SpO2: 100% (10-14-21 @ 08:00) (90% - 100%)  Wt(kg): --  I&O's Summary    13 Oct 2021 07:01  -  14 Oct 2021 07:00  --------------------------------------------------------  IN: 1129 mL / OUT: 1650 mL / NET: -521 mL        Appearance: NAD	  HEENT:   Dry  oral mucosa, PERRL, EOMI	  Lymphatic: No lymphadenopathy , no edema  Cardiovascular: Normal S1 S2, No JVD, No murmurs , Peripheral pulses palpable 2+ bilaterally  Respiratory: Lungs clear to auscultation, normal effort 	  Gastrointestinal:  Soft, Non-tender, + BS	  Skin: No rashes, No ecchymoses, No cyanosis, warm to touch  Musculoskeletal: Normal range of motion, normal strength  Psychiatry:  Mood & affect appropriate  Ext: No edema  Neurological: Awake, alert oriented to person, place and time, Following Commands, PERRL, EOMI, Face Symmetrical, Speech Fluent, Moving all extremities, LUE 4+/5, No Drift, Sensation to Light Touch Intact        LABS:    CARDIAC MARKERS:                                10.5   13.25 )-----------( 696      ( 13 Oct 2021 20:51 )             33.6     10-13    138  |  108  |  49<H>  ----------------------------<  125<H>  4.4   |  15<L>  |  3.06<H>    Ca    9.2      13 Oct 2021 20:52  Phos  3.8     10-13  Mg     2.0     10-13              TELEMETRY: 	  SR 4 beats WCT   ECG:  	  RADIOLOGY: < from: CT Head No Cont (10.13.21 @ 09:28) >    EXAM:  CT BRAIN                            PROCEDURE DATE:  10/13/2021            INTERPRETATION:  Noncontrast CT of the brain.    CLINICAL INDICATION: Status post rt pcomm aneurysm coiling    TECHNIQUE : Axial CT scanning of the brain was obtainedfrom the skull base to the vertex without the administration of intravenous contrast. Sagittal and coronal reformats were provided.    COMPARISON: CT brain 10/1/2021. MRI brain 10/4/2021    FINDINGS:    Interval placement of embolic coil mass right parasellar region.    Similar mild ventricular dilatation.    No midline shift. Basal cisterns poorly evaluated due to streak artifact.    No acute intracranial hemorrhage, mass effect, or brain edema. Extensive white matter microvascular ischemic disease.    The visualized paranasal sinuses and mastoid air cells are clear.    IMPRESSION:    Interval placement of embolic coil mass right parasellar region.  No acute intracranial hemorrhage, mass effect, or brain edema.  Similar mild ventricular dilatation.        --- End of Report ---                AFIA DAILY MD; Attending Radiologist  This document has been electronically signed. Oct 13 2021  9:31AM    < end of copied text >    DIAGNOSTIC TESTING:  [ ] Echocardiogram:  [ ]  Catheterization:  [ ] Stress Test:    OTHER:
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
## 2021-10-15 DIAGNOSIS — R93.89 ABNORMAL FINDINGS ON DIAGNOSTIC IMAGING OF OTHER SPECIFIED BODY STRUCTURES: ICD-10-CM

## 2021-10-15 DIAGNOSIS — I63.9 CEREBRAL INFARCTION, UNSPECIFIED: ICD-10-CM

## 2021-10-15 PROCEDURE — 71250 CT THORAX DX C-: CPT | Mod: 26

## 2021-10-15 PROCEDURE — 70551 MRI BRAIN STEM W/O DYE: CPT | Mod: 26

## 2021-10-15 PROCEDURE — 99232 SBSQ HOSP IP/OBS MODERATE 35: CPT

## 2021-10-15 RX ORDER — LACTULOSE 10 G/15ML
20 SOLUTION ORAL
Refills: 0 | Status: DISCONTINUED | OUTPATIENT
Start: 2021-10-15 | End: 2021-11-03

## 2021-10-15 RX ORDER — HYDRALAZINE HCL 50 MG
10 TABLET ORAL ONCE
Refills: 0 | Status: COMPLETED | OUTPATIENT
Start: 2021-10-15 | End: 2021-10-15

## 2021-10-15 RX ORDER — SODIUM BICARBONATE 1 MEQ/ML
650 SYRINGE (ML) INTRAVENOUS THREE TIMES A DAY
Refills: 0 | Status: DISCONTINUED | OUTPATIENT
Start: 2021-10-15 | End: 2021-11-03

## 2021-10-15 RX ADMIN — Medication 250 MILLIGRAM(S): at 05:48

## 2021-10-15 RX ADMIN — LACTULOSE 20 GRAM(S): 10 SOLUTION ORAL at 17:57

## 2021-10-15 RX ADMIN — Medication 12.5 MILLIGRAM(S): at 05:52

## 2021-10-15 RX ADMIN — Medication 4 MILLIGRAM(S): at 21:31

## 2021-10-15 RX ADMIN — Medication 100 MILLIGRAM(S): at 05:48

## 2021-10-15 RX ADMIN — POLYETHYLENE GLYCOL 3350 17 GRAM(S): 17 POWDER, FOR SOLUTION ORAL at 05:49

## 2021-10-15 RX ADMIN — ISOSORBIDE MONONITRATE 60 MILLIGRAM(S): 60 TABLET, EXTENDED RELEASE ORAL at 13:43

## 2021-10-15 RX ADMIN — HEPARIN SODIUM 5000 UNIT(S): 5000 INJECTION INTRAVENOUS; SUBCUTANEOUS at 17:57

## 2021-10-15 RX ADMIN — Medication 10 MILLIGRAM(S): at 23:50

## 2021-10-15 RX ADMIN — Medication 650 MILLIGRAM(S): at 15:07

## 2021-10-15 RX ADMIN — HEPARIN SODIUM 5000 UNIT(S): 5000 INJECTION INTRAVENOUS; SUBCUTANEOUS at 05:48

## 2021-10-15 RX ADMIN — ATORVASTATIN CALCIUM 20 MILLIGRAM(S): 80 TABLET, FILM COATED ORAL at 21:31

## 2021-10-15 RX ADMIN — Medication 0.2 MILLIGRAM(S): at 05:48

## 2021-10-15 RX ADMIN — Medication 250 MILLIGRAM(S): at 17:57

## 2021-10-15 RX ADMIN — Medication 100 MILLIGRAM(S): at 15:07

## 2021-10-15 RX ADMIN — POLYETHYLENE GLYCOL 3350 17 GRAM(S): 17 POWDER, FOR SOLUTION ORAL at 18:01

## 2021-10-15 RX ADMIN — Medication 650 MILLIGRAM(S): at 22:23

## 2021-10-15 RX ADMIN — Medication 100 MILLIGRAM(S): at 21:31

## 2021-10-15 RX ADMIN — CLOPIDOGREL BISULFATE 75 MILLIGRAM(S): 75 TABLET, FILM COATED ORAL at 13:42

## 2021-10-15 RX ADMIN — Medication 0.2 MILLIGRAM(S): at 21:31

## 2021-10-15 RX ADMIN — Medication 0.2 MILLIGRAM(S): at 15:07

## 2021-10-15 RX ADMIN — Medication 81 MILLIGRAM(S): at 13:43

## 2021-10-15 RX ADMIN — SENNA PLUS 2 TABLET(S): 8.6 TABLET ORAL at 21:32

## 2021-10-15 RX ADMIN — Medication 5 MILLIGRAM(S): at 07:46

## 2021-10-15 NOTE — PROGRESS NOTE ADULT - PROBLEM SELECTOR PLAN 6
check orthostatics - uncontrolled at [facility]  - f/u Renal/Cards recs if further BP control required w/ close monitoring of hemodynamics.

## 2021-10-15 NOTE — PROGRESS NOTE ADULT - PROBLEM SELECTOR PLAN 1
- management per NSGY- plan for angio tomorrow. pt is optimized from a medical standpoint once optimized from a cardiology and renal perspective. pt is at risk for JUJU- IVF per Renal.  - continue telemetry monitoring. -s/p coiling of right PCOMM artery aneurysm 10/12. monitor clinically

## 2021-10-15 NOTE — PROGRESS NOTE ADULT - SUBJECTIVE AND OBJECTIVE BOX
SUBJECTIVE: Patient seen and examined at bedside. Denies any complaints at this time.     OVERNIGHT EVENTS: tx from NSCU     Vital Signs Last 24 Hrs  T(C): 36.4 (15 Oct 2021 07:40), Max: 36.9 (14 Oct 2021 15:00)  T(F): 97.6 (15 Oct 2021 07:40), Max: 98.5 (14 Oct 2021 15:00)  HR: 56 (15 Oct 2021 07:40) (56 - 77)  BP: 149/76 (15 Oct 2021 07:40) (149/76 - 168/82)  BP(mean): 111 (14 Oct 2021 23:45) (94 - 119)  RR: 18 (15 Oct 2021 07:40) (18 - 20)  SpO2: 98% (15 Oct 2021 07:40) (92% - 99%)    PHYSICAL EXAM:    General: No Acute Distress     Neurological: Awake, alert oriented to person, place and time, Following Commands, PERRL, EOMI, Face Symmetrical, Speech Fluent, Moving all extremities, LUE 4+/5, No Drift, Sensation to Light Touch Intact    Pulmonary: Clear to Auscultation, No Rales, No Rhonchi, No Wheezes     Cardiovascular: S1, S2, Regular Rate and Rhythm     Gastrointestinal: Soft, Nontender, Nondistended     Incision: c/d/i     LABS:                        11.6   12.88 )-----------( 729      ( 14 Oct 2021 21:54 )             36.5    10-14    137  |  106  |  40<H>  ----------------------------<  111<H>  4.5   |  16<L>  |  2.74<H>    Ca    9.8      14 Oct 2021 21:56  Phos  2.6     10-14  Mg     1.9     10-14      10-14 @ 07:01  -  10-15 @ 07:00  --------------------------------------------------------  IN: 830 mL / OUT: 1300 mL / NET: -470 mL    10-15 @ 07:01  -  10-15 @ 11:30  --------------------------------------------------------  IN: 0 mL / OUT: 304 mL / NET: -304 mL    MEDICATIONS  (STANDING):  aspirin  chewable 81 milliGRAM(s) Oral daily  atorvastatin 20 milliGRAM(s) Oral at bedtime  ciprofloxacin     Tablet 250 milliGRAM(s) Oral every 12 hours  cloNIDine 0.2 milliGRAM(s) Oral every 8 hours  clopidogrel Tablet 75 milliGRAM(s) Oral daily  doxazosin 4 milliGRAM(s) Oral at bedtime  heparin   Injectable 5000 Unit(s) SubCutaneous every 12 hours  hydrALAZINE 100 milliGRAM(s) Oral three times a day  influenza   Vaccine 0.5 milliLiter(s) IntraMuscular once  isosorbide   mononitrate ER Tablet (IMDUR) 60 milliGRAM(s) Oral daily  metoprolol succinate ER 12.5 milliGRAM(s) Oral two times a day  NIFEdipine XL 90 milliGRAM(s) Oral daily  polyethylene glycol 3350 17 Gram(s) Oral every 12 hours  senna 2 Tablet(s) Oral at bedtime    MEDICATIONS  (PRN):  acetaminophen    Suspension .. 650 milliGRAM(s) Oral every 6 hours PRN Mild Pain (1 - 3)  oxyCODONE    IR 5 milliGRAM(s) Oral every 4 hours PRN Moderate Pain (4 - 6)  oxyCODONE    IR 10 milliGRAM(s) Oral every 4 hours PRN Severe Pain (7 - 10)      DIET: soft renal diet     IMAGING: no new imaging

## 2021-10-15 NOTE — PROGRESS NOTE ADULT - PROBLEM SELECTOR PLAN 3
- baseline Cr 3 per Renal  - monitor UOP, renal function, electrolytes closely  - f/u ANCA, ARMIDA, renal US  - periop IVF per Renal - apprec renal input  - straight cath for urinary retention and monitor UO 13 bts. apprec cards input

## 2021-10-15 NOTE — PROGRESS NOTE ADULT - PROBLEM SELECTOR PLAN 5
r/o malignancy per CT. will need MRI. If contrast is needed, would d/w Renal check orthostatics possible CHF/PNA. check non-contrast CT chest

## 2021-10-15 NOTE — PROGRESS NOTE ADULT - ASSESSMENT
70yoM w/ PMHx of CKD with a baseline Cr >3, HTN, HLD presented to OSH after multiple falls. He was transferred from OSH for MRI/MRA findings concerning for aneurysm vs. thrombus       1) CKD stage 4-  Likely has Hypertensive Nephropathy  B/l cr around 3mg/dl -> fluctuations expected  Ua and urine lytes reviewed--> +UTI- on cipro  urine pro/cr 1.4grams  Renal US --> right kidney 9.3cm and left 9.5 cm with b/l renal cysts  s/p Coiling for PCOMM  s/p IV nacl @ 50cc --> encourage po intake  monitor cr   avoid nephrotoxins such as ace/arb/nsaid  trend bmp    2) HTN-  bp high  nifedipine to 90mg po qd   clonidine  0.2mg po tid--> if remains high then inc to 0.3mg po tid  hydralazine 100mg po tid  Trend bp    3) metabolic acidosis- likely from ckd   na bicarb 650mg po tid  trend bicarb      Dr Hernadez   [phone]

## 2021-10-15 NOTE — PROGRESS NOTE ADULT - SUBJECTIVE AND OBJECTIVE BOX
Neurology Progress Note    S: Patient seen and examined in neuro ICU. s/p angio with dr. newby doing well. MRI for trial got valium     Medication:  MEDICATIONS  (STANDING):  aspirin  chewable 81 milliGRAM(s) Oral daily  atorvastatin 20 milliGRAM(s) Oral at bedtime  ciprofloxacin     Tablet 250 milliGRAM(s) Oral every 12 hours  cloNIDine 0.2 milliGRAM(s) Oral every 8 hours  clopidogrel Tablet 75 milliGRAM(s) Oral daily  doxazosin 4 milliGRAM(s) Oral at bedtime  heparin   Injectable 5000 Unit(s) SubCutaneous every 12 hours  hydrALAZINE 100 milliGRAM(s) Oral three times a day  influenza   Vaccine 0.5 milliLiter(s) IntraMuscular once  isosorbide   mononitrate ER Tablet (IMDUR) 60 milliGRAM(s) Oral daily  metoprolol succinate ER 12.5 milliGRAM(s) Oral two times a day  NIFEdipine XL 90 milliGRAM(s) Oral daily  polyethylene glycol 3350 17 Gram(s) Oral every 12 hours  senna 2 Tablet(s) Oral at bedtime  sodium bicarbonate 650 milliGRAM(s) Oral three times a day    MEDICATIONS  (PRN):  acetaminophen    Suspension .. 650 milliGRAM(s) Oral every 6 hours PRN Mild Pain (1 - 3)  oxyCODONE    IR 5 milliGRAM(s) Oral every 4 hours PRN Moderate Pain (4 - 6)  oxyCODONE    IR 10 milliGRAM(s) Oral every 4 hours PRN Severe Pain (7 - 10)        Vitals:  Vital Signs Last 24 Hrs  T(C): 36.4 (10-15-21 @ 07:40), Max: 36.9 (10-14-21 @ 15:00)  T(F): 97.6 (10-15-21 @ 07:40), Max: 98.5 (10-14-21 @ 15:00)  HR: 56 (10-15-21 @ 07:40) (56 - 77)  BP: 149/76 (10-15-21 @ 07:40) (149/76 - 168/82)  BP(mean): 111 (10-14-21 @ 23:45) (94 - 119)  RR: 18 (10-15-21 @ 07:40) (18 - 20)  SpO2: 98% (10-15-21 @ 07:40) (92% - 99%)          General Exam:   General Appearance: Appropriately dressed and in no acute distress       Head: Normocephalic, atraumatic and no dysmorphic features  Ear, Nose, and Throat: Moist mucous membranes  CVS: S1S2+  Resp: No SOB, no wheeze or rhonchi  Abd: soft NTND  Extremities: No edema, no cyanosis  Skin: No bruises, no rashes     Neurological Exam:  Mental Status: Awake, alert and oriented x 3.  Able to follow simple and complex verbal commands. Able to name and repeat. fluent speech. No obvious aphasia or dysarthria noted.   Cranial Nerves: PERRL, EOMI, VFFC, sensation V1-V3 intact,  no obvious facial asymmetry , equal elevation of palate, scm/trap 5/5, tongue is midline on protrusion. no obvious papilledema on fundoscopic exam. Hearing is grossly intact.   Motor: Normal bulk, tone and strength throughout. Fine finger movements were intact and symmetric. no tremors or drift noted except LUE with 4+/5 mild drift noted   Sensation: Intact to light touch and pinprick throughout. no right/left confusion. no extinction to tactile on DSS.    Reflexes: 1+ throughout at biceps, brachioradialis, triceps, patellars and ankles bilaterally and equal. No clonus. R toe and L toe were both downgoing.  Coordination: No dysmetria on FNF   Gait: deferred in ICU    I personally reviewed the below data/images/labs:      CBC Full  -  ( 14 Oct 2021 21:54 )  WBC Count : 12.88 K/uL  RBC Count : 3.93 M/uL  Hemoglobin : 11.6 g/dL  Hematocrit : 36.5 %  Platelet Count - Automated : 729 K/uL  Mean Cell Volume : 92.9 fl  Mean Cell Hemoglobin : 29.5 pg  Mean Cell Hemoglobin Concentration : 31.8 gm/dL  Auto Neutrophil # : x  Auto Lymphocyte # : x  Auto Monocyte # : x  Auto Eosinophil # : x  Auto Basophil # : x  Auto Neutrophil % : x  Auto Lymphocyte % : x  Auto Monocyte % : x  Auto Eosinophil % : x  Auto Basophil % : x      10-14    137  |  106  |  40<H>  ----------------------------<  111<H>  4.5   |  16<L>  |  2.74<H>    Ca    9.8      14 Oct 2021 21:56  Phos  2.6     10-14  Mg     1.9     10-14            < from: CT Head No Cont (10.01.21 @ 18:37) >    EXAM:  CT BRAIN                            PROCEDURE DATE:  10/01/2021          INTERPRETATION:  CT OF THE HEAD WITHOUT CONTRAST    CLINICAL INDICATION: Weakness. Falls.    TECHNIQUE: Volumetric CT acquisition was performed through the brain and reviewed using brain and bone window technique. Dose optimization techniques were utilized including kVp/mA modulation along with iterative reconstructions.      COMPARISON: No prior studies have been submitted for comparison.    FINDINGS:    The ventricular and sulcal size and configuration is age appropriate.   There is no acute loss of gray-white differentiation. There are extensive patchy and confluent areas of hypodensity in the periventricular and subcortical white matter which are non-specific but likely related  chronic microangiopathic ischemic changes.  Small chronic infarction in the right cerebellar hemisphere. Chronic appearing left thalamic lacunar infarct.    There is no evidence of mass effect, midline shift, acute intracranial hemorrhage, or extra-axial collections.     The calvarium is intact. The paranasal sinuses are clear.The mastoid air cells are predominantly clear. The orbits are unremarkable.      IMPRESSION:  No acute intracranial hemorrhage or acute territorial infarction. Extensive chronic microvascular ischemic changes and several chronic infarctions as described. Further evaluation may be obtained with MRI of the brain if no contraindications.    --- End of Report ---            KAMILA SZYMANSKI MD; Attending Radiologist  This document has been electronically signed. Oct  1 2021  7:25PM    < end of copied text >  < from: MR Head No Cont (10.04.21 @ 18:09) >    EXAM:  MR BRAIN                            PROCEDURE DATE:  10/04/2021          INTERPRETATION:  .    CLINICAL INFORMATION: Frequent falls.    TECHNIQUE: Multiplanar multisequential MRI of the brain was acquired without the administration of IV gadolinium.    COMPARISON: Prior CT examination of the head dated 10/1/2021.    FINDINGS: Multiple areas of restricted diffusion are seen within the right MCA territory affecting the right frontal, parietal, and temporal lobes. Associated T2/FLAIR prolongation is seen, compatible with cytotoxic edema. No hemorrhagic transformation is noted.    Chronic lacunar infarcts are again seen within the right cerebellar hemisphere, left thalamus, and bilateral basal ganglia.    Multiple patchy confluent nonspecific T2/FLAIR hyperintense signal changes are noted throughout the bihemispheric white matter without associated mass effect or restricted diffusion.    Mild ventriculomegaly appears unchanged. No abnormal intra-axial fluid collections are notable. Flow-voids are noted throughout the major intracranial vessels, on the T2 weighted images, consistent with their patency. The sellar area appears unremarkable. The paranasal sinuses and mastoid air cells are grossly clear. Calvarial signal appears unremarkable. The orbits appear within normal limits.    IMPRESSION: Acute/subacute right-sided MCA distribution infarct with associated cytotoxic edema and without hemorrhagic transformation.    Multiple additional chronic lacunar infarcts and similar-appearing extensive chronic white matter microvascular type changes, as discussed.      --- End of Report ---            LUIS CARLOS BONNER MD; Attending Radiologist  This document has been electronically signed. Oct  5 2021  3:01PM    < end of copied text >  < from: MR Angio Head No Cont (10.06.21 @ 18:15) >    EXAM:  MR ANGIO BRAIN                            PROCEDURE DATE:  10/06/2021          INTERPRETATION:  INDICATION:  Right MCA infarct.    TECHNIQUE:  MR angiography of the brain was performed using three dimensional time-of-flight (3D-TOF) technique.  Imaging was performed on a 1.5 Lisa magnet.    FINDINGS:    INTERNAL CAROTID ARTERIES:  Bilaterally patent.    [employer] [place] [facility]:  A right-sided P-comm aneurysm is identified. The aneurysm is directed posteriorly and laterally, the aneurysm appears to be septated and/or bilobed., and measures 8.9 x 7.3 mm.    CEREBRAL ARTERIES:  Both anterior cerebral arteries are patent. Both A1 segments are well formed. Both middle cerebral arteries are patent. There is mild narrowing of the proximal right M1 segment.    VERTEBROBASILAR SYSTEM:  The vertebrobasilar system is patent. There are large posterior communicating arteries bilaterally with dominant supply of the posterior cerebral arteries. Left P1 segment is hypoplastic.    IMPRESSION:    1. 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  2. Mild narrowing of the proximal right middle cerebral artery in the M1 region.  3. The vessels are otherwise patent, as outlined above.    --- End of Report ---            MONSE CLEMENT MD; Attending Radiologist  This document has been electronically signed. Oct  6 2021  7:11PM    < end of copied text >  < from: CT Head No Cont (10.13.21 @ 09:28) >    EXAM:  CT BRAIN                            PROCEDURE DATE:  10/13/2021            INTERPRETATION:  Noncontrast CT of the brain.    CLINICAL INDICATION: Status post rt pcomm aneurysm coiling    TECHNIQUE : Axial CT scanning of the brain was obtainedfrom the skull base to the vertex without the administration of intravenous contrast. Sagittal and coronal reformats were provided.    COMPARISON: CT brain 10/1/2021. MRI brain 10/4/2021    FINDINGS:    Interval placement of embolic coil mass right parasellar region.    Similar mild ventricular dilatation.    No midline shift. Basal cisterns poorly evaluated due to streak artifact.    No acute intracranial hemorrhage, mass effect, or brain edema. Extensive white matter microvascular ischemic disease.    The visualized paranasal sinuses and mastoid air cells are clear.    IMPRESSION:    Interval placement of embolic coil mass right parasellar region.  No acute intracranial hemorrhage, mass effect, or brain edema.  Similar mild ventricular dilatation.        --- End of Report ---                AFIA DAILY MD; Attending Radiologist  This document has been electronically signed. Oct 13 2021  9:31AM    < end of copied text >

## 2021-10-15 NOTE — PROGRESS NOTE ADULT - PROBLEM SELECTOR PLAN 7
- apprec renal input  - straight cath for urinary retention and monitor UO - uncontrolled at [facility]  - f/u Renal/Cards recs if further BP control required w/ close monitoring of hemodynamics.

## 2021-10-15 NOTE — PROGRESS NOTE ADULT - PROBLEM SELECTOR PLAN 2
- uncontrolled at [facility] -- controlled w/ current regimen   - continue Imdur, Hydralazine, Nifedipine, and Clonidine for now w/ close monitoring of hemodynamics  - SBP goals per neurology = 140-160 mmHg - uncontrolled at [facility]  - continue current meds now w/ close monitoring of hemodynamics - uncontrolled at [facility]  - f/u Renal/Cards recs if further BP control required w/ close monitoring of hemodynamics. loop recorder per neuro/cards

## 2021-10-15 NOTE — PROGRESS NOTE ADULT - SUBJECTIVE AND OBJECTIVE BOX
Patient seen and examined  no complaints    No Known Allergies    Hospital Medications:   MEDICATIONS  (STANDING):  aspirin  chewable 81 milliGRAM(s) Oral daily  atorvastatin 20 milliGRAM(s) Oral at bedtime  ciprofloxacin     Tablet 250 milliGRAM(s) Oral every 12 hours  cloNIDine 0.2 milliGRAM(s) Oral every 8 hours  clopidogrel Tablet 75 milliGRAM(s) Oral daily  doxazosin 4 milliGRAM(s) Oral at bedtime  heparin   Injectable 5000 Unit(s) SubCutaneous every 12 hours  hydrALAZINE 100 milliGRAM(s) Oral three times a day  influenza   Vaccine 0.5 milliLiter(s) IntraMuscular once  isosorbide   mononitrate ER Tablet (IMDUR) 60 milliGRAM(s) Oral daily  metoprolol succinate ER 12.5 milliGRAM(s) Oral two times a day  NIFEdipine XL 90 milliGRAM(s) Oral daily  polyethylene glycol 3350 17 Gram(s) Oral every 12 hours  senna 2 Tablet(s) Oral at bedtime  sodium bicarbonate 650 milliGRAM(s) Oral three times a day      VITALS:  T(F): 97.6 (10-15-21 @ 07:40), Max: 98.5 (10-14-21 @ 15:00)  HR: 56 (10-15-21 @ 07:40)  BP: 149/76 (10-15-21 @ 07:40)  RR: 18 (10-15-21 @ 07:40)  SpO2: 98% (10-15-21 @ 07:40)  Wt(kg): --    10-14 @ 07:  -  10-15 @ 07:00  --------------------------------------------------------  IN: 830 mL / OUT: 1300 mL / NET: -470 mL    10-15 @ 07:01  -  10-15 @ 12:52  --------------------------------------------------------  IN: 0 mL / OUT: 304 mL / NET: -304 mL        PHYSICAL EXAM:  Constitutional: NAD  HEENT: anicteric sclera, oropharynx clear, MMM  Neck: No JVD  Respiratory: CTAB, no wheezes, rales or rhonchi  Cardiovascular: S1, S2, RRR  Gastrointestinal: BS+, soft, NT/ND  Extremities: No cyanosis or clubbing. No peripheral edema  Neurological: A/O x 3, no focal deficits  Psychiatric: Normal mood, normal affect    LABS:  10-14    137  |  106  |  40<H>  ----------------------------<  111<H>  4.5   |  16<L>  |  2.74<H>    Ca    9.8      14 Oct 2021 21:56  Phos  2.6     10-14  Mg     1.9     10-14      Creatinine Trend: 2.74 <--, 3.06 <--, 3.13 <--, 2.82 <--, 2.78 <--, 2.90 <--, 3.07 <--, 3.03 <--                        11.6   12.88 )-----------( 729      ( 14 Oct 2021 21:54 )             36.5     Urine Studies:  Urinalysis Basic - ( 10 Oct 2021 21:03 )    Color: Yellow / Appearance: Slightly Turbid / S.016 / pH:   Gluc:  / Ketone: Negative  / Bili: Negative / Urobili: Negative   Blood:  / Protein: 300 mg/dL / Nitrite: Positive   Leuk Esterase: Large / RBC: 2 /hpf /  /HPF   Sq Epi:  / Non Sq Epi: 1 /hpf / Bacteria: Many      Creatinine, Random Urine: 128 mg/dL (10-10 @ 21:03)  Protein/Creatinine Ratio Calculation: 1.4 Ratio (10-10 @ 21:03)  Potassium, Random Urine: 43 mmol/L (10-10 @ 21:03)  Osmolality, Random Urine: 427 mos/kg (10-10 @ 21:03)  Sodium, Random Urine: 34 mmol/L (10-10 @ 21:03)  Creatinine, Random Urine: 127 mg/dL (10-10 @ 21:03)  Chloride, Random Urine: 24 mmol/L (10-10 @ 21:03)    RADIOLOGY & ADDITIONAL STUDIES:

## 2021-10-15 NOTE — PROGRESS NOTE ADULT - PROBLEM SELECTOR PLAN 4
13 bts. mgmt per cards 13 bts. apprec cards input r/o malignancy per CT. will need MRI. If contrast is needed, would d/w Renal

## 2021-10-15 NOTE — PROGRESS NOTE ADULT - ASSESSMENT
71 yo male with CKD (baseline Cr > 3), HTN, and HLD who presented as a transfer from [facility] for higher level of care. Patient initially presented to [facility] on 10/01 after multiple falls the since the day prior. MRI Head at [facility] showed acute vs subacute R MCA territory infarct with associated cytotoxic edema. MRA H showed multilobulated right-sided P-comm aneurysm directed posteriorly and laterally, measuring 8.9 x 7.3 mm.   A1c 5.5, LDL 25.  TTE: EF 65-70%, severely enlarged L atrium.   MRi with R MCA territory infarct   vessels with PCOM on R aneurysm    s/p angio and coil of PCOM aneurysm.   Impression: AMS + mild L hemineglect in setting of R MCA territory infarct seen on MRI Head, ESUS.  o/e 10/15 LUE drift   10/15 MRI for trial   - Dual antiplatelet therapy with ASA 81mg PO daily and Clopidogrel 75mg PO daily x 3 weeks followed by monotherapy with ASA 81mg PO daily.  - lipitor 40  - ARU and PRU therapeutic   - cardio for ILR. cardio following   - telemetry  - PT/OT/SS/SLP, OOBC  - check FS, glucose control <180  - GI/DVT ppx  - Counseling on diet, exercise, and medication adherence was done  - Counseling on smoking cessation and alcohol consumption offered when appropriate.  - Pain assessed and judicious use of narcotics when appropriate was discussed.    - Stroke education given when appropriate.  - Importance of fall prevention discussed.   - Differential diagnosis and plan of care discussed with patient and/or family and primary team  - Thank you for allowing me to participate in the care of this patient. Call with questions.   -    d/c planning ILR in or outpt. outpt f/u on d/c 1-2 weeks   Sukh Pierre MD  Vascular Neurology  Office: [phone] .

## 2021-10-15 NOTE — PROGRESS NOTE ADULT - ASSESSMENT
70yoM w/ PMHx of CKD IV (unknown baseline), uncontrolled HTN, ?CAD (patient states he has "heart problems;" TTE performed at [facility] on 10/5 w/ normal LVF, severely enlarged LA, Grade III diastolic dysfunction, mild pulm HTN) who was transfered from [facility] to where he presented w/ hx of multiple falls, found to have acute right MCA territory ischemic infarct w/ extensive chronic microvascular ischemic changes on CT, acute/subacute right-sided MCA distribution infarct with associated cytotoxic edema, on MRI, and Pcomm aneurysm vs thrombus [id number], mild R M1 stenosis on MRA. A1c 5.5, LDL 25 as resulted at [facility].  70yoM w/ PMHx of CKD IV (unknown baseline), uncontrolled HTN, ?CAD (patient states he has "heart problems;" TTE performed at [facility] on 10/5 w/ normal LVF, severely enlarged LA, Grade III diastolic dysfunction, mild pulm HTN) who was transfered from [facility] to where he presented w/ hx of multiple falls, found to have acute right MCA territory ischemic infarct w/ extensive chronic microvascular ischemic changes on CT, acute/subacute right-sided MCA distribution infarct with associated cytotoxic edema, on MRI, and Pcomm aneurysm vs thrombus [id number], mild R M1 stenosis on MRA. 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 c/w urinary retention

## 2021-10-15 NOTE — PROGRESS NOTE ADULT - ASSESSMENT
70M PMHx CKD with a baseline Cr >3, HTN, HLD presented to OSH after multiple falls. He was transferred from OSH for MRI/MRA findings concerning for aneurysm vs. thrombus with multiple infarcts. He is slightly confused, but denies pain, headache, blurry vision, nausea, vomiting, weakness, and changes in sensation.   CTH 10/1 hydro likely chronic, MRI 10/4 right-sided acute vs. subacute MCA infarcts, Carotid U/S 10/6 no sig stenosis, MRA 10/6 R pcomm aneurysm with possible intra-aneurysmal thrombus [id number], mild R M1 stenosis.  (09 Oct 2021 16:27)    PROCEDURE: 10/12 s/p coiling right PCOMM aneurysm   POD#3    PLAN:  -Neuro: neuro and vital q 4 hours  -MRA w/ narrowing of right proximal M1 - dual antiplatelet as per neurology team  -MRI pending for enrollment for BALT trial   -Encouraged incentive spirometry  -Renal following for CKD. Recommend 1) HTN; nifedipine to 90mg po qd  clonidine inc to 0.2mg po tid 2) metabolic acidosis- likely from ckd  start na bicarb 650mg po tid  -Cardiology following for NSVT (nonsustained ventricular tachycardia). Recommend  Add lopressor 12.5 bid   -Continue home meds  -Renal diet  -Bowel regimen  -Cipro for UTI  -Hospitalist following  -DVT ppx: venodynes and sqh  -PT: home vs rehab pending social work    Will discuss above with Dr. Dionicio Whitley #[id number]

## 2021-10-15 NOTE — PROGRESS NOTE ADULT - SUBJECTIVE AND OBJECTIVE BOX
Subjective: Patient seen and examined. No new events except as noted.   moved out of ICU   feels ok   no cp or sob     REVIEW OF SYSTEMS:    CONSTITUTIONAL: No weakness, fevers or chills  EYES/ENT: No visual changes;  No vertigo or throat pain   NECK: No pain or stiffness  RESPIRATORY: No cough, wheezing, hemoptysis; No shortness of breath  CARDIOVASCULAR: No chest pain or palpitations  GASTROINTESTINAL: No abdominal or epigastric pain. No nausea, vomiting, or hematemesis; No diarrhea or constipation. No melena or hematochezia.  GENITOURINARY: No dysuria, frequency or hematuria  NEUROLOGICAL: No numbness or weakness  SKIN: No itching, burning, rashes, or lesions   All other review of systems is negative unless indicated above.    MEDICATIONS:  MEDICATIONS  (STANDING):  aspirin  chewable 81 milliGRAM(s) Oral daily  atorvastatin 20 milliGRAM(s) Oral at bedtime  bisacodyl 5 milliGRAM(s) Oral at bedtime  ciprofloxacin     Tablet 250 milliGRAM(s) Oral every 12 hours  cloNIDine 0.2 milliGRAM(s) Oral every 8 hours  clopidogrel Tablet 75 milliGRAM(s) Oral daily  doxazosin 4 milliGRAM(s) Oral at bedtime  heparin   Injectable 5000 Unit(s) SubCutaneous every 12 hours  hydrALAZINE 100 milliGRAM(s) Oral three times a day  influenza   Vaccine 0.5 milliLiter(s) IntraMuscular once  isosorbide   mononitrate ER Tablet (IMDUR) 60 milliGRAM(s) Oral daily  lactulose Syrup 20 Gram(s) Oral two times a day  metoprolol succinate ER 12.5 milliGRAM(s) Oral two times a day  NIFEdipine XL 90 milliGRAM(s) Oral daily  polyethylene glycol 3350 17 Gram(s) Oral every 12 hours  senna 2 Tablet(s) Oral at bedtime  sodium bicarbonate 650 milliGRAM(s) Oral three times a day      PHYSICAL EXAM:  T(C): 36.8 (10-15-21 @ 13:32), Max: 36.9 (10-14-21 @ 23:45)  HR: 51 (10-15-21 @ 13:32) (51 - 77)  BP: 164/80 (10-15-21 @ 13:32) (149/76 - 168/82)  RR: 18 (10-15-21 @ 13:32) (18 - 20)  SpO2: 95% (10-15-21 @ 13:32) (95% - 99%)  Wt(kg): --  I&O's Summary    14 Oct 2021 07:01  -  15 Oct 2021 07:00  --------------------------------------------------------  IN: 830 mL / OUT: 1300 mL / NET: -470 mL    15 Oct 2021 07:01  -  15 Oct 2021 15:26  --------------------------------------------------------  IN: 0 mL / OUT: 1104 mL / NET: -1104 mL            Appearance: NAD	  HEENT:   Dry  oral mucosa, PERRL, EOMI	  Lymphatic: No lymphadenopathy , no edema  Cardiovascular: Normal S1 S2, No JVD, No murmurs , Peripheral pulses palpable 2+ bilaterally  Respiratory: Lungs clear to auscultation, normal effort 	  Gastrointestinal:  Soft, Non-tender, + BS	  Skin: No rashes, No ecchymoses, No cyanosis, warm to touch  Musculoskeletal: Normal range of motion, normal strength  Psychiatry:  Mood & affect appropriate  Ext: No edema  Neurological: Awake, alert oriented to person, place and time, Following Commands, PERRL, EOMI, Face Symmetrical, Speech Fluent, Moving all extremities, LUE 4+/5, No Drift, Sensation to Light Touch Intact    LABS:    CARDIAC MARKERS:                                11.6   12.88 )-----------( 729      ( 14 Oct 2021 21:54 )             36.5     10-14    137  |  106  |  40<H>  ----------------------------<  111<H>  4.5   |  16<L>  |  2.74<H>    Ca    9.8      14 Oct 2021 21:56  Phos  2.6     10-14  Mg     1.9     10-14      proBNP:   Lipid Profile:   HgA1c:   TSH:             TELEMETRY: 	    ECG:  	  RADIOLOGY:   DIAGNOSTIC TESTING:  [ ] Echocardiogram:  [ ]  Catheterization:  [ ] Stress Test:    OTHER:

## 2021-10-15 NOTE — PROGRESS NOTE ADULT - PROBLEM SELECTOR PROBLEM 5
"[place]    Chief Complaint   Patient presents with     RECHECK       [facility] Medical Record Number:  [id number]  Place of Service where encounter took place:  [facility] [[id number]]    HPI:    Elie Marcano is a 79 year old  (1939), who is being seen today for an episodic care visit.      HPI information obtained from: facility chart records, facility staff, patient report and [facility] chart review.    Today's concern is:  Loss of Weight  Weight is down 18% over six month period.  Recently will not stay seated at table for long, will get up after taking a few bites.  Staff attributed change in appetite to hospitalization of tablemate, who has since returned from hospital.  No change in bowel or bladder habits. No nausea or vomiting. No apparent abd pain.  No sweats. No melena or hematochezia reported by staff.   Due to dementia, resident cannot offer meaningful history, repeats \"nothing is wrong with me.\"    Recent weights reviewed.  11/26   141#  11/19  153#  Wt Readings from Last 2 Encounters:   11/27/18 141 lb 12.8 oz (64.3 kg)   08/02/18 164 lb 12.8 oz (74.8 kg)     Early onset Alzheimer's disease with behavioral disturbance  Resident of [facility] since July 2016 - moved from [place].   SLUMS on admission 11/30 July 2016. SLUMS 7/30 in July 2017.   Difficulty allowing for assistance w/ showering and ADLs. Was using ativan prior to bathing, initially helped with anxiety, but became more combative and agitated.  Today repeats the same - \"nothing is wrong me!\"   Paranoid affect today.  Strikes out at staff on occasion.   OT was following for staff training with good effect, but unclear if staff are still adhering to recommended approach.  More paranoia on visit the Dr. Dennis at the end of April, ativan stopped and started on Seroquel 25 mg PO daily at 7:30 am, but had two falls in June in setting of low blood pressure and Seroquel was stopped and " changed to PRN prior to bathing, unclear if resident is actually using Seroquel prior to showers. Aricept was also d/c'd.   Continues on Celexa 10 mg daily - dose increased in June 2018.     Benign essential hypertension  Lisinopril stopped June 2018 after two ER visits with pre-syncope. No arrhythmia on ECG.  Syncope listed on problem list from previous PCP in [place], but no specific notes regarding etiology.  Today resident denies chest pain, palpitations, dizziness, headache.  Recent VS reviewed:  BP Readings from Last 3 Encounters:   11/27/18 132/77   10/28/18 140/71   08/02/18 134/75     Postablative hypothyroidism  History of Graves Disease with a multinodular toxic goiter.   Previously treated with methimazole and I-131 therapy in May 2016.   While in [place], was followed by endocrinology, lost to follow-up s/p move to [place].  Previously on levothyroxine 125 mcg, but dose decreased to 110 mcg 06/08/17 due to low TSH (0.71).   On 8/01/2017 Levothyroxine increased to 112 mcg daily due to slightly elevated TSH (5.06).   TSH   Date Value Ref Range Status   02/21/2018 1.32 0.40 - 4.00 mU/L Final   10/31/2017 3.67 0.40 - 4.00 mU/L Final   09/20/2017 1.13 0.40 - 4.00 mU/L Final   08/01/2017 5.06 (A) 0.40 - 4.00 mU/L Final   06/08/2017 0.71 0.40 - 4.00 mU/L Final     BPH  No urinary symptoms reported by resident and no change in urinary habits reported by staff.   Continue on Flomax 0.4 mg daily.     Vitamin D Deficiency   No recent vitamin D level.  Ambulatory.  Managed with vitamin D 2,000 units daily.     Advance Care Planning  DNR/DNI.  Health care agent has wanted to focus on comfort and quality of life given advanced dementia.     ALLERGIES: Review of patient's allergies indicates no known allergies.     Past Medical, Surgical, Family and Social History reviewed and updated in [facility].    Current Outpatient Prescriptions   Medication Sig Dispense Refill     camphor-menthol (SARNA) 0.5-0.5 % LOTN Apply 1 mL topically 2  times daily as needed 222 mL 98     citalopram (CELEXA) 10 MG tablet Take 1 tablet (10 mg) by mouth daily 60 tablet 98     FLUTICASONE PROPIONATE, NASAL, NA Spray 1 spray in nostril daily 1 spray daily. For Rhinitis       latanoprost (XALATAN) 0.005 % ophthalmic solution INSTILL ONE DROP IN EACH EYE EVERY NIGHT AT BEDTIME 2.5 mL 97     MAPAP 325 MG tablet TAKE TWO TABLETS (650MG) BY MOUTH FOUR TIMES A DAY AS NEEDED FOR PAIN 180 tablet 98     MIRTAZAPINE PO Take 7.5 mg by mouth At Bedtime       Multiple Vitamins-Minerals (CEROVITE SENIOR) TABS Take 1 tablet by mouth daily 30 tablet 98     QUEtiapine Fumarate (SEROQUEL PO) Take 25 mg by mouth daily as needed On shower days.        tamsulosin (FLOMAX) 0.4 MG capsule TAKE 1 CAPSULE BY MOUTH ONCE DAILY 31 capsule 98     Vitamin D, Cholecalciferol, 1000 UNITS TABS Take 2,000 Units by mouth daily 62 tablet 98     LEVOTHYROXINE SODIUM PO Take 100 mcg by mouth       Medications reviewed:  Medications reconciled to facility chart and changes were made to reflect current medications as identified as above med list. Below are the changes that were made:   Medications stopped since last EPIC medication reconciliation:   There are no discontinued medications.    Medications started since last [facility] medication reconciliation:  Orders Placed This Encounter   Medications     MIRTAZAPINE PO     Sig: Take 7.5 mg by mouth At Bedtime       REVIEW OF SYSTEMS:  Unobtainable secondary to cognitive impairment.     Physical Exam:  /77  Pulse 75  Temp 98.8  F (37.1  C)  Resp 20  Wt 141 lb 12.8 oz (64.3 kg)  SpO2 98%  GENERAL APPEARANCE:  Alert, slightly agitated, fully dressed, wearing polo shirt backwards, pacing around unit.  HEENT: MMM w/o exudate, conjunctiva w/o injection, no drainage.  RESP:  Respiratory effort and palpation of chest normal, lungs clear to auscultation , no respiratory distress  CV:  Palpation and auscultation of heart done, regular rate and rhythm, no murmur,  "rub, or gallop  PV: no edema, calves are supple and non-tender  GI: scaphoid abdomen, soft, non-tender, non-distended, + BS  M/S:   Gait and station shuffling, ambulating independently w/ 4WW then without walker, attempting to enter other residents rooms.   SKIN:  No visible rashes, lesions or ulcerations. Skin without increased warmth or tenderness.  Neuro: Does not follow for cranial nerve testing, no facial asymmetry, no tremor, normal tone  PSYCH:  memory impaired, oriented to self only, speech fluent, judgment impaired, cooperative today, confused \"why are you doing this?\" not easily reassured and agitated, will not sit for exam.     Recent Labs:  CBC RESULTS:   Recent Labs   Lab Test  06/11/18   1138  06/06/18   1110   WBC  5.3  4.5   RBC  3.74*  4.11*   HGB  12.0*  13.3   HCT  36.3*  39.7*   MCV  97  97   MCH  32.1  32.4   MCHC  33.1  33.5   RDW  14.4  14.4   PLT  160  202       Last Basic Metabolic Panel:  Recent Labs   Lab Test 06/28/18 06/11/18   1138   NA  136  138   POTASSIUM  3.6  3.8   CHLORIDE  102  103   RONALD  8.3*  8.5   CO2  27  28   BUN  15  13   CR  0.79  0.89   GLC  91  117*       Liver Function Studies -   Recent Labs   Lab Test  06/06/18   1110 11/03/16   PROTTOTAL  7.0  6.3*   ALBUMIN  3.2*  3.0  3.0*   BILITOTAL  0.4  0.5  0.5   ALKPHOS  69  68  68   AST  19  19  19   ALT  19  29  29       TSH   Date Value Ref Range Status   11/28/2018 0.47 0.40 - 4.00 mU/L Final   06/28/2018 1.05 0.40 - 4.00 mU/L Final     Assessment/Plan:  (R63.4) Loss of weight  (primary encounter diagnosis)  Comment: Weight is down about 30# over last six months. Eating less.   Plan:   - Check labs to rule out reversible cause: TSH, free T4, CBC, CMP, vitamin D, B12, A1C  - Reweigh patient to ensure most recent weight accurate  - Request family bring Ensure and favorite foods  - Start Mitrazapine 7.5 mg PO once daily at HS, may help with agitation and stimulate appetite     (G30.0,  F02.81) Early onset Alzheimer's " "disease with behavioral disturbance  Comment: Progressive cognitive and functional decline over last year.  Plan:   - If unable to find reversible cause of weight loss or address with supplements and medication would offer hospice  - Continues to benefit from supportive care in [facility]   - Continue non-pharmacological intervention with agitation as per OT recommendations    (I10) Benign essential hypertension  Comment: BP a goal of < 150/90 off antihypertensives, no recent syncope  Plan:   - Monitor routine VS  - Check CMP    (E89.0) Postablative hypothyroidism  Comment: History of Graves Disease with a multinodular toxic goiter.   Previously treated with methimazole and I-131 therapy in May 2016.   Last TSH 1.05 in June 2018, managed on synthroid, lost to endocrine follow-up.  Plan:   - Continue levothyroxine 112 mcg daily -- if dose too high could contribute to weight loss and agitation   - Check TSH and free T4  - HC agent does not want to bring out to specialist unless would add to comfort    (N40.0) Benign prostatic hyperplasia, unspecified whether lower urinary tract symptoms present  Comment: No change in urinary habits, no report of symptoms  Plan:   - Continue Flomax     (E55.9) Vitamin D deficiency  Comment: Maintained on supplement due to hx of vit D def  Plan:   - Continue vitamin D supplement  - Check vitamin D level    (Z71.89) Advance care planning  Comment: Progressive dementia, DNR/DNI, now with weight loss  Plan:   - Called Shayla [facility] ([facility] no answer message left with my direct cell phone number requesting return call to discuss plan moving forward and medication changes.     Addendum 11/28/18:  End 8:50 - 9:03 am (13 minutes)  Shayla called back, reviewed clinical status and plan of care.   Notes that Betito has always loved food. She has notice a decline in cognition over the last several months, \"agitation is increasing and dementia increasing.\" Agreeable to trial of Mirtazapine in " "addition to Celexa. Will bring Ensure next week.  \"If he qualifies for hopsice then let's do it\" - Betito was clear on wishes he would not want life prolonged if poor quality.     Electronically signed by  STAS Ziegler CNP          Documentation of Face-to-Face and Certification for Home Health Services     Patient: Elie Marcano   YOB: 1939  MR Number: [id number]  Today's Date: 12/1/2018    I certify that patient: Elie Marcano is under my care and that I, or a nurse practitioner or physician's assistant working with me, had a face-to-face encounter that meets the physician face-to-face encounter requirements with this patient on: 11/27/18.    This encounter with the patient was in whole, or in part, for the following medical condition, which is the primary reason for home health care: weight loss, dementia.    I certify that, based on my findings, the following services are medically necessary home health services: Speech Language Therapy.    My clinical findings support the need for the above services because: Speech Therapy Services are needed to assess and treat impairments in language and/or swallow functions due to weight loss, dementia.    Further, I certify that my clinical findings support that this patient is homebound (i.e. absences from home require considerable and taxing effort and are for medical reasons or Spiritism services or infrequently or of short duration when for other reasons) because: Requires assistance of another person or specialized equipment to access medical services because patient: Is prone to wander/get lost without assistance...    Based on the above findings. I certify that this patient is confined to the home and needs intermittent skilled nursing care, physical therapy and/or speech therapy.  The patient is under my care, and I have initiated the establishment of the plan of care.  This patient will be followed by a physician who will periodically review " Hepatic lesion the plan of care.  Physician/Provider to provide follow up care: Debbie Plata    Responsible Medicare certified PECOS Physician: Dr. Sharri Dennis.  Physician Signature: See electronic signature associated with these discharge orders.  Date: 12/1/2018               Falls Abnormal chest x-ray

## 2021-10-15 NOTE — PROGRESS NOTE ADULT - SUBJECTIVE AND OBJECTIVE BOX
Eddie Dove   Pager [phone]  Office [phone]      CC: Patient is a 70y old  Male who presents with a chief complaint of Transfer from [facility] for MRA findings (14 Oct 2021 12:12)      SUBJECTIVE / OVERNIGHT EVENTS:    MEDICATIONS  (STANDING):  aspirin  chewable 81 milliGRAM(s) Oral daily  atorvastatin 20 milliGRAM(s) Oral at bedtime  ciprofloxacin     Tablet 250 milliGRAM(s) Oral every 12 hours  cloNIDine 0.2 milliGRAM(s) Oral every 8 hours  clopidogrel Tablet 75 milliGRAM(s) Oral daily  doxazosin 4 milliGRAM(s) Oral at bedtime  heparin   Injectable 5000 Unit(s) SubCutaneous every 12 hours  hydrALAZINE 100 milliGRAM(s) Oral three times a day  influenza   Vaccine 0.5 milliLiter(s) IntraMuscular once  isosorbide   mononitrate ER Tablet (IMDUR) 60 milliGRAM(s) Oral daily  metoprolol succinate ER 12.5 milliGRAM(s) Oral two times a day  NIFEdipine XL 90 milliGRAM(s) Oral daily  polyethylene glycol 3350 17 Gram(s) Oral every 12 hours  senna 2 Tablet(s) Oral at bedtime    MEDICATIONS  (PRN):  acetaminophen    Suspension .. 650 milliGRAM(s) Oral every 6 hours PRN Mild Pain (1 - 3)  oxyCODONE    IR 5 milliGRAM(s) Oral every 4 hours PRN Moderate Pain (4 - 6)  oxyCODONE    IR 10 milliGRAM(s) Oral every 4 hours PRN Severe Pain (7 - 10)      Vital Signs Last 24 Hrs  T(C): 36.4 (15 Oct 2021 07:40), Max: 36.9 (14 Oct 2021 15:00)  T(F): 97.6 (15 Oct 2021 07:40), Max: 98.5 (14 Oct 2021 15:00)  HR: 56 (15 Oct 2021 07:40) (56 - 102)  BP: 149/76 (15 Oct 2021 07:40) (149/76 - 177/88)  BP(mean): 111 (14 Oct 2021 23:45) (94 - 119)  RR: 18 (15 Oct 2021 07:40) (18 - 20)  SpO2: 98% (15 Oct 2021 07:40) (92% - 99%)  CAPILLARY BLOOD GLUCOSE        I&O's Summary    14 Oct 2021 07:01  -  15 Oct 2021 07:00  --------------------------------------------------------  IN: 830 mL / OUT: 1300 mL / NET: -470 mL      tele:    PHYSICAL EXAM:    GENERAL: NAD   HEENT: EOMI, PERRL  PULM: Clear to auscultation bilaterally  CV: Regular rate and rhythm; nl S1, S2; No murmurs, rubs, or gallops  ABDOMEN: Soft, Nontender, Nondistended; Bowel sounds present  EXTREMITIES/MSK:  No edema, calf tenderness   PSYCH: AAOx3  NEUROLOGY: non-focal          LABS:                        11.6   12.88 )-----------( 729      ( 14 Oct 2021 21:54 )             36.5     10-14    137  |  106  |  40<H>  ----------------------------<  111<H>  4.5   |  16<L>  |  2.74<H>    Ca    9.8      14 Oct 2021 21:56  Phos  2.6     10-14  Mg     1.9     10-14                  RADIOLOGY & ADDITIONAL TESTS:    Imaging Personally Reviewed:    Consultant(s) Notes Reviewed:      Care Discussed with Consultants/Other Providers:   Eddie Dove   Pager [phone]  Office [phone]      CC: Patient is a 70y old  Male who presents with a chief complaint of Transfer from [facility] for MRA findings (14 Oct 2021 12:12)      SUBJECTIVE / OVERNIGHT EVENTS: denies any complaints on ROS    MEDICATIONS  (STANDING):  aspirin  chewable 81 milliGRAM(s) Oral daily  atorvastatin 20 milliGRAM(s) Oral at bedtime  ciprofloxacin     Tablet 250 milliGRAM(s) Oral every 12 hours  cloNIDine 0.2 milliGRAM(s) Oral every 8 hours  clopidogrel Tablet 75 milliGRAM(s) Oral daily  doxazosin 4 milliGRAM(s) Oral at bedtime  heparin   Injectable 5000 Unit(s) SubCutaneous every 12 hours  hydrALAZINE 100 milliGRAM(s) Oral three times a day  influenza   Vaccine 0.5 milliLiter(s) IntraMuscular once  isosorbide   mononitrate ER Tablet (IMDUR) 60 milliGRAM(s) Oral daily  metoprolol succinate ER 12.5 milliGRAM(s) Oral two times a day  NIFEdipine XL 90 milliGRAM(s) Oral daily  polyethylene glycol 3350 17 Gram(s) Oral every 12 hours  senna 2 Tablet(s) Oral at bedtime    MEDICATIONS  (PRN):  acetaminophen    Suspension .. 650 milliGRAM(s) Oral every 6 hours PRN Mild Pain (1 - 3)  oxyCODONE    IR 5 milliGRAM(s) Oral every 4 hours PRN Moderate Pain (4 - 6)  oxyCODONE    IR 10 milliGRAM(s) Oral every 4 hours PRN Severe Pain (7 - 10)      Vital Signs Last 24 Hrs  T(C): 36.4 (15 Oct 2021 07:40), Max: 36.9 (14 Oct 2021 15:00)  T(F): 97.6 (15 Oct 2021 07:40), Max: 98.5 (14 Oct 2021 15:00)  HR: 56 (15 Oct 2021 07:40) (56 - 102)  BP: 149/76 (15 Oct 2021 07:40) (149/76 - 177/88)  BP(mean): 111 (14 Oct 2021 23:45) (94 - 119)  RR: 18 (15 Oct 2021 07:40) (18 - 20)  SpO2: 98% (15 Oct 2021 07:40) (92% - 99%)  CAPILLARY BLOOD GLUCOSE        I&O's Summary    14 Oct 2021 07:01  -  15 Oct 2021 07:00  --------------------------------------------------------  IN: 830 mL / OUT: 1300 mL / NET: -470 mL          PHYSICAL EXAM:    GENERAL: NAD   HEENT: EOMI, PERRL  PULM: Clear to auscultation bilaterally  CV: Regular rate and rhythm; nl S1, S2; No murmurs, rubs, or gallops  ABDOMEN: Soft, Nontender, Nondistended; Bowel sounds present  EXTREMITIES/MSK:  No edema, calf tenderness   PSYCH: AAOx3  NEUROLOGY: non-focal          LABS:                        11.6   12.88 )-----------( 729      ( 14 Oct 2021 21:54 )             36.5     10-14    137  |  106  |  40<H>  ----------------------------<  111<H>  4.5   |  16<L>  |  2.74<H>    Ca    9.8      14 Oct 2021 21:56  Phos  2.6     10-14  Mg     1.9     10-14                  RADIOLOGY & ADDITIONAL TESTS:    Imaging Personally Reviewed:    Consultant(s) Notes Reviewed:      Care Discussed with Consultants/Other Providers: neurosurg

## 2021-10-15 NOTE — PROGRESS NOTE ADULT - ASSESSMENT
70yoM w/ PMHx of CKD IV (unknown baseline), uncontrolled HTN, ?CAD (patient states he has "heart problems;" TTE performed at [facility] on 10/5 w/ normal LVF, severely enlarged LA, Grade III diastolic dysfunction, mild pulm HTN) who was transfered from [facility] to where he presented w/ hx of multiple falls, found to have acute right MCA territory ischemic infarct w/ extensive chronic microvascular ischemic changes on CT, acute/subacute right-sided MCA distribution infarct with associated cytotoxic edema, on MRI, and Pcomm aneurysm vs thrombus [id number], mild R M1 stenosis on MRA.

## 2021-10-16 LAB
ANION GAP SERPL CALC-SCNC: 14 MMOL/L — SIGNIFICANT CHANGE UP (ref 5–17)
BUN SERPL-MCNC: 36 MG/DL — HIGH (ref 7–23)
CALCIUM SERPL-MCNC: 9.4 MG/DL — SIGNIFICANT CHANGE UP (ref 8.4–10.5)
CHLORIDE SERPL-SCNC: 107 MMOL/L — SIGNIFICANT CHANGE UP (ref 96–108)
CO2 SERPL-SCNC: 16 MMOL/L — LOW (ref 22–31)
CREAT SERPL-MCNC: 2.55 MG/DL — HIGH (ref 0.5–1.3)
GLUCOSE SERPL-MCNC: 98 MG/DL — SIGNIFICANT CHANGE UP (ref 70–99)
HCT VFR BLD CALC: 33.3 % — LOW (ref 39–50)
HGB BLD-MCNC: 10.6 G/DL — LOW (ref 13–17)
MCHC RBC-ENTMCNC: 29.8 PG — SIGNIFICANT CHANGE UP (ref 27–34)
MCHC RBC-ENTMCNC: 31.8 GM/DL — LOW (ref 32–36)
MCV RBC AUTO: 93.5 FL — SIGNIFICANT CHANGE UP (ref 80–100)
NRBC # BLD: 0 /100 WBCS — SIGNIFICANT CHANGE UP (ref 0–0)
PLATELET # BLD AUTO: 669 K/UL — HIGH (ref 150–400)
POTASSIUM SERPL-MCNC: 4.1 MMOL/L — SIGNIFICANT CHANGE UP (ref 3.5–5.3)
POTASSIUM SERPL-SCNC: 4.1 MMOL/L — SIGNIFICANT CHANGE UP (ref 3.5–5.3)
RBC # BLD: 3.56 M/UL — LOW (ref 4.2–5.8)
RBC # FLD: 16 % — HIGH (ref 10.3–14.5)
SODIUM SERPL-SCNC: 137 MMOL/L — SIGNIFICANT CHANGE UP (ref 135–145)
WBC # BLD: 6.49 K/UL — SIGNIFICANT CHANGE UP (ref 3.8–10.5)
WBC # FLD AUTO: 6.49 K/UL — SIGNIFICANT CHANGE UP (ref 3.8–10.5)

## 2021-10-16 PROCEDURE — 99233 SBSQ HOSP IP/OBS HIGH 50: CPT

## 2021-10-16 PROCEDURE — 99232 SBSQ HOSP IP/OBS MODERATE 35: CPT

## 2021-10-16 RX ADMIN — Medication 5 MILLIGRAM(S): at 21:02

## 2021-10-16 RX ADMIN — HEPARIN SODIUM 5000 UNIT(S): 5000 INJECTION INTRAVENOUS; SUBCUTANEOUS at 04:55

## 2021-10-16 RX ADMIN — Medication 90 MILLIGRAM(S): at 04:55

## 2021-10-16 RX ADMIN — Medication 650 MILLIGRAM(S): at 13:01

## 2021-10-16 RX ADMIN — ISOSORBIDE MONONITRATE 60 MILLIGRAM(S): 60 TABLET, EXTENDED RELEASE ORAL at 11:12

## 2021-10-16 RX ADMIN — Medication 250 MILLIGRAM(S): at 19:25

## 2021-10-16 RX ADMIN — Medication 0.3 MILLIGRAM(S): at 14:28

## 2021-10-16 RX ADMIN — Medication 0.2 MILLIGRAM(S): at 04:55

## 2021-10-16 RX ADMIN — LACTULOSE 20 GRAM(S): 10 SOLUTION ORAL at 17:36

## 2021-10-16 RX ADMIN — Medication 250 MILLIGRAM(S): at 04:55

## 2021-10-16 RX ADMIN — Medication 100 MILLIGRAM(S): at 04:55

## 2021-10-16 RX ADMIN — Medication 81 MILLIGRAM(S): at 11:12

## 2021-10-16 RX ADMIN — HEPARIN SODIUM 5000 UNIT(S): 5000 INJECTION INTRAVENOUS; SUBCUTANEOUS at 17:36

## 2021-10-16 RX ADMIN — ATORVASTATIN CALCIUM 20 MILLIGRAM(S): 80 TABLET, FILM COATED ORAL at 21:02

## 2021-10-16 RX ADMIN — Medication 12.5 MILLIGRAM(S): at 17:36

## 2021-10-16 RX ADMIN — SENNA PLUS 2 TABLET(S): 8.6 TABLET ORAL at 21:03

## 2021-10-16 RX ADMIN — Medication 0.3 MILLIGRAM(S): at 21:04

## 2021-10-16 RX ADMIN — Medication 4 MILLIGRAM(S): at 21:00

## 2021-10-16 RX ADMIN — Medication 100 MILLIGRAM(S): at 13:01

## 2021-10-16 RX ADMIN — Medication 100 MILLIGRAM(S): at 21:02

## 2021-10-16 RX ADMIN — Medication 0.1 MILLIGRAM(S): at 11:15

## 2021-10-16 RX ADMIN — Medication 650 MILLIGRAM(S): at 04:55

## 2021-10-16 RX ADMIN — LACTULOSE 20 GRAM(S): 10 SOLUTION ORAL at 04:56

## 2021-10-16 RX ADMIN — Medication 650 MILLIGRAM(S): at 21:02

## 2021-10-16 RX ADMIN — CLOPIDOGREL BISULFATE 75 MILLIGRAM(S): 75 TABLET, FILM COATED ORAL at 11:12

## 2021-10-16 RX ADMIN — POLYETHYLENE GLYCOL 3350 17 GRAM(S): 17 POWDER, FOR SOLUTION ORAL at 04:55

## 2021-10-16 NOTE — PROGRESS NOTE ADULT - ASSESSMENT
71 yo male with CKD (baseline Cr > 3), HTN, and HLD who presented as a transfer from [facility] for higher level of care. Patient initially presented to [facility] on 10/01 after multiple falls the since the day prior. MRI Head at [facility] showed acute vs subacute R MCA territory infarct with associated cytotoxic edema. MRA H showed multilobulated right-sided P-comm aneurysm directed posteriorly and laterally, measuring 8.9 x 7.3 mm.   A1c 5.5, LDL 25.  TTE: EF 65-70%, severely enlarged L atrium.   MRi with R MCA territory infarct   vessels with PCOM on R aneurysm    s/p angio and coil of PCOM aneurysm.   Impression: AMS + mild L hemineglect in setting of R MCA territory infarct seen on MRI Head, ESUS.  o/e 10/15 LUE drift   10/15 MRI for trial as above  10/16 okay    - Dual antiplatelet therapy with ASA 81mg PO daily and Clopidogrel 75mg PO daily x 3 weeks followed by monotherapy with ASA 81mg PO daily.  - lipitor 40  - ARU and PRU therapeutic   - cardio for ILR. cardio following can do outpt   - telemetry  - PT/OT/SS/SLP, OOBC  - check FS, glucose control <180  - GI/DVT ppx  - Counseling on diet, exercise, and medication adherence was done  - Counseling on smoking cessation and alcohol consumption offered when appropriate.  - Pain assessed and judicious use of narcotics when appropriate was discussed.    - Stroke education given when appropriate.  - Importance of fall prevention discussed.   - Differential diagnosis and plan of care discussed with patient and/or family and primary team  - Thank you for allowing me to participate in the care of this patient. Call with questions.   -    d/c planning ILR in or outpt. outpt f/u on d/c 1-2 weeks no objectio nto d/c   Sukh Pierre MD  Vascular Neurology  Office: [phone] .

## 2021-10-16 NOTE — PROGRESS NOTE ADULT - ASSESSMENT
70yoM w/ PMHx of CKD with a baseline Cr >3, HTN, HLD presented to OSH after multiple falls. He was transferred from OSH for MRI/MRA findings concerning for aneurysm vs. thrombus       1) CKD stage 4-  Likely has Hypertensive Nephropathy  Ua and urine lytes reviewed--> +UTI- on cipro  urine pro/cr 1.4grams  Renal US --> right kidney 9.3cm and left 9.5 cm with b/l renal cysts  s/p Coiling for PCOMM  stable renal function   avoid nephrotoxins such as ace/arb/nsaid  trend bmp    2) HTN-  bp high  on nifedipine clonidine  hydralazine, metoprolol  Trend bp    3) metabolic acidosis- likely from ckd   na bicarb 650mg po tid  trend bicarb      Dr Corey  [phone]

## 2021-10-16 NOTE — PROGRESS NOTE ADULT - ASSESSMENT
70M PMHx CKD with a baseline Cr >3, HTN, HLD presented to OSH after multiple falls. He was transferred from OSH for MRI/MRA findings concerning for aneurysm vs. thrombus with multiple infarcts. He is slightly confused, but denies pain, headache, blurry vision, nausea, vomiting, weakness, and changes in sensation.   CTH 10/1 hydro likely chronic, MRI 10/4 right-sided acute vs. subacute MCA infarcts, Carotid U/S 10/6 no sig stenosis, MRA 10/6 R pcomm aneurysm with possible intra-aneurysmal thrombus [id number], mild R M1 stenosis.  (09 Oct 2021 16:27)    PROCEDURE: 10/12 s/p coiling right PCOMM aneurysm   POD#4    PLAN:  -Neuro: neuro and vital q 4 hours  -MRA w/ narrowing of right proximal M1 - dual antiplatelet as per neurology team  -MRI done for for BALT trial and reviewed by Dr. Greenberg    -Discussed with hospitalist and will give extra dose of clonidine and increase standing dose for hypertension.   -Encouraged incentive spirometry  -Renal following for CKD. Recommend 1) HTN; nifedipine to 90mg po qd  clonidine inc to 0.2mg po tid 2) metabolic acidosis- likely from ckd  start na bicarb 650mg po tid  -Cardiology following for NSVT (nonsustained ventricular tachycardia). Recommend  Add lopressor 12.5 bid   -Continue home meds  -Renal diet  -Bowel regimen  -Cipro for UTI-end 10/17  -Hospitalist following  -DVT ppx: venodynes and sqh  -PT: CLYDE when stable     Will discuss above with Dr. Greenberg  Spectra #[id number]

## 2021-10-16 NOTE — PROGRESS NOTE ADULT - SUBJECTIVE AND OBJECTIVE BOX
[facility] Division of Hospital Medicine  Renetta Marte MD  Spectra: [phone]      Patient is a 70y old  Male who presents with a chief complaint of Transfer from [facility] for MRA findings (16 Oct 2021 11:19)      SUBJECTIVE / OVERNIGHT EVENTS: hypertensive to SBP 200s overnight. asymptomatic per patient. no headache, blurry vision, nor chest pain. denies any dyspnea at time of my exam. able to like flat with no issues.  ADDITIONAL REVIEW OF SYSTEMS:    MEDICATIONS  (STANDING):  aspirin  chewable 81 milliGRAM(s) Oral daily  atorvastatin 20 milliGRAM(s) Oral at bedtime  bisacodyl 5 milliGRAM(s) Oral at bedtime  ciprofloxacin     Tablet 250 milliGRAM(s) Oral every 12 hours  cloNIDine 0.3 milliGRAM(s) Oral every 8 hours  clopidogrel Tablet 75 milliGRAM(s) Oral daily  doxazosin 4 milliGRAM(s) Oral at bedtime  heparin   Injectable 5000 Unit(s) SubCutaneous every 12 hours  hydrALAZINE 100 milliGRAM(s) Oral three times a day  influenza   Vaccine 0.5 milliLiter(s) IntraMuscular once  isosorbide   mononitrate ER Tablet (IMDUR) 60 milliGRAM(s) Oral daily  lactulose Syrup 20 Gram(s) Oral two times a day  metoprolol succinate ER 12.5 milliGRAM(s) Oral two times a day  NIFEdipine XL 90 milliGRAM(s) Oral daily  polyethylene glycol 3350 17 Gram(s) Oral every 12 hours  senna 2 Tablet(s) Oral at bedtime  sodium bicarbonate 650 milliGRAM(s) Oral three times a day    MEDICATIONS  (PRN):  acetaminophen    Suspension .. 650 milliGRAM(s) Oral every 6 hours PRN Mild Pain (1 - 3)  oxyCODONE    IR 5 milliGRAM(s) Oral every 4 hours PRN Moderate Pain (4 - 6)  oxyCODONE    IR 10 milliGRAM(s) Oral every 4 hours PRN Severe Pain (7 - 10)      CAPILLARY BLOOD GLUCOSE        I&O's Summary    15 Oct 2021 07:01  -  16 Oct 2021 07:00  --------------------------------------------------------  IN: 0 mL / OUT: 2729 mL / NET: -2729 mL    16 Oct 2021 07:01  -  16 Oct 2021 12:30  --------------------------------------------------------  IN: 0 mL / OUT: 525 mL / NET: -525 mL        PHYSICAL EXAM:  Vital Signs Last 24 Hrs  T(C): 36.4 (16 Oct 2021 11:46), Max: 36.8 (15 Oct 2021 13:32)  T(F): 97.5 (16 Oct 2021 11:46), Max: 98.3 (15 Oct 2021 19:59)  HR: 58 (16 Oct 2021 11:46) (44 - 58)  BP: 146/78 (16 Oct 2021 11:46) (146/78 - 203/96)  BP(mean): --  RR: 18 (16 Oct 2021 11:46) (18 - 18)  SpO2: 100% (16 Oct 2021 11:46) (95% - 100%)    CONSTITUTIONAL: NAD, chronically ill appearing   EYES: PERRLA; conjunctiva and sclera clear  ENMT: Moist oral mucosa, no pharyngeal injection or exudates; normal dentition  NECK: Supple, no palpable masses; no thyromegaly  RESPIRATORY: Normal respiratory effort; lungs are clear to auscultation bilaterally with exception of mild bibasilar crackles   CARDIOVASCULAR: Regular rate and rhythm, normal S1 and S2, no murmur/rub/gallop; No lower extremity edema; Peripheral pulses are 2+ bilaterally  ABDOMEN: Soft, Nondistended, Nontender to palpation, normoactive bowel sounds  MUSCULOSKELETAL:  No clubbing or cyanosis of digits; no joint swelling or tenderness to palpation  PSYCH: A+O to person, place, and time; affect appropriate  NEUROLOGY: no gross sensory deficits, 5/5 strength throughout with exception of 4+/5 on LUE  SKIN: No rashes; no palpable lesions    LABS:                        10.6   6.49  )-----------( 669      ( 16 Oct 2021 05:15 )             33.3     10-16    137  |  107  |  36<H>  ----------------------------<  98  4.1   |  16<L>  |  2.55<H>    Ca    9.4      16 Oct 2021 05:15  Phos  2.6     10-14  Mg     1.9     10-14        RADIOLOGY & ADDITIONAL TESTS:  Results Reviewed: no leukocytosis H/H stable, Cr improving  Imaging Personally Reviewed:  Electrocardiogram Personally Reviewed:    COORDINATION OF CARE:  Care Discussed with Consultants/Other Providers [Y]: Neurosurgery JUAN Dickson  Prior or Outpatient Records Reviewed [Y]: Neurology, Nephrology progress notes

## 2021-10-16 NOTE — PROGRESS NOTE ADULT - SUBJECTIVE AND OBJECTIVE BOX
Neurology Progress Note    S: Patient seen and examined on floor. no complaints     Medication:  MEDICATIONS  (STANDING):  aspirin  chewable 81 milliGRAM(s) Oral daily  atorvastatin 20 milliGRAM(s) Oral at bedtime  bisacodyl 5 milliGRAM(s) Oral at bedtime  ciprofloxacin     Tablet 250 milliGRAM(s) Oral every 12 hours  cloNIDine 0.3 milliGRAM(s) Oral every 8 hours  cloNIDine 0.1 milliGRAM(s) Oral once  clopidogrel Tablet 75 milliGRAM(s) Oral daily  doxazosin 4 milliGRAM(s) Oral at bedtime  heparin   Injectable 5000 Unit(s) SubCutaneous every 12 hours  hydrALAZINE 100 milliGRAM(s) Oral three times a day  influenza   Vaccine 0.5 milliLiter(s) IntraMuscular once  isosorbide   mononitrate ER Tablet (IMDUR) 60 milliGRAM(s) Oral daily  lactulose Syrup 20 Gram(s) Oral two times a day  metoprolol succinate ER 12.5 milliGRAM(s) Oral two times a day  NIFEdipine XL 90 milliGRAM(s) Oral daily  polyethylene glycol 3350 17 Gram(s) Oral every 12 hours  senna 2 Tablet(s) Oral at bedtime  sodium bicarbonate 650 milliGRAM(s) Oral three times a day    MEDICATIONS  (PRN):  acetaminophen    Suspension .. 650 milliGRAM(s) Oral every 6 hours PRN Mild Pain (1 - 3)  oxyCODONE    IR 5 milliGRAM(s) Oral every 4 hours PRN Moderate Pain (4 - 6)  oxyCODONE    IR 10 milliGRAM(s) Oral every 4 hours PRN Severe Pain (7 - 10)        Vitals:  Vital Signs Last 24 Hrs  T(C): 36.8 (10-16-21 @ 07:27), Max: 36.8 (10-15-21 @ 13:32)  T(F): 98.2 (10-16-21 @ 07:27), Max: 98.3 (10-15-21 @ 19:59)  HR: 46 (10-16-21 @ 07:27) (44 - 51)  BP: 198/98 (10-16-21 @ 07:27) (162/81 - 203/96)  BP(mean): --  RR: 18 (10-16-21 @ 04:01) (18 - 18)  SpO2: 98% (10-16-21 @ 04:01) (95% - 98%)    Orthostatic VS  10-16-21 @ 07:27  Lying BP: 198/98 HR: 46  Sitting BP: 182/95 HR: 50  Standing BP: 174/96 HR: 58  Site: --  Mode: --  Orthostatic VS  10-15-21 @ 16:00  Lying BP: 163/76 HR: 49  Sitting BP: 136/77 HR: 51  Standing BP: 136/70 HR: 59  Site: upper right arm  Mode: electronic        General Exam:   General Appearance: Appropriately dressed and in no acute distress       Head: Normocephalic, atraumatic and no dysmorphic features  Ear, Nose, and Throat: Moist mucous membranes  CVS: S1S2+  Resp: No SOB, no wheeze or rhonchi  Abd: soft NTND  Extremities: No edema, no cyanosis  Skin: No bruises, no rashes     Neurological Exam:  Mental Status: Awake, alert and oriented x 3.  Able to follow simple and complex verbal commands. Able to name and repeat. fluent speech. No obvious aphasia or dysarthria noted.   Cranial Nerves: PERRL, EOMI, VFFC, sensation V1-V3 intact,  no obvious facial asymmetry , equal elevation of palate, scm/trap 5/5, tongue is midline on protrusion. no obvious papilledema on fundoscopic exam. Hearing is grossly intact.   Motor: Normal bulk, tone and strength throughout. Fine finger movements were intact and symmetric. no tremors or drift noted except LUE with 4+/5 mild drift noted   Sensation: Intact to light touch and pinprick throughout. no right/left confusion. no extinction to tactile on DSS.    Reflexes: 1+ throughout at biceps, brachioradialis, triceps, patellars and ankles bilaterally and equal. No clonus. R toe and L toe were both downgoing.  Coordination: No dysmetria on FNF   Gait: deferred in ICU    I personally reviewed the below data/images/labs:      CBC Full  -  ( 16 Oct 2021 05:15 )  WBC Count : 6.49 K/uL  RBC Count : 3.56 M/uL  Hemoglobin : 10.6 g/dL  Hematocrit : 33.3 %  Platelet Count - Automated : 669 K/uL  Mean Cell Volume : 93.5 fl  Mean Cell Hemoglobin : 29.8 pg  Mean Cell Hemoglobin Concentration : 31.8 gm/dL  Auto Neutrophil # : x  Auto Lymphocyte # : x  Auto Monocyte # : x  Auto Eosinophil # : x  Auto Basophil # : x  Auto Neutrophil % : x  Auto Lymphocyte % : x  Auto Monocyte % : x  Auto Eosinophil % : x  Auto Basophil % : x      10-16    137  |  107  |  36<H>  ----------------------------<  98  4.1   |  16<L>  |  2.55<H>    Ca    9.4      16 Oct 2021 05:15  Phos  2.6     10-14  Mg     1.9     10-14        < from: CT Head No Cont (10.01.21 @ 18:37) >    EXAM:  CT BRAIN                            PROCEDURE DATE:  10/01/2021          INTERPRETATION:  CT OF THE HEAD WITHOUT CONTRAST    CLINICAL INDICATION: Weakness. Falls.    TECHNIQUE: Volumetric CT acquisition was performed through the brain and reviewed using brain and bone window technique. Dose optimization techniques were utilized including kVp/mA modulation along with iterative reconstructions.      COMPARISON: No prior studies have been submitted for comparison.    FINDINGS:    The ventricular and sulcal size and configuration is age appropriate.   There is no acute loss of gray-white differentiation. There are extensive patchy and confluent areas of hypodensity in the periventricular and subcortical white matter which are non-specific but likely related  chronic microangiopathic ischemic changes.  Small chronic infarction in the right cerebellar hemisphere. Chronic appearing left thalamic lacunar infarct.    There is no evidence of mass effect, midline shift, acute intracranial hemorrhage, or extra-axial collections.     The calvarium is intact. The paranasal sinuses are clear.The mastoid air cells are predominantly clear. The orbits are unremarkable.      IMPRESSION:  No acute intracranial hemorrhage or acute territorial infarction. Extensive chronic microvascular ischemic changes and several chronic infarctions as described. Further evaluation may be obtained with MRI of the brain if no contraindications.    --- End of Report ---            KAMILA SZYMANSKI MD; Attending Radiologist  This document has been electronically signed. Oct  1 2021  7:25PM    < end of copied text >  < from: MR Head No Cont (10.04.21 @ 18:09) >    EXAM:  MR BRAIN                            PROCEDURE DATE:  10/04/2021          INTERPRETATION:  .    CLINICAL INFORMATION: Frequent falls.    TECHNIQUE: Multiplanar multisequential MRI of the brain was acquired without the administration of IV gadolinium.    COMPARISON: Prior CT examination of the head dated 10/1/2021.    FINDINGS: Multiple areas of restricted diffusion are seen within the right MCA territory affecting the right frontal, parietal, and temporal lobes. Associated T2/FLAIR prolongation is seen, compatible with cytotoxic edema. No hemorrhagic transformation is noted.    Chronic lacunar infarcts are again seen within the right cerebellar hemisphere, left thalamus, and bilateral basal ganglia.    Multiple patchy confluent nonspecific T2/FLAIR hyperintense signal changes are noted throughout the bihemispheric white matter without associated mass effect or restricted diffusion.    Mild ventriculomegaly appears unchanged. No abnormal intra-axial fluid collections are notable. Flow-voids are noted throughout the major intracranial vessels, on the T2 weighted images, consistent with their patency. The sellar area appears unremarkable. The paranasal sinuses and mastoid air cells are grossly clear. Calvarial signal appears unremarkable. The orbits appear within normal limits.    IMPRESSION: Acute/subacute right-sided MCA distribution infarct with associated cytotoxic edema and without hemorrhagic transformation.    Multiple additional chronic lacunar infarcts and similar-appearing extensive chronic white matter microvascular type changes, as discussed.      --- End of Report ---            LUIS CARLOS BONNER MD; Attending Radiologist  This document has been electronically signed. Oct  5 2021  3:01PM    < end of copied text >  < from: MR Angio Head No Cont (10.06.21 @ 18:15) >    EXAM:  MR ANGIO BRAIN                            PROCEDURE DATE:  10/06/2021          INTERPRETATION:  INDICATION:  Right MCA infarct.    TECHNIQUE:  MR angiography of the brain was performed using three dimensional time-of-flight (3D-TOF) technique.  Imaging was performed on a 1.5 Lisa magnet.    FINDINGS:    INTERNAL CAROTID ARTERIES:  Bilaterally patent.    [place] OF MCWILLIAMS:  A right-sided P-comm aneurysm is identified. The aneurysm is directed posteriorly and laterally, the aneurysm appears to be septated and/or bilobed., and measures 8.9 x 7.3 mm.    CEREBRAL ARTERIES:  Both anterior cerebral arteries are patent. Both A1 segments are well formed. Both middle cerebral arteries are patent. There is mild narrowing of the proximal right M1 segment.    VERTEBROBASILAR SYSTEM:  The vertebrobasilar system is patent. There are large posterior communicating arteries bilaterally with dominant supply of the posterior cerebral arteries. Left P1 segment is hypoplastic.    IMPRESSION:    1. 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  2. Mild narrowing of the proximal right middle cerebral artery in the M1 region.  3. The vessels are otherwise patent, as outlined above.    --- End of Report ---            MONSE CLEMENT MD; Attending Radiologist  This document has been electronically signed. Oct  6 2021  7:11PM    < end of copied text >  < from: CT Head No Cont (10.13.21 @ 09:28) >    EXAM:  CT BRAIN                            PROCEDURE DATE:  10/13/2021            INTERPRETATION:  Noncontrast CT of the brain.    CLINICAL INDICATION: Status post rt pcomm aneurysm coiling    TECHNIQUE : Axial CT scanning of the brain was obtainedfrom the skull base to the vertex without the administration of intravenous contrast. Sagittal and coronal reformats were provided.    COMPARISON: CT brain 10/1/2021. MRI brain 10/4/2021    FINDINGS:    Interval placement of embolic coil mass right parasellar region.    Similar mild ventricular dilatation.    No midline shift. Basal cisterns poorly evaluated due to streak artifact.    No acute intracranial hemorrhage, mass effect, or brain edema. Extensive white matter microvascular ischemic disease.    The visualized paranasal sinuses and mastoid air cells are clear.    IMPRESSION:    Interval placement of embolic coil mass right parasellar region.  No acute intracranial hemorrhage, mass effect, or brain edema.  Similar mild ventricular dilatation.        --- End of Report ---      AFIA DAILY MD; Attending Radiologist  This document has been electronically signed. Oct 13 2021  9:31AM    < end of copied text >      < from: MR Head No Cont (10.15.21 @ 16:40) >    EXAM:  MR BRAIN                            PROCEDURE DATE:  10/15/2021            INTERPRETATION:  MRI OF THE BRAIN WITHOUT CONTRAST    CLINICAL INDICATION: Status post right posterior communicating artery aneurysm status post balloon assisted coilembolization    TECHNIQUE: Multiplanar multisequence noncontrast MRI of the brain was performed.    COMPARISON: CT brain, 10/13/2021 and MRI brain, 10/4/2021.    FINDINGS:    The ventricular and sulcal size and configuration is age appropriate. Thereare scattered T2/FLAIR hyperintensities in the subcortical and periventricular white matter which are nonspecific but most commonly represent chronic microvascular ischemic changes.    The previously seen acute lacunar infarctions along left MCA distribution are decreased in diffusion restriction intensity, consistent with evolution of acute infarctions to late subacute stage. In addition, new punctate acute lacunar infarctions have developed in the posterior wall of the right temporal lobe and in the left cerebellar hemisphere along right parietal distribution. There is no susceptibility signal to suggest hemorrhagic conversion.  There is a chronic infarction in the right cerebellum and in the left thalamus.    There is susceptibility signalin the right paraclinoid region at the site of the previously coiled right P-comm aneurysm.    There is abnormal extra-axial fluid collection or hydrocephalus. The visualized globes are symmetric bilaterally.    The visualized paranasal sinuses and mastoid bones are adequately developed and aerated.    IMPRESSION:    Comparison previous studies,    There has been interval evolution of previously seen infarcts in right MCA distribution as described. There is development of new acute lacunar infarctions at the posterior pole of the right temporal lobe and in the right cerebellum as described.    There is no intracranial hemorrhage, midline shift or mass effect.    Coil mass in the right paraclinoid region.    Notification to clinician of alert:    Provider   Dr. BARGER  was notified about the final results at 10/15/2021 10:23 AM via telephone by neuroradiologist KEITH Tineo. Readback confirmation was obtained.    --- End of Report ---                JULIO TINEO MD; Attending Radiologist  This document has been electronically signed. Oct 15 2021 10:35AM    < end of copied text >  < from: CT Chest No Cont (10.15.21 @ 23:27) >    EXAM:  CT CHEST                            PROCEDURE DATE:  10/15/2021            INTERPRETATION:  CLINICAL INFORMATION: Shortness breath, evaluate for pulmonary infection or pulmonary edema.    COMPARISON: CT chest 9/6/2014. CT abdomen pelvis 10/11/2021.    CONTRAST/COMPLICATIONS:  IV Contrast: None  Oral Contrast: None  Complications: None    PROCEDURE:  CT of the Chest was performed.  Sagittal and coronal reformats were performed.    FINDINGS:    LUNGS AND AIRWAYS: Emphysema. No pulmonary nodules or parenchymal consolidations.  PLEURA: Small bilateral pleural effusions.    MEDIASTINUM AND RED: 1.2 cm right paratracheal lymph node (2:40), nonspecific. No axillary lymphadenopathy    VESSELS: Calcified atherosclerotic plaques in aorta and coronary arteries.    HEART: Multifocal cardiac enlargement is noted. No pericardial effusion. Mitral annular calcifications.    CHEST WALL AND LOWER NECK: Within normal limits.    VISUALIZED UPPER ABDOMEN: Partially imaged large hypodense hepatic lesion measuring 6.9 x 8.8 cm, similar in appearance compared to prior CT abdomen pelvis 10/11/2021. Simple cyst in the left kidney    BONES: Degenerative changes of the spine.    IMPRESSION:    Small bilateral pleural effusions with minimal bilateral groundglass opacities and a few areas of interlobular septal thickening. Findings are suggestive of mild interstitial edema. This is superimposed on a background of emphysema.    Redemonstration of large hypodense hepatic lesion, similar appearance whencompared to prior abdominal pelvis 10/11/2021. As mentioned on prior report, consider contrast enhanced MRI for further evaluation.        --- End of Report ---              ALIYA BERTRAND MD; Resident Radiology  This document has been electronicallysigned.  ABRIL PINEDA MD; Attending Radiologist  This document has been electronically signed. Oct 16 2021  9:09AM    < end of copied text >

## 2021-10-16 NOTE — PROGRESS NOTE ADULT - ASSESSMENT
70yoM w/ PMHx of CKD IV (unknown baseline), uncontrolled HTN, ?CAD (patient states he has "heart problems;" TTE performed at [facility] on 10/5 w/ normal LVF, severely enlarged LA, Grade III diastolic dysfunction, mild pulm HTN) who was transfered from [facility] to where he presented w/ hx of multiple falls, found to have acute right MCA territory ischemic infarct w/ extensive chronic microvascular ischemic changes on CT, acute/subacute right-sided MCA distribution infarct with associated cytotoxic edema, on MRI, and Pcomm aneurysm vs thrombus [id number], mild R M1 stenosis on MRA.

## 2021-10-16 NOTE — PROGRESS NOTE ADULT - PROBLEM SELECTOR PLAN 7
- uncontrolled at [facility]  - f/u Renal/Cards recs if further BP control required w/ close monitoring of hemodynamics  - c/w nifedipine, hydralazine, metoprolol as ordered  - increased clonidine to 0.3mg TID  - monitor vitals

## 2021-10-16 NOTE — PROGRESS NOTE ADULT - PROBLEM SELECTOR PLAN 5
possible CHF/PNA.  - CT chest with small b/l pleural effusions and interstitial edema  - as patient not hypoxic and asymptomatic, will hold off on diuretics

## 2021-10-16 NOTE — PROGRESS NOTE ADULT - ASSESSMENT
70yoM w/ PMHx of CKD IV (unknown baseline), uncontrolled HTN, ?CAD (patient states he has "heart problems;" TTE performed at [facility] on 10/5 w/ normal LVF, severely enlarged LA, Grade III diastolic dysfunction, mild pulm HTN) who was transfered from [facility] to where he presented w/ hx of multiple falls, found to have acute right MCA territory ischemic infarct w/ extensive chronic microvascular ischemic changes on CT, acute/subacute right-sided MCA distribution infarct with associated cytotoxic edema, on MRI, and Pcomm aneurysm vs thrombus [id number], mild R M1 stenosis on MRA. 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 c/w urinary retention

## 2021-10-16 NOTE — PROGRESS NOTE ADULT - PROBLEM SELECTOR PLAN 6
positive orthostatics but will avoid fluid boluses in setting of uncontrolled hypertension. continue to monitor orthostatics

## 2021-10-16 NOTE — PROGRESS NOTE ADULT - SUBJECTIVE AND OBJECTIVE BOX
Subjective: Patient seen and examined. No new events except as noted.     REVIEW OF SYSTEMS:    CONSTITUTIONAL: No weakness, fevers or chills  EYES/ENT: No visual changes;  No vertigo or throat pain   NECK: No pain or stiffness  RESPIRATORY: No cough, wheezing, hemoptysis; No shortness of breath  CARDIOVASCULAR: No chest pain or palpitations  GASTROINTESTINAL: No abdominal or epigastric pain. No nausea, vomiting, or hematemesis; No diarrhea or constipation. No melena or hematochezia.  GENITOURINARY: No dysuria, frequency or hematuria  NEUROLOGICAL: No numbness or weakness  SKIN: No itching, burning, rashes, or lesions   All other review of systems is negative unless indicated above.    MEDICATIONS:  MEDICATIONS  (STANDING):  aspirin  chewable 81 milliGRAM(s) Oral daily  atorvastatin 20 milliGRAM(s) Oral at bedtime  bisacodyl 5 milliGRAM(s) Oral at bedtime  ciprofloxacin     Tablet 250 milliGRAM(s) Oral every 12 hours  cloNIDine 0.3 milliGRAM(s) Oral every 8 hours  clopidogrel Tablet 75 milliGRAM(s) Oral daily  doxazosin 4 milliGRAM(s) Oral at bedtime  heparin   Injectable 5000 Unit(s) SubCutaneous every 12 hours  hydrALAZINE 100 milliGRAM(s) Oral three times a day  influenza   Vaccine 0.5 milliLiter(s) IntraMuscular once  isosorbide   mononitrate ER Tablet (IMDUR) 60 milliGRAM(s) Oral daily  lactulose Syrup 20 Gram(s) Oral two times a day  metoprolol succinate ER 12.5 milliGRAM(s) Oral two times a day  NIFEdipine XL 90 milliGRAM(s) Oral daily  polyethylene glycol 3350 17 Gram(s) Oral every 12 hours  senna 2 Tablet(s) Oral at bedtime  sodium bicarbonate 650 milliGRAM(s) Oral three times a day      PHYSICAL EXAM:  T(C): 36.4 (10-16-21 @ 21:03), Max: 36.8 (10-16-21 @ 07:27)  HR: 52 (10-16-21 @ 21:08) (46 - 60)  BP: 150/72 (10-16-21 @ 21:08) (130/86 - 203/96)  RR: 18 (10-16-21 @ 21:03) (18 - 18)  SpO2: 98% (10-16-21 @ 21:03) (98% - 100%)  Wt(kg): --  I&O's Summary    15 Oct 2021 07:01  -  16 Oct 2021 07:00  --------------------------------------------------------  IN: 0 mL / OUT: 2729 mL / NET: -2729 mL    16 Oct 2021 07:01  -  16 Oct 2021 21:23  --------------------------------------------------------  IN: 160 mL / OUT: 1010 mL / NET: -850 mL            Appearance: NAD	  HEENT:   Dry  oral mucosa, PERRL, EOMI	  Lymphatic: No lymphadenopathy , no edema  Cardiovascular: Normal S1 S2, No JVD, No murmurs , Peripheral pulses palpable 2+ bilaterally  Respiratory: Lungs clear to auscultation, normal effort 	  Gastrointestinal:  Soft, Non-tender, + BS	  Skin: No rashes, No ecchymoses, No cyanosis, warm to touch  Musculoskeletal: Normal range of motion, normal strength  Psychiatry:  Mood & affect appropriate  Ext: No edema  Neurological: Awake, alert oriented to person, place and time, Following Commands, PERRL, EOMI, Face Symmetrical, Speech Fluent, Moving all extremities, LUE 4+/5, No Drift, Sensation to Light Touch Intact    LABS:    CARDIAC MARKERS:                                10.6   6.49  )-----------( 669      ( 16 Oct 2021 05:15 )             33.3     10-16    137  |  107  |  36<H>  ----------------------------<  98  4.1   |  16<L>  |  2.55<H>    Ca    9.4      16 Oct 2021 05:15  Phos  2.6     10-14  Mg     1.9     10-14      proBNP:   Lipid Profile:   HgA1c:   TSH:             TELEMETRY: 	    ECG:  	  RADIOLOGY:   DIAGNOSTIC TESTING:  [ ] Echocardiogram:  [ ]  Catheterization:  [ ] Stress Test:    OTHER:

## 2021-10-16 NOTE — PROGRESS NOTE ADULT - PROBLEM SELECTOR PROBLEM 7
Hypertension [FreeTextEntry1] : 46yoF with ovarian cancer Stage IIb s/p p RTLH, RSO, resection left adnexal remnant, omentectomy, P&PA LND (2/28/17), s/p Carboplatin, Taxol with disease recurrence presents for follow up visit.  PMH also significant for vertigo, migraines.  Had POD on Pembrolizumab, now on monthly Doxil with recent scan showing POD. Patient now under the care of Dr. Cole, on Gemcitabine. \par \par Patient was hospitalized last week for acute pain.  During admission she was seen by the inpatient palliative care team, methadone dosing was altered to 15mg TID, short-acting opioid was rotated to morphine (from hydromorphone) 30mg Q4h PRN.  Pain has been better controlled with methadone increase, has not had to use any morphine since hospital d/c on 4/26.  On Dexamethasone taper. \par \par Has been using Lorazepam 0.5mg TID, which is helping control her high anxiety levels/outbursts.  \par \par ROS:\par +intermittent migraine headaches\par +nausea, early satiety\par Denies constipation, takes lactulose daily which is very helpful\par \par I-Stop Ref#: [id number]

## 2021-10-16 NOTE — PROGRESS NOTE ADULT - SUBJECTIVE AND OBJECTIVE BOX
[facility] : Progress Note :: [phone], (office [phone]),   Dr Corey / Dr Maddox / Dr Hollingsworth / Dr Cruz / Dr Satya BENITEZ / Dr Arellano / Dr Miller / Dr Geovani abad  _____________________________________________________________________________________________          No Known Allergies    Hospital Medications:   MEDICATIONS  (STANDING):  aspirin  chewable 81 milliGRAM(s) Oral daily  atorvastatin 20 milliGRAM(s) Oral at bedtime  bisacodyl 5 milliGRAM(s) Oral at bedtime  ciprofloxacin     Tablet 250 milliGRAM(s) Oral every 12 hours  cloNIDine 0.3 milliGRAM(s) Oral every 8 hours  clopidogrel Tablet 75 milliGRAM(s) Oral daily  doxazosin 4 milliGRAM(s) Oral at bedtime  heparin   Injectable 5000 Unit(s) SubCutaneous every 12 hours  hydrALAZINE 100 milliGRAM(s) Oral three times a day  influenza   Vaccine 0.5 milliLiter(s) IntraMuscular once  isosorbide   mononitrate ER Tablet (IMDUR) 60 milliGRAM(s) Oral daily  lactulose Syrup 20 Gram(s) Oral two times a day  metoprolol succinate ER 12.5 milliGRAM(s) Oral two times a day  NIFEdipine XL 90 milliGRAM(s) Oral daily  polyethylene glycol 3350 17 Gram(s) Oral every 12 hours  senna 2 Tablet(s) Oral at bedtime  sodium bicarbonate 650 milliGRAM(s) Oral three times a day        VITALS:  T(F): 98.2 (10-16-21 @ 07:27), Max: 98.3 (10-15-21 @ 19:59)  HR: 46 (10-16-21 @ 07:27)  BP: 198/98 (10-16-21 @ 07:27)  RR: 18 (10-16-21 @ 04:01)  SpO2: 98% (10-16-21 @ 04:01)  Wt(kg): --    10-15 @ 07:01  -  10-16 @ 07:00  --------------------------------------------------------  IN: 0 mL / OUT: 2729 mL / NET: -2729 mL    10-16 @ 07:01  -  10-16 @ 11:19  --------------------------------------------------------  IN: 0 mL / OUT: 525 mL / NET: -525 mL        PHYSICAL EXAM:  Constitutional: NAD  HEENT: anicteric sclera, oropharynx clear.  Neck: No JVD  Respiratory: CTAB, no wheezes, rales or rhonchi  Cardiovascular: S1, S2, RRR  Gastrointestinal: BS+, soft, NT/ND  Extremities:  No peripheral edema  Neurological: A/O x 3, no focal deficits      LABS:  10-16    137  |  107  |  36<H>  ----------------------------<  98  4.1   |  16<L>  |  2.55<H>    Ca    9.4      16 Oct 2021 05:15  Phos  2.6     10-14  Mg     1.9     10-14      Creatinine Trend: 2.55 <--, 2.74 <--, 3.06 <--, 3.13 <--, 2.82 <--, 2.78 <--, 2.90 <--, 3.07 <--, 3.03 <--                        10.6   6.49  )-----------( 669      ( 16 Oct 2021 05:15 )             33.3     Urine Studies:  Urinalysis Basic - ( 10 Oct 2021 21:03 )    Color: Yellow / Appearance: Slightly Turbid / S.016 / pH:   Gluc:  / Ketone: Negative  / Bili: Negative / Urobili: Negative   Blood:  / Protein: 300 mg/dL / Nitrite: Positive   Leuk Esterase: Large / RBC: 2 /hpf /  /HPF   Sq Epi:  / Non Sq Epi: 1 /hpf / Bacteria: Many      Creatinine, Random Urine: 128 mg/dL (10-10 @ 21:03)  Protein/Creatinine Ratio Calculation: 1.4 Ratio (10-10 @ 21:03)  Potassium, Random Urine: 43 mmol/L (10-10 @ 21:03)  Osmolality, Random Urine: 427 mos/kg (10-10 @ 21:03)  Sodium, Random Urine: 34 mmol/L (10-10 @ 21:03)  Creatinine, Random Urine: 127 mg/dL (10-10 @ 21:03)  Chloride, Random Urine: 24 mmol/L (10-10 @ 21:03)    RADIOLOGY & ADDITIONAL STUDIES:

## 2021-10-17 LAB — SARS-COV-2 RNA SPEC QL NAA+PROBE: SIGNIFICANT CHANGE UP

## 2021-10-17 PROCEDURE — 99232 SBSQ HOSP IP/OBS MODERATE 35: CPT

## 2021-10-17 RX ADMIN — Medication 650 MILLIGRAM(S): at 05:29

## 2021-10-17 RX ADMIN — HEPARIN SODIUM 5000 UNIT(S): 5000 INJECTION INTRAVENOUS; SUBCUTANEOUS at 17:26

## 2021-10-17 RX ADMIN — Medication 5 MILLIGRAM(S): at 22:35

## 2021-10-17 RX ADMIN — Medication 250 MILLIGRAM(S): at 05:29

## 2021-10-17 RX ADMIN — LACTULOSE 20 GRAM(S): 10 SOLUTION ORAL at 05:28

## 2021-10-17 RX ADMIN — ATORVASTATIN CALCIUM 20 MILLIGRAM(S): 80 TABLET, FILM COATED ORAL at 22:34

## 2021-10-17 RX ADMIN — Medication 650 MILLIGRAM(S): at 22:34

## 2021-10-17 RX ADMIN — Medication 650 MILLIGRAM(S): at 14:28

## 2021-10-17 RX ADMIN — ISOSORBIDE MONONITRATE 60 MILLIGRAM(S): 60 TABLET, EXTENDED RELEASE ORAL at 12:51

## 2021-10-17 RX ADMIN — SENNA PLUS 2 TABLET(S): 8.6 TABLET ORAL at 22:32

## 2021-10-17 RX ADMIN — Medication 90 MILLIGRAM(S): at 05:29

## 2021-10-17 RX ADMIN — Medication 0.3 MILLIGRAM(S): at 14:29

## 2021-10-17 RX ADMIN — Medication 100 MILLIGRAM(S): at 14:29

## 2021-10-17 RX ADMIN — HEPARIN SODIUM 5000 UNIT(S): 5000 INJECTION INTRAVENOUS; SUBCUTANEOUS at 05:29

## 2021-10-17 RX ADMIN — Medication 4 MILLIGRAM(S): at 22:32

## 2021-10-17 RX ADMIN — CLOPIDOGREL BISULFATE 75 MILLIGRAM(S): 75 TABLET, FILM COATED ORAL at 12:51

## 2021-10-17 RX ADMIN — Medication 100 MILLIGRAM(S): at 22:32

## 2021-10-17 RX ADMIN — Medication 100 MILLIGRAM(S): at 05:28

## 2021-10-17 RX ADMIN — Medication 0.3 MILLIGRAM(S): at 05:29

## 2021-10-17 RX ADMIN — Medication 0.3 MILLIGRAM(S): at 22:32

## 2021-10-17 RX ADMIN — Medication 81 MILLIGRAM(S): at 12:51

## 2021-10-17 NOTE — PROGRESS NOTE ADULT - ASSESSMENT
70yoM w/ PMHx of CKD IV (unknown baseline), uncontrolled HTN, ?CAD (patient states he has "heart problems;" TTE performed at [facility] on 10/5 w/ normal LVF, severely enlarged LA, Grade III diastolic dysfunction, mild pulm HTN) who was transfered from [facility] to where he presented w/ hx of multiple falls, found to have acute right MCA territory ischemic infarct w/ extensive chronic microvascular ischemic changes on CT, acute/subacute right-sided MCA distribution infarct with associated cytotoxic edema, on MRI, and Pcomm aneurysm vs thrombus [id number], mild R M1 stenosis on MRA.

## 2021-10-17 NOTE — PROGRESS NOTE ADULT - ASSESSMENT
70M PMHx CKD with a baseline Cr >3, HTN, HLD presented to OSH after multiple falls. He was transferred from OSH for MRI/MRA findings concerning for aneurysm vs. thrombus with multiple infarcts. He is slightly confused, but denies pain, headache, blurry vision, nausea, vomiting, weakness, and changes in sensation.   CTH 10/1 hydro likely chronic, MRI 10/4 right-sided acute vs. subacute MCA infarcts, Carotid U/S 10/6 no sig stenosis, MRA 10/6 R pcomm aneurysm with possible intra-aneurysmal thrombus [id number], mild R M1 stenosis.  (09 Oct 2021 16:27)   10/12 s/p coiling right PCOMM aneurysm. Post op Ct head with evolution of previous infarcts in right MCA, now with new acute lacuna infarctions in the right temporal lobe and cerebellum.       PLAN:  Neuro:   - neuro checks q 4 hrs   -MRA w/ narrowing of right proximal M1 - dual antiplatelet as per neurology team, continue asa/plavix  -Hospitalist following for medical management  - on statin  Respiratory:   - on room air  CV:  - HTN- difficult to manage  - on clonidine 0.3 q8, hydralazine 100 tid, imdur 60 mg daily, metoprolol and nifedipine  -Hospitalist following  Endocrine:   DVT ppx:   - on hep 5000 q12, venodynes  - LE dopp- neg for DVT  Renal:   - CKD4   -Renal following for CKD. Recommend 1) HTN; nifedipine to 90mg po qd  clonidine inc to 0.2mg po tid 2) metabolic acidosis- likely from ckd  start na bicarb 650mg po tid  ID:   - completed course of cipro for UTI  PT/OT:   - CLYDE         Spectra #[phone]

## 2021-10-17 NOTE — PROGRESS NOTE ADULT - SUBJECTIVE AND OBJECTIVE BOX
Subjective: Patient seen and examined. No new events except as noted.     REVIEW OF SYSTEMS:    CONSTITUTIONAL: + weakness, fevers or chills  EYES/ENT: No visual changes;  No vertigo or throat pain   NECK: No pain or stiffness  RESPIRATORY: No cough, wheezing, hemoptysis; No shortness of breath  CARDIOVASCULAR: No chest pain or palpitations  GASTROINTESTINAL: No abdominal or epigastric pain. No nausea, vomiting, or hematemesis; No diarrhea or constipation. No melena or hematochezia.  GENITOURINARY: No dysuria, frequency or hematuria  NEUROLOGICAL: No numbness or weakness  SKIN: No itching, burning, rashes, or lesions   All other review of systems is negative unless indicated above.    MEDICATIONS:  MEDICATIONS  (STANDING):  aspirin  chewable 81 milliGRAM(s) Oral daily  atorvastatin 20 milliGRAM(s) Oral at bedtime  bisacodyl 5 milliGRAM(s) Oral at bedtime  cloNIDine 0.3 milliGRAM(s) Oral every 8 hours  clopidogrel Tablet 75 milliGRAM(s) Oral daily  doxazosin 4 milliGRAM(s) Oral at bedtime  heparin   Injectable 5000 Unit(s) SubCutaneous every 12 hours  hydrALAZINE 100 milliGRAM(s) Oral three times a day  influenza   Vaccine 0.5 milliLiter(s) IntraMuscular once  isosorbide   mononitrate ER Tablet (IMDUR) 60 milliGRAM(s) Oral daily  lactulose Syrup 20 Gram(s) Oral two times a day  metoprolol succinate ER 12.5 milliGRAM(s) Oral two times a day  NIFEdipine XL 90 milliGRAM(s) Oral daily  polyethylene glycol 3350 17 Gram(s) Oral every 12 hours  senna 2 Tablet(s) Oral at bedtime  sodium bicarbonate 650 milliGRAM(s) Oral three times a day      PHYSICAL EXAM:  T(C): 36.4 (10-17-21 @ 07:32), Max: 36.8 (10-17-21 @ 03:45)  HR: 44 (10-17-21 @ 07:32) (44 - 60)  BP: 147/79 (10-17-21 @ 07:32) (130/86 - 159/89)  RR: 18 (10-17-21 @ 07:32) (18 - 18)  SpO2: 99% (10-17-21 @ 07:32) (98% - 100%)  Wt(kg): --  I&O's Summary    16 Oct 2021 07:01  -  17 Oct 2021 07:00  --------------------------------------------------------  IN: 160 mL / OUT: 1535 mL / NET: -1375 mL            Appearance: NAD	  HEENT:   Dry  oral mucosa, PERRL, EOMI	  Lymphatic: No lymphadenopathy , no edema  Cardiovascular: Normal S1 S2, No JVD, No murmurs , Peripheral pulses palpable 2+ bilaterally  Respiratory: Lungs clear to auscultation, normal effort 	  Gastrointestinal:  Soft, Non-tender, + BS	  Skin: No rashes, No ecchymoses, No cyanosis, warm to touch  Musculoskeletal: Normal range of motion, normal strength  Psychiatry:  Mood & affect appropriate  Ext: No edema  Neurological: Awake, alert oriented to person, place and time, Following Commands, PERRL, EOMI, Face Symmetrical, Speech Fluent, Moving all extremities, LUE 4+/5, No Drift, Sensation to Light Touch Intact        LABS:    CARDIAC MARKERS:                                10.6   6.49  )-----------( 669      ( 16 Oct 2021 05:15 )             33.3     10-16    137  |  107  |  36<H>  ----------------------------<  98  4.1   |  16<L>  |  2.55<H>    Ca    9.4      16 Oct 2021 05:15      proBNP:   Lipid Profile:   HgA1c:   TSH:             TELEMETRY: 	    ECG:  	  RADIOLOGY:   DIAGNOSTIC TESTING:  [ ] Echocardiogram:  [ ]  Catheterization:  [ ] Stress Test:    OTHER:

## 2021-10-17 NOTE — PROGRESS NOTE ADULT - NSPROGADDITIONALINFOA_GEN_ALL_CORE
.  Renetta Marte MD  Division of Hospital Medicine  [facility]   Spectra: [phone]    Medicine will sign off at this time.  Please call [phone] if any new questions or concerns arise.    Plan discussed with patient and neurosurgery NP Aria.

## 2021-10-17 NOTE — PROGRESS NOTE ADULT - SUBJECTIVE AND OBJECTIVE BOX
[facility] Division of Hospital Medicine  Renetta Marte MD  Spectra: [phone]      Patient is a 70y old  Male who presents with a chief complaint of Transfer from [facility] for MRA findings for Right PCOMM aneurysm (17 Oct 2021 11:45)      SUBJECTIVE / OVERNIGHT EVENTS: no acute events overnight. BP better controlled today. no fever, chills, headache, changes in vision, chest pain, dyspnea. feels well with exception of being unable to find his ID.   ADDITIONAL REVIEW OF SYSTEMS:    MEDICATIONS  (STANDING):  aspirin  chewable 81 milliGRAM(s) Oral daily  atorvastatin 20 milliGRAM(s) Oral at bedtime  bisacodyl 5 milliGRAM(s) Oral at bedtime  cloNIDine 0.3 milliGRAM(s) Oral every 8 hours  clopidogrel Tablet 75 milliGRAM(s) Oral daily  doxazosin 4 milliGRAM(s) Oral at bedtime  heparin   Injectable 5000 Unit(s) SubCutaneous every 12 hours  hydrALAZINE 100 milliGRAM(s) Oral three times a day  influenza   Vaccine 0.5 milliLiter(s) IntraMuscular once  isosorbide   mononitrate ER Tablet (IMDUR) 60 milliGRAM(s) Oral daily  lactulose Syrup 20 Gram(s) Oral two times a day  metoprolol succinate ER 12.5 milliGRAM(s) Oral two times a day  NIFEdipine XL 90 milliGRAM(s) Oral daily  polyethylene glycol 3350 17 Gram(s) Oral every 12 hours  senna 2 Tablet(s) Oral at bedtime  sodium bicarbonate 650 milliGRAM(s) Oral three times a day    MEDICATIONS  (PRN):  acetaminophen    Suspension .. 650 milliGRAM(s) Oral every 6 hours PRN Mild Pain (1 - 3)      CAPILLARY BLOOD GLUCOSE        I&O's Summary    16 Oct 2021 07:01  -  17 Oct 2021 07:00  --------------------------------------------------------  IN: 160 mL / OUT: 1535 mL / NET: -1375 mL        PHYSICAL EXAM:  Vital Signs Last 24 Hrs  T(C): 36.6 (17 Oct 2021 11:31), Max: 36.8 (17 Oct 2021 03:45)  T(F): 97.9 (17 Oct 2021 11:31), Max: 98.2 (17 Oct 2021 03:45)  HR: 59 (17 Oct 2021 11:31) (44 - 60)  BP: 117/65 (17 Oct 2021 11:31) (117/65 - 159/89)  BP(mean): --  RR: 18 (17 Oct 2021 11:31) (18 - 18)  SpO2: 100% (17 Oct 2021 11:31) (98% - 100%)    CONSTITUTIONAL: NAD, chronically ill appearing   EYES: PERRLA; conjunctiva and sclera clear  ENMT: Moist oral mucosa, no pharyngeal injection or exudates; normal dentition  NECK: Supple, no palpable masses; no thyromegaly  RESPIRATORY: Normal respiratory effort; lungs are clear to auscultation bilaterally with exception of mild bibasilar crackles   CARDIOVASCULAR: Regular rate and rhythm, normal S1 and S2, no murmur/rub/gallop; No lower extremity edema; Peripheral pulses are 2+ bilaterally  ABDOMEN: Soft, Nondistended, Nontender to palpation, normoactive bowel sounds  MUSCULOSKELETAL:  No clubbing or cyanosis of digits; no joint swelling or tenderness to palpation  PSYCH: A+O to person, place, and time; affect appropriate  NEUROLOGY: no gross sensory deficits, 5/5 strength throughout with exception of 4+/5 on LUE  SKIN: No rashes; no palpable lesions    LABS:                        10.6   6.49  )-----------( 669      ( 16 Oct 2021 05:15 )             33.3     10-16    137  |  107  |  36<H>  ----------------------------<  98  4.1   |  16<L>  |  2.55<H>    Ca    9.4      16 Oct 2021 05:15        RADIOLOGY & ADDITIONAL TESTS:  Results Reviewed:   Imaging Personally Reviewed:  Electrocardiogram Personally Reviewed:    COORDINATION OF CARE:  Care Discussed with Consultants/Other Providers [Y]: Neurosurgery AUGUSTINE Knapp  Prior or Outpatient Records Reviewed [Y]: Cardiology, Neurology progress notes

## 2021-10-17 NOTE — PROGRESS NOTE ADULT - SUBJECTIVE AND OBJECTIVE BOX
SUBJECTIVE: Pt seen and examined, resting comfortably in bed, he is     OVERNIGHT EVENTS:     Vital Signs Last 24 Hrs  T(C): 36.6 (17 Oct 2021 11:31), Max: 36.8 (17 Oct 2021 03:45)  T(F): 97.9 (17 Oct 2021 11:31), Max: 98.2 (17 Oct 2021 03:45)  HR: 59 (17 Oct 2021 11:31) (44 - 60)  BP: 117/65 (17 Oct 2021 11:31) (117/65 - 159/89)  BP(mean): --  RR: 18 (17 Oct 2021 11:31) (18 - 18)  SpO2: 100% (17 Oct 2021 11:31) (98% - 100%)    PHYSICAL EXAM:    General: No Acute Distress     Neurological: Awake, alert oriented to person, place and time, Following Commands, PERRL, EOMI, Face Symmetrical, Speech Fluent, Moving all extremities, Muscle Strength normal in all four extremities, No Drift, Sensation to Light Touch Intact    Pulmonary: Clear to Auscultation, No Rales, No Rhonchi, No Wheezes     Cardiovascular: S1, S2, Regular Rate and Rhythm     Gastrointestinal: Soft, Nontender, Nondistended     Incision:     LABS:                        10.6   6.49  )-----------( 669      ( 16 Oct 2021 05:15 )             33.3    10-16    137  |  107  |  36<H>  ----------------------------<  98  4.1   |  16<L>  |  2.55<H>    Ca    9.4      16 Oct 2021 05:15          10-16 @ 07:01  -  10-17 @ 07:00  --------------------------------------------------------  IN: 160 mL / OUT: 1535 mL / NET: -1375 mL      DRAINS:     MEDICATIONS:  Antibiotics:    Neuro:  acetaminophen    Suspension .. 650 milliGRAM(s) Oral every 6 hours PRN Mild Pain (1 - 3)    Cardiac:  cloNIDine 0.3 milliGRAM(s) Oral every 8 hours  doxazosin 4 milliGRAM(s) Oral at bedtime  hydrALAZINE 100 milliGRAM(s) Oral three times a day  isosorbide   mononitrate ER Tablet (IMDUR) 60 milliGRAM(s) Oral daily  metoprolol succinate ER 12.5 milliGRAM(s) Oral two times a day  NIFEdipine XL 90 milliGRAM(s) Oral daily    Pulm:    GI/:  bisacodyl 5 milliGRAM(s) Oral at bedtime  lactulose Syrup 20 Gram(s) Oral two times a day  polyethylene glycol 3350 17 Gram(s) Oral every 12 hours  senna 2 Tablet(s) Oral at bedtime    Other:   aspirin  chewable 81 milliGRAM(s) Oral daily  atorvastatin 20 milliGRAM(s) Oral at bedtime  clopidogrel Tablet 75 milliGRAM(s) Oral daily  heparin   Injectable 5000 Unit(s) SubCutaneous every 12 hours  influenza   Vaccine 0.5 milliLiter(s) IntraMuscular once  sodium bicarbonate 650 milliGRAM(s) Oral three times a day    DIET: [] Regular [] CCD [] Renal [] Puree [] Dysphagia [] Tube Feeds:     IMAGING:    SUBJECTIVE: Pt seen and examined, resting comfortably in bed, he is upset that his wallet is lost. Otherwise he is doing well. No c/o HA    OVERNIGHT EVENTS: none    Vital Signs Last 24 Hrs  T(C): 36.6 (17 Oct 2021 11:31), Max: 36.8 (17 Oct 2021 03:45)  T(F): 97.9 (17 Oct 2021 11:31), Max: 98.2 (17 Oct 2021 03:45)  HR: 59 (17 Oct 2021 11:31) (44 - 60)  BP: 117/65 (17 Oct 2021 11:31) (117/65 - 159/89)  BP(mean): --  RR: 18 (17 Oct 2021 11:31) (18 - 18)  SpO2: 100% (17 Oct 2021 11:31) (98% - 100%)    PHYSICAL EXAM:    General: No Acute Distress     Neurological: Awake, alert oriented to person, place and time, Following Commands, PERRL, EOMI, Face Symmetrical, Speech Fluent, Moving all extremities, LUE 4+/5, No Drift, Sensation to Light Touch Intact    Pulmonary: Clear to Auscultation, No Rales, No Rhonchi, No Wheezes     Cardiovascular: S1, S2, Regular Rate and Rhythm     Gastrointestinal: Soft, Nontender, Nondistended     Incision: groin site c/d/i    LABS:                        10.6   6.49  )-----------( 669      ( 16 Oct 2021 05:15 )             33.3    10-16    137  |  107  |  36<H>  ----------------------------<  98  4.1   |  16<L>  |  2.55<H>    Ca    9.4      16 Oct 2021 05:15          10-16 @ 07:01  -  10-17 @ 07:00  --------------------------------------------------------  IN: 160 mL / OUT: 1535 mL / NET: -1375 mL      DRAINS:     MEDICATIONS:  Antibiotics:    Neuro:  acetaminophen    Suspension .. 650 milliGRAM(s) Oral every 6 hours PRN Mild Pain (1 - 3)    Cardiac:  cloNIDine 0.3 milliGRAM(s) Oral every 8 hours  doxazosin 4 milliGRAM(s) Oral at bedtime  hydrALAZINE 100 milliGRAM(s) Oral three times a day  isosorbide   mononitrate ER Tablet (IMDUR) 60 milliGRAM(s) Oral daily  metoprolol succinate ER 12.5 milliGRAM(s) Oral two times a day  NIFEdipine XL 90 milliGRAM(s) Oral daily    Pulm:    GI/:  bisacodyl 5 milliGRAM(s) Oral at bedtime  lactulose Syrup 20 Gram(s) Oral two times a day  polyethylene glycol 3350 17 Gram(s) Oral every 12 hours  senna 2 Tablet(s) Oral at bedtime    Other:   aspirin  chewable 81 milliGRAM(s) Oral daily  atorvastatin 20 milliGRAM(s) Oral at bedtime  clopidogrel Tablet 75 milliGRAM(s) Oral daily  heparin   Injectable 5000 Unit(s) SubCutaneous every 12 hours  influenza   Vaccine 0.5 milliLiter(s) IntraMuscular once  sodium bicarbonate 650 milliGRAM(s) Oral three times a day    DIET: [x] Regular [] CCD [] Renal [] Puree [] Dysphagia [] Tube Feeds:     IMAGING:   < from: CT Head No Cont (10.13.21 @ 09:28) >  IMPRESSION:    Interval placement of embolic coil mass right parasellar region.  No acute intracranial hemorrhage, mass effect, or brain edema.  Similar mild ventricular dilatation.    < end of copied text >  < from: CT Chest No Cont (10.15.21 @ 23:27) >  IMPRESSION:    Small bilateral pleural effusions with minimal bilateral groundglass opacities and a few areas of interlobular septal thickening. Findings are suggestive of mild interstitial edema. This is superimposed on a background of emphysema.    Redemonstration of large hypodense hepatic lesion, similar appearance whencompared to prior abdominal pelvis 10/11/2021. As mentioned on prior report, consider contrast enhanced MRI for further evaluation.    < end of copied text >  < from: [facility] Duplex Lower Ext Vein Scan, Bilat (10.10.21 @ 11:06) >  IMPRESSION:  No evidence of deep venous thrombosis in either lower extremity.    < end of copied text >  < from: MR Head No Cont (10.15.21 @ 16:40) >  IMPRESSION:    Comparison previous studies,    There has been interval evolution of previously seen infarcts in right MCA distribution as described. There is development of new acute lacunar infarctions at the posterior pole of the right temporal lobe and in the right cerebellum as described.    There is no intracranial hemorrhage, midline shift or mass effect.    Coil mass in the right paraclinoid region.    Notification to clinician of alert:    Provider   Dr. BARGER  was notified about the final results at 10/15/2021 10:23 AM via telephone by neuroradiologist KEITH Francisco. Readback confirmation was obtained.    < end of copied text >  < from: [facility] Duplex Lower Ext Vein Scan, Bilat (10.10.21 @ 11:06) >  IMPRESSION:  No evidence of deep venous thrombosis in either lower extremity.    < end of copied text >

## 2021-10-17 NOTE — PROGRESS NOTE ADULT - ASSESSMENT
70yoM w/ PMHx of CKD IV (unknown baseline), uncontrolled HTN, ?CAD (patient states he has "heart problems;" TTE performed at [facility] on 10/5 w/ normal LVF, severely enlarged LA, Grade III diastolic dysfunction, mild pulm HTN) who was transfered from [facility] to where he presented w/ hx of multiple falls, found to have acute right MCA territory ischemic infarct w/ extensive chronic microvascular ischemic changes on CT, acute/subacute right-sided MCA distribution infarct with associated cytotoxic edema, on MRI, and Pcomm aneurysm vs thrombus [id number], mild R M1 stenosis on MRA. 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 c/w urinary retention

## 2021-10-17 NOTE — PROGRESS NOTE ADULT - PROBLEM SELECTOR PLAN 7
- uncontrolled at [facility]  - f/u Renal/Cards recs if further BP control required w/ close monitoring of hemodynamics  - c/w nifedipine, hydralazine, metoprolol as ordered  - increased clonidine to 0.3mg TID on 10/16 with improvement  - monitor vitals

## 2021-10-17 NOTE — PROGRESS NOTE ADULT - ASSESSMENT
69 yo male with CKD (baseline Cr > 3), HTN, and HLD who presented as a transfer from [facility] for higher level of care. Patient initially presented to [facility] on 10/01 after multiple falls the since the day prior. MRI Head at [facility] showed acute vs subacute R MCA territory infarct with associated cytotoxic edema. MRA H showed multilobulated right-sided P-comm aneurysm directed posteriorly and laterally, measuring 8.9 x 7.3 mm.   A1c 5.5, LDL 25.  TTE: EF 65-70%, severely enlarged L atrium.   MRi with R MCA territory infarct   vessels with PCOM on R aneurysm    s/p angio and coil of PCOM aneurysm.   Impression: AMS + mild L hemineglect in setting of R MCA territory infarct seen on MRI Head, ESUS.  o/e 10/15 LUE drift   10/15 MRI for trial as above  10/17 okay  no complaints   - Dual antiplatelet therapy with ASA 81mg PO daily and Clopidogrel 75mg PO daily x 3 weeks followed by monotherapy with ASA 81mg PO daily.  - lipitor 40  - ARU and PRU therapeutic   - cardio for ILR. cardio following can do outpt   - telemetry  - PT/OT/SS/SLP, OOBC  - check FS, glucose control <180  - GI/DVT ppx  - Counseling on diet, exercise, and medication adherence was done  - Counseling on smoking cessation and alcohol consumption offered when appropriate.  - Pain assessed and judicious use of narcotics when appropriate was discussed.    - Stroke education given when appropriate.  - Importance of fall prevention discussed.   - Differential diagnosis and plan of care discussed with patient and/or family and primary team  - Thank you for allowing me to participate in the care of this patient. Call with questions.   -    d/c planning ILR in or outpt. outpt f/u on d/c 1-2 weeks no objection to d/c   Sukh Pierre MD  Vascular Neurology  Office: [phone] .

## 2021-10-17 NOTE — PROGRESS NOTE ADULT - SUBJECTIVE AND OBJECTIVE BOX
Neurology Progress Note    S: Patient seen and examined on floor. no complaints     Medication:  MEDICATIONS  (STANDING):  aspirin  chewable 81 milliGRAM(s) Oral daily  atorvastatin 20 milliGRAM(s) Oral at bedtime  bisacodyl 5 milliGRAM(s) Oral at bedtime  ciprofloxacin     Tablet 250 milliGRAM(s) Oral every 12 hours  cloNIDine 0.3 milliGRAM(s) Oral every 8 hours  cloNIDine 0.1 milliGRAM(s) Oral once  clopidogrel Tablet 75 milliGRAM(s) Oral daily  doxazosin 4 milliGRAM(s) Oral at bedtime  heparin   Injectable 5000 Unit(s) SubCutaneous every 12 hours  hydrALAZINE 100 milliGRAM(s) Oral three times a day  influenza   Vaccine 0.5 milliLiter(s) IntraMuscular once  isosorbide   mononitrate ER Tablet (IMDUR) 60 milliGRAM(s) Oral daily  lactulose Syrup 20 Gram(s) Oral two times a day  metoprolol succinate ER 12.5 milliGRAM(s) Oral two times a day  NIFEdipine XL 90 milliGRAM(s) Oral daily  polyethylene glycol 3350 17 Gram(s) Oral every 12 hours  senna 2 Tablet(s) Oral at bedtime  sodium bicarbonate 650 milliGRAM(s) Oral three times a day    MEDICATIONS  (PRN):  acetaminophen    Suspension .. 650 milliGRAM(s) Oral every 6 hours PRN Mild Pain (1 - 3)  oxyCODONE    IR 5 milliGRAM(s) Oral every 4 hours PRN Moderate Pain (4 - 6)  oxyCODONE    IR 10 milliGRAM(s) Oral every 4 hours PRN Severe Pain (7 - 10)        Vitals:  Vital Signs Last 24 Hrs  T(C): 36.6 (17 Oct 2021 11:31), Max: 36.8 (17 Oct 2021 03:45)  T(F): 97.9 (17 Oct 2021 11:31), Max: 98.2 (17 Oct 2021 03:45)  HR: 59 (17 Oct 2021 11:31) (44 - 60)  BP: 117/65 (17 Oct 2021 11:31) (117/65 - 159/89)  BP(mean): --  ABP: --  ABP(mean): --  RR: 18 (17 Oct 2021 11:31) (18 - 18)  SpO2: 100% (17 Oct 2021 11:31) (98% - 100%)      Orthostatic VS  10-16-21 @ 07:27  Lying BP: 198/98 HR: 46  Sitting BP: 182/95 HR: 50  Standing BP: 174/96 HR: 58  Site: --  Mode: --  Orthostatic VS  10-15-21 @ 16:00  Lying BP: 163/76 HR: 49  Sitting BP: 136/77 HR: 51  Standing BP: 136/70 HR: 59  Site: upper right arm  Mode: electronic        General Exam:   General Appearance: Appropriately dressed and in no acute distress       Head: Normocephalic, atraumatic and no dysmorphic features  Ear, Nose, and Throat: Moist mucous membranes  CVS: S1S2+  Resp: No SOB, no wheeze or rhonchi  Abd: soft NTND  Extremities: No edema, no cyanosis  Skin: No bruises, no rashes     Neurological Exam:  Mental Status: Awake, alert and oriented x 3.  Able to follow simple and complex verbal commands. Able to name and repeat. fluent speech. No obvious aphasia or dysarthria noted.   Cranial Nerves: PERRL, EOMI, VFFC, sensation V1-V3 intact,  no obvious facial asymmetry , equal elevation of palate, scm/trap 5/5, tongue is midline on protrusion. no obvious papilledema on fundoscopic exam. Hearing is grossly intact.   Motor: Normal bulk, tone and strength throughout. Fine finger movements were intact and symmetric. no tremors or drift noted except LUE with 4+/5 mild drift noted   Sensation: Intact to light touch and pinprick throughout. no right/left confusion. no extinction to tactile on DSS.    Reflexes: 1+ throughout at biceps, brachioradialis, triceps, patellars and ankles bilaterally and equal. No clonus. R toe and L toe were both downgoing.  Coordination: No dysmetria on FNF   Gait: deferred in ICU    I personally reviewed the below data/images/labs:      CBC Full  -  ( 16 Oct 2021 05:15 )  WBC Count : 6.49 K/uL  RBC Count : 3.56 M/uL  Hemoglobin : 10.6 g/dL  Hematocrit : 33.3 %  Platelet Count - Automated : 669 K/uL  Mean Cell Volume : 93.5 fl  Mean Cell Hemoglobin : 29.8 pg  Mean Cell Hemoglobin Concentration : 31.8 gm/dL  Auto Neutrophil # : x  Auto Lymphocyte # : x  Auto Monocyte # : x  Auto Eosinophil # : x  Auto Basophil # : x  Auto Neutrophil % : x  Auto Lymphocyte % : x  Auto Monocyte % : x  Auto Eosinophil % : x  Auto Basophil % : x    10-16    137  |  107  |  36<H>  ----------------------------<  98  4.1   |  16<L>  |  2.55<H>    Ca    9.4      16 Oct 2021 05:15          < from: CT Head No Cont (10.01.21 @ 18:37) >    EXAM:  CT BRAIN                            PROCEDURE DATE:  10/01/2021          INTERPRETATION:  CT OF THE HEAD WITHOUT CONTRAST    CLINICAL INDICATION: Weakness. Falls.    TECHNIQUE: Volumetric CT acquisition was performed through the brain and reviewed using brain and bone window technique. Dose optimization techniques were utilized including kVp/mA modulation along with iterative reconstructions.      COMPARISON: No prior studies have been submitted for comparison.    FINDINGS:    The ventricular and sulcal size and configuration is age appropriate.   There is no acute loss of gray-white differentiation. There are extensive patchy and confluent areas of hypodensity in the periventricular and subcortical white matter which are non-specific but likely related  chronic microangiopathic ischemic changes.  Small chronic infarction in the right cerebellar hemisphere. Chronic appearing left thalamic lacunar infarct.    There is no evidence of mass effect, midline shift, acute intracranial hemorrhage, or extra-axial collections.     The calvarium is intact. The paranasal sinuses are clear.The mastoid air cells are predominantly clear. The orbits are unremarkable.      IMPRESSION:  No acute intracranial hemorrhage or acute territorial infarction. Extensive chronic microvascular ischemic changes and several chronic infarctions as described. Further evaluation may be obtained with MRI of the brain if no contraindications.    --- End of Report ---            KAMILA SZYMANSKI MD; Attending Radiologist  This document has been electronically signed. Oct  1 2021  7:25PM    < end of copied text >  < from: MR Head No Cont (10.04.21 @ 18:09) >    EXAM:  MR BRAIN                            PROCEDURE DATE:  10/04/2021          INTERPRETATION:  .    CLINICAL INFORMATION: Frequent falls.    TECHNIQUE: Multiplanar multisequential MRI of the brain was acquired without the administration of IV gadolinium.    COMPARISON: Prior CT examination of the head dated 10/1/2021.    FINDINGS: Multiple areas of restricted diffusion are seen within the right MCA territory affecting the right frontal, parietal, and temporal lobes. Associated T2/FLAIR prolongation is seen, compatible with cytotoxic edema. No hemorrhagic transformation is noted.    Chronic lacunar infarcts are again seen within the right cerebellar hemisphere, left thalamus, and bilateral basal ganglia.    Multiple patchy confluent nonspecific T2/FLAIR hyperintense signal changes are noted throughout the bihemispheric white matter without associated mass effect or restricted diffusion.    Mild ventriculomegaly appears unchanged. No abnormal intra-axial fluid collections are notable. Flow-voids are noted throughout the major intracranial vessels, on the T2 weighted images, consistent with their patency. The sellar area appears unremarkable. The paranasal sinuses and mastoid air cells are grossly clear. Calvarial signal appears unremarkable. The orbits appear within normal limits.    IMPRESSION: Acute/subacute right-sided MCA distribution infarct with associated cytotoxic edema and without hemorrhagic transformation.    Multiple additional chronic lacunar infarcts and similar-appearing extensive chronic white matter microvascular type changes, as discussed.      --- End of Report ---            LUIS CARLOS BONNER MD; Attending Radiologist  This document has been electronically signed. Oct  5 2021  3:01PM    < end of copied text >  < from: MR Angio Head No Cont (10.06.21 @ 18:15) >    EXAM:  MR ANGIO BRAIN                            PROCEDURE DATE:  10/06/2021          INTERPRETATION:  INDICATION:  Right MCA infarct.    TECHNIQUE:  MR angiography of the brain was performed using three dimensional time-of-flight (3D-TOF) technique.  Imaging was performed on a 1.5 Lisa magnet.    FINDINGS:    INTERNAL CAROTID ARTERIES:  Bilaterally patent.    [place] [facility]:  A right-sided P-comm aneurysm is identified. The aneurysm is directed posteriorly and laterally, the aneurysm appears to be septated and/or bilobed., and measures 8.9 x 7.3 mm.    CEREBRAL ARTERIES:  Both anterior cerebral arteries are patent. Both A1 segments are well formed. Both middle cerebral arteries are patent. There is mild narrowing of the proximal right M1 segment.    VERTEBROBASILAR SYSTEM:  The vertebrobasilar system is patent. There are large posterior communicating arteries bilaterally with dominant supply of the posterior cerebral arteries. Left P1 segment is hypoplastic.    IMPRESSION:    1. 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  2. Mild narrowing of the proximal right middle cerebral artery in the M1 region.  3. The vessels are otherwise patent, as outlined above.    --- End of Report ---            MONSE CLEMENT MD; Attending Radiologist  This document has been electronically signed. Oct  6 2021  7:11PM    < end of copied text >  < from: CT Head No Cont (10.13.21 @ 09:28) >    EXAM:  CT BRAIN                            PROCEDURE DATE:  10/13/2021            INTERPRETATION:  Noncontrast CT of the brain.    CLINICAL INDICATION: Status post rt pcomm aneurysm coiling    TECHNIQUE : Axial CT scanning of the brain was obtainedfrom the skull base to the vertex without the administration of intravenous contrast. Sagittal and coronal reformats were provided.    COMPARISON: CT brain 10/1/2021. MRI brain 10/4/2021    FINDINGS:    Interval placement of embolic coil mass right parasellar region.    Similar mild ventricular dilatation.    No midline shift. Basal cisterns poorly evaluated due to streak artifact.    No acute intracranial hemorrhage, mass effect, or brain edema. Extensive white matter microvascular ischemic disease.    The visualized paranasal sinuses and mastoid air cells are clear.    IMPRESSION:    Interval placement of embolic coil mass right parasellar region.  No acute intracranial hemorrhage, mass effect, or brain edema.  Similar mild ventricular dilatation.        --- End of Report ---      AFIA DAILY MD; Attending Radiologist  This document has been electronically signed. Oct 13 2021  9:31AM    < end of copied text >      < from: MR Head No Cont (10.15.21 @ 16:40) >    EXAM:  MR BRAIN                            PROCEDURE DATE:  10/15/2021            INTERPRETATION:  MRI OF THE BRAIN WITHOUT CONTRAST    CLINICAL INDICATION: Status post right posterior communicating artery aneurysm status post balloon assisted coilembolization    TECHNIQUE: Multiplanar multisequence noncontrast MRI of the brain was performed.    COMPARISON: CT brain, 10/13/2021 and MRI brain, 10/4/2021.    FINDINGS:    The ventricular and sulcal size and configuration is age appropriate. Thereare scattered T2/FLAIR hyperintensities in the subcortical and periventricular white matter which are nonspecific but most commonly represent chronic microvascular ischemic changes.    The previously seen acute lacunar infarctions along left MCA distribution are decreased in diffusion restriction intensity, consistent with evolution of acute infarctions to late subacute stage. In addition, new punctate acute lacunar infarctions have developed in the posterior wall of the right temporal lobe and in the left cerebellar hemisphere along right parietal distribution. There is no susceptibility signal to suggest hemorrhagic conversion.  There is a chronic infarction in the right cerebellum and in the left thalamus.    There is susceptibility signalin the right paraclinoid region at the site of the previously coiled right P-comm aneurysm.    There is abnormal extra-axial fluid collection or hydrocephalus. The visualized globes are symmetric bilaterally.    The visualized paranasal sinuses and mastoid bones are adequately developed and aerated.    IMPRESSION:    Comparison previous studies,    There has been interval evolution of previously seen infarcts in right MCA distribution as described. There is development of new acute lacunar infarctions at the posterior pole of the right temporal lobe and in the right cerebellum as described.    There is no intracranial hemorrhage, midline shift or mass effect.    Coil mass in the right paraclinoid region.    Notification to clinician of alert:    Provider   Dr. BARGER  was notified about the final results at 10/15/2021 10:23 AM via telephone by neuroradiologist KEITH Tineo. Readback confirmation was obtained.    --- End of Report ---                JLUIO TINEO MD; Attending Radiologist  This document has been electronically signed. Oct 15 2021 10:35AM    < end of copied text >  < from: CT Chest No Cont (10.15.21 @ 23:27) >    EXAM:  CT CHEST                            PROCEDURE DATE:  10/15/2021            INTERPRETATION:  CLINICAL INFORMATION: Shortness breath, evaluate for pulmonary infection or pulmonary edema.    COMPARISON: CT chest 9/6/2014. CT abdomen pelvis 10/11/2021.    CONTRAST/COMPLICATIONS:  IV Contrast: None  Oral Contrast: None  Complications: None    PROCEDURE:  CT of the Chest was performed.  Sagittal and coronal reformats were performed.    FINDINGS:    LUNGS AND AIRWAYS: Emphysema. No pulmonary nodules or parenchymal consolidations.  PLEURA: Small bilateral pleural effusions.    MEDIASTINUM AND RED: 1.2 cm right paratracheal lymph node (2:40), nonspecific. No axillary lymphadenopathy    VESSELS: Calcified atherosclerotic plaques in aorta and coronary arteries.    HEART: Multifocal cardiac enlargement is noted. No pericardial effusion. Mitral annular calcifications.    CHEST WALL AND LOWER NECK: Within normal limits.    VISUALIZED UPPER ABDOMEN: Partially imaged large hypodense hepatic lesion measuring 6.9 x 8.8 cm, similar in appearance compared to prior CT abdomen pelvis 10/11/2021. Simple cyst in the left kidney    BONES: Degenerative changes of the spine.    IMPRESSION:    Small bilateral pleural effusions with minimal bilateral groundglass opacities and a few areas of interlobular septal thickening. Findings are suggestive of mild interstitial edema. This is superimposed on a background of emphysema.    Redemonstration of large hypodense hepatic lesion, similar appearance whencompared to prior abdominal pelvis 10/11/2021. As mentioned on prior report, consider contrast enhanced MRI for further evaluation.        --- End of Report ---              ALIYA BERTRAND MD; Resident Radiology  This document has been electronicallysigned.  ABRIL PINEDA MD; Attending Radiologist  This document has been electronically signed. Oct 16 2021  9:09AM    < end of copied text >

## 2021-10-18 DIAGNOSIS — I63.9 CEREBRAL INFARCTION, UNSPECIFIED: ICD-10-CM

## 2021-10-18 DIAGNOSIS — G93.6 CEREBRAL EDEMA: ICD-10-CM

## 2021-10-18 DIAGNOSIS — F17.210 NICOTINE DEPENDENCE, CIGARETTES, UNCOMPLICATED: ICD-10-CM

## 2021-10-18 DIAGNOSIS — I67.1 CEREBRAL ANEURYSM, NONRUPTURED: ICD-10-CM

## 2021-10-18 DIAGNOSIS — R29.6 REPEATED FALLS: ICD-10-CM

## 2021-10-18 DIAGNOSIS — N18.4 CHRONIC KIDNEY DISEASE, STAGE 4 (SEVERE): ICD-10-CM

## 2021-10-18 DIAGNOSIS — E78.5 HYPERLIPIDEMIA, UNSPECIFIED: ICD-10-CM

## 2021-10-18 DIAGNOSIS — I25.10 ATHEROSCLEROTIC HEART DISEASE OF NATIVE CORONARY ARTERY WITHOUT ANGINA PECTORIS: ICD-10-CM

## 2021-10-18 DIAGNOSIS — I12.9 HYPERTENSIVE CHRONIC KIDNEY DISEASE WITH STAGE 1 THROUGH STAGE 4 CHRONIC KIDNEY DISEASE, OR UNSPECIFIED CHRONIC KIDNEY DISEASE: ICD-10-CM

## 2021-10-18 PROCEDURE — 99232 SBSQ HOSP IP/OBS MODERATE 35: CPT

## 2021-10-18 RX ORDER — METOPROLOL TARTRATE 50 MG
25 TABLET ORAL
Refills: 0 | Status: DISCONTINUED | OUTPATIENT
Start: 2021-10-18 | End: 2021-10-24

## 2021-10-18 RX ADMIN — Medication 5 MILLIGRAM(S): at 21:57

## 2021-10-18 RX ADMIN — Medication 100 MILLIGRAM(S): at 13:35

## 2021-10-18 RX ADMIN — Medication 650 MILLIGRAM(S): at 18:06

## 2021-10-18 RX ADMIN — ATORVASTATIN CALCIUM 20 MILLIGRAM(S): 80 TABLET, FILM COATED ORAL at 21:58

## 2021-10-18 RX ADMIN — CLOPIDOGREL BISULFATE 75 MILLIGRAM(S): 75 TABLET, FILM COATED ORAL at 13:34

## 2021-10-18 RX ADMIN — Medication 90 MILLIGRAM(S): at 06:47

## 2021-10-18 RX ADMIN — Medication 650 MILLIGRAM(S): at 21:57

## 2021-10-18 RX ADMIN — SENNA PLUS 2 TABLET(S): 8.6 TABLET ORAL at 21:57

## 2021-10-18 RX ADMIN — Medication 4 MILLIGRAM(S): at 21:57

## 2021-10-18 RX ADMIN — POLYETHYLENE GLYCOL 3350 17 GRAM(S): 17 POWDER, FOR SOLUTION ORAL at 06:47

## 2021-10-18 RX ADMIN — ISOSORBIDE MONONITRATE 60 MILLIGRAM(S): 60 TABLET, EXTENDED RELEASE ORAL at 13:34

## 2021-10-18 RX ADMIN — LACTULOSE 20 GRAM(S): 10 SOLUTION ORAL at 06:47

## 2021-10-18 RX ADMIN — Medication 100 MILLIGRAM(S): at 06:47

## 2021-10-18 RX ADMIN — Medication 100 MILLIGRAM(S): at 21:57

## 2021-10-18 RX ADMIN — Medication 650 MILLIGRAM(S): at 06:47

## 2021-10-18 RX ADMIN — Medication 81 MILLIGRAM(S): at 13:34

## 2021-10-18 RX ADMIN — Medication 0.3 MILLIGRAM(S): at 06:46

## 2021-10-18 RX ADMIN — Medication 0.3 MILLIGRAM(S): at 18:07

## 2021-10-18 NOTE — PROGRESS NOTE ADULT - SUBJECTIVE AND OBJECTIVE BOX
Subjective: Patient seen and examined. No new events except as noted.   WCT on Tele   asymptomatic       REVIEW OF SYSTEMS:    CONSTITUTIONAL: + weakness, fevers or chills  EYES/ENT: No visual changes;  No vertigo or throat pain   NECK: No pain or stiffness  RESPIRATORY: No cough, wheezing, hemoptysis; No shortness of breath  CARDIOVASCULAR: No chest pain or palpitations  GASTROINTESTINAL: No abdominal or epigastric pain. No nausea, vomiting, or hematemesis; No diarrhea or constipation. No melena or hematochezia.  GENITOURINARY: No dysuria, frequency or hematuria  NEUROLOGICAL: No numbness or weakness  SKIN: No itching, burning, rashes, or lesions   All other review of systems is negative unless indicated above.    MEDICATIONS:  MEDICATIONS  (STANDING):  aspirin  chewable 81 milliGRAM(s) Oral daily  atorvastatin 20 milliGRAM(s) Oral at bedtime  bisacodyl 5 milliGRAM(s) Oral at bedtime  cloNIDine 0.3 milliGRAM(s) Oral every 8 hours  clopidogrel Tablet 75 milliGRAM(s) Oral daily  doxazosin 4 milliGRAM(s) Oral at bedtime  hydrALAZINE 100 milliGRAM(s) Oral three times a day  influenza   Vaccine 0.5 milliLiter(s) IntraMuscular once  isosorbide   mononitrate ER Tablet (IMDUR) 60 milliGRAM(s) Oral daily  lactulose Syrup 20 Gram(s) Oral two times a day  metoprolol succinate ER 12.5 milliGRAM(s) Oral two times a day  NIFEdipine XL 90 milliGRAM(s) Oral daily  polyethylene glycol 3350 17 Gram(s) Oral every 12 hours  senna 2 Tablet(s) Oral at bedtime  sodium bicarbonate 650 milliGRAM(s) Oral three times a day      PHYSICAL EXAM:  T(C): 36.6 (10-18-21 @ 07:17), Max: 37.2 (10-18-21 @ 00:10)  HR: 49 (10-18-21 @ 07:17) (46 - 59)  BP: 193/95 (10-18-21 @ 07:17) (107/63 - 193/95)  RR: 18 (10-18-21 @ 07:17) (16 - 18)  SpO2: 100% (10-18-21 @ 07:17) (99% - 100%)  Wt(kg): --  I&O's Summary    17 Oct 2021 07:01  -  18 Oct 2021 07:00  --------------------------------------------------------  IN: 120 mL / OUT: 1425 mL / NET: -1305 mL            Appearance: NAD	  HEENT:   Dry  oral mucosa, PERRL, EOMI	  Lymphatic: No lymphadenopathy , no edema  Cardiovascular: Normal S1 S2, No JVD, No murmurs , Peripheral pulses palpable 2+ bilaterally  Respiratory: Lungs clear to auscultation, normal effort 	  Gastrointestinal:  Soft, Non-tender, + BS	  Skin: No rashes, No ecchymoses, No cyanosis, warm to touch  Musculoskeletal: Normal range of motion, normal strength  Psychiatry:  Mood & affect appropriate  Ext: No edema  Neurological: Awake, alert oriented to person, place and time, Following Commands, PERRL, EOMI, Face Symmetrical, Speech Fluent, Moving all extremities, LUE 4+/5, No Drift, Sensation to Light Touch Intact          proBNP:   Lipid Profile:   HgA1c:   TSH:             TELEMETRY: 	  SR WCT 5 beats , PVCs  ECG:  	  RADIOLOGY:   DIAGNOSTIC TESTING:  [ ] Echocardiogram:  [ ]  Catheterization:  [ ] Stress Test:    OTHER:

## 2021-10-18 NOTE — PROGRESS NOTE ADULT - ASSESSMENT
70yoM w/ PMHx of CKD IV (unknown baseline), uncontrolled HTN, ?CAD (patient states he has "heart problems;" TTE performed at [facility] on 10/5 w/ normal LVF, severely enlarged LA, Grade III diastolic dysfunction, mild pulm HTN) who was transfered from [facility] to where he presented w/ hx of multiple falls, found to have acute right MCA territory ischemic infarct w/ extensive chronic microvascular ischemic changes on CT, acute/subacute right-sided MCA distribution infarct with associated cytotoxic edema, on MRI, and Pcomm aneurysm vs thrombus [id number], mild R M1 stenosis on MRA.

## 2021-10-18 NOTE — PROGRESS NOTE ADULT - SUBJECTIVE AND OBJECTIVE BOX
Neurology Progress Note    S: Patient seen and examined on floor. no complaints     Medication:  MEDICATIONS  (STANDING):  aspirin  chewable 81 milliGRAM(s) Oral daily  atorvastatin 20 milliGRAM(s) Oral at bedtime  bisacodyl 5 milliGRAM(s) Oral at bedtime  ciprofloxacin     Tablet 250 milliGRAM(s) Oral every 12 hours  cloNIDine 0.3 milliGRAM(s) Oral every 8 hours  cloNIDine 0.1 milliGRAM(s) Oral once  clopidogrel Tablet 75 milliGRAM(s) Oral daily  doxazosin 4 milliGRAM(s) Oral at bedtime  heparin   Injectable 5000 Unit(s) SubCutaneous every 12 hours  hydrALAZINE 100 milliGRAM(s) Oral three times a day  influenza   Vaccine 0.5 milliLiter(s) IntraMuscular once  isosorbide   mononitrate ER Tablet (IMDUR) 60 milliGRAM(s) Oral daily  lactulose Syrup 20 Gram(s) Oral two times a day  metoprolol succinate ER 12.5 milliGRAM(s) Oral two times a day  NIFEdipine XL 90 milliGRAM(s) Oral daily  polyethylene glycol 3350 17 Gram(s) Oral every 12 hours  senna 2 Tablet(s) Oral at bedtime  sodium bicarbonate 650 milliGRAM(s) Oral three times a day    MEDICATIONS  (PRN):  acetaminophen    Suspension .. 650 milliGRAM(s) Oral every 6 hours PRN Mild Pain (1 - 3)  oxyCODONE    IR 5 milliGRAM(s) Oral every 4 hours PRN Moderate Pain (4 - 6)  oxyCODONE    IR 10 milliGRAM(s) Oral every 4 hours PRN Severe Pain (7 - 10)        Vitals:  Vital Signs Last 24 Hrs  T(C): 36.5 (10-18-21 @ 11:39), Max: 37.2 (10-18-21 @ 00:10)  T(F): 97.7 (10-18-21 @ 11:39), Max: 98.9 (10-18-21 @ 00:10)  HR: 47 (10-18-21 @ 11:39) (47 - 51)  BP: 130/72 (10-18-21 @ 11:39) (116/66 - 193/95)  BP(mean): --  RR: 18 (10-18-21 @ 11:39) (17 - 18)  SpO2: 98% (10-18-21 @ 11:39) (98% - 100%)        Orthostatic VS  10-16-21 @ 07:27  Lying BP: 198/98 HR: 46  Sitting BP: 182/95 HR: 50  Standing BP: 174/96 HR: 58  Site: --  Mode: --  Orthostatic VS  10-15-21 @ 16:00  Lying BP: 163/76 HR: 49  Sitting BP: 136/77 HR: 51  Standing BP: 136/70 HR: 59  Site: upper right arm  Mode: electronic        General Exam:   General Appearance: Appropriately dressed and in no acute distress       Head: Normocephalic, atraumatic and no dysmorphic features  Ear, Nose, and Throat: Moist mucous membranes  CVS: S1S2+  Resp: No SOB, no wheeze or rhonchi  Abd: soft NTND  Extremities: No edema, no cyanosis  Skin: No bruises, no rashes     Neurological Exam:  Mental Status: Awake, alert and oriented x 3.  Able to follow simple and complex verbal commands. Able to name and repeat. fluent speech. No obvious aphasia or dysarthria noted.   Cranial Nerves: PERRL, EOMI, VFFC, sensation V1-V3 intact,  no obvious facial asymmetry , equal elevation of palate, scm/trap 5/5, tongue is midline on protrusion. no obvious papilledema on fundoscopic exam. Hearing is grossly intact.   Motor: Normal bulk, tone and strength throughout. Fine finger movements were intact and symmetric. no tremors or drift noted except LUE with 4+/5 mild drift noted   Sensation: Intact to light touch and pinprick throughout. no right/left confusion. no extinction to tactile on DSS.    Reflexes: 1+ throughout at biceps, brachioradialis, triceps, patellars and ankles bilaterally and equal. No clonus. R toe and L toe were both downgoing.  Coordination: No dysmetria on FNF   Gait: deferred in ICU    I personally reviewed the below data/images/labs:    no new labs       < from: CT Head No Cont (10.01.21 @ 18:37) >    EXAM:  CT BRAIN                            PROCEDURE DATE:  10/01/2021          INTERPRETATION:  CT OF THE HEAD WITHOUT CONTRAST    CLINICAL INDICATION: Weakness. Falls.    TECHNIQUE: Volumetric CT acquisition was performed through the brain and reviewed using brain and bone window technique. Dose optimization techniques were utilized including kVp/mA modulation along with iterative reconstructions.      COMPARISON: No prior studies have been submitted for comparison.    FINDINGS:    The ventricular and sulcal size and configuration is age appropriate.   There is no acute loss of gray-white differentiation. There are extensive patchy and confluent areas of hypodensity in the periventricular and subcortical white matter which are non-specific but likely related  chronic microangiopathic ischemic changes.  Small chronic infarction in the right cerebellar hemisphere. Chronic appearing left thalamic lacunar infarct.    There is no evidence of mass effect, midline shift, acute intracranial hemorrhage, or extra-axial collections.     The calvarium is intact. The paranasal sinuses are clear.The mastoid air cells are predominantly clear. The orbits are unremarkable.      IMPRESSION:  No acute intracranial hemorrhage or acute territorial infarction. Extensive chronic microvascular ischemic changes and several chronic infarctions as described. Further evaluation may be obtained with MRI of the brain if no contraindications.    --- End of Report ---            KAMILA SZYMANSKI MD; Attending Radiologist  This document has been electronically signed. Oct  1 2021  7:25PM    < end of copied text >  < from: MR Head No Cont (10.04.21 @ 18:09) >    EXAM:  MR BRAIN                            PROCEDURE DATE:  10/04/2021          INTERPRETATION:  .    CLINICAL INFORMATION: Frequent falls.    TECHNIQUE: Multiplanar multisequential MRI of the brain was acquired without the administration of IV gadolinium.    COMPARISON: Prior CT examination of the head dated 10/1/2021.    FINDINGS: Multiple areas of restricted diffusion are seen within the right MCA territory affecting the right frontal, parietal, and temporal lobes. Associated T2/FLAIR prolongation is seen, compatible with cytotoxic edema. No hemorrhagic transformation is noted.    Chronic lacunar infarcts are again seen within the right cerebellar hemisphere, left thalamus, and bilateral basal ganglia.    Multiple patchy confluent nonspecific T2/FLAIR hyperintense signal changes are noted throughout the bihemispheric white matter without associated mass effect or restricted diffusion.    Mild ventriculomegaly appears unchanged. No abnormal intra-axial fluid collections are notable. Flow-voids are noted throughout the major intracranial vessels, on the T2 weighted images, consistent with their patency. The sellar area appears unremarkable. The paranasal sinuses and mastoid air cells are grossly clear. Calvarial signal appears unremarkable. The orbits appear within normal limits.    IMPRESSION: Acute/subacute right-sided MCA distribution infarct with associated cytotoxic edema and without hemorrhagic transformation.    Multiple additional chronic lacunar infarcts and similar-appearing extensive chronic white matter microvascular type changes, as discussed.      --- End of Report ---            LUIS CARLOS BONNER MD; Attending Radiologist  This document has been electronically signed. Oct  5 2021  3:01PM    < end of copied text >  < from: MR Angio Head No Cont (10.06.21 @ 18:15) >    EXAM:  MR ANGIO BRAIN                            PROCEDURE DATE:  10/06/2021          INTERPRETATION:  INDICATION:  Right MCA infarct.    TECHNIQUE:  MR angiography of the brain was performed using three dimensional time-of-flight (3D-TOF) technique.  Imaging was performed on a 1.5 Lisa magnet.    FINDINGS:    INTERNAL CAROTID ARTERIES:  Bilaterally patent.    [place] [facility]:  A right-sided P-comm aneurysm is identified. The aneurysm is directed posteriorly and laterally, the aneurysm appears to be septated and/or bilobed., and measures 8.9 x 7.3 mm.    CEREBRAL ARTERIES:  Both anterior cerebral arteries are patent. Both A1 segments are well formed. Both middle cerebral arteries are patent. There is mild narrowing of the proximal right M1 segment.    VERTEBROBASILAR SYSTEM:  The vertebrobasilar system is patent. There are large posterior communicating arteries bilaterally with dominant supply of the posterior cerebral arteries. Left P1 segment is hypoplastic.    IMPRESSION:    1. 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  2. Mild narrowing of the proximal right middle cerebral artery in the M1 region.  3. The vessels are otherwise patent, as outlined above.    --- End of Report ---            MONSE CLEMENT MD; Attending Radiologist  This document has been electronically signed. Oct  6 2021  7:11PM    < end of copied text >  < from: CT Head No Cont (10.13.21 @ 09:28) >    EXAM:  CT BRAIN                            PROCEDURE DATE:  10/13/2021            INTERPRETATION:  Noncontrast CT of the brain.    CLINICAL INDICATION: Status post rt pcomm aneurysm coiling    TECHNIQUE : Axial CT scanning of the brain was obtainedfrom the skull base to the vertex without the administration of intravenous contrast. Sagittal and coronal reformats were provided.    COMPARISON: CT brain 10/1/2021. MRI brain 10/4/2021    FINDINGS:    Interval placement of embolic coil mass right parasellar region.    Similar mild ventricular dilatation.    No midline shift. Basal cisterns poorly evaluated due to streak artifact.    No acute intracranial hemorrhage, mass effect, or brain edema. Extensive white matter microvascular ischemic disease.    The visualized paranasal sinuses and mastoid air cells are clear.    IMPRESSION:    Interval placement of embolic coil mass right parasellar region.  No acute intracranial hemorrhage, mass effect, or brain edema.  Similar mild ventricular dilatation.        --- End of Report ---      AFIA DAILY MD; Attending Radiologist  This document has been electronically signed. Oct 13 2021  9:31AM    < end of copied text >      < from: MR Head No Cont (10.15.21 @ 16:40) >    EXAM:  MR BRAIN                            PROCEDURE DATE:  10/15/2021            INTERPRETATION:  MRI OF THE BRAIN WITHOUT CONTRAST    CLINICAL INDICATION: Status post right posterior communicating artery aneurysm status post balloon assisted coilembolization    TECHNIQUE: Multiplanar multisequence noncontrast MRI of the brain was performed.    COMPARISON: CT brain, 10/13/2021 and MRI brain, 10/4/2021.    FINDINGS:    The ventricular and sulcal size and configuration is age appropriate. Thereare scattered T2/FLAIR hyperintensities in the subcortical and periventricular white matter which are nonspecific but most commonly represent chronic microvascular ischemic changes.    The previously seen acute lacunar infarctions along left MCA distribution are decreased in diffusion restriction intensity, consistent with evolution of acute infarctions to late subacute stage. In addition, new punctate acute lacunar infarctions have developed in the posterior wall of the right temporal lobe and in the left cerebellar hemisphere along right parietal distribution. There is no susceptibility signal to suggest hemorrhagic conversion.  There is a chronic infarction in the right cerebellum and in the left thalamus.    There is susceptibility signalin the right paraclinoid region at the site of the previously coiled right P-comm aneurysm.    There is abnormal extra-axial fluid collection or hydrocephalus. The visualized globes are symmetric bilaterally.    The visualized paranasal sinuses and mastoid bones are adequately developed and aerated.    IMPRESSION:    Comparison previous studies,    There has been interval evolution of previously seen infarcts in right MCA distribution as described. There is development of new acute lacunar infarctions at the posterior pole of the right temporal lobe and in the right cerebellum as described.    There is no intracranial hemorrhage, midline shift or mass effect.    Coil mass in the right paraclinoid region.    Notification to clinician of alert:    Provider   Dr. BARGER  was notified about the final results at 10/15/2021 10:23 AM via telephone by neuroradiologist P. Sideras. Readback confirmation was obtained.    --- End of Report ---                JULIO TINEO MD; Attending Radiologist  This document has been electronically signed. Oct 15 2021 10:35AM    < end of copied text >  < from: CT Chest No Cont (10.15.21 @ 23:27) >    EXAM:  CT CHEST                            PROCEDURE DATE:  10/15/2021            INTERPRETATION:  CLINICAL INFORMATION: Shortness breath, evaluate for pulmonary infection or pulmonary edema.    COMPARISON: CT chest 9/6/2014. CT abdomen pelvis 10/11/2021.    CONTRAST/COMPLICATIONS:  IV Contrast: None  Oral Contrast: None  Complications: None    PROCEDURE:  CT of the Chest was performed.  Sagittal and coronal reformats were performed.    FINDINGS:    LUNGS AND AIRWAYS: Emphysema. No pulmonary nodules or parenchymal consolidations.  PLEURA: Small bilateral pleural effusions.    MEDIASTINUM AND RED: 1.2 cm right paratracheal lymph node (2:40), nonspecific. No axillary lymphadenopathy    VESSELS: Calcified atherosclerotic plaques in aorta and coronary arteries.    HEART: Multifocal cardiac enlargement is noted. No pericardial effusion. Mitral annular calcifications.    CHEST WALL AND LOWER NECK: Within normal limits.    VISUALIZED UPPER ABDOMEN: Partially imaged large hypodense hepatic lesion measuring 6.9 x 8.8 cm, similar in appearance compared to prior CT abdomen pelvis 10/11/2021. Simple cyst in the left kidney    BONES: Degenerative changes of the spine.    IMPRESSION:    Small bilateral pleural effusions with minimal bilateral groundglass opacities and a few areas of interlobular septal thickening. Findings are suggestive of mild interstitial edema. This is superimposed on a background of emphysema.    Redemonstration of large hypodense hepatic lesion, similar appearance whencompared to prior abdominal pelvis 10/11/2021. As mentioned on prior report, consider contrast enhanced MRI for further evaluation.        --- End of Report ---              ALIYA BERTRAND MD; Resident Radiology  This document has been electronicallysigned.  ABRIL PINEDA MD; Attending Radiologist  This document has been electronically signed. Oct 16 2021  9:09AM    < end of copied text >

## 2021-10-18 NOTE — PROGRESS NOTE ADULT - SUBJECTIVE AND OBJECTIVE BOX
SUBJECTIVE: Pt seen and examined, resting comfortably in bed, awaiting rehab.    OVERNIGHT EVENTS: none    Vital Signs Last 24 Hrs  T(C): 36.6 (18 Oct 2021 07:17), Max: 37.2 (18 Oct 2021 00:10)  T(F): 97.8 (18 Oct 2021 07:17), Max: 98.9 (18 Oct 2021 00:10)  HR: 50 (18 Oct 2021 10:30) (46 - 59)  BP: 139/73 (18 Oct 2021 10:30) (107/63 - 193/95)  BP(mean): --  RR: 18 (18 Oct 2021 10:30) (16 - 18)  SpO2: 100% (18 Oct 2021 10:30) (99% - 100%)    PHYSICAL EXAM:    General: No Acute Distress     Neurological: Awake, alert oriented to person, place and time, Following Commands, PERRL, EOMI, Face Symmetrical, Speech Fluent, Moving all extremities, LUE 4+/5, No Drift, Sensation to Light Touch Intact    Pulmonary: Clear to Auscultation, No Rales, No Rhonchi, No Wheezes     Cardiovascular: S1, S2, Regular Rate and Rhythm     Gastrointestinal: Soft, Nontender, Nondistended     Incision: groin site c/d/i    LABS:             10-17 @ 07:01  -  10-18 @ 07:00  --------------------------------------------------------  IN: 120 mL / OUT: 1425 mL / NET: -1305 mL      MEDICATIONS:  Antibiotics:    Neuro:  acetaminophen    Suspension .. 650 milliGRAM(s) Oral every 6 hours PRN Mild Pain (1 - 3)    Cardiac:  cloNIDine 0.3 milliGRAM(s) Oral every 8 hours  doxazosin 4 milliGRAM(s) Oral at bedtime  hydrALAZINE 100 milliGRAM(s) Oral three times a day  isosorbide   mononitrate ER Tablet (IMDUR) 60 milliGRAM(s) Oral daily  metoprolol succinate ER 25 milliGRAM(s) Oral two times a day  NIFEdipine XL 90 milliGRAM(s) Oral daily    Pulm:    GI/:  bisacodyl 5 milliGRAM(s) Oral at bedtime  lactulose Syrup 20 Gram(s) Oral two times a day  polyethylene glycol 3350 17 Gram(s) Oral every 12 hours  senna 2 Tablet(s) Oral at bedtime    Other:   aspirin  chewable 81 milliGRAM(s) Oral daily  atorvastatin 20 milliGRAM(s) Oral at bedtime  clopidogrel Tablet 75 milliGRAM(s) Oral daily  influenza   Vaccine 0.5 milliLiter(s) IntraMuscular once  sodium bicarbonate 650 milliGRAM(s) Oral three times a day    DIET: [] Regular [] CCD [x] Renal [] Puree [] Dysphagia [] Tube Feeds:     IMAGING:   < from: CT Head No Cont (10.13.21 @ 09:28) >  IMPRESSION:    Interval placement of embolic coil mass right parasellar region.  No acute intracranial hemorrhage, mass effect, or brain edema.  Similar mild ventricular dilatation.    < end of copied text >  < from: CT Chest No Cont (10.15.21 @ 23:27) >  IMPRESSION:    Small bilateral pleural effusions with minimal bilateral groundglass opacities and a few areas of interlobular septal thickening. Findings are suggestive of mild interstitial edema. This is superimposed on a background of emphysema.    Redemonstration of large hypodense hepatic lesion, similar appearance whencompared to prior abdominal pelvis 10/11/2021. As mentioned on prior report, consider contrast enhanced MRI for further evaluation.    < end of copied text >  < from: [facility] Duplex Lower Ext Vein Scan, Bilat (10.10.21 @ 11:06) >  IMPRESSION:  No evidence of deep venous thrombosis in either lower extremity.    < end of copied text >  < from: MR Head No Cont (10.15.21 @ 16:40) >  IMPRESSION:    Comparison previous studies,    There has been interval evolution of previously seen infarcts in right MCA distribution as described. There is development of new acute lacunar infarctions at the posterior pole of the right temporal lobe and in the right cerebellum as described.    There is no intracranial hemorrhage, midline shift or mass effect.    Coil mass in the right paraclinoid region.    Notification to clinician of alert:    Provider   Dr. BARGER  was notified about the final results at 10/15/2021 10:23 AM via telephone by neuroradiologist KEITH Francisco. Readback confirmation was obtained.    < end of copied text >  < from: VA Duplex Lower Ext Vein Scan, Bilat (10.10.21 @ 11:06) >  IMPRESSION:  No evidence of deep venous thrombosis in either lower extremity.    < end of copied text >

## 2021-10-18 NOTE — PROGRESS NOTE ADULT - ASSESSMENT
70yoM w/ PMHx of CKD with a baseline Cr >3, HTN, HLD presented to OSH after multiple falls. He was transferred from OSH for MRI/MRA findings concerning for aneurysm vs. thrombus       1) CKD stage 4-  Likely has Hypertensive Nephropathy  Ua and urine lytes reviewed--> +UTI- on cipro  urine pro/cr 1.4grams  Renal US --> right kidney 9.3cm and left 9.5 cm with b/l renal cysts  s/p Coiling for PCOMM  stable renal function   avoid nephrotoxins such as ace/arb/nsaid  trend bmp    2) HTN-  bp high  on nifedipine clonidine  hydralazine, metoprolol  Trend bp    3) metabolic acidosis- likely from ckd   na bicarb 650mg po tid  trend bicarb      For any question, call:  Cell # [phone]  Pager # [phone]  Callback # [phone]

## 2021-10-18 NOTE — PROGRESS NOTE ADULT - ASSESSMENT
71 yo male with CKD (baseline Cr > 3), HTN, and HLD who presented as a transfer from [facility] for higher level of care. Patient initially presented to [facility] on 10/01 after multiple falls the since the day prior. MRI Head at [facility] showed acute vs subacute R MCA territory infarct with associated cytotoxic edema. MRA H showed multilobulated right-sided P-comm aneurysm directed posteriorly and laterally, measuring 8.9 x 7.3 mm.   A1c 5.5, LDL 25.  TTE: EF 65-70%, severely enlarged L atrium.   MRi with R MCA territory infarct   vessels with PCOM on R aneurysm    s/p angio and coil of PCOM aneurysm.   Impression: AMS + mild L hemineglect in setting of R MCA territory infarct seen on MRI Head, ESUS.  o/e 10/15 LUE drift   10/15 MRI for trial as above  10/17 okay  no complaints   - Dual antiplatelet therapy with ASA 81mg PO daily and Clopidogrel 75mg PO daily x 3 weeks followed by monotherapy with ASA 81mg PO daily.  - lipitor 40  - ARU and PRU therapeutic   - cardio for ILR. cardio following can do outpt   - telemetry  - PT/OT/SS/SLP, OOBC  - check FS, glucose control <180  - GI/DVT ppx  - Counseling on diet, exercise, and medication adherence was done  - Counseling on smoking cessation and alcohol consumption offered when appropriate.  - Pain assessed and judicious use of narcotics when appropriate was discussed.    - Stroke education given when appropriate.  - Importance of fall prevention discussed.   - Differential diagnosis and plan of care discussed with patient and/or family and primary team  - Thank you for allowing me to participate in the care of this patient. Call with questions.   -    d/c planning ILR in or outpt. outpt f/u on d/c 1-2 weeks no objection to d/c   Sukh Pierre MD  Vascular Neurology  Office: [phone] .

## 2021-10-18 NOTE — PROGRESS NOTE ADULT - ASSESSMENT
70M PMHx CKD with a baseline Cr >3, HTN, HLD presented to OSH after multiple falls. He was transferred from OSH for MRI/MRA findings concerning for aneurysm vs. thrombus with multiple infarcts. He is slightly confused, but denies pain, headache, blurry vision, nausea, vomiting, weakness, and changes in sensation.   CTH 10/1 hydro likely chronic, MRI 10/4 right-sided acute vs. subacute MCA infarcts, Carotid U/S 10/6 no sig stenosis, MRA 10/6 R pcomm aneurysm with possible intra-aneurysmal thrombus [id number], mild R M1 stenosis.  (09 Oct 2021 16:27)   10/12 s/p coiling right PCOMM aneurysm. Post op Ct head with evolution of previous infarcts in right MCA, now with new acute lacuna infarctions in the right temporal lobe and cerebellum.       PLAN:  Neuro:   - neuro checks q 4 hrs   -MRA w/ narrowing of right proximal M1 - dual antiplatelet as per neurology team, continue asa/plavix  -Hospitalist following for medical management  - on statin  Respiratory:   - on room air  CV:  - HTN- difficult to manage  - on clonidine 0.3 q8, hydralazine 100 tid, imdur 60 mg daily, metoprolol 25 bid and nifedipine 90 daily  -Hospitalist following  Endocrine:   DVT ppx:   - on hep 5000 q12, venodynes  - LE dopp- neg for DVT  Renal:   - CKD4   -Renal following for CKD. Recommend 1) HTN; nifedipine to 90mg po qd  clonidine inc to 0.2mg po tid 2) metabolic acidosis- likely from ckd  - on na bicarb 650mg po tid  - Likely has Hypertensive Nephropathy  - Ua and urine lytes reviewed--> +UTI- on cipro  Renal US --> right kidney 9.3cm and left 9.5 cm with b/l renal cysts  stable renal function   avoid nephrotoxins such as ace/arb/nsaid  ID:   - completed course of cipro for UTI  PT/OT:   - CLYDE         Spectra #[id number]

## 2021-10-18 NOTE — PROGRESS NOTE ADULT - SUBJECTIVE AND OBJECTIVE BOX
[facility] - NIKITA Miller / NIKITA Cruz / SAMIA Arellano/ NIKITA Hernadez/ NIKITA Hollingsworth/ FARZAD Maddox / JONELLE Corey / WELLINGTON Red  ---------------------------------------------------------------------------------------------------------------  seen and examined today for Chiqui on CKD 4  Interval : serum creatinine stable  VITALS:  T(F): 97.7 (10-18-21 @ 11:39), Max: 98.9 (10-18-21 @ 00:10)  HR: 47 (10-18-21 @ 11:39)  BP: 130/72 (10-18-21 @ 11:39)  RR: 18 (10-18-21 @ 11:39)  SpO2: 98% (10-18-21 @ 11:39)  Wt(kg): --    10-17 @ 07:01  -  10-18 @ 07:00  --------------------------------------------------------  IN: 120 mL / OUT: 1425 mL / NET: -1305 mL      Physical Exam :-  Constitutional: NAD  Neck: Supple.  Respiratory: Bilateral equal breath sounds,  Cardiovascular: S1, S2 normal,  Gastrointestinal: Bowel Sounds present, soft, non tender.  Extremities: No edema  Neurological: Alert and Oriented x 3, no focal deficits  Psychiatric: Normal mood, normal affect  Data:-  Allergies :   No Known Allergies    Hospital Medications:   MEDICATIONS  (STANDING):  aspirin  chewable 81 milliGRAM(s) Oral daily  atorvastatin 20 milliGRAM(s) Oral at bedtime  bisacodyl 5 milliGRAM(s) Oral at bedtime  cloNIDine 0.3 milliGRAM(s) Oral every 8 hours  clopidogrel Tablet 75 milliGRAM(s) Oral daily  doxazosin 4 milliGRAM(s) Oral at bedtime  hydrALAZINE 100 milliGRAM(s) Oral three times a day  influenza   Vaccine 0.5 milliLiter(s) IntraMuscular once  isosorbide   mononitrate ER Tablet (IMDUR) 60 milliGRAM(s) Oral daily  lactulose Syrup 20 Gram(s) Oral two times a day  metoprolol succinate ER 25 milliGRAM(s) Oral two times a day  NIFEdipine XL 90 milliGRAM(s) Oral daily  polyethylene glycol 3350 17 Gram(s) Oral every 12 hours  senna 2 Tablet(s) Oral at bedtime  sodium bicarbonate 650 milliGRAM(s) Oral three times a day          Creatinine Trend: 2.55 <--, 2.74 <--, 3.06 <--, 3.13 <--, 2.82 <--, 2.78 <--

## 2021-10-19 LAB
ANION GAP SERPL CALC-SCNC: 13 MMOL/L — SIGNIFICANT CHANGE UP (ref 5–17)
BUN SERPL-MCNC: 45 MG/DL — HIGH (ref 7–23)
CALCIUM SERPL-MCNC: 9.6 MG/DL — SIGNIFICANT CHANGE UP (ref 8.4–10.5)
CHLORIDE SERPL-SCNC: 104 MMOL/L — SIGNIFICANT CHANGE UP (ref 96–108)
CO2 SERPL-SCNC: 16 MMOL/L — LOW (ref 22–31)
CREAT SERPL-MCNC: 3.22 MG/DL — HIGH (ref 0.5–1.3)
GLUCOSE SERPL-MCNC: 107 MG/DL — HIGH (ref 70–99)
HCT VFR BLD CALC: 34 % — LOW (ref 39–50)
HGB BLD-MCNC: 10.5 G/DL — LOW (ref 13–17)
MCHC RBC-ENTMCNC: 29.4 PG — SIGNIFICANT CHANGE UP (ref 27–34)
MCHC RBC-ENTMCNC: 30.9 GM/DL — LOW (ref 32–36)
MCV RBC AUTO: 95.2 FL — SIGNIFICANT CHANGE UP (ref 80–100)
NRBC # BLD: 0 /100 WBCS — SIGNIFICANT CHANGE UP (ref 0–0)
PLATELET # BLD AUTO: 721 K/UL — HIGH (ref 150–400)
POTASSIUM SERPL-MCNC: 4 MMOL/L — SIGNIFICANT CHANGE UP (ref 3.5–5.3)
POTASSIUM SERPL-SCNC: 4 MMOL/L — SIGNIFICANT CHANGE UP (ref 3.5–5.3)
RBC # BLD: 3.57 M/UL — LOW (ref 4.2–5.8)
RBC # FLD: 15.9 % — HIGH (ref 10.3–14.5)
SODIUM SERPL-SCNC: 133 MMOL/L — LOW (ref 135–145)
WBC # BLD: 6.96 K/UL — SIGNIFICANT CHANGE UP (ref 3.8–10.5)
WBC # FLD AUTO: 6.96 K/UL — SIGNIFICANT CHANGE UP (ref 3.8–10.5)

## 2021-10-19 PROCEDURE — 99231 SBSQ HOSP IP/OBS SF/LOW 25: CPT

## 2021-10-19 RX ORDER — SODIUM CHLORIDE 9 MG/ML
500 INJECTION INTRAMUSCULAR; INTRAVENOUS; SUBCUTANEOUS ONCE
Refills: 0 | Status: COMPLETED | OUTPATIENT
Start: 2021-10-19 | End: 2021-10-19

## 2021-10-19 RX ORDER — HYDRALAZINE HCL 50 MG
100 TABLET ORAL
Refills: 0 | Status: DISCONTINUED | OUTPATIENT
Start: 2021-10-19 | End: 2021-10-28

## 2021-10-19 RX ORDER — HEPARIN SODIUM 5000 [USP'U]/ML
5000 INJECTION INTRAVENOUS; SUBCUTANEOUS EVERY 12 HOURS
Refills: 0 | Status: DISCONTINUED | OUTPATIENT
Start: 2021-10-19 | End: 2021-10-20

## 2021-10-19 RX ADMIN — Medication 0.3 MILLIGRAM(S): at 22:37

## 2021-10-19 RX ADMIN — Medication 650 MILLIGRAM(S): at 05:34

## 2021-10-19 RX ADMIN — SODIUM CHLORIDE 100 MILLILITER(S): 9 INJECTION INTRAMUSCULAR; INTRAVENOUS; SUBCUTANEOUS at 13:42

## 2021-10-19 RX ADMIN — Medication 0.3 MILLIGRAM(S): at 15:01

## 2021-10-19 RX ADMIN — Medication 0.3 MILLIGRAM(S): at 00:44

## 2021-10-19 RX ADMIN — SENNA PLUS 2 TABLET(S): 8.6 TABLET ORAL at 22:37

## 2021-10-19 RX ADMIN — Medication 650 MILLIGRAM(S): at 22:36

## 2021-10-19 RX ADMIN — LACTULOSE 20 GRAM(S): 10 SOLUTION ORAL at 19:35

## 2021-10-19 RX ADMIN — Medication 90 MILLIGRAM(S): at 05:22

## 2021-10-19 RX ADMIN — Medication 100 MILLIGRAM(S): at 05:24

## 2021-10-19 RX ADMIN — HEPARIN SODIUM 5000 UNIT(S): 5000 INJECTION INTRAVENOUS; SUBCUTANEOUS at 19:34

## 2021-10-19 RX ADMIN — Medication 0.3 MILLIGRAM(S): at 05:22

## 2021-10-19 RX ADMIN — Medication 650 MILLIGRAM(S): at 15:01

## 2021-10-19 RX ADMIN — ISOSORBIDE MONONITRATE 60 MILLIGRAM(S): 60 TABLET, EXTENDED RELEASE ORAL at 13:27

## 2021-10-19 RX ADMIN — LACTULOSE 20 GRAM(S): 10 SOLUTION ORAL at 05:32

## 2021-10-19 RX ADMIN — CLOPIDOGREL BISULFATE 75 MILLIGRAM(S): 75 TABLET, FILM COATED ORAL at 13:28

## 2021-10-19 RX ADMIN — Medication 5 MILLIGRAM(S): at 22:37

## 2021-10-19 RX ADMIN — ATORVASTATIN CALCIUM 20 MILLIGRAM(S): 80 TABLET, FILM COATED ORAL at 22:37

## 2021-10-19 RX ADMIN — POLYETHYLENE GLYCOL 3350 17 GRAM(S): 17 POWDER, FOR SOLUTION ORAL at 19:31

## 2021-10-19 RX ADMIN — Medication 4 MILLIGRAM(S): at 22:37

## 2021-10-19 RX ADMIN — Medication 100 MILLIGRAM(S): at 19:30

## 2021-10-19 RX ADMIN — Medication 81 MILLIGRAM(S): at 13:27

## 2021-10-19 NOTE — OCCUPATIONAL THERAPY INITIAL EVALUATION ADULT - LIVES WITH, PROFILE
Pt lives alone in 2nd floor apartment of private home, +10 steps to enter, tub in bathroom. Pt I in ADLs and ambulation prior to admission
Pt lives alone in 2nd floor apartment of private home, +10 steps to enter, tub in bathroom. Pt I in ADLs and ambulation prior to admission/alone

## 2021-10-19 NOTE — PROGRESS NOTE ADULT - SUBJECTIVE AND OBJECTIVE BOX
Subjective: Patient seen and examined. No new events except as noted.   no cp or sob     REVIEW OF SYSTEMS:    CONSTITUTIONAL: + weakness, fevers or chills  EYES/ENT: No visual changes;  No vertigo or throat pain   NECK: No pain or stiffness  RESPIRATORY: No cough, wheezing, hemoptysis; No shortness of breath  CARDIOVASCULAR: No chest pain or palpitations  GASTROINTESTINAL: No abdominal or epigastric pain. No nausea, vomiting, or hematemesis; No diarrhea or constipation. No melena or hematochezia.  GENITOURINARY: No dysuria, frequency or hematuria  NEUROLOGICAL: No numbness or weakness  SKIN: No itching, burning, rashes, or lesions   All other review of systems is negative unless indicated above.    MEDICATIONS:  MEDICATIONS  (STANDING):  aspirin  chewable 81 milliGRAM(s) Oral daily  atorvastatin 20 milliGRAM(s) Oral at bedtime  bisacodyl 5 milliGRAM(s) Oral at bedtime  cloNIDine 0.3 milliGRAM(s) Oral every 8 hours  clopidogrel Tablet 75 milliGRAM(s) Oral daily  doxazosin 4 milliGRAM(s) Oral at bedtime  heparin   Injectable 5000 Unit(s) SubCutaneous every 12 hours  hydrALAZINE 100 milliGRAM(s) Oral <User Schedule>  influenza   Vaccine 0.5 milliLiter(s) IntraMuscular once  isosorbide   mononitrate ER Tablet (IMDUR) 60 milliGRAM(s) Oral daily  lactulose Syrup 20 Gram(s) Oral two times a day  metoprolol succinate ER 25 milliGRAM(s) Oral two times a day  NIFEdipine XL 90 milliGRAM(s) Oral daily  polyethylene glycol 3350 17 Gram(s) Oral every 12 hours  senna 2 Tablet(s) Oral at bedtime  sodium bicarbonate 650 milliGRAM(s) Oral three times a day  sodium chloride 0.9% Bolus 500 milliLiter(s) IV Bolus once      PHYSICAL EXAM:  T(C): 36.4 (10-19-21 @ 11:26), Max: 36.8 (10-18-21 @ 15:21)  HR: 42 (10-19-21 @ 11:26) (41 - 66)  BP: 170/83 (10-19-21 @ 11:26) (120/62 - 176/86)  RR: 18 (10-19-21 @ 11:26) (18 - 19)  SpO2: 100% (10-19-21 @ 11:26) (95% - 100%)  Wt(kg): --  I&O's Summary    18 Oct 2021 07:01  -  19 Oct 2021 07:00  --------------------------------------------------------  IN: 200 mL / OUT: 500 mL / NET: -300 mL    19 Oct 2021 07:01  -  19 Oct 2021 11:36  --------------------------------------------------------  IN: 360 mL / OUT: 0 mL / NET: 360 mL          Appearance: NAD	  HEENT:   Dry  oral mucosa, PERRL, EOMI	  Lymphatic: No lymphadenopathy , no edema  Cardiovascular: Normal S1 S2, No JVD, No murmurs , Peripheral pulses palpable 2+ bilaterally  Respiratory: Lungs clear to auscultation, normal effort 	  Gastrointestinal:  Soft, Non-tender, + BS	  Skin: No rashes, No ecchymoses, No cyanosis, warm to touch  Musculoskeletal: Normal range of motion, normal strength  Psychiatry:  Mood & affect appropriate  Ext: No edema  Neurological: Awake, alert oriented to person, place and time, Following Commands, PERRL, EOMI, Face Symmetrical, Speech Fluent, Moving all extremities, LUE 4+/5, No Drift, Sensation to Light Touch Intact          LABS:    CARDIAC MARKERS:                                10.5   6.96  )-----------( 721      ( 19 Oct 2021 06:32 )             34.0     10-19    133<L>  |  104  |  45<H>  ----------------------------<  107<H>  4.0   |  16<L>  |  3.22<H>    Ca    9.6      19 Oct 2021 06:32      proBNP:   Lipid Profile:   HgA1c:   TSH:             TELEMETRY: 	 SR PAT    ECG:  	  RADIOLOGY:   DIAGNOSTIC TESTING:  [ ] Echocardiogram:  [ ]  Catheterization:  [ ] Stress Test:    OTHER:

## 2021-10-19 NOTE — PROGRESS NOTE ADULT - SUBJECTIVE AND OBJECTIVE BOX
Neurology Progress Note    S: Patient seen and examined on floor. no complaints a bit confused today     Medication:  MEDICATIONS  (STANDING):  aspirin  chewable 81 milliGRAM(s) Oral daily  atorvastatin 20 milliGRAM(s) Oral at bedtime  bisacodyl 5 milliGRAM(s) Oral at bedtime  cloNIDine 0.3 milliGRAM(s) Oral every 8 hours  clopidogrel Tablet 75 milliGRAM(s) Oral daily  doxazosin 4 milliGRAM(s) Oral at bedtime  heparin   Injectable 5000 Unit(s) SubCutaneous every 12 hours  hydrALAZINE 100 milliGRAM(s) Oral <User Schedule>  influenza   Vaccine 0.5 milliLiter(s) IntraMuscular once  isosorbide   mononitrate ER Tablet (IMDUR) 60 milliGRAM(s) Oral daily  lactulose Syrup 20 Gram(s) Oral two times a day  metoprolol succinate ER 25 milliGRAM(s) Oral two times a day  NIFEdipine XL 90 milliGRAM(s) Oral daily  polyethylene glycol 3350 17 Gram(s) Oral every 12 hours  senna 2 Tablet(s) Oral at bedtime  sodium bicarbonate 650 milliGRAM(s) Oral three times a day    MEDICATIONS  (PRN):  acetaminophen    Suspension .. 650 milliGRAM(s) Oral every 6 hours PRN Mild Pain (1 - 3)      Vitals:  Vital Signs Last 24 Hrs  T(C): 36.4 (10-19-21 @ 14:59), Max: 36.8 (10-19-21 @ 00:17)  T(F): 97.6 (10-19-21 @ 14:59), Max: 98.2 (10-19-21 @ 00:17)  HR: 45 (10-19-21 @ 14:59) (41 - 66)  BP: 124/64 (10-19-21 @ 14:59) (106/51 - 176/86)  BP(mean): --  RR: 18 (10-19-21 @ 14:59) (18 - 18)  SpO2: 99% (10-19-21 @ 14:59) (95% - 100%)      Orthostatic VS  10-16-21 @ 07:27  Lying BP: 198/98 HR: 46  Sitting BP: 182/95 HR: 50  Standing BP: 174/96 HR: 58  Site: --  Mode: --  Orthostatic VS  10-15-21 @ 16:00  Lying BP: 163/76 HR: 49  Sitting BP: 136/77 HR: 51  Standing BP: 136/70 HR: 59  Site: upper right arm  Mode: electronic        General Exam:   General Appearance: Appropriately dressed and in no acute distress       Head: Normocephalic, atraumatic and no dysmorphic features  Ear, Nose, and Throat: Moist mucous membranes  CVS: S1S2+  Resp: No SOB, no wheeze or rhonchi  Abd: soft NTND  Extremities: No edema, no cyanosis  Skin: No bruises, no rashes     Neurological Exam:  Mental Status: Awake, alert and oriented x 3.  Able to follow simple and complex verbal commands. Able to name and repeat. fluent speech. No obvious aphasia or dysarthria noted.   Cranial Nerves: PERRL, EOMI, VFFC, sensation V1-V3 intact,  no obvious facial asymmetry , equal elevation of palate, scm/trap 5/5, tongue is midline on protrusion. no obvious papilledema on fundoscopic exam. Hearing is grossly intact.   Motor: Normal bulk, tone and strength throughout. Fine finger movements were intact and symmetric. no tremors or drift noted except LUE with 4+/5 mild drift noted   Sensation: Intact to light touch and pinprick throughout. no right/left confusion. no extinction to tactile on DSS.    Reflexes: 1+ throughout at biceps, brachioradialis, triceps, patellars and ankles bilaterally and equal. No clonus. R toe and L toe were both downgoing.  Coordination: No dysmetria on FNF   Gait: deferred in ICU    I personally reviewed the below data/images/labs:  CBC Full  -  ( 19 Oct 2021 06:32 )  WBC Count : 6.96 K/uL  RBC Count : 3.57 M/uL  Hemoglobin : 10.5 g/dL  Hematocrit : 34.0 %  Platelet Count - Automated : 721 K/uL  Mean Cell Volume : 95.2 fl  Mean Cell Hemoglobin : 29.4 pg  Mean Cell Hemoglobin Concentration : 30.9 gm/dL  Auto Neutrophil # : x  Auto Lymphocyte # : x  Auto Monocyte # : x  Auto Eosinophil # : x  Auto Basophil # : x  Auto Neutrophil % : x  Auto Lymphocyte % : x  Auto Monocyte % : x  Auto Eosinophil % : x  Auto Basophil % : x      10-19    133<L>  |  104  |  45<H>  ----------------------------<  107<H>  4.0   |  16<L>  |  3.22<H>    Ca    9.6      19 Oct 2021 06:32        < from: CT Head No Cont (10.01.21 @ 18:37) >    EXAM:  CT BRAIN                            PROCEDURE DATE:  10/01/2021          INTERPRETATION:  CT OF THE HEAD WITHOUT CONTRAST    CLINICAL INDICATION: Weakness. Falls.    TECHNIQUE: Volumetric CT acquisition was performed through the brain and reviewed using brain and bone window technique. Dose optimization techniques were utilized including kVp/mA modulation along with iterative reconstructions.      COMPARISON: No prior studies have been submitted for comparison.    FINDINGS:    The ventricular and sulcal size and configuration is age appropriate.   There is no acute loss of gray-white differentiation. There are extensive patchy and confluent areas of hypodensity in the periventricular and subcortical white matter which are non-specific but likely related  chronic microangiopathic ischemic changes.  Small chronic infarction in the right cerebellar hemisphere. Chronic appearing left thalamic lacunar infarct.    There is no evidence of mass effect, midline shift, acute intracranial hemorrhage, or extra-axial collections.     The calvarium is intact. The paranasal sinuses are clear.The mastoid air cells are predominantly clear. The orbits are unremarkable.      IMPRESSION:  No acute intracranial hemorrhage or acute territorial infarction. Extensive chronic microvascular ischemic changes and several chronic infarctions as described. Further evaluation may be obtained with MRI of the brain if no contraindications.    --- End of Report ---            KAMILA SZYMANSKI MD; Attending Radiologist  This document has been electronically signed. Oct  1 2021  7:25PM    < end of copied text >  < from: MR Head No Cont (10.04.21 @ 18:09) >    EXAM:  MR BRAIN                            PROCEDURE DATE:  10/04/2021          INTERPRETATION:  .    CLINICAL INFORMATION: Frequent falls.    TECHNIQUE: Multiplanar multisequential MRI of the brain was acquired without the administration of IV gadolinium.    COMPARISON: Prior CT examination of the head dated 10/1/2021.    FINDINGS: Multiple areas of restricted diffusion are seen within the right MCA territory affecting the right frontal, parietal, and temporal lobes. Associated T2/FLAIR prolongation is seen, compatible with cytotoxic edema. No hemorrhagic transformation is noted.    Chronic lacunar infarcts are again seen within the right cerebellar hemisphere, left thalamus, and bilateral basal ganglia.    Multiple patchy confluent nonspecific T2/FLAIR hyperintense signal changes are noted throughout the bihemispheric white matter without associated mass effect or restricted diffusion.    Mild ventriculomegaly appears unchanged. No abnormal intra-axial fluid collections are notable. Flow-voids are noted throughout the major intracranial vessels, on the T2 weighted images, consistent with their patency. The sellar area appears unremarkable. The paranasal sinuses and mastoid air cells are grossly clear. Calvarial signal appears unremarkable. The orbits appear within normal limits.    IMPRESSION: Acute/subacute right-sided MCA distribution infarct with associated cytotoxic edema and without hemorrhagic transformation.    Multiple additional chronic lacunar infarcts and similar-appearing extensive chronic white matter microvascular type changes, as discussed.      --- End of Report ---            LUIS CARLOS BONNER MD; Attending Radiologist  This document has been electronically signed. Oct  5 2021  3:01PM    < end of copied text >  < from: MR Angio Head No Cont (10.06.21 @ 18:15) >    EXAM:  MR ANGIO BRAIN                            PROCEDURE DATE:  10/06/2021          INTERPRETATION:  INDICATION:  Right MCA infarct.    TECHNIQUE:  MR angiography of the brain was performed using three dimensional time-of-flight (3D-TOF) technique.  Imaging was performed on a 1.5 Lisa magnet.    FINDINGS:    INTERNAL CAROTID ARTERIES:  Bilaterally patent.    [place] [facility]:  A right-sided P-comm aneurysm is identified. The aneurysm is directed posteriorly and laterally, the aneurysm appears to be septated and/or bilobed., and measures 8.9 x 7.3 mm.    CEREBRAL ARTERIES:  Both anterior cerebral arteries are patent. Both A1 segments are well formed. Both middle cerebral arteries are patent. There is mild narrowing of the proximal right M1 segment.    VERTEBROBASILAR SYSTEM:  The vertebrobasilar system is patent. There are large posterior communicating arteries bilaterally with dominant supply of the posterior cerebral arteries. Left P1 segment is hypoplastic.    IMPRESSION:    1. 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  2. Mild narrowing of the proximal right middle cerebral artery in the M1 region.  3. The vessels are otherwise patent, as outlined above.    --- End of Report ---            MONSE CLEMENT MD; Attending Radiologist  This document has been electronically signed. Oct  6 2021  7:11PM    < end of copied text >  < from: CT Head No Cont (10.13.21 @ 09:28) >    EXAM:  CT BRAIN                            PROCEDURE DATE:  10/13/2021            INTERPRETATION:  Noncontrast CT of the brain.    CLINICAL INDICATION: Status post rt pcomm aneurysm coiling    TECHNIQUE : Axial CT scanning of the brain was obtainedfrom the skull base to the vertex without the administration of intravenous contrast. Sagittal and coronal reformats were provided.    COMPARISON: CT brain 10/1/2021. MRI brain 10/4/2021    FINDINGS:    Interval placement of embolic coil mass right parasellar region.    Similar mild ventricular dilatation.    No midline shift. Basal cisterns poorly evaluated due to streak artifact.    No acute intracranial hemorrhage, mass effect, or brain edema. Extensive white matter microvascular ischemic disease.    The visualized paranasal sinuses and mastoid air cells are clear.    IMPRESSION:    Interval placement of embolic coil mass right parasellar region.  No acute intracranial hemorrhage, mass effect, or brain edema.  Similar mild ventricular dilatation.        --- End of Report ---      AFIA DAILY MD; Attending Radiologist  This document has been electronically signed. Oct 13 2021  9:31AM    < end of copied text >      < from: MR Head No Cont (10.15.21 @ 16:40) >    EXAM:  MR BRAIN                            PROCEDURE DATE:  10/15/2021            INTERPRETATION:  MRI OF THE BRAIN WITHOUT CONTRAST    CLINICAL INDICATION: Status post right posterior communicating artery aneurysm status post balloon assisted coilembolization    TECHNIQUE: Multiplanar multisequence noncontrast MRI of the brain was performed.    COMPARISON: CT brain, 10/13/2021 and MRI brain, 10/4/2021.    FINDINGS:    The ventricular and sulcal size and configuration is age appropriate. Thereare scattered T2/FLAIR hyperintensities in the subcortical and periventricular white matter which are nonspecific but most commonly represent chronic microvascular ischemic changes.    The previously seen acute lacunar infarctions along left MCA distribution are decreased in diffusion restriction intensity, consistent with evolution of acute infarctions to late subacute stage. In addition, new punctate acute lacunar infarctions have developed in the posterior wall of the right temporal lobe and in the left cerebellar hemisphere along right parietal distribution. There is no susceptibility signal to suggest hemorrhagic conversion.  There is a chronic infarction in the right cerebellum and in the left thalamus.    There is susceptibility signalin the right paraclinoid region at the site of the previously coiled right P-comm aneurysm.    There is abnormal extra-axial fluid collection or hydrocephalus. The visualized globes are symmetric bilaterally.    The visualized paranasal sinuses and mastoid bones are adequately developed and aerated.    IMPRESSION:    Comparison previous studies,    There has been interval evolution of previously seen infarcts in right MCA distribution as described. There is development of new acute lacunar infarctions at the posterior pole of the right temporal lobe and in the right cerebellum as described.    There is no intracranial hemorrhage, midline shift or mass effect.    Coil mass in the right paraclinoid region.    Notification to clinician of alert:    Provider   Dr. BARGER  was notified about the final results at 10/15/2021 10:23 AM via telephone by neuroradiologist KEITH Tineo. Readback confirmation was obtained.    --- End of Report ---                JULIO TINEO MD; Attending Radiologist  This document has been electronically signed. Oct 15 2021 10:35AM    < end of copied text >  < from: CT Chest No Cont (10.15.21 @ 23:27) >    EXAM:  CT CHEST                            PROCEDURE DATE:  10/15/2021            INTERPRETATION:  CLINICAL INFORMATION: Shortness breath, evaluate for pulmonary infection or pulmonary edema.    COMPARISON: CT chest 9/6/2014. CT abdomen pelvis 10/11/2021.    CONTRAST/COMPLICATIONS:  IV Contrast: None  Oral Contrast: None  Complications: None    PROCEDURE:  CT of the Chest was performed.  Sagittal and coronal reformats were performed.    FINDINGS:    LUNGS AND AIRWAYS: Emphysema. No pulmonary nodules or parenchymal consolidations.  PLEURA: Small bilateral pleural effusions.    MEDIASTINUM AND RED: 1.2 cm right paratracheal lymph node (2:40), nonspecific. No axillary lymphadenopathy    VESSELS: Calcified atherosclerotic plaques in aorta and coronary arteries.    HEART: Multifocal cardiac enlargement is noted. No pericardial effusion. Mitral annular calcifications.    CHEST WALL AND LOWER NECK: Within normal limits.    VISUALIZED UPPER ABDOMEN: Partially imaged large hypodense hepatic lesion measuring 6.9 x 8.8 cm, similar in appearance compared to prior CT abdomen pelvis 10/11/2021. Simple cyst in the left kidney    BONES: Degenerative changes of the spine.    IMPRESSION:    Small bilateral pleural effusions with minimal bilateral groundglass opacities and a few areas of interlobular septal thickening. Findings are suggestive of mild interstitial edema. This is superimposed on a background of emphysema.    Redemonstration of large hypodense hepatic lesion, similar appearance whencompared to prior abdominal pelvis 10/11/2021. As mentioned on prior report, consider contrast enhanced MRI for further evaluation.        --- End of Report ---              ALIYA BERTRAND MD; Resident Radiology  This document has been electronicallysigned.  ABRIL PINEDA MD; Attending Radiologist  This document has been electronically signed. Oct 16 2021  9:09AM    < end of copied text >

## 2021-10-19 NOTE — OCCUPATIONAL THERAPY INITIAL EVALUATION ADULT - IMPAIRED TRANSFERS: SIT/STAND, REHAB EVAL
impaired balance/cognition/decreased strength
impaired balance/cognition/impaired postural control/decreased ROM/decreased strength

## 2021-10-19 NOTE — PROGRESS NOTE ADULT - ASSESSMENT
70yoM w/ PMHx of CKD IV (unknown baseline), uncontrolled HTN, ?CAD (patient states he has "heart problems;" TTE performed at [facility] on 10/5 w/ normal LVF, severely enlarged LA, Grade III diastolic dysfunction, mild pulm HTN) who was transfered from [facility] to where he presented w/ hx of multiple falls, found to have acute right MCA territory ischemic infarct w/ extensive chronic microvascular ischemic changes on CT, acute/subacute right-sided MCA distribution infarct with associated cytotoxic edema, on MRI, and Pcomm aneurysm vs thrombus [id number], mild R M1 stenosis on MRA.

## 2021-10-19 NOTE — PROGRESS NOTE ADULT - SUBJECTIVE AND OBJECTIVE BOX
SUBJECTIVE: Patient seen and examined.  Patient is doing well.  He wants to leave the hospital for rehab.     Vital Signs Last 24 Hrs  T(C): 36.3 (19 Oct 2021 08:58), Max: 36.8 (18 Oct 2021 15:21)  T(F): 97.3 (19 Oct 2021 08:58), Max: 98.2 (18 Oct 2021 15:21)  HR: 42 (19 Oct 2021 08:58) (41 - 66)  BP: 176/86 (19 Oct 2021 08:58) (120/62 - 176/86)  BP(mean): --  RR: 18 (19 Oct 2021 08:58) (18 - 19)  SpO2: 100% (19 Oct 2021 08:58) (98% - 100%)    PHYSICAL EXAM:    Constitutional: No Acute Distress     Neurological: AOx3, Following Commands, Moving all Extremities right side 5/5 lue 4+/5, lle 5/5     Pulmonary: Clear to Auscultation, No rales, No rhonchi, No wheezes     Cardiovascular: S1, S2, Regular rate and rhythm     Gastrointestinal: Soft, Non-tender, Non-distended     Extremities: No calf tenderness     Incision: right groin no hematoma    LABS:                        10.5   6.96  )-----------( 721      ( 19 Oct 2021 06:32 )             34.0    10-19    133<L>  |  104  |  45<H>  ----------------------------<  107<H>  4.0   |  16<L>  |  3.22<H>    Ca    9.6      19 Oct 2021 06:32        10-18 @ 07:01  -  10-19 @ 07:00  --------------------------------------------------------  IN: 200 mL / OUT: 500 mL / NET: -300 mL    10-19 @ 07:01  -  10-19 @ 09:30  --------------------------------------------------------  IN: 360 mL / OUT: 0 mL / NET: 360 mL      MEDICATIONS:  Anticoagulation:   aspirin  chewable 81 milliGRAM(s) Oral daily  clopidogrel Tablet 75 milliGRAM(s) Oral daily    Antibiotics:    Endo:  atorvastatin 20 milliGRAM(s) Oral at bedtime    Neuro:  acetaminophen    Suspension .. 650 milliGRAM(s) Oral every 6 hours PRN Mild Pain (1 - 3)    Cardiac:  cloNIDine 0.3 milliGRAM(s) Oral every 8 hours  doxazosin 4 milliGRAM(s) Oral at bedtime  hydrALAZINE 100 milliGRAM(s) Oral three times a day  isosorbide   mononitrate ER Tablet (IMDUR) 60 milliGRAM(s) Oral daily  metoprolol succinate ER 25 milliGRAM(s) Oral two times a day  NIFEdipine XL 90 milliGRAM(s) Oral daily    Pulm:    GI/:  bisacodyl 5 milliGRAM(s) Oral at bedtime  lactulose Syrup 20 Gram(s) Oral two times a day  polyethylene glycol 3350 17 Gram(s) Oral every 12 hours  senna 2 Tablet(s) Oral at bedtime    Other:   influenza   Vaccine 0.5 milliLiter(s) IntraMuscular once  sodium bicarbonate 650 milliGRAM(s) Oral three times a day  sodium chloride 0.9% Bolus 500 milliLiter(s) IV Bolus once    DIET: soft    IMAGING:

## 2021-10-19 NOTE — PROGRESS NOTE ADULT - SUBJECTIVE AND OBJECTIVE BOX
[facility] - NIKITA Miller / NIKITA Cruz / SAMIA Arellano/ NIKITA Hernadez/ NIKITA Hollingsworth/ FARZAD Maddox / JONELLE Corey / WELLINGTON Red  ---------------------------------------------------------------------------------------------------------------  seen and examined today for Chiqui on CKD  Interval : serum creatinine worse today  VITALS:  T(F): 97.6 (10-19-21 @ 11:26), Max: 98.2 (10-18-21 @ 15:21)  HR: 43 (10-19-21 @ 13:23)  BP: 106/51 (10-19-21 @ 13:23)  RR: 18 (10-19-21 @ 13:23)  SpO2: 98% (10-19-21 @ 13:23)  Wt(kg): --    10-18 @ 07:01  -  10-19 @ 07:00  --------------------------------------------------------  IN: 200 mL / OUT: 500 mL / NET: -300 mL    10-19 @ 07:01  -  10-19 @ 14:01  --------------------------------------------------------  IN: 600 mL / OUT: 0 mL / NET: 600 mL      Physical Exam :-  Constitutional: NAD  Neck: Supple.  Respiratory: Bilateral equal breath sounds,  Cardiovascular: S1, S2 normal,  Gastrointestinal: Bowel Sounds present, soft, non tender.  Extremities: No edema  Neurological: Alert and Oriented x 3, no focal deficits  Psychiatric: Normal mood, normal affect  Data:-  Allergies :   No Known Allergies    Hospital Medications:   MEDICATIONS  (STANDING):  aspirin  chewable 81 milliGRAM(s) Oral daily  atorvastatin 20 milliGRAM(s) Oral at bedtime  bisacodyl 5 milliGRAM(s) Oral at bedtime  cloNIDine 0.3 milliGRAM(s) Oral every 8 hours  clopidogrel Tablet 75 milliGRAM(s) Oral daily  doxazosin 4 milliGRAM(s) Oral at bedtime  heparin   Injectable 5000 Unit(s) SubCutaneous every 12 hours  hydrALAZINE 100 milliGRAM(s) Oral <User Schedule>  influenza   Vaccine 0.5 milliLiter(s) IntraMuscular once  isosorbide   mononitrate ER Tablet (IMDUR) 60 milliGRAM(s) Oral daily  lactulose Syrup 20 Gram(s) Oral two times a day  metoprolol succinate ER 25 milliGRAM(s) Oral two times a day  NIFEdipine XL 90 milliGRAM(s) Oral daily  polyethylene glycol 3350 17 Gram(s) Oral every 12 hours  senna 2 Tablet(s) Oral at bedtime  sodium bicarbonate 650 milliGRAM(s) Oral three times a day    10-19    133<L>  |  104  |  45<H>  ----------------------------<  107<H>  4.0   |  16<L>  |  3.22<H>    Ca    9.6      19 Oct 2021 06:32      Creatinine Trend: 3.22 <--, 2.55 <--, 2.74 <--, 3.06 <--, 3.13 <--, 2.82 <--                        10.5   6.96  )-----------( 721      ( 19 Oct 2021 06:32 )             34.0

## 2021-10-19 NOTE — OCCUPATIONAL THERAPY INITIAL EVALUATION ADULT - TRANSFER TRAINING, PT EVAL
GOAL: Pt will perform sit to stand, bed <-> chair, toilet transfers with I in 4 weeks
GOAL: Pt will perform sit to stand, bed <-> chair, toilet transfers with I in 4 weeks

## 2021-10-19 NOTE — OCCUPATIONAL THERAPY INITIAL EVALUATION ADULT - PRECAUTIONS/LIMITATIONS, REHAB EVAL
fall precautions
10/12 s/p coiling right PCOMM aneurysm. Post op Ct head with evolution of previous infarcts in right MCA, now with new acute lacuna infarctions in the right temporal lobe and cerebellum./fall precautions

## 2021-10-19 NOTE — OCCUPATIONAL THERAPY INITIAL EVALUATION ADULT - COGNITIVE, VISUAL PERCEPTUAL, OT EVAL
GOAL: Pt will follow multistep commands 100% of the time in 2/4 OT session within 4 weeks
GOAL: Pt will recall 5/5 words after 5 minutes with 0 cues within 4 weeks

## 2021-10-19 NOTE — OCCUPATIONAL THERAPY INITIAL EVALUATION ADULT - PLANNED THERAPY INTERVENTIONS, OT EVAL
ADL retraining/cognitive, visual perceptual/transfer training
ADL retraining/balance training/bed mobility training/cognitive, visual perceptual/transfer training

## 2021-10-19 NOTE — PROGRESS NOTE ADULT - ASSESSMENT
70yoM w/ PMHx of CKD with a baseline Cr >3, HTN, HLD presented to OSH after multiple falls. He was transferred from OSH for MRI/MRA findings concerning for aneurysm vs. thrombus       1) CKD stage 4-  Likely has Hypertensive Nephropathy  Ua and urine lytes reviewed--> +UTI- on cipro  urine pro/cr 1.4grams  Renal US --> right kidney 9.3cm and left 9.5 cm with b/l renal cysts  s/p Coiling for PCOMM  real function much worse today, labile BP, given IV bolus by primary team  avoid nephrotoxins such as ace/arb/nsaid  trend bmp    2) HTN-  bp high  on nifedipine clonidine  hydralazine, metoprolol  Trend bp    3) metabolic acidosis- likely from ckd   na bicarb 650mg po tid  trend bicarb      For any question, call:  Cell # [phone]  Pager # [phone]  Callback # [phone]

## 2021-10-19 NOTE — PROGRESS NOTE ADULT - ASSESSMENT
71 yo male with CKD (baseline Cr > 3), HTN, and HLD who presented as a transfer from [facility] for higher level of care. Patient initially presented to [facility] on 10/01 after multiple falls the since the day prior. MRI Head at [facility] showed acute vs subacute R MCA territory infarct with associated cytotoxic edema. MRA H showed multilobulated right-sided P-comm aneurysm directed posteriorly and laterally, measuring 8.9 x 7.3 mm.   A1c 5.5, LDL 25.  TTE: EF 65-70%, severely enlarged L atrium.   MRi with R MCA territory infarct   vessels with PCOM on R aneurysm    s/p angio and coil of PCOM aneurysm.   Impression: AMS + mild L hemineglect in setting of R MCA territory infarct seen on MRI Head, ESUS.  o/e 10/15 LUE drift   10/15 MRI for trial as above  10/19 okay  no complaints   - Dual antiplatelet therapy with ASA 81mg PO daily and Clopidogrel 75mg PO daily x 3 weeks followed by monotherapy with ASA 81mg PO daily.  - lipitor 40. should be high dose   - ARU and PRU therapeutic   - cardio for ILR. cardio following can do outpt   - telemetry  - PT/OT/SS/SLP, OOBC  - check FS, glucose control <180  - GI/DVT ppx  - Counseling on diet, exercise, and medication adherence was done  - Counseling on smoking cessation and alcohol consumption offered when appropriate.  - Pain assessed and judicious use of narcotics when appropriate was discussed.    - Stroke education given when appropriate.  - Importance of fall prevention discussed.   - Differential diagnosis and plan of care discussed with patient and/or family and primary team  - Thank you for allowing me to participate in the care of this patient. Call with questions.   -    d/c planning ILR in or outpt. outpt f/u on d/c 1-2 weeks no objection to d/c   Sukh Pierre MD  Vascular Neurology  Office: [phone] .

## 2021-10-19 NOTE — OCCUPATIONAL THERAPY INITIAL EVALUATION ADULT - ADDITIONAL COMMENTS
MRI/MRA Head: Multilobulated right-sided P-comm aneurysm directed posteriorly and laterally, measuring 8.9 x 7.3 mm. Mild narrowing of the proximal right middle cerebral artery in the M1 region.  The vessels are otherwise patent, as outlined above.
MRI/MRA Head: Multilobulated right-sided P-comm aneurysm directed posteriorly and laterally, measuring 8.9 x 7.3 mm. Mild narrowing of the proximal right middle cerebral artery in the M1 region.  The vessels are otherwise patent, as outlined above.  MRI Head 10/15: There has been interval evolution of previously seen infarcts in right MCA distribution as described. There is development of new acute lacunar infarctions at the posterior pole of the right temporal lobe and in the right cerebellum as described.

## 2021-10-19 NOTE — OCCUPATIONAL THERAPY INITIAL EVALUATION ADULT - ADL RETRAINING, OT EVAL
GOAL: Pt will perform toilet transfer & hygiene with _assist, within 4 weeks
GOAL: Pt will perform toilet transfer & hygiene with I within 4 weeks

## 2021-10-19 NOTE — PROGRESS NOTE ADULT - ASSESSMENT
70M PMHx CKD with a baseline Cr >3, HTN, HLD presented to OSH after multiple falls. He was transferred from OSH for MRI/MRA findings concerning for aneurysm vs. thrombus with multiple infarcts. He is slightly confused, but denies pain, headache, blurry vision, nausea, vomiting, weakness, and changes in sensation.   CTH 10/1 hydro likely chronic, MRI 10/4 right-sided acute vs. subacute MCA infarcts, Carotid U/S 10/6 no sig stenosis, MRA 10/6 R pcomm aneurysm with possible intra-aneurysmal thrombus [id number], mild R M1 stenosis.  (09 Oct 2021 16:27)   10/12 s/p coiling right PCOMM aneurysm. Post op Ct head with evolution of previous infarcts in right MCA, now with new acute lacuna infarctions in the right temporal lobe and cerebellum.       PLAN:  Neuro:   - neuro checks q 4 hrs   -MRA w/ narrowing of right proximal M1 - dual antiplatelet as per neurology team, continue asa/plavix  -Hospitalist following for medical management  - on statin  Respiratory:   - on room air  CV:  - HTN- difficult to manage  - on clonidine 0.3 q8, hydralazine 100 tid, imdur 60 mg daily, metoprolol 25 bid and nifedipine 90 daily  - continue lipitor for hyperlipidemia   Endocrine: euyglycemia   DVT ppx:   - on hep 5000 q12, venodynes  - LE dopp- neg for DVT  Renal:   - CKD4   -Renal following for CKD. Recommend 1) HTN; nifedipine to 90mg po qd  clonidine inc to 0.2mg po tid 2) metabolic acidosis- likely from ckd  - on na bicarb 650mg po tid  - Likely has Hypertensive Nephropathy  - Ua and urine lytes reviewed--> +UTI- course of cipro completed   Renal US --> right kidney 9.3cm and left 9.5 cm with b/l renal cysts  stable renal function   avoid nephrotoxins such as ace/arb/nsaid  na is 133 this am most likely hypovolemic hyponatremia will give bolus 500 cc over 5 hours and encourage patient to drink   ID:   - completed course of cipro for UTI  PT/OT:   - CLYDE         Spectra #[id number]

## 2021-10-19 NOTE — OCCUPATIONAL THERAPY INITIAL EVALUATION ADULT - NS ASR FOLLOW COMMAND OT EVAL
75% of the time/able to follow multistep instructions
75% of the time/able to follow multistep instructions

## 2021-10-19 NOTE — OCCUPATIONAL THERAPY INITIAL EVALUATION ADULT - PERTINENT HX OF CURRENT PROBLEM, REHAB EVAL
70M PMHx CKD with a baseline Cr >3, HTN, HLD presented to OSH after multiple falls. He was transferred from OSH for MRI/MRA findings concerning for aneurysm vs. thrombus with multiple infarcts. He is slightly confused, but denies pain, headache, blurry vision, nausea, vomiting, weakness, and changes in sensation.  See below
70M PMHx CKD with a baseline Cr >3, HTN, HLD presented to OSH after multiple falls. He was transferred from OSH for MRI/MRA findings concerning for aneurysm vs. thrombus with multiple infarcts. He is slightly confused, but denies pain, headache, blurry vision, nausea, vomiting, weakness, and changes in sensation. See below

## 2021-10-20 LAB
ANION GAP SERPL CALC-SCNC: 15 MMOL/L — SIGNIFICANT CHANGE UP (ref 5–17)
BUN SERPL-MCNC: 38 MG/DL — HIGH (ref 7–23)
CALCIUM SERPL-MCNC: 9.2 MG/DL — SIGNIFICANT CHANGE UP (ref 8.4–10.5)
CHLORIDE SERPL-SCNC: 105 MMOL/L — SIGNIFICANT CHANGE UP (ref 96–108)
CO2 SERPL-SCNC: 17 MMOL/L — LOW (ref 22–31)
CREAT SERPL-MCNC: 2.88 MG/DL — HIGH (ref 0.5–1.3)
GLUCOSE SERPL-MCNC: 114 MG/DL — HIGH (ref 70–99)
HCT VFR BLD CALC: 33.6 % — LOW (ref 39–50)
HGB BLD-MCNC: 10.9 G/DL — LOW (ref 13–17)
JAK2 P.V617F BLD/T QL: SIGNIFICANT CHANGE UP
MCHC RBC-ENTMCNC: 29.9 PG — SIGNIFICANT CHANGE UP (ref 27–34)
MCHC RBC-ENTMCNC: 32.4 GM/DL — SIGNIFICANT CHANGE UP (ref 32–36)
MCV RBC AUTO: 92.3 FL — SIGNIFICANT CHANGE UP (ref 80–100)
NRBC # BLD: 0 /100 WBCS — SIGNIFICANT CHANGE UP (ref 0–0)
PLATELET # BLD AUTO: 735 K/UL — HIGH (ref 150–400)
POTASSIUM SERPL-MCNC: 4.1 MMOL/L — SIGNIFICANT CHANGE UP (ref 3.5–5.3)
POTASSIUM SERPL-SCNC: 4.1 MMOL/L — SIGNIFICANT CHANGE UP (ref 3.5–5.3)
RBC # BLD: 3.64 M/UL — LOW (ref 4.2–5.8)
RBC # FLD: 15.9 % — HIGH (ref 10.3–14.5)
SODIUM SERPL-SCNC: 137 MMOL/L — SIGNIFICANT CHANGE UP (ref 135–145)
WBC # BLD: 7.01 K/UL — SIGNIFICANT CHANGE UP (ref 3.8–10.5)
WBC # FLD AUTO: 7.01 K/UL — SIGNIFICANT CHANGE UP (ref 3.8–10.5)

## 2021-10-20 PROCEDURE — 99232 SBSQ HOSP IP/OBS MODERATE 35: CPT

## 2021-10-20 PROCEDURE — 99233 SBSQ HOSP IP/OBS HIGH 50: CPT

## 2021-10-20 RX ORDER — SIMETHICONE 80 MG/1
80 TABLET, CHEWABLE ORAL
Refills: 0 | Status: DISCONTINUED | OUTPATIENT
Start: 2021-10-20 | End: 2021-11-03

## 2021-10-20 RX ORDER — HYDRALAZINE HCL 50 MG
10 TABLET ORAL ONCE
Refills: 0 | Status: COMPLETED | OUTPATIENT
Start: 2021-10-20 | End: 2021-10-20

## 2021-10-20 RX ORDER — ISOSORBIDE MONONITRATE 60 MG/1
120 TABLET, EXTENDED RELEASE ORAL DAILY
Refills: 0 | Status: DISCONTINUED | OUTPATIENT
Start: 2021-10-20 | End: 2021-11-03

## 2021-10-20 RX ADMIN — ATORVASTATIN CALCIUM 20 MILLIGRAM(S): 80 TABLET, FILM COATED ORAL at 21:55

## 2021-10-20 RX ADMIN — ISOSORBIDE MONONITRATE 120 MILLIGRAM(S): 60 TABLET, EXTENDED RELEASE ORAL at 17:58

## 2021-10-20 RX ADMIN — Medication 81 MILLIGRAM(S): at 11:09

## 2021-10-20 RX ADMIN — Medication 0.3 MILLIGRAM(S): at 13:08

## 2021-10-20 RX ADMIN — Medication 5 MILLIGRAM(S): at 21:55

## 2021-10-20 RX ADMIN — CLOPIDOGREL BISULFATE 75 MILLIGRAM(S): 75 TABLET, FILM COATED ORAL at 11:09

## 2021-10-20 RX ADMIN — Medication 90 MILLIGRAM(S): at 04:59

## 2021-10-20 RX ADMIN — LACTULOSE 20 GRAM(S): 10 SOLUTION ORAL at 17:45

## 2021-10-20 RX ADMIN — Medication 4 MILLIGRAM(S): at 21:55

## 2021-10-20 RX ADMIN — Medication 10 MILLIGRAM(S): at 07:14

## 2021-10-20 RX ADMIN — Medication 0.3 MILLIGRAM(S): at 04:59

## 2021-10-20 RX ADMIN — ISOSORBIDE MONONITRATE 60 MILLIGRAM(S): 60 TABLET, EXTENDED RELEASE ORAL at 11:08

## 2021-10-20 RX ADMIN — SENNA PLUS 2 TABLET(S): 8.6 TABLET ORAL at 21:55

## 2021-10-20 RX ADMIN — Medication 100 MILLIGRAM(S): at 04:56

## 2021-10-20 RX ADMIN — Medication 0.3 MILLIGRAM(S): at 21:55

## 2021-10-20 RX ADMIN — Medication 650 MILLIGRAM(S): at 13:08

## 2021-10-20 RX ADMIN — SIMETHICONE 80 MILLIGRAM(S): 80 TABLET, CHEWABLE ORAL at 04:30

## 2021-10-20 RX ADMIN — Medication 650 MILLIGRAM(S): at 05:05

## 2021-10-20 RX ADMIN — POLYETHYLENE GLYCOL 3350 17 GRAM(S): 17 POWDER, FOR SOLUTION ORAL at 17:45

## 2021-10-20 RX ADMIN — Medication 650 MILLIGRAM(S): at 21:55

## 2021-10-20 RX ADMIN — Medication 25 MILLIGRAM(S): at 17:47

## 2021-10-20 RX ADMIN — Medication 100 MILLIGRAM(S): at 11:09

## 2021-10-20 RX ADMIN — Medication 100 MILLIGRAM(S): at 17:44

## 2021-10-20 NOTE — PROGRESS NOTE ADULT - ASSESSMENT
71 yo male with CKD (baseline Cr > 3), HTN, and HLD who presented as a transfer from [facility] for higher level of care. Patient initially presented to [facility] on 10/01 after multiple falls the since the day prior. MRI Head at [facility] showed acute vs subacute R MCA territory infarct with associated cytotoxic edema. MRA H showed multilobulated right-sided P-comm aneurysm directed posteriorly and laterally, measuring 8.9 x 7.3 mm.   A1c 5.5, LDL 25.  TTE: EF 65-70%, severely enlarged L atrium.   MRi with R MCA territory infarct   vessels with PCOM on R aneurysm    s/p angio and coil of PCOM aneurysm.   Impression: AMS + mild L hemineglect in setting of R MCA territory infarct seen on MRI Head, ESUS.  o/e 10/15 LUE drift   10/15 MRI for trial as above  10/19 okay  no complaints   10/20 awaiting telly   - Dual antiplatelet therapy with ASA 81mg PO daily and Clopidogrel 75mg PO daily x 3 weeks followed by monotherapy with ASA 81mg PO daily.  - lipitor 40. should be high dose   - ARU and PRU therapeutic   - cardio for ILR. cardio following can do outpt   - telemetry  - PT/OT/SS/SLP, OOBC  - check FS, glucose control <180  - GI/DVT ppx  - Counseling on diet, exercise, and medication adherence was done  - Counseling on smoking cessation and alcohol consumption offered when appropriate.  - Pain assessed and judicious use of narcotics when appropriate was discussed.    - Stroke education given when appropriate.  - Importance of fall prevention discussed.   - Differential diagnosis and plan of care discussed with patient and/or family and primary team  - Thank you for allowing me to participate in the care of this patient. Call with questions.   -    d/c planning ILR in or outpt. outpt f/u on d/c 1-2 weeks no objection to d/c   Sukh Pierre MD  Vascular Neurology  Office: [phone] .

## 2021-10-20 NOTE — PROGRESS NOTE ADULT - ASSESSMENT
70yoM w/ PMHx of CKD with a baseline Cr >3, HTN, HLD presented to OSH after multiple falls. He was transferred from OSH for MRI/MRA findings concerning for aneurysm vs. thrombus       1) CKD stage 4-  Likely has Hypertensive Nephropathy-> b/l 2.8mg/dl to 3.1mg/dl  Ua and urine lytes reviewed--> +UTI- s/p cipro  urine pro/cr 1.4grams  Renal US --> right kidney 9.3cm and left 9.5 cm with b/l renal cysts  s/p Coiling for PCOMM  Cr  fluctuating however overall stable  avoid nephrotoxins such as ace/arb/nsaid  trend bmp    2) HTN-  bp high  on nifedipine clonidine  hydralazine, metoprolol  inc Imdur to 120mg po qd from 60mg  Trend bp    3) metabolic acidosis- likely from ckd   na bicarb 650mg po tid  trend bicarb      For any question, call:  Cell # [phone]  Dr Hernadez

## 2021-10-20 NOTE — PROGRESS NOTE ADULT - ASSESSMENT
71 yo male with CKD (baseline Cr > 3), HTN, and HLD who presented as a transfer from [facility] for higher level of care. Patient initially presented to [facility] on 10/01 after multiple falls the since the day prior. MRI Head at [facility] showed acute vs subacute R MCA territory infarct with associated cytotoxic edema. MRA H showed multilobulated right-sided P-comm aneurysm directed posteriorly and laterally, measuring 8.9 x 7.3 mm.TTE: EF 65-70%, severely enlarged L atrium. CT abdomen 10/11  reveals large hypodense 8.5 cm hepatic lesion concerning for malignancy .    10/12 s/p coiling right PCOMM aneurysm. Post op CT head with evolution of previous infarcts in right MCA, now with new acute lacunar infarctions in the right temporal lobe and cerebellum. On DAPT     Plan    Neuro stable. Continue ASA/ Plavix   HTN- -180. Imdur increased to 120mg On Clonidine 0.3q8 Hydrlazine 100q8 Nifedipine 90 QD  CKD- creatinine stable. on bicarb tabs   Liver lesion- r/o HCC. MR abd wo contrast while awaiting rehab placement . GFR 24. Not cleared for mac. Outpatient follow up with general surgery for further plans   DVT ppx- stopped SQH, bleeding from subcutaneous heparin admin sites . Patient ambulatory with assistance . Continue venodynes   PT/OT CLYDE- Awaiting placement

## 2021-10-20 NOTE — PROGRESS NOTE ADULT - SUBJECTIVE AND OBJECTIVE BOX
Neurology Progress Note    S: Patient seen and examined on floor. no complaints dc planning     Medication:  MEDICATIONS  (STANDING):  aspirin  chewable 81 milliGRAM(s) Oral daily  atorvastatin 20 milliGRAM(s) Oral at bedtime  bisacodyl 5 milliGRAM(s) Oral at bedtime  cloNIDine 0.3 milliGRAM(s) Oral every 8 hours  clopidogrel Tablet 75 milliGRAM(s) Oral daily  doxazosin 4 milliGRAM(s) Oral at bedtime  hydrALAZINE 100 milliGRAM(s) Oral <User Schedule>  influenza   Vaccine 0.5 milliLiter(s) IntraMuscular once  isosorbide   mononitrate ER Tablet (IMDUR) 120 milliGRAM(s) Oral daily  lactulose Syrup 20 Gram(s) Oral two times a day  metoprolol succinate ER 25 milliGRAM(s) Oral two times a day  NIFEdipine XL 90 milliGRAM(s) Oral daily  polyethylene glycol 3350 17 Gram(s) Oral every 12 hours  senna 2 Tablet(s) Oral at bedtime  sodium bicarbonate 650 milliGRAM(s) Oral three times a day    MEDICATIONS  (PRN):  acetaminophen    Suspension .. 650 milliGRAM(s) Oral every 6 hours PRN Mild Pain (1 - 3)  simethicone 80 milliGRAM(s) Chew two times a day PRN Upset Stomach    Vitals:  Vital Signs Last 24 Hrs  T(C): 36.3 (10-20-21 @ 15:47), Max: 36.6 (10-19-21 @ 20:26)  T(F): 97.3 (10-20-21 @ 15:47), Max: 97.8 (10-19-21 @ 20:26)  HR: 46 (10-20-21 @ 15:47) (42 - 50)  BP: 148/75 (10-20-21 @ 15:47) (108/61 - 214/99)  BP(mean): --  RR: 18 (10-20-21 @ 15:47) (17 - 18)  SpO2: 97% (10-20-21 @ 15:47) (96% - 100%)    Orthostatic VS  10-20-21 @ 11:10  Lying BP: 183/88 HR: 46  Sitting BP: 163/81 HR: 48  Standing BP: 152/51 HR: --  Site: upper right arm  Mode: electronic      Orthostatic VS  10-16-21 @ 07:27  Lying BP: 198/98 HR: 46  Sitting BP: 182/95 HR: 50  Standing BP: 174/96 HR: 58  Site: --  Mode: --  Orthostatic VS  10-15-21 @ 16:00  Lying BP: 163/76 HR: 49  Sitting BP: 136/77 HR: 51  Standing BP: 136/70 HR: 59  Site: upper right arm  Mode: electronic        General Exam:   General Appearance: Appropriately dressed and in no acute distress       Head: Normocephalic, atraumatic and no dysmorphic features  Ear, Nose, and Throat: Moist mucous membranes  CVS: S1S2+  Resp: No SOB, no wheeze or rhonchi  Abd: soft NTND  Extremities: No edema, no cyanosis  Skin: No bruises, no rashes     Neurological Exam:  Mental Status: Awake, alert and oriented x 3.  Able to follow simple and complex verbal commands. Able to name and repeat. fluent speech. No obvious aphasia or dysarthria noted.   Cranial Nerves: PERRL, EOMI, VFFC, sensation V1-V3 intact,  no obvious facial asymmetry , equal elevation of palate, scm/trap 5/5, tongue is midline on protrusion. no obvious papilledema on fundoscopic exam. Hearing is grossly intact.   Motor: Normal bulk, tone and strength throughout. Fine finger movements were intact and symmetric. no tremors or drift noted except LUE with 4+/5 mild drift noted   Sensation: Intact to light touch and pinprick throughout. no right/left confusion. no extinction to tactile on DSS.    Reflexes: 1+ throughout at biceps, brachioradialis, triceps, patellars and ankles bilaterally and equal. No clonus. R toe and L toe were both downgoing.  Coordination: No dysmetria on FNF   Gait: deferred in ICU    I personally reviewed the below data/images/labs:    CBC Full  -  ( 20 Oct 2021 08:23 )  WBC Count : 7.01 K/uL  RBC Count : 3.64 M/uL  Hemoglobin : 10.9 g/dL  Hematocrit : 33.6 %  Platelet Count - Automated : 735 K/uL  Mean Cell Volume : 92.3 fl  Mean Cell Hemoglobin : 29.9 pg  Mean Cell Hemoglobin Concentration : 32.4 gm/dL  Auto Neutrophil # : x  Auto Lymphocyte # : x  Auto Monocyte # : x  Auto Eosinophil # : x  Auto Basophil # : x  Auto Neutrophil % : x  Auto Lymphocyte % : x  Auto Monocyte % : x  Auto Eosinophil % : x  Auto Basophil % : x      10-20    137  |  105  |  38<H>  ----------------------------<  114<H>  4.1   |  17<L>  |  2.88<H>    Ca    9.2      20 Oct 2021 08:23          < from: CT Head No Cont (10.01.21 @ 18:37) >    EXAM:  CT BRAIN                            PROCEDURE DATE:  10/01/2021          INTERPRETATION:  CT OF THE HEAD WITHOUT CONTRAST    CLINICAL INDICATION: Weakness. Falls.    TECHNIQUE: Volumetric CT acquisition was performed through the brain and reviewed using brain and bone window technique. Dose optimization techniques were utilized including kVp/mA modulation along with iterative reconstructions.      COMPARISON: No prior studies have been submitted for comparison.    FINDINGS:    The ventricular and sulcal size and configuration is age appropriate.   There is no acute loss of gray-white differentiation. There are extensive patchy and confluent areas of hypodensity in the periventricular and subcortical white matter which are non-specific but likely related  chronic microangiopathic ischemic changes.  Small chronic infarction in the right cerebellar hemisphere. Chronic appearing left thalamic lacunar infarct.    There is no evidence of mass effect, midline shift, acute intracranial hemorrhage, or extra-axial collections.     The calvarium is intact. The paranasal sinuses are clear.The mastoid air cells are predominantly clear. The orbits are unremarkable.      IMPRESSION:  No acute intracranial hemorrhage or acute territorial infarction. Extensive chronic microvascular ischemic changes and several chronic infarctions as described. Further evaluation may be obtained with MRI of the brain if no contraindications.    --- End of Report ---            KAMILA SZYMANSKI MD; Attending Radiologist  This document has been electronically signed. Oct  1 2021  7:25PM    < end of copied text >  < from: MR Head No Cont (10.04.21 @ 18:09) >    EXAM:  MR BRAIN                            PROCEDURE DATE:  10/04/2021          INTERPRETATION:  .    CLINICAL INFORMATION: Frequent falls.    TECHNIQUE: Multiplanar multisequential MRI of the brain was acquired without the administration of IV gadolinium.    COMPARISON: Prior CT examination of the head dated 10/1/2021.    FINDINGS: Multiple areas of restricted diffusion are seen within the right MCA territory affecting the right frontal, parietal, and temporal lobes. Associated T2/FLAIR prolongation is seen, compatible with cytotoxic edema. No hemorrhagic transformation is noted.    Chronic lacunar infarcts are again seen within the right cerebellar hemisphere, left thalamus, and bilateral basal ganglia.    Multiple patchy confluent nonspecific T2/FLAIR hyperintense signal changes are noted throughout the bihemispheric white matter without associated mass effect or restricted diffusion.    Mild ventriculomegaly appears unchanged. No abnormal intra-axial fluid collections are notable. Flow-voids are noted throughout the major intracranial vessels, on the T2 weighted images, consistent with their patency. The sellar area appears unremarkable. The paranasal sinuses and mastoid air cells are grossly clear. Calvarial signal appears unremarkable. The orbits appear within normal limits.    IMPRESSION: Acute/subacute right-sided MCA distribution infarct with associated cytotoxic edema and without hemorrhagic transformation.    Multiple additional chronic lacunar infarcts and similar-appearing extensive chronic white matter microvascular type changes, as discussed.      --- End of Report ---            LUIS CARLOS BONNER MD; Attending Radiologist  This document has been electronically signed. Oct  5 2021  3:01PM    < end of copied text >  < from: MR Angio Head No Cont (10.06.21 @ 18:15) >    EXAM:  MR ANGIO BRAIN                            PROCEDURE DATE:  10/06/2021          INTERPRETATION:  INDICATION:  Right MCA infarct.    TECHNIQUE:  MR angiography of the brain was performed using three dimensional time-of-flight (3D-TOF) technique.  Imaging was performed on a 1.5 Lisa magnet.    FINDINGS:    INTERNAL CAROTID ARTERIES:  Bilaterally patent.    [place] OF [facility]:  A right-sided P-comm aneurysm is identified. The aneurysm is directed posteriorly and laterally, the aneurysm appears to be septated and/or bilobed., and measures 8.9 x 7.3 mm.    CEREBRAL ARTERIES:  Both anterior cerebral arteries are patent. Both A1 segments are well formed. Both middle cerebral arteries are patent. There is mild narrowing of the proximal right M1 segment.    VERTEBROBASILAR SYSTEM:  The vertebrobasilar system is patent. There are large posterior communicating arteries bilaterally with dominant supply of the posterior cerebral arteries. Left P1 segment is hypoplastic.    IMPRESSION:    1. 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  2. Mild narrowing of the proximal right middle cerebral artery in the M1 region.  3. The vessels are otherwise patent, as outlined above.    --- End of Report ---            MONSE CLEMENT MD; Attending Radiologist  This document has been electronically signed. Oct  6 2021  7:11PM    < end of copied text >  < from: CT Head No Cont (10.13.21 @ 09:28) >    EXAM:  CT BRAIN                            PROCEDURE DATE:  10/13/2021            INTERPRETATION:  Noncontrast CT of the brain.    CLINICAL INDICATION: Status post rt pcomm aneurysm coiling    TECHNIQUE : Axial CT scanning of the brain was obtainedfrom the skull base to the vertex without the administration of intravenous contrast. Sagittal and coronal reformats were provided.    COMPARISON: CT brain 10/1/2021. MRI brain 10/4/2021    FINDINGS:    Interval placement of embolic coil mass right parasellar region.    Similar mild ventricular dilatation.    No midline shift. Basal cisterns poorly evaluated due to streak artifact.    No acute intracranial hemorrhage, mass effect, or brain edema. Extensive white matter microvascular ischemic disease.    The visualized paranasal sinuses and mastoid air cells are clear.    IMPRESSION:    Interval placement of embolic coil mass right parasellar region.  No acute intracranial hemorrhage, mass effect, or brain edema.  Similar mild ventricular dilatation.        --- End of Report ---      AFIA DAILY MD; Attending Radiologist  This document has been electronically signed. Oct 13 2021  9:31AM    < end of copied text >      < from: MR Head No Cont (10.15.21 @ 16:40) >    EXAM:  MR BRAIN                            PROCEDURE DATE:  10/15/2021            INTERPRETATION:  MRI OF THE BRAIN WITHOUT CONTRAST    CLINICAL INDICATION: Status post right posterior communicating artery aneurysm status post balloon assisted coilembolization    TECHNIQUE: Multiplanar multisequence noncontrast MRI of the brain was performed.    COMPARISON: CT brain, 10/13/2021 and MRI brain, 10/4/2021.    FINDINGS:    The ventricular and sulcal size and configuration is age appropriate. Thereare scattered T2/FLAIR hyperintensities in the subcortical and periventricular white matter which are nonspecific but most commonly represent chronic microvascular ischemic changes.    The previously seen acute lacunar infarctions along left MCA distribution are decreased in diffusion restriction intensity, consistent with evolution of acute infarctions to late subacute stage. In addition, new punctate acute lacunar infarctions have developed in the posterior wall of the right temporal lobe and in the left cerebellar hemisphere along right parietal distribution. There is no susceptibility signal to suggest hemorrhagic conversion.  There is a chronic infarction in the right cerebellum and in the left thalamus.    There is susceptibility signalin the right paraclinoid region at the site of the previously coiled right P-comm aneurysm.    There is abnormal extra-axial fluid collection or hydrocephalus. The visualized globes are symmetric bilaterally.    The visualized paranasal sinuses and mastoid bones are adequately developed and aerated.    IMPRESSION:    Comparison previous studies,    There has been interval evolution of previously seen infarcts in right MCA distribution as described. There is development of new acute lacunar infarctions at the posterior pole of the right temporal lobe and in the right cerebellum as described.    There is no intracranial hemorrhage, midline shift or mass effect.    Coil mass in the right paraclinoid region.    Notification to clinician of alert:    Provider   Dr. BARGER  was notified about the final results at 10/15/2021 10:23 AM via telephone by neuroradiologist KEITH Tineo. Readback confirmation was obtained.    --- End of Report ---                JULIO TINEO MD; Attending Radiologist  This document has been electronically signed. Oct 15 2021 10:35AM    < end of copied text >  < from: CT Chest No Cont (10.15.21 @ 23:27) >    EXAM:  CT CHEST                            PROCEDURE DATE:  10/15/2021            INTERPRETATION:  CLINICAL INFORMATION: Shortness breath, evaluate for pulmonary infection or pulmonary edema.    COMPARISON: CT chest 9/6/2014. CT abdomen pelvis 10/11/2021.    CONTRAST/COMPLICATIONS:  IV Contrast: None  Oral Contrast: None  Complications: None    PROCEDURE:  CT of the Chest was performed.  Sagittal and coronal reformats were performed.    FINDINGS:    LUNGS AND AIRWAYS: Emphysema. No pulmonary nodules or parenchymal consolidations.  PLEURA: Small bilateral pleural effusions.    MEDIASTINUM AND RED: 1.2 cm right paratracheal lymph node (2:40), nonspecific. No axillary lymphadenopathy    VESSELS: Calcified atherosclerotic plaques in aorta and coronary arteries.    HEART: Multifocal cardiac enlargement is noted. No pericardial effusion. Mitral annular calcifications.    CHEST WALL AND LOWER NECK: Within normal limits.    VISUALIZED UPPER ABDOMEN: Partially imaged large hypodense hepatic lesion measuring 6.9 x 8.8 cm, similar in appearance compared to prior CT abdomen pelvis 10/11/2021. Simple cyst in the left kidney    BONES: Degenerative changes of the spine.    IMPRESSION:    Small bilateral pleural effusions with minimal bilateral groundglass opacities and a few areas of interlobular septal thickening. Findings are suggestive of mild interstitial edema. This is superimposed on a background of emphysema.    Redemonstration of large hypodense hepatic lesion, similar appearance whencompared to prior abdominal pelvis 10/11/2021. As mentioned on prior report, consider contrast enhanced MRI for further evaluation.        --- End of Report ---              ALIYA BERTRAND MD; Resident Radiology  This document has been electronicallysigned.  ABRIL PINEDA MD; Attending Radiologist  This document has been electronically signed. Oct 16 2021  9:09AM    < end of copied text >

## 2021-10-20 NOTE — PROGRESS NOTE ADULT - SUBJECTIVE AND OBJECTIVE BOX
Patient seen and examined  no complaints      No Known Allergies    Hospital Medications:   MEDICATIONS  (STANDING):  aspirin  chewable 81 milliGRAM(s) Oral daily  atorvastatin 20 milliGRAM(s) Oral at bedtime  bisacodyl 5 milliGRAM(s) Oral at bedtime  cloNIDine 0.3 milliGRAM(s) Oral every 8 hours  clopidogrel Tablet 75 milliGRAM(s) Oral daily  doxazosin 4 milliGRAM(s) Oral at bedtime  hydrALAZINE 100 milliGRAM(s) Oral <User Schedule>  influenza   Vaccine 0.5 milliLiter(s) IntraMuscular once  isosorbide   mononitrate ER Tablet (IMDUR) 60 milliGRAM(s) Oral daily  lactulose Syrup 20 Gram(s) Oral two times a day  metoprolol succinate ER 25 milliGRAM(s) Oral two times a day  NIFEdipine XL 90 milliGRAM(s) Oral daily  polyethylene glycol 3350 17 Gram(s) Oral every 12 hours  senna 2 Tablet(s) Oral at bedtime  sodium bicarbonate 650 milliGRAM(s) Oral three times a day        VITALS:  T(F): 97.3 (10-20-21 @ 11:10), Max: 97.8 (10-19-21 @ 20:26)  HR: 46 (10-20-21 @ 11:10)  BP: 183/88 (10-20-21 @ 11:10)  RR: 17 (10-20-21 @ 11:10)  SpO2: 99% (10-20-21 @ 11:10)  Wt(kg): --    10-19 @ 07:01  -  10-20 @ 07:00  --------------------------------------------------------  IN: 1360 mL / OUT: 650 mL / NET: 710 mL    10-20 @ 07:01  -  10-20 @ 13:48  --------------------------------------------------------  IN: 360 mL / OUT: 0 mL / NET: 360 mL        Physical Exam :-  Constitutional: NAD  Neck: Supple.  Respiratory: Bilateral equal breath sounds,  Cardiovascular: S1, S2 normal,  Gastrointestinal: Bowel Sounds present, soft, non tender.  Extremities: No edema  Neurological: Alert and Oriented x 3, no focal deficits  Psychiatric: Normal mood, normal affect    LABS:  10-20    137  |  105  |  38<H>  ----------------------------<  114<H>  4.1   |  17<L>  |  2.88<H>    Ca    9.2      20 Oct 2021 08:23      Creatinine Trend: 2.88 <--, 3.22 <--, 2.55 <--, 2.74 <--, 3.06 <--                        10.9   7.01  )-----------( 735      ( 20 Oct 2021 08:23 )             33.6     Urine Studies:      RADIOLOGY & ADDITIONAL STUDIES:

## 2021-10-20 NOTE — PROGRESS NOTE ADULT - SUBJECTIVE AND OBJECTIVE BOX
SUBJECTIVE:   Patient seen & examined . oozing blood from SQH site. Patient very upset about it   OVERNIGHT EVENTS: none    Vital Signs Last 24 Hrs  T(C): 36.3 (20 Oct 2021 15:47), Max: 36.6 (19 Oct 2021 20:26)  T(F): 97.3 (20 Oct 2021 15:47), Max: 97.8 (19 Oct 2021 20:26)  HR: 46 (20 Oct 2021 15:47) (42 - 50)  BP: 148/75 (20 Oct 2021 15:47) (108/61 - 214/99)  BP(mean): --  RR: 18 (20 Oct 2021 15:47) (17 - 18)  SpO2: 97% (20 Oct 2021 15:47) (96% - 100%)    PHYSICAL EXAM:    Constitutional: No Acute Distress     Neurological: Awake alert Ox3 , Speech clear Following Commands, Moving all Extremities 5/5 except LUE 4+/5     Pulmonary: Clear to Auscultation, No rales, No rhonchi, No wheezes     Cardiovascular: S1, S2, Regular rate and rhythm     Gastrointestinal: Soft, Non-tender, Non-distended     Extremities: No calf tenderness       LABS:                        10.9   7.01  )-----------( 735      ( 20 Oct 2021 08:23 )             33.6    10-20    137  |  105  |  38<H>  ----------------------------<  114<H>  4.1   |  17<L>  |  2.88<H>    Ca    9.2      20 Oct 2021 08:23          IMAGING:         MEDICATIONS:    acetaminophen    Suspension .. 650 milliGRAM(s) Oral every 6 hours PRN Mild Pain (1 - 3)  cloNIDine 0.3 milliGRAM(s) Oral every 8 hours  doxazosin 4 milliGRAM(s) Oral at bedtime  hydrALAZINE 100 milliGRAM(s) Oral <User Schedule>  isosorbide   mononitrate ER Tablet (IMDUR) 120 milliGRAM(s) Oral daily  metoprolol succinate ER 25 milliGRAM(s) Oral two times a day  NIFEdipine XL 90 milliGRAM(s) Oral daily  bisacodyl 5 milliGRAM(s) Oral at bedtime  lactulose Syrup 20 Gram(s) Oral two times a day  polyethylene glycol 3350 17 Gram(s) Oral every 12 hours  senna 2 Tablet(s) Oral at bedtime  simethicone 80 milliGRAM(s) Chew two times a day PRN Upset Stomach  aspirin  chewable 81 milliGRAM(s) Oral daily  atorvastatin 20 milliGRAM(s) Oral at bedtime  clopidogrel Tablet 75 milliGRAM(s) Oral daily  influenza   Vaccine 0.5 milliLiter(s) IntraMuscular once  sodium bicarbonate 650 milliGRAM(s) Oral three times a day      DIET:

## 2021-10-20 NOTE — PROGRESS NOTE ADULT - SUBJECTIVE AND OBJECTIVE BOX
Subjective: Patient seen and examined. No new events except as noted.     REVIEW OF SYSTEMS:    CONSTITUTIONAL: + weakness, fevers or chills  EYES/ENT: No visual changes;  No vertigo or throat pain   NECK: No pain or stiffness  RESPIRATORY: No cough, wheezing, hemoptysis; No shortness of breath  CARDIOVASCULAR: No chest pain or palpitations  GASTROINTESTINAL: No abdominal or epigastric pain. No nausea, vomiting, or hematemesis; No diarrhea or constipation. No melena or hematochezia.  GENITOURINARY: No dysuria, frequency or hematuria  NEUROLOGICAL: No numbness or weakness  SKIN: No itching, burning, rashes, or lesions   All other review of systems is negative unless indicated above.    MEDICATIONS:  MEDICATIONS  (STANDING):  aspirin  chewable 81 milliGRAM(s) Oral daily  atorvastatin 20 milliGRAM(s) Oral at bedtime  bisacodyl 5 milliGRAM(s) Oral at bedtime  cloNIDine 0.3 milliGRAM(s) Oral every 8 hours  clopidogrel Tablet 75 milliGRAM(s) Oral daily  doxazosin 4 milliGRAM(s) Oral at bedtime  hydrALAZINE 100 milliGRAM(s) Oral <User Schedule>  influenza   Vaccine 0.5 milliLiter(s) IntraMuscular once  isosorbide   mononitrate ER Tablet (IMDUR) 60 milliGRAM(s) Oral daily  lactulose Syrup 20 Gram(s) Oral two times a day  metoprolol succinate ER 25 milliGRAM(s) Oral two times a day  NIFEdipine XL 90 milliGRAM(s) Oral daily  polyethylene glycol 3350 17 Gram(s) Oral every 12 hours  senna 2 Tablet(s) Oral at bedtime  sodium bicarbonate 650 milliGRAM(s) Oral three times a day      PHYSICAL EXAM:  T(C): 36.4 (10-20-21 @ 07:00), Max: 36.6 (10-19-21 @ 20:26)  HR: 50 (10-20-21 @ 08:20) (42 - 50)  BP: 147/73 (10-20-21 @ 08:20) (106/51 - 214/99)  RR: 18 (10-20-21 @ 08:20) (18 - 18)  SpO2: 96% (10-20-21 @ 08:20) (96% - 100%)  Wt(kg): --  I&O's Summary    19 Oct 2021 07:01  -  20 Oct 2021 07:00  --------------------------------------------------------  IN: 1360 mL / OUT: 650 mL / NET: 710 mL            Appearance: NAD	  HEENT:   Dry  oral mucosa, PERRL, EOMI	  Lymphatic: No lymphadenopathy , no edema  Cardiovascular: Normal S1 S2, No JVD, No murmurs , Peripheral pulses palpable 2+ bilaterally  Respiratory: Lungs clear to auscultation, normal effort 	  Gastrointestinal:  Soft, Non-tender, + BS	  Skin: No rashes, No ecchymoses, No cyanosis, warm to touch  Musculoskeletal: Normal range of motion, normal strength  Psychiatry:  Mood & affect appropriate  Ext: No edema  Neurological: Awake, alert oriented to person, place and time, Following Commands, PERRL, EOMI, Face Symmetrical, Speech Fluent, Moving all extremities, LUE 4+/5, No Drift, Sensation to Light Touch Intact    LABS:    CARDIAC MARKERS:                                10.9   7.01  )-----------( 735      ( 20 Oct 2021 08:23 )             33.6     10-20    137  |  105  |  38<H>  ----------------------------<  114<H>  4.1   |  17<L>  |  2.88<H>    Ca    9.2      20 Oct 2021 08:23      proBNP:   Lipid Profile:   HgA1c:   TSH:             TELEMETRY: 	    ECG:  	  RADIOLOGY:   DIAGNOSTIC TESTING:  [ ] Echocardiogram:  [ ]  Catheterization:  [ ] Stress Test:    OTHER:

## 2021-10-20 NOTE — PROGRESS NOTE ADULT - ASSESSMENT
70yoM w/ PMHx of CKD IV (unknown baseline), uncontrolled HTN, ?CAD (patient states he has "heart problems;" TTE performed at [facility] on 10/5 w/ normal LVF, severely enlarged LA, Grade III diastolic dysfunction, mild pulm HTN) who was transfered from [facility] to where he presented w/ hx of multiple falls, found to have acute right MCA territory ischemic infarct w/ extensive chronic microvascular ischemic changes on CT, acute/subacute right-sided MCA distribution infarct with associated cytotoxic edema, on MRI, and Pcomm aneurysm vs thrombus [id number], mild R M1 stenosis on MRA.

## 2021-10-21 LAB
AFP-TM SERPL-MCNC: 7.6 NG/ML — SIGNIFICANT CHANGE UP
ALBUMIN SERPL ELPH-MCNC: 3.8 G/DL — SIGNIFICANT CHANGE UP (ref 3.3–5)
ALP SERPL-CCNC: 90 U/L — SIGNIFICANT CHANGE UP (ref 40–120)
ALT FLD-CCNC: 17 U/L — SIGNIFICANT CHANGE UP (ref 10–45)
ANION GAP SERPL CALC-SCNC: 14 MMOL/L — SIGNIFICANT CHANGE UP (ref 5–17)
ANION GAP SERPL CALC-SCNC: 16 MMOL/L — SIGNIFICANT CHANGE UP (ref 5–17)
AST SERPL-CCNC: 44 U/L — HIGH (ref 10–40)
BILIRUB DIRECT SERPL-MCNC: <0.1 MG/DL — SIGNIFICANT CHANGE UP (ref 0–0.2)
BILIRUB INDIRECT FLD-MCNC: >0.1 MG/DL — LOW (ref 0.2–1)
BILIRUB SERPL-MCNC: 0.2 MG/DL — SIGNIFICANT CHANGE UP (ref 0.2–1.2)
BUN SERPL-MCNC: 38 MG/DL — HIGH (ref 7–23)
BUN SERPL-MCNC: 38 MG/DL — HIGH (ref 7–23)
CALCIUM SERPL-MCNC: 9.1 MG/DL — SIGNIFICANT CHANGE UP (ref 8.4–10.5)
CALCIUM SERPL-MCNC: 9.1 MG/DL — SIGNIFICANT CHANGE UP (ref 8.4–10.5)
CEA SERPL-MCNC: 5.1 NG/ML — HIGH (ref 0–3.8)
CHLORIDE SERPL-SCNC: 101 MMOL/L — SIGNIFICANT CHANGE UP (ref 96–108)
CHLORIDE SERPL-SCNC: 103 MMOL/L — SIGNIFICANT CHANGE UP (ref 96–108)
CO2 SERPL-SCNC: 15 MMOL/L — LOW (ref 22–31)
CO2 SERPL-SCNC: 17 MMOL/L — LOW (ref 22–31)
CREAT SERPL-MCNC: 2.74 MG/DL — HIGH (ref 0.5–1.3)
CREAT SERPL-MCNC: 2.81 MG/DL — HIGH (ref 0.5–1.3)
GLUCOSE SERPL-MCNC: 110 MG/DL — HIGH (ref 70–99)
GLUCOSE SERPL-MCNC: 117 MG/DL — HIGH (ref 70–99)
POTASSIUM SERPL-MCNC: 4 MMOL/L — SIGNIFICANT CHANGE UP (ref 3.5–5.3)
POTASSIUM SERPL-MCNC: 6.5 MMOL/L — CRITICAL HIGH (ref 3.5–5.3)
POTASSIUM SERPL-SCNC: 4 MMOL/L — SIGNIFICANT CHANGE UP (ref 3.5–5.3)
POTASSIUM SERPL-SCNC: 6.5 MMOL/L — CRITICAL HIGH (ref 3.5–5.3)
PROT SERPL-MCNC: 7.5 G/DL — SIGNIFICANT CHANGE UP (ref 6–8.3)
SARS-COV-2 RNA SPEC QL NAA+PROBE: SIGNIFICANT CHANGE UP
SODIUM SERPL-SCNC: 132 MMOL/L — LOW (ref 135–145)
SODIUM SERPL-SCNC: 134 MMOL/L — LOW (ref 135–145)

## 2021-10-21 PROCEDURE — 99232 SBSQ HOSP IP/OBS MODERATE 35: CPT

## 2021-10-21 RX ADMIN — ISOSORBIDE MONONITRATE 120 MILLIGRAM(S): 60 TABLET, EXTENDED RELEASE ORAL at 12:30

## 2021-10-21 RX ADMIN — ATORVASTATIN CALCIUM 20 MILLIGRAM(S): 80 TABLET, FILM COATED ORAL at 21:12

## 2021-10-21 RX ADMIN — CLOPIDOGREL BISULFATE 75 MILLIGRAM(S): 75 TABLET, FILM COATED ORAL at 12:29

## 2021-10-21 RX ADMIN — Medication 650 MILLIGRAM(S): at 06:28

## 2021-10-21 RX ADMIN — Medication 90 MILLIGRAM(S): at 06:28

## 2021-10-21 RX ADMIN — Medication 0.3 MILLIGRAM(S): at 14:15

## 2021-10-21 RX ADMIN — POLYETHYLENE GLYCOL 3350 17 GRAM(S): 17 POWDER, FOR SOLUTION ORAL at 18:17

## 2021-10-21 RX ADMIN — Medication 4 MILLIGRAM(S): at 22:29

## 2021-10-21 RX ADMIN — Medication 650 MILLIGRAM(S): at 22:29

## 2021-10-21 RX ADMIN — Medication 5 MILLIGRAM(S): at 21:12

## 2021-10-21 RX ADMIN — SENNA PLUS 2 TABLET(S): 8.6 TABLET ORAL at 22:29

## 2021-10-21 RX ADMIN — Medication 100 MILLIGRAM(S): at 12:29

## 2021-10-21 RX ADMIN — Medication 650 MILLIGRAM(S): at 14:15

## 2021-10-21 RX ADMIN — Medication 100 MILLIGRAM(S): at 21:12

## 2021-10-21 RX ADMIN — Medication 0.3 MILLIGRAM(S): at 21:12

## 2021-10-21 RX ADMIN — Medication 0.3 MILLIGRAM(S): at 06:28

## 2021-10-21 RX ADMIN — Medication 81 MILLIGRAM(S): at 12:29

## 2021-10-21 RX ADMIN — Medication 100 MILLIGRAM(S): at 03:29

## 2021-10-21 NOTE — CONSULT NOTE ADULT - ASSESSMENT
69 yo male with CKD (baseline Cr > 3), HTN, and HLD who presented as a transfer from [facility] for higher level of care. Patient initially presented to [facility] on 10/01 after multiple falls the since the day prior. MRI Head at [facility] showed acute vs subacute R MCA territory infarct with associated cytotoxic edema. MRA H showed multilobulated right-sided P-comm aneurysm directed posteriorly and laterally, measuring 8.9 x 7.3 mm.TTE: EF 65-70%, severely enlarged L atrium. CT abdomen 10/11  reveals large hypodense 8.5 cm hepatic lesion concerning for malignancy. 10/12 s/p coiling right PCOMM aneurysm. Post op CT head with evolution of previous infarcts in right MCA, now with new acute lacunar infarctions in the right temporal lobe and cerebellum. On DAPT.    - No acute surgical oncology intervention at this time  - Recommend hepatic function AFP, and CEA levels (ordered)  - Recommend MRI to evaluate liver lesion, please discuss with nephrology about give contrast during MRI for better elucidation of lesion  - To be discussed with attending    [id number]   71 yo male with CKD (baseline Cr > 3), HTN, and HLD who presented as a transfer from [facility] for higher level of care. Patient initially presented to [facility] on 10/01 after multiple falls the since the day prior. MRI Head at [facility] showed acute vs subacute R MCA territory infarct with associated cytotoxic edema. MRA H showed multilobulated right-sided P-comm aneurysm directed posteriorly and laterally, measuring 8.9 x 7.3 mm.TTE: EF 65-70%, severely enlarged L atrium. CT abdomen 10/11  reveals large hypodense 8.5 cm hepatic lesion concerning for malignancy. 10/12 s/p coiling right PCOMM aneurysm. Post op CT head with evolution of previous infarcts in right MCA, now with new acute lacunar infarctions in the right temporal lobe and cerebellum. On DAPT.    - No acute surgical oncology intervention at this time  - Recommend hepatic function AFP, Ca19-9, and CEA levels (ordered)  - New liver lesions most likely metastasis, patient states he does not remember seeing gastroenterologist and does not remember if had colonoscopy, recommend GI work up   - Recommend MRI to evaluate liver lesion, please discuss with nephrology about give contrast during MRI for better elucidation of lesion  - To be discussed with attending    [id number]

## 2021-10-21 NOTE — PROGRESS NOTE ADULT - ASSESSMENT
70yoM w/ PMHx of CKD with a baseline Cr >3, HTN, HLD presented to OSH after multiple falls. He was transferred from OSH for MRI/MRA findings concerning for aneurysm vs. thrombus       1) CKD stage 4-  Likely has Hypertensive Nephropathy-> b/l 2.8mg/dl to 3.1mg/dl  Ua and urine lytes reviewed--> +UTI- s/p cipro  urine pro/cr 1.4grams  Renal US --> right kidney 9.3cm and left 9.5 cm with b/l renal cysts  s/p Coiling for PCOMM  Cr  fluctuating however overall stable--> cr pending today  avoid nephrotoxins such as ace/arb/nsaid  trend bmp    2) HTN-  bp high but improving  on nifedipine clonidine  hydralazine, metoprolol  Imdur inc yesterday to 120mg  Trend bp    3) metabolic acidosis- likely from ckd   na bicarb 650mg po tid  trend bicarb      For any question, call:  Cell # [phone]  Dr Hernadez

## 2021-10-21 NOTE — PROGRESS NOTE ADULT - ASSESSMENT
70yoM w/ PMHx of CKD IV (unknown baseline), uncontrolled HTN, ?CAD (patient states he has "heart problems;" TTE performed at [facility] on 10/5 w/ normal LVF, severely enlarged LA, Grade III diastolic dysfunction, mild pulm HTN) who was transfered from [facility] to where he presented w/ hx of multiple falls, found to have acute right MCA territory ischemic infarct w/ extensive chronic microvascular ischemic changes on CT, acute/subacute right-sided MCA distribution infarct with associated cytotoxic edema, on MRI, and Pcomm aneurysm vs thrombus [id number], mild R M1 stenosis on MRA.

## 2021-10-21 NOTE — PROGRESS NOTE ADULT - ASSESSMENT
69 yo male with CKD (baseline Cr > 3), HTN, and HLD who presented as a transfer from [facility] for higher level of care. Patient initially presented to [facility] on 10/01 after multiple falls the since the day prior. MRI Head at [facility] showed acute vs subacute R MCA territory infarct with associated cytotoxic edema. MRA H showed multilobulated right-sided P-comm aneurysm directed posteriorly and laterally, measuring 8.9 x 7.3 mm.TTE: EF 65-70%, severely enlarged L atrium. CT abdomen 10/11  reveals large hypodense 8.5 cm hepatic lesion concerning for malignancy .    10/12 s/p coiling right PCOMM aneurysm. Post op CT head with evolution of previous infarcts in right MCA, now with new acute lacunar infarctions in the right temporal lobe and cerebellum. On DAPT     Plan    Neuro stable. Continue ASA/ Plavix   HTN- -180. Imdur increased to 120mg On Clonidine 0.3q8 Hydrlazine 100q8 Nifedipine 90 QD  CKD- creatinine stable. on bicarb tabs   Liver lesion- . MR abd wo contrast scheduled for Friday  GFR 24. Not cleared for mac. Outpatient follow up with general surgery & GI  for further plans . General surgery consult appreciated   DVT ppx- off  SQH,as patient  bleeding from subcutaneous heparin admin sites . Patient ambulatory with assistance . Continue venodynes in bed   PT/OT CLYDE- Awaiting placement

## 2021-10-21 NOTE — PROGRESS NOTE ADULT - SUBJECTIVE AND OBJECTIVE BOX
SUBJECTIVE:   Patient seen this am   OVERNIGHT EVENTS: none    Vital Signs Last 24 Hrs  T(C): 36.4 (21 Oct 2021 12:28), Max: 36.8 (21 Oct 2021 06:28)  T(F): 97.5 (21 Oct 2021 12:28), Max: 98.3 (21 Oct 2021 06:28)  HR: 45 (21 Oct 2021 12:28) (45 - 48)  BP: 144/78 (21 Oct 2021 12:28) (135/70 - 172/83)  BP(mean): --  RR: 18 (21 Oct 2021 12:28) (17 - 18)  SpO2: 100% (21 Oct 2021 12:28) (97% - 100%)    PHYSICAL EXAM:    Neurological: Awake alert Ox3 , Speech clear Following Commands, Moving all Extremities 5/5 except LUE 4+/5     Pulmonary: Clear to Auscultation, No rales, No rhonchi, No wheezes     Cardiovascular: S1, S2, Regular rate and rhythm     Gastrointestinal: Soft, Non-tender, Non-distended     Extremities: No calf tenderness     LABS:                        10.9   7.01  )-----------( 735      ( 20 Oct 2021 08:23 )             33.6    10-20    137  |  105  |  38<H>  ----------------------------<  114<H>  4.1   |  17<L>  |  2.88<H>    Ca    9.2      20 Oct 2021 08:23    TPro  7.5  /  Alb  3.8  /  TBili  0.2  /  DBili  <0.1  /  AST  44<H>  /  ALT  17  /  AlkPhos  90  10-21            IMAGING:         MEDICATIONS:    acetaminophen    Suspension .. 650 milliGRAM(s) Oral every 6 hours PRN Mild Pain (1 - 3)  cloNIDine 0.3 milliGRAM(s) Oral every 8 hours  doxazosin 4 milliGRAM(s) Oral at bedtime  hydrALAZINE 100 milliGRAM(s) Oral <User Schedule>  isosorbide   mononitrate ER Tablet (IMDUR) 120 milliGRAM(s) Oral daily  metoprolol succinate ER 25 milliGRAM(s) Oral two times a day  NIFEdipine XL 90 milliGRAM(s) Oral daily  bisacodyl 5 milliGRAM(s) Oral at bedtime  lactulose Syrup 20 Gram(s) Oral two times a day  polyethylene glycol 3350 17 Gram(s) Oral every 12 hours  senna 2 Tablet(s) Oral at bedtime  simethicone 80 milliGRAM(s) Chew two times a day PRN Upset Stomach  aspirin  chewable 81 milliGRAM(s) Oral daily  atorvastatin 20 milliGRAM(s) Oral at bedtime  clopidogrel Tablet 75 milliGRAM(s) Oral daily  influenza   Vaccine 0.5 milliLiter(s) IntraMuscular once  sodium bicarbonate 650 milliGRAM(s) Oral three times a day      DIET:

## 2021-10-21 NOTE — CONSULT NOTE ADULT - SUBJECTIVE AND OBJECTIVE BOX
HPI:        PAST MEDICAL & SURGICAL HISTORY:  Frequent falls    Hypertension    Chronic kidney disease, unspecified CKD stage        MEDICATIONS  (STANDING):  aspirin  chewable 81 milliGRAM(s) Oral daily  atorvastatin 20 milliGRAM(s) Oral at bedtime  bisacodyl 5 milliGRAM(s) Oral at bedtime  cloNIDine 0.3 milliGRAM(s) Oral every 8 hours  clopidogrel Tablet 75 milliGRAM(s) Oral daily  doxazosin 4 milliGRAM(s) Oral at bedtime  hydrALAZINE 100 milliGRAM(s) Oral <User Schedule>  influenza   Vaccine 0.5 milliLiter(s) IntraMuscular once  isosorbide   mononitrate ER Tablet (IMDUR) 120 milliGRAM(s) Oral daily  lactulose Syrup 20 Gram(s) Oral two times a day  metoprolol succinate ER 25 milliGRAM(s) Oral two times a day  NIFEdipine XL 90 milliGRAM(s) Oral daily  polyethylene glycol 3350 17 Gram(s) Oral every 12 hours  senna 2 Tablet(s) Oral at bedtime  sodium bicarbonate 650 milliGRAM(s) Oral three times a day    MEDICATIONS  (PRN):  acetaminophen    Suspension .. 650 milliGRAM(s) Oral every 6 hours PRN Mild Pain (1 - 3)  simethicone 80 milliGRAM(s) Chew two times a day PRN Upset Stomach      Allergies    No Known Allergies    Intolerances        SOCIAL HISTORY:    FAMILY HISTORY:      PHYSICAL EXAM:  Constitutional: well developed, well nourished, NAD  Eyes: anicteric  ENMT: normal facies, symmetric  Respiratory: Breathing comfortably    Gastrointestinal: abdomen soft, nontender, mildly distended.           Vital Signs Last 24 Hrs  T(C): 36.3 (21 Oct 2021 10:27), Max: 36.8 (21 Oct 2021 06:28)  T(F): 97.4 (21 Oct 2021 10:27), Max: 98.3 (21 Oct 2021 06:28)  HR: 45 (21 Oct 2021 10:27) (45 - 48)  BP: 153/82 (21 Oct 2021 10:27) (135/70 - 172/83)  BP(mean): --  RR: 18 (21 Oct 2021 10:27) (17 - 18)  SpO2: 100% (21 Oct 2021 10:27) (97% - 100%)    I&O's Summary    20 Oct 2021 07:01  -  21 Oct 2021 07:00  --------------------------------------------------------  IN: 720 mL / OUT: 200 mL / NET: 520 mL            LABS:                        10.9   7.01  )-----------( 735      ( 20 Oct 2021 08:23 )             33.6     10-20    137  |  105  |  38<H>  ----------------------------<  114<H>  4.1   |  17<L>  |  2.88<H>    Ca    9.2      20 Oct 2021 08:23          CAPILLARY BLOOD GLUCOSE            Cultures:      RADIOLOGY & ADDITIONAL STUDIES:        PROCEDURE DATE:  10/11/2021            INTERPRETATION:  CLINICAL INFORMATION: Renal failure. Assess for stone.    COMPARISON: CT abdomen and pelvis 11/18/2014. MR abdomen 3/23/2014. Same-day renal ultrasound.    CONTRAST/COMPLICATIONS:  IV Contrast: None.  Oral Contrast: None.  Complications: None reported.    PROCEDURE:  CT of the Abdomen and Pelvis was performed.  Sagittal and coronal reformats were performed.    FINDINGS:    Evaluation of the solid visceral organs and vasculature is limited without the administration of intravenous contrast.    LOWER CHEST: Cardiomegaly with coarse mitral annular calcification. Coronary artery calcification.    LIVER: Normal morphology. In the central liver, centered in segments 4 and 5 around the gallbladder fossa, there is a lobulated soft tissue attenuation lesion measuring 8.5 x 6.8 x 7.4 cm (3:52). Soft tissue attenuation abuts the proximal gallbladder.  BILE DUCTS: Normal caliber.  GALLBLADDER: Cholelithiasis. As above, liver lesion abuts the proximal gallbladder.  SPLEEN: Within normal limits.  PANCREAS: Within normal limits.  ADRENALS: Within normal limits.  KIDNEYS/URETERS: Bilateral cysts, as on same-day ultrasound, including a 3.5cm hemorrhagic cyst at the right interpole (3:51) and a 0.6 cm hemorrhagic cyst at the left interpole. Several nonobstructing punctate renal stones bilaterally measuring up to 2 mm at the right lower pole. No hydronephrosis.    BLADDER: Circumferential bladder wall thickening, as in 2014, likely on the basis of chronic bladder outlet obstruction.  REPRODUCTIVE ORGANS: Prostatomegaly measuring 5.4 cm in transverse dimension with median lobe hypertrophy.    BOWEL: Moderate stool throughout the colon. No bowel wall thickening or obstruction. Appendix is normal. Colonic diverticulosis without acute diverticulitis.  PERITONEUM: Trace pelvic ascites.  VESSELS: Atherosclerotic changes.  RETROPERITONEUM/LYMPH NODES: A prominent/mildly enlarged periportal node in the savage hepatis measuring 2.2 x 1.8 cm (3:44), nonspecific.  ABDOMINAL WALL: A tiny fat-containing umbilical hernia. Mild subcutaneous inflammatory change of the low ventral abdominal wall, likely related to recent injections.  BONES: Degenerative changes.    IMPRESSION:  Limited noncontrast study.    An 8.5 cm hepatic lesion centered in segments 4 and 5, favoring malignancy over less likely infection. Further characterization may be obtained with contrast enhanced MR and/or tissue sampling.    The findings and recommendations were discussed by Dr. Roe with AUGUSTINE Diane at 10:37 AM on 10/11/2021 with repeat back verification.      --- End of Report ---             HPI:    69 yo male with CKD (baseline Cr > 3), HTN, and HLD who presented as a transfer from [facility] for higher level of care. Patient initially presented to [facility] on 10/01 after multiple falls the since the day prior. MRI Head at [facility] showed acute vs subacute R MCA territory infarct with associated cytotoxic edema. MRA H showed multilobulated right-sided P-comm aneurysm directed posteriorly and laterally, measuring 8.9 x 7.3 mm.TTE: EF 65-70%, severely enlarged L atrium. CT abdomen 10/11  reveals large hypodense 8.5 cm hepatic lesion concerning for malignancy. 10/12 s/p coiling right PCOMM aneurysm. Post op CT head with evolution of previous infarcts in right MCA, now with new acute lacunar infarctions in the right temporal lobe and cerebellum. On DAPT. Surgical oncology consulted for liver lesion in segments 4/5.     PAST MEDICAL & SURGICAL HISTORY:  Frequent falls    Hypertension    Chronic kidney disease, unspecified CKD stage        MEDICATIONS  (STANDING):  aspirin  chewable 81 milliGRAM(s) Oral daily  atorvastatin 20 milliGRAM(s) Oral at bedtime  bisacodyl 5 milliGRAM(s) Oral at bedtime  cloNIDine 0.3 milliGRAM(s) Oral every 8 hours  clopidogrel Tablet 75 milliGRAM(s) Oral daily  doxazosin 4 milliGRAM(s) Oral at bedtime  hydrALAZINE 100 milliGRAM(s) Oral <User Schedule>  influenza   Vaccine 0.5 milliLiter(s) IntraMuscular once  isosorbide   mononitrate ER Tablet (IMDUR) 120 milliGRAM(s) Oral daily  lactulose Syrup 20 Gram(s) Oral two times a day  metoprolol succinate ER 25 milliGRAM(s) Oral two times a day  NIFEdipine XL 90 milliGRAM(s) Oral daily  polyethylene glycol 3350 17 Gram(s) Oral every 12 hours  senna 2 Tablet(s) Oral at bedtime  sodium bicarbonate 650 milliGRAM(s) Oral three times a day    MEDICATIONS  (PRN):  acetaminophen    Suspension .. 650 milliGRAM(s) Oral every 6 hours PRN Mild Pain (1 - 3)  simethicone 80 milliGRAM(s) Chew two times a day PRN Upset Stomach      Allergies    No Known Allergies    Intolerances        SOCIAL HISTORY:    FAMILY HISTORY:      PHYSICAL EXAM:  Constitutional: well developed, well nourished, NAD  Eyes: anicteric  ENMT: normal facies, symmetric  Respiratory: Breathing comfortably    Gastrointestinal: abdomen soft, nontender, mildly distended.           Vital Signs Last 24 Hrs  T(C): 36.3 (21 Oct 2021 10:27), Max: 36.8 (21 Oct 2021 06:28)  T(F): 97.4 (21 Oct 2021 10:27), Max: 98.3 (21 Oct 2021 06:28)  HR: 45 (21 Oct 2021 10:27) (45 - 48)  BP: 153/82 (21 Oct 2021 10:27) (135/70 - 172/83)  BP(mean): --  RR: 18 (21 Oct 2021 10:27) (17 - 18)  SpO2: 100% (21 Oct 2021 10:27) (97% - 100%)    I&O's Summary    20 Oct 2021 07:01  -  21 Oct 2021 07:00  --------------------------------------------------------  IN: 720 mL / OUT: 200 mL / NET: 520 mL            LABS:                        10.9   7.01  )-----------( 735      ( 20 Oct 2021 08:23 )             33.6     10-20    137  |  105  |  38<H>  ----------------------------<  114<H>  4.1   |  17<L>  |  2.88<H>    Ca    9.2      20 Oct 2021 08:23          CAPILLARY BLOOD GLUCOSE            Cultures:      RADIOLOGY & ADDITIONAL STUDIES:        PROCEDURE DATE:  10/11/2021            INTERPRETATION:  CLINICAL INFORMATION: Renal failure. Assess for stone.    COMPARISON: CT abdomen and pelvis 11/18/2014. MR abdomen 3/23/2014. Same-day renal ultrasound.    CONTRAST/COMPLICATIONS:  IV Contrast: None.  Oral Contrast: None.  Complications: None reported.    PROCEDURE:  CT of the Abdomen and Pelvis was performed.  Sagittal and coronal reformats were performed.    FINDINGS:    Evaluation of the solid visceral organs and vasculature is limited without the administration of intravenous contrast.    LOWER CHEST: Cardiomegaly with coarse mitral annular calcification. Coronary artery calcification.    LIVER: Normal morphology. In the central liver, centered in segments 4 and 5 around the gallbladder fossa, there is a lobulated soft tissue attenuation lesion measuring 8.5 x 6.8 x 7.4 cm (3:52). Soft tissue attenuation abuts the proximal gallbladder.  BILE DUCTS: Normal caliber.  GALLBLADDER: Cholelithiasis. As above, liver lesion abuts the proximal gallbladder.  SPLEEN: Within normal limits.  PANCREAS: Within normal limits.  ADRENALS: Within normal limits.  KIDNEYS/URETERS: Bilateral cysts, as on same-day ultrasound, including a 3.5cm hemorrhagic cyst at the right interpole (3:51) and a 0.6 cm hemorrhagic cyst at the left interpole. Several nonobstructing punctate renal stones bilaterally measuring up to 2 mm at the right lower pole. No hydronephrosis.    BLADDER: Circumferential bladder wall thickening, as in 2014, likely on the basis of chronic bladder outlet obstruction.  REPRODUCTIVE ORGANS: Prostatomegaly measuring 5.4 cm in transverse dimension with median lobe hypertrophy.    BOWEL: Moderate stool throughout the colon. No bowel wall thickening or obstruction. Appendix is normal. Colonic diverticulosis without acute diverticulitis.  PERITONEUM: Trace pelvic ascites.  VESSELS: Atherosclerotic changes.  RETROPERITONEUM/LYMPH NODES: A prominent/mildly enlarged periportal node in the savage hepatis measuring 2.2 x 1.8 cm (3:44), nonspecific.  ABDOMINAL WALL: A tiny fat-containing umbilical hernia. Mild subcutaneous inflammatory change of the low ventral abdominal wall, likely related to recent injections.  BONES: Degenerative changes.    IMPRESSION:  Limited noncontrast study.    An 8.5 cm hepatic lesion centered in segments 4 and 5, favoring malignancy over less likely infection. Further characterization may be obtained with contrast enhanced MR and/or tissue sampling.    The findings and recommendations were discussed by Dr. Roe with AUGUSTINE Diane at 10:37 AM on 10/11/2021 with repeat back verification.      --- End of Report ---

## 2021-10-21 NOTE — PROGRESS NOTE ADULT - SUBJECTIVE AND OBJECTIVE BOX
Patient checo and examined  no complaints      No Known Allergies    Hospital Medications:   MEDICATIONS  (STANDING):  aspirin  chewable 81 milliGRAM(s) Oral daily  atorvastatin 20 milliGRAM(s) Oral at bedtime  bisacodyl 5 milliGRAM(s) Oral at bedtime  cloNIDine 0.3 milliGRAM(s) Oral every 8 hours  clopidogrel Tablet 75 milliGRAM(s) Oral daily  doxazosin 4 milliGRAM(s) Oral at bedtime  hydrALAZINE 100 milliGRAM(s) Oral <User Schedule>  influenza   Vaccine 0.5 milliLiter(s) IntraMuscular once  isosorbide   mononitrate ER Tablet (IMDUR) 120 milliGRAM(s) Oral daily  lactulose Syrup 20 Gram(s) Oral two times a day  metoprolol succinate ER 25 milliGRAM(s) Oral two times a day  NIFEdipine XL 90 milliGRAM(s) Oral daily  polyethylene glycol 3350 17 Gram(s) Oral every 12 hours  senna 2 Tablet(s) Oral at bedtime  sodium bicarbonate 650 milliGRAM(s) Oral three times a day      VITALS:  T(F): 97.5 (10-21-21 @ 12:28), Max: 98.3 (10-21-21 @ 06:28)  HR: 45 (10-21-21 @ 12:28)  BP: 144/78 (10-21-21 @ 12:28)  RR: 18 (10-21-21 @ 12:28)  SpO2: 100% (10-21-21 @ 12:28)  Wt(kg): --    10-20 @ 07:01  -  10-21 @ 07:00  --------------------------------------------------------  IN: 720 mL / OUT: 200 mL / NET: 520 mL        Physical Exam :-  Constitutional: NAD  Neck: Supple.  Respiratory: Bilateral equal breath sounds,  Cardiovascular: S1, S2 normal,  Gastrointestinal: Bowel Sounds present, soft, non tender.  Extremities: No edema  Neurological: Alert and Oriented x 3, no focal deficits  Psychiatric: Normal mood, normal affect    LABS:  10-20    137  |  105  |  38<H>  ----------------------------<  114<H>  4.1   |  17<L>  |  2.88<H>    Ca    9.2      20 Oct 2021 08:23    TPro  7.5  /  Alb  3.8  /  TBili  0.2  /  DBili  <0.1  /  AST  44<H>  /  ALT  17  /  AlkPhos  90  10-21    Creatinine Trend: 2.88 <--, 3.22 <--, 2.55 <--, 2.74 <--                        10.9   7.01  )-----------( 735      ( 20 Oct 2021 08:23 )             33.6     Urine Studies:      RADIOLOGY & ADDITIONAL STUDIES:

## 2021-10-21 NOTE — PROGRESS NOTE ADULT - SUBJECTIVE AND OBJECTIVE BOX
Subjective: Patient seen and examined. No new events except as noted.     REVIEW OF SYSTEMS:    CONSTITUTIONAL: +weakness, fevers or chills  EYES/ENT: No visual changes;  No vertigo or throat pain   NECK: No pain or stiffness  RESPIRATORY: No cough, wheezing, hemoptysis; No shortness of breath  CARDIOVASCULAR: No chest pain or palpitations  GASTROINTESTINAL: No abdominal or epigastric pain. No nausea, vomiting, or hematemesis; No diarrhea or constipation. No melena or hematochezia.  GENITOURINARY: No dysuria, frequency or hematuria  NEUROLOGICAL: No numbness or weakness  SKIN: No itching, burning, rashes, or lesions   All other review of systems is negative unless indicated above.    MEDICATIONS:  MEDICATIONS  (STANDING):  aspirin  chewable 81 milliGRAM(s) Oral daily  atorvastatin 20 milliGRAM(s) Oral at bedtime  bisacodyl 5 milliGRAM(s) Oral at bedtime  cloNIDine 0.3 milliGRAM(s) Oral every 8 hours  clopidogrel Tablet 75 milliGRAM(s) Oral daily  doxazosin 4 milliGRAM(s) Oral at bedtime  hydrALAZINE 100 milliGRAM(s) Oral <User Schedule>  influenza   Vaccine 0.5 milliLiter(s) IntraMuscular once  isosorbide   mononitrate ER Tablet (IMDUR) 120 milliGRAM(s) Oral daily  lactulose Syrup 20 Gram(s) Oral two times a day  metoprolol succinate ER 25 milliGRAM(s) Oral two times a day  NIFEdipine XL 90 milliGRAM(s) Oral daily  polyethylene glycol 3350 17 Gram(s) Oral every 12 hours  senna 2 Tablet(s) Oral at bedtime  sodium bicarbonate 650 milliGRAM(s) Oral three times a day      PHYSICAL EXAM:  T(C): 36.3 (10-21-21 @ 10:27), Max: 36.8 (10-21-21 @ 06:28)  HR: 45 (10-21-21 @ 10:27) (45 - 48)  BP: 153/82 (10-21-21 @ 10:27) (135/70 - 172/83)  RR: 18 (10-21-21 @ 10:27) (17 - 18)  SpO2: 100% (10-21-21 @ 10:27) (97% - 100%)  Wt(kg): --  I&O's Summary    20 Oct 2021 07:01  -  21 Oct 2021 07:00  --------------------------------------------------------  IN: 720 mL / OUT: 200 mL / NET: 520 mL          Appearance: Normal	  HEENT:   Normal oral mucosa, PERRL, EOMI	  Lymphatic: No lymphadenopathy , no edema  Cardiovascular: Normal S1 S2, No JVD, No murmurs , Peripheral pulses palpable 2+ bilaterally  Respiratory: Lungs clear to auscultation, normal effort 	  Gastrointestinal:  Soft, Non-tender, + BS	  Skin: No rashes, No ecchymoses, No cyanosis, warm to touch  Musculoskeletal: Normal range of motion, normal strength  Psychiatry:  Mood & affect appropriate  Ext: No edema      LABS:    CARDIAC MARKERS:                                10.9   7.01  )-----------( 735      ( 20 Oct 2021 08:23 )             33.6     10-20    137  |  105  |  38<H>  ----------------------------<  114<H>  4.1   |  17<L>  |  2.88<H>    Ca    9.2      20 Oct 2021 08:23    TPro  7.5  /  Alb  3.8  /  TBili  0.2  /  DBili  <0.1  /  AST  44<H>  /  ALT  17  /  AlkPhos  90  10-21    proBNP:   Lipid Profile:   HgA1c:   TSH:             TELEMETRY: 	    ECG:  	  RADIOLOGY:   DIAGNOSTIC TESTING:  [ ] Echocardiogram:  [ ]  Catheterization:  [ ] Stress Test:    OTHER:

## 2021-10-22 LAB
CANCER AG19-9 SERPL-ACNC: 37 U/ML — HIGH
SARS-COV-2 RNA SPEC QL NAA+PROBE: SIGNIFICANT CHANGE UP

## 2021-10-22 PROCEDURE — 99232 SBSQ HOSP IP/OBS MODERATE 35: CPT

## 2021-10-22 PROCEDURE — 74182 MRI ABDOMEN W/CONTRAST: CPT | Mod: 26

## 2021-10-22 RX ORDER — SODIUM CHLORIDE 9 MG/ML
1000 INJECTION INTRAMUSCULAR; INTRAVENOUS; SUBCUTANEOUS
Refills: 0 | Status: DISCONTINUED | OUTPATIENT
Start: 2021-10-22 | End: 2021-10-25

## 2021-10-22 RX ADMIN — Medication 25 MILLIGRAM(S): at 23:17

## 2021-10-22 RX ADMIN — Medication 650 MILLIGRAM(S): at 05:55

## 2021-10-22 RX ADMIN — Medication 4 MILLIGRAM(S): at 23:16

## 2021-10-22 RX ADMIN — Medication 650 MILLIGRAM(S): at 13:44

## 2021-10-22 RX ADMIN — Medication 81 MILLIGRAM(S): at 11:40

## 2021-10-22 RX ADMIN — Medication 100 MILLIGRAM(S): at 03:23

## 2021-10-22 RX ADMIN — Medication 100 MILLIGRAM(S): at 23:16

## 2021-10-22 RX ADMIN — Medication 650 MILLIGRAM(S): at 23:15

## 2021-10-22 RX ADMIN — ATORVASTATIN CALCIUM 20 MILLIGRAM(S): 80 TABLET, FILM COATED ORAL at 23:16

## 2021-10-22 RX ADMIN — Medication 25 MILLIGRAM(S): at 05:55

## 2021-10-22 RX ADMIN — Medication 5 MILLIGRAM(S): at 23:16

## 2021-10-22 RX ADMIN — CLOPIDOGREL BISULFATE 75 MILLIGRAM(S): 75 TABLET, FILM COATED ORAL at 11:39

## 2021-10-22 RX ADMIN — Medication 100 MILLIGRAM(S): at 11:39

## 2021-10-22 RX ADMIN — Medication 0.3 MILLIGRAM(S): at 13:44

## 2021-10-22 RX ADMIN — Medication 0.3 MILLIGRAM(S): at 23:17

## 2021-10-22 RX ADMIN — LACTULOSE 20 GRAM(S): 10 SOLUTION ORAL at 05:55

## 2021-10-22 RX ADMIN — Medication 90 MILLIGRAM(S): at 05:56

## 2021-10-22 RX ADMIN — SENNA PLUS 2 TABLET(S): 8.6 TABLET ORAL at 23:17

## 2021-10-22 RX ADMIN — Medication 0.3 MILLIGRAM(S): at 05:56

## 2021-10-22 NOTE — PROGRESS NOTE ADULT - SUBJECTIVE AND OBJECTIVE BOX
Subjective: Patient seen and examined. No new events except as noted.     REVIEW OF SYSTEMS:    CONSTITUTIONAL: +weakness, fevers or chills  EYES/ENT: No visual changes;  No vertigo or throat pain   NECK: No pain or stiffness  RESPIRATORY: No cough, wheezing, hemoptysis; No shortness of breath  CARDIOVASCULAR: No chest pain or palpitations  GASTROINTESTINAL: No abdominal or epigastric pain. No nausea, vomiting, or hematemesis; No diarrhea or constipation. No melena or hematochezia.  GENITOURINARY: No dysuria, frequency or hematuria  NEUROLOGICAL: No numbness or weakness  SKIN: No itching, burning, rashes, or lesions   All other review of systems is negative unless indicated above.    MEDICATIONS:  MEDICATIONS  (STANDING):  aspirin  chewable 81 milliGRAM(s) Oral daily  atorvastatin 20 milliGRAM(s) Oral at bedtime  bisacodyl 5 milliGRAM(s) Oral at bedtime  cloNIDine 0.3 milliGRAM(s) Oral every 8 hours  clopidogrel Tablet 75 milliGRAM(s) Oral daily  doxazosin 4 milliGRAM(s) Oral at bedtime  hydrALAZINE 100 milliGRAM(s) Oral <User Schedule>  influenza   Vaccine 0.5 milliLiter(s) IntraMuscular once  isosorbide   mononitrate ER Tablet (IMDUR) 120 milliGRAM(s) Oral daily  lactulose Syrup 20 Gram(s) Oral two times a day  metoprolol succinate ER 25 milliGRAM(s) Oral two times a day  NIFEdipine XL 90 milliGRAM(s) Oral daily  polyethylene glycol 3350 17 Gram(s) Oral every 12 hours  senna 2 Tablet(s) Oral at bedtime  sodium bicarbonate 650 milliGRAM(s) Oral three times a day      PHYSICAL EXAM:  T(C): 36.4 (10-22-21 @ 06:57), Max: 36.9 (10-21-21 @ 23:27)  HR: 41 (10-22-21 @ 06:57) (41 - 102)  BP: 119/60 (10-22-21 @ 06:57) (113/57 - 144/78)  RR: 18 (10-22-21 @ 06:57) (18 - 19)  SpO2: 98% (10-22-21 @ 06:57) (98% - 100%)  Wt(kg): --  I&O's Summary    21 Oct 2021 07:01  -  22 Oct 2021 07:00  --------------------------------------------------------  IN: 1000 mL / OUT: 575 mL / NET: 425 mL          Appearance: Normal	  HEENT:   Normal oral mucosa, PERRL, EOMI	  Lymphatic: No lymphadenopathy , no edema  Cardiovascular: Normal S1 S2, No JVD, No murmurs , Peripheral pulses palpable 2+ bilaterally  Respiratory: Lungs clear to auscultation, normal effort 	  Gastrointestinal:  Soft, Non-tender, + BS	  Skin: No rashes, No ecchymoses, No cyanosis, warm to touch  Musculoskeletal: Normal range of motion, normal strength  Psychiatry:  Mood & affect appropriate  Ext: No edema      LABS:    CARDIAC MARKERS:            10-21    134<L>  |  101  |  38<H>  ----------------------------<  117<H>  4.0   |  17<L>  |  2.81<H>    Ca    9.1      21 Oct 2021 15:00    TPro  7.5  /  Alb  3.8  /  TBili  0.2  /  DBili  <0.1  /  AST  44<H>  /  ALT  17  /  AlkPhos  90  10-21    proBNP:   Lipid Profile:   HgA1c:   TSH:             TELEMETRY: 	    ECG:  	  RADIOLOGY:   DIAGNOSTIC TESTING:  [ ] Echocardiogram:  [ ]  Catheterization:  [ ] Stress Test:    OTHER:

## 2021-10-22 NOTE — PROGRESS NOTE ADULT - ASSESSMENT
69 yo male with CKD (baseline Cr > 3), HTN, and HLD who presented as a transfer from [facility] for higher level of care. Patient initially presented to [facility] on 10/01 after multiple falls the since the day prior. MRI Head at [facility] showed acute vs subacute R MCA territory infarct with associated cytotoxic edema. MRA H showed multilobulated right-sided P-comm aneurysm directed posteriorly and laterally, measuring 8.9 x 7.3 mm.TTE: EF 65-70%, severely enlarged L atrium. CT abdomen 10/11  reveals large hypodense 8.5 cm hepatic lesion concerning for malignancy .    10/12 s/p coiling right PCOMM aneurysm. Post op CT head with evolution of previous infarcts in right MCA, now with new acute lacunar infarctions in the right temporal lobe and cerebellum. On DAPT     Plan:     Neuro stable. Continue ASA/ Plavix   HTN- -180. Imdur increased to 120mg On Clonidine 0.3q8 Hydrlazine 100q8 Nifedipine 90 QD  CKD- creatinine stable. on bicarb tabs   Liver lesion- MR abd wo contrast scheduled for Friday 5pm, no contrast,  GFR 24. Not cleared for mac per renal. Outpatient follow up with general surgery & GI for further plans. General surgery consult appreciated   DVT ppx- off  SQH,as patient  bleeding from subcutaneous heparin admin sites . Patient ambulatory with assistance . Continue venodynes in bed   PT/OT CLYDE- Awaiting placement, to go to rehab tomorrow 2PM .        will discuss with Dr Greenberg  [id number]

## 2021-10-22 NOTE — PROGRESS NOTE ADULT - SUBJECTIVE AND OBJECTIVE BOX
Neurology Progress Note    S: Patient seen and examined on floor. no complaints dc planning pending mr abd     Medication:  MEDICATIONS  (STANDING):  aspirin  chewable 81 milliGRAM(s) Oral daily  atorvastatin 20 milliGRAM(s) Oral at bedtime  bisacodyl 5 milliGRAM(s) Oral at bedtime  cloNIDine 0.3 milliGRAM(s) Oral every 8 hours  clopidogrel Tablet 75 milliGRAM(s) Oral daily  doxazosin 4 milliGRAM(s) Oral at bedtime  hydrALAZINE 100 milliGRAM(s) Oral <User Schedule>  influenza   Vaccine 0.5 milliLiter(s) IntraMuscular once  isosorbide   mononitrate ER Tablet (IMDUR) 120 milliGRAM(s) Oral daily  lactulose Syrup 20 Gram(s) Oral two times a day  metoprolol succinate ER 25 milliGRAM(s) Oral two times a day  NIFEdipine XL 90 milliGRAM(s) Oral daily  polyethylene glycol 3350 17 Gram(s) Oral every 12 hours  senna 2 Tablet(s) Oral at bedtime  sodium bicarbonate 650 milliGRAM(s) Oral three times a day    MEDICATIONS  (PRN):  acetaminophen    Suspension .. 650 milliGRAM(s) Oral every 6 hours PRN Mild Pain (1 - 3)  simethicone 80 milliGRAM(s) Chew two times a day PRN Upset Stomach        Vitals:  Vital Signs Last 24 Hrs  T(C): 36.4 (10-22-21 @ 15:39), Max: 36.9 (10-21-21 @ 23:27)  T(F): 97.5 (10-22-21 @ 15:39), Max: 98.5 (10-21-21 @ 23:27)  HR: 43 (10-22-21 @ 15:39) (41 - 102)  BP: 125/66 (10-22-21 @ 15:39) (113/57 - 125/66)  BP(mean): --  RR: 18 (10-22-21 @ 15:39) (18 - 19)  SpO2: 100% (10-22-21 @ 15:39) (98% - 100%)        Orthostatic VS  10-20-21 @ 11:10  Lying BP: 183/88 HR: 46  Sitting BP: 163/81 HR: 48  Standing BP: 152/51 HR: --  Site: upper right arm  Mode: electronic    Orthostatic VS  10-20-21 @ 11:10  Lying BP: 183/88 HR: 46  Sitting BP: 163/81 HR: 48  Standing BP: 152/51 HR: --  Site: upper right arm  Mode: electronic      Orthostatic VS  10-16-21 @ 07:27  Lying BP: 198/98 HR: 46  Sitting BP: 182/95 HR: 50  Standing BP: 174/96 HR: 58  Site: --  Mode: --  Orthostatic VS  10-15-21 @ 16:00  Lying BP: 163/76 HR: 49  Sitting BP: 136/77 HR: 51  Standing BP: 136/70 HR: 59  Site: upper right arm  Mode: electronic        General Exam:   General Appearance: Appropriately dressed and in no acute distress       Head: Normocephalic, atraumatic and no dysmorphic features  Ear, Nose, and Throat: Moist mucous membranes  CVS: S1S2+  Resp: No SOB, no wheeze or rhonchi  Abd: soft NTND  Extremities: No edema, no cyanosis  Skin: No bruises, no rashes     Neurological Exam:  Mental Status: Awake, alert and oriented x 3.  Able to follow simple and complex verbal commands. Able to name and repeat. fluent speech. No obvious aphasia or dysarthria noted.   Cranial Nerves: PERRL, EOMI, VFFC, sensation V1-V3 intact,  no obvious facial asymmetry , equal elevation of palate, scm/trap 5/5, tongue is midline on protrusion. no obvious papilledema on fundoscopic exam. Hearing is grossly intact.   Motor: Normal bulk, tone and strength throughout. Fine finger movements were intact and symmetric. no tremors or drift noted except LUE with 4+/5 mild drift noted   Sensation: Intact to light touch and pinprick throughout. no right/left confusion. no extinction to tactile on DSS.    Reflexes: 1+ throughout at biceps, brachioradialis, triceps, patellars and ankles bilaterally and equal. No clonus. R toe and L toe were both downgoing.  Coordination: No dysmetria on FNF   Gait: deferred in ICU    I personally reviewed the below data/images/labs:    no nwe labs   10-21    134<L>  |  101  |  38<H>  ----------------------------<  117<H>  4.0   |  17<L>  |  2.81<H>    Ca    9.1      21 Oct 2021 15:00    TPro  7.5  /  Alb  3.8  /  TBili  0.2  /  DBili  <0.1  /  AST  44<H>  /  ALT  17  /  AlkPhos  90  10-21        < from: CT Head No Cont (10.01.21 @ 18:37) >    EXAM:  CT BRAIN                              PROCEDURE DATE:  10/01/2021          INTERPRETATION:  CT OF THE HEAD WITHOUT CONTRAST    CLINICAL INDICATION: Weakness. Falls.    TECHNIQUE: Volumetric CT acquisition was performed through the brain and reviewed using brain and bone window technique. Dose optimization techniques were utilized including kVp/mA modulation along with iterative reconstructions.      COMPARISON: No prior studies have been submitted for comparison.    FINDINGS:    The ventricular and sulcal size and configuration is age appropriate.   There is no acute loss of gray-white differentiation. There are extensive patchy and confluent areas of hypodensity in the periventricular and subcortical white matter which are non-specific but likely related  chronic microangiopathic ischemic changes.  Small chronic infarction in the right cerebellar hemisphere. Chronic appearing left thalamic lacunar infarct.    There is no evidence of mass effect, midline shift, acute intracranial hemorrhage, or extra-axial collections.     The calvarium is intact. The paranasal sinuses are clear.The mastoid air cells are predominantly clear. The orbits are unremarkable.      IMPRESSION:  No acute intracranial hemorrhage or acute territorial infarction. Extensive chronic microvascular ischemic changes and several chronic infarctions as described. Further evaluation may be obtained with MRI of the brain if no contraindications.    --- End of Report ---            KAMILA SZYMANSKI MD; Attending Radiologist  This document has been electronically signed. Oct  1 2021  7:25PM    < end of copied text >  < from: MR Head No Cont (10.04.21 @ 18:09) >    EXAM:  MR BRAIN                            PROCEDURE DATE:  10/04/2021          INTERPRETATION:  .    CLINICAL INFORMATION: Frequent falls.    TECHNIQUE: Multiplanar multisequential MRI of the brain was acquired without the administration of IV gadolinium.    COMPARISON: Prior CT examination of the head dated 10/1/2021.    FINDINGS: Multiple areas of restricted diffusion are seen within the right MCA territory affecting the right frontal, parietal, and temporal lobes. Associated T2/FLAIR prolongation is seen, compatible with cytotoxic edema. No hemorrhagic transformation is noted.    Chronic lacunar infarcts are again seen within the right cerebellar hemisphere, left thalamus, and bilateral basal ganglia.    Multiple patchy confluent nonspecific T2/FLAIR hyperintense signal changes are noted throughout the bihemispheric white matter without associated mass effect or restricted diffusion.    Mild ventriculomegaly appears unchanged. No abnormal intra-axial fluid collections are notable. Flow-voids are noted throughout the major intracranial vessels, on the T2 weighted images, consistent with their patency. The sellar area appears unremarkable. The paranasal sinuses and mastoid air cells are grossly clear. Calvarial signal appears unremarkable. The orbits appear within normal limits.    IMPRESSION: Acute/subacute right-sided MCA distribution infarct with associated cytotoxic edema and without hemorrhagic transformation.    Multiple additional chronic lacunar infarcts and similar-appearing extensive chronic white matter microvascular type changes, as discussed.      --- End of Report ---            LUIS CARLOS BONNER MD; Attending Radiologist  This document has been electronically signed. Oct  5 2021  3:01PM    < end of copied text >  < from: MR Angio Head No Cont (10.06.21 @ 18:15) >    EXAM:  MR ANGIO BRAIN                            PROCEDURE DATE:  10/06/2021          INTERPRETATION:  INDICATION:  Right MCA infarct.    TECHNIQUE:  MR angiography of the brain was performed using three dimensional time-of-flight (3D-TOF) technique.  Imaging was performed on a 1.5 Lisa magnet.    FINDINGS:    INTERNAL CAROTID ARTERIES:  Bilaterally patent.    [place] [facility]:  A right-sided P-comm aneurysm is identified. The aneurysm is directed posteriorly and laterally, the aneurysm appears to be septated and/or bilobed., and measures 8.9 x 7.3 mm.    CEREBRAL ARTERIES:  Both anterior cerebral arteries are patent. Both A1 segments are well formed. Both middle cerebral arteries are patent. There is mild narrowing of the proximal right M1 segment.    VERTEBROBASILAR SYSTEM:  The vertebrobasilar system is patent. There are large posterior communicating arteries bilaterally with dominant supply of the posterior cerebral arteries. Left P1 segment is hypoplastic.    IMPRESSION:    1. 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  2. Mild narrowing of the proximal right middle cerebral artery in the M1 region.  3. The vessels are otherwise patent, as outlined above.    --- End of Report ---            MONSE CLEMENT MD; Attending Radiologist  This document has been electronically signed. Oct  6 2021  7:11PM    < end of copied text >  < from: CT Head No Cont (10.13.21 @ 09:28) >    EXAM:  CT BRAIN                            PROCEDURE DATE:  10/13/2021            INTERPRETATION:  Noncontrast CT of the brain.    CLINICAL INDICATION: Status post rt pcomm aneurysm coiling    TECHNIQUE : Axial CT scanning of the brain was obtainedfrom the skull base to the vertex without the administration of intravenous contrast. Sagittal and coronal reformats were provided.    COMPARISON: CT brain 10/1/2021. MRI brain 10/4/2021    FINDINGS:    Interval placement of embolic coil mass right parasellar region.    Similar mild ventricular dilatation.    No midline shift. Basal cisterns poorly evaluated due to streak artifact.    No acute intracranial hemorrhage, mass effect, or brain edema. Extensive white matter microvascular ischemic disease.    The visualized paranasal sinuses and mastoid air cells are clear.    IMPRESSION:    Interval placement of embolic coil mass right parasellar region.  No acute intracranial hemorrhage, mass effect, or brain edema.  Similar mild ventricular dilatation.        --- End of Report ---      AFIA DAILY MD; Attending Radiologist  This document has been electronically signed. Oct 13 2021  9:31AM    < end of copied text >      < from: MR Head No Cont (10.15.21 @ 16:40) >    EXAM:  MR BRAIN                            PROCEDURE DATE:  10/15/2021            INTERPRETATION:  MRI OF THE BRAIN WITHOUT CONTRAST    CLINICAL INDICATION: Status post right posterior communicating artery aneurysm status post balloon assisted coilembolization    TECHNIQUE: Multiplanar multisequence noncontrast MRI of the brain was performed.    COMPARISON: CT brain, 10/13/2021 and MRI brain, 10/4/2021.    FINDINGS:    The ventricular and sulcal size and configuration is age appropriate. Thereare scattered T2/FLAIR hyperintensities in the subcortical and periventricular white matter which are nonspecific but most commonly represent chronic microvascular ischemic changes.    The previously seen acute lacunar infarctions along left MCA distribution are decreased in diffusion restriction intensity, consistent with evolution of acute infarctions to late subacute stage. In addition, new punctate acute lacunar infarctions have developed in the posterior wall of the right temporal lobe and in the left cerebellar hemisphere along right parietal distribution. There is no susceptibility signal to suggest hemorrhagic conversion.  There is a chronic infarction in the right cerebellum and in the left thalamus.    There is susceptibility signalin the right paraclinoid region at the site of the previously coiled right P-comm aneurysm.    There is abnormal extra-axial fluid collection or hydrocephalus. The visualized globes are symmetric bilaterally.    The visualized paranasal sinuses and mastoid bones are adequately developed and aerated.    IMPRESSION:    Comparison previous studies,    There has been interval evolution of previously seen infarcts in right MCA distribution as described. There is development of new acute lacunar infarctions at the posterior pole of the right temporal lobe and in the right cerebellum as described.    There is no intracranial hemorrhage, midline shift or mass effect.    Coil mass in the right paraclinoid region.    Notification to clinician of alert:    Provider   Dr. BARGER  was notified about the final results at 10/15/2021 10:23 AM via telephone by neuroradiologist KEITH Tineo. Readback confirmation was obtained.    --- End of Report ---                JULIO TINEO MD; Attending Radiologist  This document has been electronically signed. Oct 15 2021 10:35AM    < end of copied text >  < from: CT Chest No Cont (10.15.21 @ 23:27) >    EXAM:  CT CHEST                            PROCEDURE DATE:  10/15/2021            INTERPRETATION:  CLINICAL INFORMATION: Shortness breath, evaluate for pulmonary infection or pulmonary edema.    COMPARISON: CT chest 9/6/2014. CT abdomen pelvis 10/11/2021.    CONTRAST/COMPLICATIONS:  IV Contrast: None  Oral Contrast: None  Complications: None    PROCEDURE:  CT of the Chest was performed.  Sagittal and coronal reformats were performed.    FINDINGS:    LUNGS AND AIRWAYS: Emphysema. No pulmonary nodules or parenchymal consolidations.  PLEURA: Small bilateral pleural effusions.    MEDIASTINUM AND RED: 1.2 cm right paratracheal lymph node (2:40), nonspecific. No axillary lymphadenopathy    VESSELS: Calcified atherosclerotic plaques in aorta and coronary arteries.    HEART: Multifocal cardiac enlargement is noted. No pericardial effusion. Mitral annular calcifications.    CHEST WALL AND LOWER NECK: Within normal limits.    VISUALIZED UPPER ABDOMEN: Partially imaged large hypodense hepatic lesion measuring 6.9 x 8.8 cm, similar in appearance compared to prior CT abdomen pelvis 10/11/2021. Simple cyst in the left kidney    BONES: Degenerative changes of the spine.    IMPRESSION:    Small bilateral pleural effusions with minimal bilateral groundglass opacities and a few areas of interlobular septal thickening. Findings are suggestive of mild interstitial edema. This is superimposed on a background of emphysema.    Redemonstration of large hypodense hepatic lesion, similar appearance whencompared to prior abdominal pelvis 10/11/2021. As mentioned on prior report, consider contrast enhanced MRI for further evaluation.        --- End of Report ---              ALIYA BERTRAND MD; Resident Radiology  This document has been electronicallysigned.  ABRIL PINEDA MD; Attending Radiologist  This document has been electronically signed. Oct 16 2021  9:09AM    < end of copied text >

## 2021-10-22 NOTE — PROGRESS NOTE ADULT - SUBJECTIVE AND OBJECTIVE BOX
Patient seen and examined  no complaints    No Known Allergies    Hospital Medications:   MEDICATIONS  (STANDING):  aspirin  chewable 81 milliGRAM(s) Oral daily  atorvastatin 20 milliGRAM(s) Oral at bedtime  bisacodyl 5 milliGRAM(s) Oral at bedtime  cloNIDine 0.3 milliGRAM(s) Oral every 8 hours  clopidogrel Tablet 75 milliGRAM(s) Oral daily  doxazosin 4 milliGRAM(s) Oral at bedtime  hydrALAZINE 100 milliGRAM(s) Oral <User Schedule>  influenza   Vaccine 0.5 milliLiter(s) IntraMuscular once  isosorbide   mononitrate ER Tablet (IMDUR) 120 milliGRAM(s) Oral daily  lactulose Syrup 20 Gram(s) Oral two times a day  metoprolol succinate ER 25 milliGRAM(s) Oral two times a day  NIFEdipine XL 90 milliGRAM(s) Oral daily  polyethylene glycol 3350 17 Gram(s) Oral every 12 hours  senna 2 Tablet(s) Oral at bedtime  sodium bicarbonate 650 milliGRAM(s) Oral three times a day      VITALS:  T(F): 98 (10-22-21 @ 11:23), Max: 98.5 (10-21-21 @ 23:27)  HR: 44 (10-22-21 @ 11:23)  BP: 118/54 (10-22-21 @ 11:23)  RR: 18 (10-22-21 @ 11:23)  SpO2: 98% (10-22-21 @ 11:23)  Wt(kg): --    10-21 @ 07:01  -  10-22 @ 07:00  --------------------------------------------------------  IN: 1000 mL / OUT: 575 mL / NET: 425 mL        Physical Exam :-  Constitutional: NAD  Neck: Supple.  Respiratory: Bilateral equal breath sounds,  Cardiovascular: S1, S2 normal,  Gastrointestinal: Bowel Sounds present, soft, non tender.  Extremities: No edema  Neurological: Alert and Oriented x 3, no focal deficits  Psychiatric: Normal mood, normal affect    LABS:  10-21    134<L>  |  101  |  38<H>  ----------------------------<  117<H>  4.0   |  17<L>  |  2.81<H>    Ca    9.1      21 Oct 2021 15:00    TPro  7.5  /  Alb  3.8  /  TBili  0.2  /  DBili  <0.1  /  AST  44<H>  /  ALT  17  /  AlkPhos  90  10-21    Creatinine Trend: 2.81 <--, 2.74 <--, 2.88 <--, 3.22 <--, 2.55 <--    Urine Studies:      RADIOLOGY & ADDITIONAL STUDIES:

## 2021-10-22 NOTE — PROGRESS NOTE ADULT - SUBJECTIVE AND OBJECTIVE BOX
SUBJECTIVE:   Patient seen this am, sitting in chair in NAD; awaiting MRI     Vital Signs Last 24 Hrs  T(C): 36.4 (22 Oct 2021 15:39), Max: 36.9 (21 Oct 2021 23:27)  T(F): 97.5 (22 Oct 2021 15:39), Max: 98.5 (21 Oct 2021 23:27)  HR: 43 (22 Oct 2021 15:39) (41 - 102)  BP: 125/66 (22 Oct 2021 15:39) (113/57 - 125/66)  BP(mean): --  RR: 18 (22 Oct 2021 15:39) (18 - 19)  SpO2: 100% (22 Oct 2021 15:39) (98% - 100%)    PHYSICAL EXAM:    Neurological: Awake alert Ox3 , Speech clear Following Commands, Moving all Extremities 5/5 except LUE 4+/5     Pulmonary: Clear to Auscultation, No rales, No rhonchi, No wheezes     Cardiovascular: S1, S2, Regular rate and rhythm     Gastrointestinal: Soft, Non-tender, Non-distended     Extremities: No calf tenderness     LABS:  10-21    134<L>  |  101  |  38<H>  ----------------------------<  117<H>  4.0   |  17<L>  |  2.81<H>    Ca    9.1      21 Oct 2021 15:00    TPro  7.5  /  Alb  3.8  /  TBili  0.2  /  DBili  <0.1  /  AST  44<H>  /  ALT  17  /  AlkPhos  90  10-21    IMAGING:     no new imaging    MEDICATIONS  (STANDING):  aspirin  chewable 81 milliGRAM(s) Oral daily  atorvastatin 20 milliGRAM(s) Oral at bedtime  bisacodyl 5 milliGRAM(s) Oral at bedtime  cloNIDine 0.3 milliGRAM(s) Oral every 8 hours  clopidogrel Tablet 75 milliGRAM(s) Oral daily  doxazosin 4 milliGRAM(s) Oral at bedtime  hydrALAZINE 100 milliGRAM(s) Oral <User Schedule>  influenza   Vaccine 0.5 milliLiter(s) IntraMuscular once  isosorbide   mononitrate ER Tablet (IMDUR) 120 milliGRAM(s) Oral daily  lactulose Syrup 20 Gram(s) Oral two times a day  metoprolol succinate ER 25 milliGRAM(s) Oral two times a day  NIFEdipine XL 90 milliGRAM(s) Oral daily  polyethylene glycol 3350 17 Gram(s) Oral every 12 hours  senna 2 Tablet(s) Oral at bedtime  sodium bicarbonate 650 milliGRAM(s) Oral three times a day    MEDICATIONS  (PRN):  acetaminophen    Suspension .. 650 milliGRAM(s) Oral every 6 hours PRN Mild Pain (1 - 3)  simethicone 80 milliGRAM(s) Chew two times a day PRN Upset Stomach

## 2021-10-22 NOTE — PROGRESS NOTE ADULT - ASSESSMENT
70yoM w/ PMHx of CKD with a baseline Cr >3, HTN, HLD presented to OSH after multiple falls. He was transferred from OSH for MRI/MRA findings concerning for aneurysm vs. thrombus       1) CKD stage 4-  Likely has Hypertensive Nephropathy-> b/l 2.8mg/dl to 3.1mg/dl  Ua and urine lytes reviewed--> +UTI- s/p cipro  urine pro/cr 1.4grams  Renal US --> right kidney 9.3cm and left 9.5 cm with b/l renal cysts  s/p Coiling for PCOMM  Cr  fluctuating however overall stable  Pt with liver lesions awaiting MRI without Mac--> The cut off for mac is egfr of 30, pts egfr is 25. If MRI with Mac is needed which will  then can pursue. Otherwise would avoid Mac to avoid NSF.  If pt does get MAC-> Place on Iv nacl @ 60cc x 12 hours post Mac.- monitor Urineoutput strictly  avoid nephrotoxins such as ace/arb/nsaid  trend bmp    2) HTN-  bp much better  on nifedipine clonidine  hydralazine, metoprolol  Imdur  120mg po qd  Trend bp    3) metabolic acidosis- likely from ckd   na bicarb 650mg po tid  trend bicarb      For any question, call:  Cell # [phone]  Dr Hernadez

## 2021-10-22 NOTE — PROGRESS NOTE ADULT - SUBJECTIVE AND OBJECTIVE BOX
Red Team Progress Note  [id number]    24h Events:   - Overnight, no acute events    Subjective:   Patient seen at bedside this AM. Denies abdominal pain, n/v, chest pain, shortness of breath.     Objective:  Vital Signs  T(C): 36.4 (10-22 @ 06:57), Max: 36.9 (10-21 @ 23:27)  HR: 41 (10-22 @ 06:57) (41 - 102)  BP: 119/60 (10-22 @ 06:57) (113/57 - 153/82)  RR: 18 (10-22 @ 06:57) (18 - 19)  SpO2: 98% (10-22 @ 06:57) (98% - 100%)  10-21-21 @ 07:01  -  10-22-21 @ 07:00  --------------------------------------------------------  IN:  Total IN: 0 mL    OUT:    Voided (mL): 575 mL  Total OUT: 575 mL    Total NET: -575 mL          I&O's Detail    10-21-21 @ 07:01  -  10-22-21 @ 07:00  --------------------------------------------------------  IN: 1000 mL / OUT: 575 mL / NET: 425 mL        Physical Exam:  GEN: resting in bed comfortably in NAD  RESP: no increased WOB  ABD: soft, non-distended, nontender  EXTR: warm, well-perfused    Labs:  10-21    134<L>  |  101  |  38<H>  ----------------------------<  117<H>  4.0   |  17<L>  |  2.81<H>    Ca    9.1      21 Oct 2021 15:00    TPro  7.5  /  Alb  3.8  /  TBili  0.2  /  DBili  <0.1  /  AST  44<H>  /  ALT  17  /  AlkPhos  90  10-21    CAPILLARY BLOOD GLUCOSE          Medications:   MEDICATIONS  (STANDING):  aspirin  chewable 81 milliGRAM(s) Oral daily  atorvastatin 20 milliGRAM(s) Oral at bedtime  bisacodyl 5 milliGRAM(s) Oral at bedtime  cloNIDine 0.3 milliGRAM(s) Oral every 8 hours  clopidogrel Tablet 75 milliGRAM(s) Oral daily  doxazosin 4 milliGRAM(s) Oral at bedtime  hydrALAZINE 100 milliGRAM(s) Oral <User Schedule>  influenza   Vaccine 0.5 milliLiter(s) IntraMuscular once  isosorbide   mononitrate ER Tablet (IMDUR) 120 milliGRAM(s) Oral daily  lactulose Syrup 20 Gram(s) Oral two times a day  metoprolol succinate ER 25 milliGRAM(s) Oral two times a day  NIFEdipine XL 90 milliGRAM(s) Oral daily  polyethylene glycol 3350 17 Gram(s) Oral every 12 hours  senna 2 Tablet(s) Oral at bedtime  sodium bicarbonate 650 milliGRAM(s) Oral three times a day    MEDICATIONS  (PRN):  acetaminophen    Suspension .. 650 milliGRAM(s) Oral every 6 hours PRN Mild Pain (1 - 3)  simethicone 80 milliGRAM(s) Chew two times a day PRN Upset Stomach      Imaging:

## 2021-10-22 NOTE — PROGRESS NOTE ADULT - ASSESSMENT
69 yo male with CKD (baseline Cr > 3), HTN, and HLD who presented as a transfer from [facility] for higher level of care. Patient initially presented to [facility] on 10/01 after multiple falls the since the day prior. MRI Head at [facility] showed acute vs subacute R MCA territory infarct with associated cytotoxic edema. MRA H showed multilobulated right-sided P-comm aneurysm directed posteriorly and laterally, measuring 8.9 x 7.3 mm.TTE: EF 65-70%, severely enlarged L atrium. CT abdomen 10/11  reveals large hypodense 8.5 cm hepatic lesion concerning for malignancy. 10/12 s/p coiling right PCOMM aneurysm. Post op CT head with evolution of previous infarcts in right MCA, now with new acute lacunar infarctions in the right temporal lobe and cerebellum. On DAPT.    Plan:  - No acute surgical oncology intervention at this time  - Recommend hepatic function AFP 7.6, Ca19-9 (pending), and CEA (5.1)  - New liver lesions most likely metastasis, patient states he does not remember seeing gastroenterologist and does not remember if had colonoscopy, recommend GI work up   - Recommend MRI to evaluate liver lesion, please discuss with nephrology about give contrast during MRI for better elucidation of lesion  - To be discussed with attending    Red Surgery  [id number]  Sam Nunez

## 2021-10-22 NOTE — PROGRESS NOTE ADULT - ASSESSMENT
70yoM w/ PMHx of CKD IV (unknown baseline), uncontrolled HTN, ?CAD (patient states he has "heart problems;" TTE performed at [facility] on 10/5 w/ normal LVF, severely enlarged LA, Grade III diastolic dysfunction, mild pulm HTN) who was transfered from [facility] to where he presented w/ hx of multiple falls, found to have acute right MCA territory ischemic infarct w/ extensive chronic microvascular ischemic changes on CT, acute/subacute right-sided MCA distribution infarct with associated cytotoxic edema, on MRI, and Pcomm aneurysm vs thrombus [id number], mild R M1 stenosis on MRA.

## 2021-10-22 NOTE — PROGRESS NOTE ADULT - ASSESSMENT
69 yo male with CKD (baseline Cr > 3), HTN, and HLD who presented as a transfer from [facility] for higher level of care. Patient initially presented to [facility] on 10/01 after multiple falls the since the day prior. MRI Head at [facility] showed acute vs subacute R MCA territory infarct with associated cytotoxic edema. MRA H showed multilobulated right-sided P-comm aneurysm directed posteriorly and laterally, measuring 8.9 x 7.3 mm.   A1c 5.5, LDL 25.  TTE: EF 65-70%, severely enlarged L atrium.   MRi with R MCA territory infarct   vessels with PCOM on R aneurysm    s/p angio and coil of PCOM aneurysm.   Impression: AMS + mild L hemineglect in setting of R MCA territory infarct seen on MRI Head, ESUS.  o/e 10/15 LUE drift   10/15 MRI for trial as above  10/19 okay  no complaints   10/21 awaiting telly   10/22 mr abd then telly   - Dual antiplatelet therapy with ASA 81mg PO daily and Clopidogrel 75mg PO daily x 3 weeks followed by monotherapy with ASA 81mg PO daily.  - lipitor 40. should be high dose   - ARU and PRU therapeutic   - cardio for ILR. cardio following can do outpt   - telemetry  - PT/OT/SS/SLP, OOBC  - check FS, glucose control <180  - GI/DVT ppx  - Counseling on diet, exercise, and medication adherence was done  - Counseling on smoking cessation and alcohol consumption offered when appropriate.  - Pain assessed and judicious use of narcotics when appropriate was discussed.    - Stroke education given when appropriate.  - Importance of fall prevention discussed.   - Differential diagnosis and plan of care discussed with patient and/or family and primary team  - Thank you for allowing me to participate in the care of this patient. Call with questions.   - d/c planning ILR in or outpt. outpt f/u on d/c 1-2 weeks no objection to d/c   Sukh Pierre MD  Vascular Neurology  Office: [phone] .

## 2021-10-23 LAB
ANION GAP SERPL CALC-SCNC: 16 MMOL/L — SIGNIFICANT CHANGE UP (ref 5–17)
BUN SERPL-MCNC: 42 MG/DL — HIGH (ref 7–23)
CALCIUM SERPL-MCNC: 9.5 MG/DL — SIGNIFICANT CHANGE UP (ref 8.4–10.5)
CHLORIDE SERPL-SCNC: 104 MMOL/L — SIGNIFICANT CHANGE UP (ref 96–108)
CO2 SERPL-SCNC: 17 MMOL/L — LOW (ref 22–31)
CREAT SERPL-MCNC: 2.8 MG/DL — HIGH (ref 0.5–1.3)
GLUCOSE SERPL-MCNC: 104 MG/DL — HIGH (ref 70–99)
HCT VFR BLD CALC: 38.4 % — LOW (ref 39–50)
HGB BLD-MCNC: 12.2 G/DL — LOW (ref 13–17)
MCHC RBC-ENTMCNC: 29.5 PG — SIGNIFICANT CHANGE UP (ref 27–34)
MCHC RBC-ENTMCNC: 31.8 GM/DL — LOW (ref 32–36)
MCV RBC AUTO: 93 FL — SIGNIFICANT CHANGE UP (ref 80–100)
NRBC # BLD: 0 /100 WBCS — SIGNIFICANT CHANGE UP (ref 0–0)
PLATELET # BLD AUTO: 843 K/UL — HIGH (ref 150–400)
POTASSIUM SERPL-MCNC: 4.2 MMOL/L — SIGNIFICANT CHANGE UP (ref 3.5–5.3)
POTASSIUM SERPL-SCNC: 4.2 MMOL/L — SIGNIFICANT CHANGE UP (ref 3.5–5.3)
RBC # BLD: 4.13 M/UL — LOW (ref 4.2–5.8)
RBC # FLD: 15.8 % — HIGH (ref 10.3–14.5)
SARS-COV-2 RNA SPEC QL NAA+PROBE: SIGNIFICANT CHANGE UP
SODIUM SERPL-SCNC: 137 MMOL/L — SIGNIFICANT CHANGE UP (ref 135–145)
WBC # BLD: 7.71 K/UL — SIGNIFICANT CHANGE UP (ref 3.8–10.5)
WBC # FLD AUTO: 7.71 K/UL — SIGNIFICANT CHANGE UP (ref 3.8–10.5)

## 2021-10-23 PROCEDURE — 99232 SBSQ HOSP IP/OBS MODERATE 35: CPT

## 2021-10-23 PROCEDURE — 99223 1ST HOSP IP/OBS HIGH 75: CPT

## 2021-10-23 RX ADMIN — CLOPIDOGREL BISULFATE 75 MILLIGRAM(S): 75 TABLET, FILM COATED ORAL at 13:50

## 2021-10-23 RX ADMIN — Medication 100 MILLIGRAM(S): at 17:44

## 2021-10-23 RX ADMIN — Medication 100 MILLIGRAM(S): at 05:30

## 2021-10-23 RX ADMIN — Medication 0.3 MILLIGRAM(S): at 05:30

## 2021-10-23 RX ADMIN — Medication 0.3 MILLIGRAM(S): at 21:12

## 2021-10-23 RX ADMIN — ATORVASTATIN CALCIUM 20 MILLIGRAM(S): 80 TABLET, FILM COATED ORAL at 21:12

## 2021-10-23 RX ADMIN — ISOSORBIDE MONONITRATE 120 MILLIGRAM(S): 60 TABLET, EXTENDED RELEASE ORAL at 13:49

## 2021-10-23 RX ADMIN — Medication 5 MILLIGRAM(S): at 21:12

## 2021-10-23 RX ADMIN — Medication 100 MILLIGRAM(S): at 10:28

## 2021-10-23 RX ADMIN — Medication 0.3 MILLIGRAM(S): at 13:49

## 2021-10-23 RX ADMIN — SODIUM CHLORIDE 60 MILLILITER(S): 9 INJECTION INTRAMUSCULAR; INTRAVENOUS; SUBCUTANEOUS at 03:15

## 2021-10-23 RX ADMIN — Medication 650 MILLIGRAM(S): at 13:48

## 2021-10-23 RX ADMIN — Medication 25 MILLIGRAM(S): at 05:30

## 2021-10-23 RX ADMIN — Medication 650 MILLIGRAM(S): at 21:12

## 2021-10-23 RX ADMIN — Medication 90 MILLIGRAM(S): at 05:30

## 2021-10-23 RX ADMIN — LACTULOSE 20 GRAM(S): 10 SOLUTION ORAL at 17:44

## 2021-10-23 RX ADMIN — LACTULOSE 20 GRAM(S): 10 SOLUTION ORAL at 05:30

## 2021-10-23 RX ADMIN — Medication 81 MILLIGRAM(S): at 13:48

## 2021-10-23 RX ADMIN — POLYETHYLENE GLYCOL 3350 17 GRAM(S): 17 POWDER, FOR SOLUTION ORAL at 17:44

## 2021-10-23 RX ADMIN — Medication 4 MILLIGRAM(S): at 21:12

## 2021-10-23 NOTE — PROGRESS NOTE ADULT - SUBJECTIVE AND OBJECTIVE BOX
Neurology Progress Note    S: Patient seen and examined on floor. no complaints MR obtained     Medication:  MEDICATIONS  (STANDING):  aspirin  chewable 81 milliGRAM(s) Oral daily  atorvastatin 20 milliGRAM(s) Oral at bedtime  bisacodyl 5 milliGRAM(s) Oral at bedtime  cloNIDine 0.3 milliGRAM(s) Oral every 8 hours  clopidogrel Tablet 75 milliGRAM(s) Oral daily  doxazosin 4 milliGRAM(s) Oral at bedtime  hydrALAZINE 100 milliGRAM(s) Oral <User Schedule>  influenza   Vaccine 0.5 milliLiter(s) IntraMuscular once  isosorbide   mononitrate ER Tablet (IMDUR) 120 milliGRAM(s) Oral daily  lactulose Syrup 20 Gram(s) Oral two times a day  metoprolol succinate ER 25 milliGRAM(s) Oral two times a day  NIFEdipine XL 90 milliGRAM(s) Oral daily  polyethylene glycol 3350 17 Gram(s) Oral every 12 hours  senna 2 Tablet(s) Oral at bedtime  sodium bicarbonate 650 milliGRAM(s) Oral three times a day  sodium chloride 0.9%. 1000 milliLiter(s) (60 mL/Hr) IV Continuous <Continuous>    MEDICATIONS  (PRN):  acetaminophen    Suspension .. 650 milliGRAM(s) Oral every 6 hours PRN Mild Pain (1 - 3)  simethicone 80 milliGRAM(s) Chew two times a day PRN Upset Stomach      Vitals:  Vital Signs Last 24 Hrs  T(C): 36.5 (10-23-21 @ 10:00), Max: 36.9 (10-23-21 @ 04:59)  T(F): 97.7 (10-23-21 @ 10:00), Max: 98.5 (10-23-21 @ 04:59)  HR: 49 (10-23-21 @ 10:00) (43 - 65)  BP: 177/90 (10-23-21 @ 10:00) (118/54 - 184/88)  BP(mean): --  RR: 18 (10-23-21 @ 10:00) (18 - 18)  SpO2: 97% (10-23-21 @ 10:00) (97% - 100%)        Orthostatic VS  10-20-21 @ 11:10  Lying BP: 183/88 HR: 46  Sitting BP: 163/81 HR: 48  Standing BP: 152/51 HR: --  Site: upper right arm  Mode: electronic    Orthostatic VS  10-20-21 @ 11:10  Lying BP: 183/88 HR: 46  Sitting BP: 163/81 HR: 48  Standing BP: 152/51 HR: --  Site: upper right arm  Mode: electronic      Orthostatic VS  10-16-21 @ 07:27  Lying BP: 198/98 HR: 46  Sitting BP: 182/95 HR: 50  Standing BP: 174/96 HR: 58  Site: --  Mode: --  Orthostatic VS  10-15-21 @ 16:00  Lying BP: 163/76 HR: 49  Sitting BP: 136/77 HR: 51  Standing BP: 136/70 HR: 59  Site: upper right arm  Mode: electronic        General Exam:   General Appearance: Appropriately dressed and in no acute distress       Head: Normocephalic, atraumatic and no dysmorphic features  Ear, Nose, and Throat: Moist mucous membranes  CVS: S1S2+  Resp: No SOB, no wheeze or rhonchi  Abd: soft NTND  Extremities: No edema, no cyanosis  Skin: No bruises, no rashes     Neurological Exam:  Mental Status: Awake, alert and oriented x 3.  Able to follow simple and complex verbal commands. Able to name and repeat. fluent speech. No obvious aphasia or dysarthria noted.   Cranial Nerves: PERRL, EOMI, VFFC, sensation V1-V3 intact,  no obvious facial asymmetry , equal elevation of palate, scm/trap 5/5, tongue is midline on protrusion. no obvious papilledema on fundoscopic exam. Hearing is grossly intact.   Motor: Normal bulk, tone and strength throughout. Fine finger movements were intact and symmetric. no tremors or drift noted except LUE with 4+/5 mild drift noted   Sensation: Intact to light touch and pinprick throughout. no right/left confusion. no extinction to tactile on DSS.    Reflexes: 1+ throughout at biceps, brachioradialis, triceps, patellars and ankles bilaterally and equal. No clonus. R toe and L toe were both downgoing.  Coordination: No dysmetria on FNF   Gait: deferred in ICU    I personally reviewed the below data/images/labs:  CBC Full  -  ( 23 Oct 2021 07:59 )  WBC Count : 7.71 K/uL  RBC Count : 4.13 M/uL  Hemoglobin : 12.2 g/dL  Hematocrit : 38.4 %  Platelet Count - Automated : 843 K/uL  Mean Cell Volume : 93.0 fl  Mean Cell Hemoglobin : 29.5 pg  Mean Cell Hemoglobin Concentration : 31.8 gm/dL  Auto Neutrophil # : x  Auto Lymphocyte # : x  Auto Monocyte # : x  Auto Eosinophil # : x  Auto Basophil # : x  Auto Neutrophil % : x  Auto Lymphocyte % : x  Auto Monocyte % : x  Auto Eosinophil % : x  Auto Basophil % : x    10-23    137  |  104  |  42<H>  ----------------------------<  104<H>  4.2   |  17<L>  |  2.80<H>    Ca    9.5      23 Oct 2021 07:59    TPro  7.5  /  Alb  3.8  /  TBili  0.2  /  DBili  <0.1  /  AST  44<H>  /  ALT  17  /  AlkPhos  90  10-21    < from: CT Head No Cont (10.01.21 @ 18:37) >    EXAM:  CT BRAIN                              PROCEDURE DATE:  10/01/2021          INTERPRETATION:  CT OF THE HEAD WITHOUT CONTRAST    CLINICAL INDICATION: Weakness. Falls.    TECHNIQUE: Volumetric CT acquisition was performed through the brain and reviewed using brain and bone window technique. Dose optimization techniques were utilized including kVp/mA modulation along with iterative reconstructions.      COMPARISON: No prior studies have been submitted for comparison.    FINDINGS:    The ventricular and sulcal size and configuration is age appropriate.   There is no acute loss of gray-white differentiation. There are extensive patchy and confluent areas of hypodensity in the periventricular and subcortical white matter which are non-specific but likely related  chronic microangiopathic ischemic changes.  Small chronic infarction in the right cerebellar hemisphere. Chronic appearing left thalamic lacunar infarct.    There is no evidence of mass effect, midline shift, acute intracranial hemorrhage, or extra-axial collections.     The calvarium is intact. The paranasal sinuses are clear.The mastoid air cells are predominantly clear. The orbits are unremarkable.      IMPRESSION:  No acute intracranial hemorrhage or acute territorial infarction. Extensive chronic microvascular ischemic changes and several chronic infarctions as described. Further evaluation may be obtained with MRI of the brain if no contraindications.    --- End of Report ---            KAMILA SZYMANSKI MD; Attending Radiologist  This document has been electronically signed. Oct  1 2021  7:25PM    < end of copied text >  < from: MR Head No Cont (10.04.21 @ 18:09) >    EXAM:  MR BRAIN                            PROCEDURE DATE:  10/04/2021          INTERPRETATION:  .    CLINICAL INFORMATION: Frequent falls.    TECHNIQUE: Multiplanar multisequential MRI of the brain was acquired without the administration of IV gadolinium.    COMPARISON: Prior CT examination of the head dated 10/1/2021.    FINDINGS: Multiple areas of restricted diffusion are seen within the right MCA territory affecting the right frontal, parietal, and temporal lobes. Associated T2/FLAIR prolongation is seen, compatible with cytotoxic edema. No hemorrhagic transformation is noted.    Chronic lacunar infarcts are again seen within the right cerebellar hemisphere, left thalamus, and bilateral basal ganglia.    Multiple patchy confluent nonspecific T2/FLAIR hyperintense signal changes are noted throughout the bihemispheric white matter without associated mass effect or restricted diffusion.    Mild ventriculomegaly appears unchanged. No abnormal intra-axial fluid collections are notable. Flow-voids are noted throughout the major intracranial vessels, on the T2 weighted images, consistent with their patency. The sellar area appears unremarkable. The paranasal sinuses and mastoid air cells are grossly clear. Calvarial signal appears unremarkable. The orbits appear within normal limits.    IMPRESSION: Acute/subacute right-sided MCA distribution infarct with associated cytotoxic edema and without hemorrhagic transformation.    Multiple additional chronic lacunar infarcts and similar-appearing extensive chronic white matter microvascular type changes, as discussed.      --- End of Report ---            LUIS CARLOS BONNER MD; Attending Radiologist  This document has been electronically signed. Oct  5 2021  3:01PM    < end of copied text >  < from: MR Angio Head No Cont (10.06.21 @ 18:15) >    EXAM:  MR ANGIO BRAIN                            PROCEDURE DATE:  10/06/2021          INTERPRETATION:  INDICATION:  Right MCA infarct.    TECHNIQUE:  MR angiography of the brain was performed using three dimensional time-of-flight (3D-TOF) technique.  Imaging was performed on a 1.5 Lisa magnet.    FINDINGS:    INTERNAL CAROTID ARTERIES:  Bilaterally patent.    [place] [facility]:  A right-sided P-comm aneurysm is identified. The aneurysm is directed posteriorly and laterally, the aneurysm appears to be septated and/or bilobed., and measures 8.9 x 7.3 mm.    CEREBRAL ARTERIES:  Both anterior cerebral arteries are patent. Both A1 segments are well formed. Both middle cerebral arteries are patent. There is mild narrowing of the proximal right M1 segment.    VERTEBROBASILAR SYSTEM:  The vertebrobasilar system is patent. There are large posterior communicating arteries bilaterally with dominant supply of the posterior cerebral arteries. Left P1 segment is hypoplastic.    IMPRESSION:    1. 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  2. Mild narrowing of the proximal right middle cerebral artery in the M1 region.  3. The vessels are otherwise patent, as outlined above.    --- End of Report ---            MONSE CLEMENT MD; Attending Radiologist  This document has been electronically signed. Oct  6 2021  7:11PM    < end of copied text >  < from: CT Head No Cont (10.13.21 @ 09:28) >    EXAM:  CT BRAIN                            PROCEDURE DATE:  10/13/2021            INTERPRETATION:  Noncontrast CT of the brain.    CLINICAL INDICATION: Status post rt pcomm aneurysm coiling    TECHNIQUE : Axial CT scanning of the brain was obtainedfrom the skull base to the vertex without the administration of intravenous contrast. Sagittal and coronal reformats were provided.    COMPARISON: CT brain 10/1/2021. MRI brain 10/4/2021    FINDINGS:    Interval placement of embolic coil mass right parasellar region.    Similar mild ventricular dilatation.    No midline shift. Basal cisterns poorly evaluated due to streak artifact.    No acute intracranial hemorrhage, mass effect, or brain edema. Extensive white matter microvascular ischemic disease.    The visualized paranasal sinuses and mastoid air cells are clear.    IMPRESSION:    Interval placement of embolic coil mass right parasellar region.  No acute intracranial hemorrhage, mass effect, or brain edema.  Similar mild ventricular dilatation.        --- End of Report ---      AFIA DAILY MD; Attending Radiologist  This document has been electronically signed. Oct 13 2021  9:31AM    < end of copied text >      < from: MR Head No Cont (10.15.21 @ 16:40) >    EXAM:  MR BRAIN                            PROCEDURE DATE:  10/15/2021            INTERPRETATION:  MRI OF THE BRAIN WITHOUT CONTRAST    CLINICAL INDICATION: Status post right posterior communicating artery aneurysm status post balloon assisted coilembolization    TECHNIQUE: Multiplanar multisequence noncontrast MRI of the brain was performed.    COMPARISON: CT brain, 10/13/2021 and MRI brain, 10/4/2021.    FINDINGS:    The ventricular and sulcal size and configuration is age appropriate. Thereare scattered T2/FLAIR hyperintensities in the subcortical and periventricular white matter which are nonspecific but most commonly represent chronic microvascular ischemic changes.    The previously seen acute lacunar infarctions along left MCA distribution are decreased in diffusion restriction intensity, consistent with evolution of acute infarctions to late subacute stage. In addition, new punctate acute lacunar infarctions have developed in the posterior wall of the right temporal lobe and in the left cerebellar hemisphere along right parietal distribution. There is no susceptibility signal to suggest hemorrhagic conversion.  There is a chronic infarction in the right cerebellum and in the left thalamus.    There is susceptibility signalin the right paraclinoid region at the site of the previously coiled right P-comm aneurysm.    There is abnormal extra-axial fluid collection or hydrocephalus. The visualized globes are symmetric bilaterally.    The visualized paranasal sinuses and mastoid bones are adequately developed and aerated.    IMPRESSION:    Comparison previous studies,    There has been interval evolution of previously seen infarcts in right MCA distribution as described. There is development of new acute lacunar infarctions at the posterior pole of the right temporal lobe and in the right cerebellum as described.    There is no intracranial hemorrhage, midline shift or mass effect.    Coil mass in the right paraclinoid region.    Notification to clinician of alert:    Provider   Dr. BARGER  was notified about the final results at 10/15/2021 10:23 AM via telephone by neuroradiologist KEITH Tineo. Readback confirmation was obtained.    --- End of Report ---                JULIO TINEO MD; Attending Radiologist  This document has been electronically signed. Oct 15 2021 10:35AM    < end of copied text >  < from: CT Chest No Cont (10.15.21 @ 23:27) >    EXAM:  CT CHEST                            PROCEDURE DATE:  10/15/2021            INTERPRETATION:  CLINICAL INFORMATION: Shortness breath, evaluate for pulmonary infection or pulmonary edema.    COMPARISON: CT chest 9/6/2014. CT abdomen pelvis 10/11/2021.    CONTRAST/COMPLICATIONS:  IV Contrast: None  Oral Contrast: None  Complications: None    PROCEDURE:  CT of the Chest was performed.  Sagittal and coronal reformats were performed.    FINDINGS:    LUNGS AND AIRWAYS: Emphysema. No pulmonary nodules or parenchymal consolidations.  PLEURA: Small bilateral pleural effusions.    MEDIASTINUM AND RED: 1.2 cm right paratracheal lymph node (2:40), nonspecific. No axillary lymphadenopathy    VESSELS: Calcified atherosclerotic plaques in aorta and coronary arteries.    HEART: Multifocal cardiac enlargement is noted. No pericardial effusion. Mitral annular calcifications.    CHEST WALL AND LOWER NECK: Within normal limits.    VISUALIZED UPPER ABDOMEN: Partially imaged large hypodense hepatic lesion measuring 6.9 x 8.8 cm, similar in appearance compared to prior CT abdomen pelvis 10/11/2021. Simple cyst in the left kidney    BONES: Degenerative changes of the spine.    IMPRESSION:    Small bilateral pleural effusions with minimal bilateral groundglass opacities and a few areas of interlobular septal thickening. Findings are suggestive of mild interstitial edema. This is superimposed on a background of emphysema.    Redemonstration of large hypodense hepatic lesion, similar appearance whencompared to prior abdominal pelvis 10/11/2021. As mentioned on prior report, consider contrast enhanced MRI for further evaluation.      ALIYA BERTRAND MD; Resident Radiology  This document has been electronicallysigned.  ABRIL PINEDA MD; Attending Radiologist  This document has been electronically signed. Oct 16 2021  9:09AM    < end of copied text >  < from: MR Abdomen w/ IV Cont (10.22.21 @ 23:26) >    EXAM:  MR ABDOMEN IC                          PROCEDURE DATE:  10/22/2021      INTERPRETATION:  CLINICAL INFORMATION: Hepatic mass.    COMPARISON: Noncontrast CT abdomen and pelvis 10/11/2021    CONTRAST/COMPLICATIONS:  IV Contrast: Gadavist  7.5 cc administered   0 cc discarded  Oral Contrast: NONE  Complications: None reported at time of study completion    PROCEDURE:  MRI of the abdomen was performed.  MRCP was performed.    FINDINGS:  LOWER CHEST: Within normal limits.    LIVER: No morphologic evidence of cirrhosis. An 8.9 x 7.1 cm central hepatic lesion involving hepatic segments 4, 5 and 6 with possible extension into superior right hepatic lobe segments demonstrates restricted diffusion with peripheral rim enhancement and delayed central enhancement. While post contrast imaging is limited, MR characteristics strongly favor cholangiocarcinoma.  BILE DUCTS: Normal caliber.  GALLBLADDER: Cholelithiasis.  SPLEEN: Within normal limits.  PANCREAS: Within normal limits.  ADRENALS: Within normal limits.  KIDNEYS/URETERS: Bilateral renal cysts including a 3.3 cm hemorrhagic right renal cyst.    VISUALIZED PORTIONS:  BOWEL: Within normal limits.  PERITONEUM: No ascites.  VESSELS: Hepatic and portal veins are patent.  RETROPERITONEUM/LYMPH NODES: Upper abdominal adenopathy including peripancreatic, portacaval and savage hepatis nodes. Reference lesions as follows.    Peripancreatic node anterior to the portal vein (6:22) 3.7 x 2.6 cm.    Portacaval node (6:24) 2.9 x 1.5 cm.    ABDOMINAL WALL: Within normal limits.  BONES: Within normal limits.    IMPRESSION:  Limited post contrast imaging.    Central hepatic mass as above for which the primary differential consideration is cholangiocarcinoma.    Adenopathy in the peripancreatic, savage hepatis and portacaval regions.    --- End of Report ---      CARLOS ARMSTRONG MD; Attending Radiologist  This document has been electronically signed. Oct 23 2021  9:47AM    < end of copied text >

## 2021-10-23 NOTE — CHART NOTE - NSCHARTNOTEFT_GEN_A_CORE
Radiation Oncology was called By Sanaz/Dr Baldwin for Mr. Huertas Re: "HCC"    I was told patient is going to rehab likely tomorrow.   No pathology. No liver MRI as of this note  Possibly liver metastases per surgery note    Obviously no role for inpatient radiation    If an oncologic diagnosis is confirmed, patient may follow up outpatient with Radiation Medicine at [address] [place] [address], [phone]. Our GI specialist is Dr Richey.     Plan discussed with attending, Dr Steve Villalpando, who agrees.     Mack Childers, PGY2  Radiation Oncology

## 2021-10-23 NOTE — PROGRESS NOTE ADULT - PROBLEM SELECTOR PLAN 1
MRI abd shows central hepatic mass for which the primary differential consideration is cholangiocarcinoma and adenopathy in the peripancreatic, savage hepatis and portacaval regions.  Would require bx for diagnosis - I discussed biopsy with the patient and he would like to think about it further. If amenable, place IR consult for liver lesion bx.   Unsure when last colonoscopy was but remembers something was removed.   CEA elevated, mild elevation Ca 19-9  I offered to discuss w/ his family (niece and nephew noted in the chart) but patient declines at this time.

## 2021-10-23 NOTE — PROGRESS NOTE ADULT - ASSESSMENT
69 yo male with CKD (baseline Cr > 3), HTN, and HLD who presented as a transfer from [facility] for higher level of care. Patient initially presented to [facility] on 10/01 after multiple falls the since the day prior. MRI Head at [facility] showed acute vs subacute R MCA territory infarct with associated cytotoxic edema. MRA H showed multilobulated right-sided P-comm aneurysm directed posteriorly and laterally, measuring 8.9 x 7.3 mm.   A1c 5.5, LDL 25.  TTE: EF 65-70%, severely enlarged L atrium.   MRi with R MCA territory infarct   vessels with PCOM on R aneurysm    s/p angio and coil of PCOM aneurysm.   Impression: AMS + mild L hemineglect in setting of R MCA territory infarct seen on MRI Head, ESUS.etiology fo stroke may be hypercaog from maligiancy   o/e 10/15 LUE drift   10/15 MRI for trial as above  10/19 okay  no complaints   10/21 awaiting telly   10/22 mr abd then telly   MRI abd with concern for hepatic mass/cholangiocarcinoma   - malignancy workup?   - send APLS labs. beta 2 glycoprotein antibodies, cardiolipin antibodies and lupus anticoagulant   - Dual antiplatelet therapy with ASA 81mg PO daily and Clopidogrel 75mg PO daily x 3 weeks followed by monotherapy with ASA 81mg PO daily.  - lipitor 40. should be high dose   - ARU and PRU therapeutic   - cardio for ILR. cardio following can do outpt   - telemetry  - PT/OT/SS/SLP, OOBC  - check FS, glucose control <180  - GI/DVT ppx  - Counseling on diet, exercise, and medication adherence was done  - Counseling on smoking cessation and alcohol consumption offered when appropriate.  - Pain assessed and judicious use of narcotics when appropriate was discussed.    - Stroke education given when appropriate.  - Importance of fall prevention discussed.   - Differential diagnosis and plan of care discussed with patient and/or family and primary team  - Thank you for allowing me to participate in the care of this patient. Call with questions.   - d/c planning ILR in or outpt. outpt f/u on d/c 1-2 weeks no objection to d/c   Sukh Pierre MD  Vascular Neurology  Office: [phone] .

## 2021-10-23 NOTE — CHART NOTE - NSCHARTNOTEFT_GEN_A_CORE
69yo M w/ CKD (baseline Cr >3), HTN, HLD who presents as transfer from OSH for aneurysm. Initially presented to OSH after multiple falls and MRI/MRA was concerning for aneurysm vs. thrombus with multiple infarcts. On this admission, MRI brain with acute/subacute R MCA infarct. MRA brain with R sided P-comm aneurysm, mild narrowing of proximal R MCA in M1 region. S/p angio and balloon assisted coiling of R P-comm aneurysm. On CT A/P non-con for renal stone he was incidentally found to have 8.5cm hepatic lesion. CT chest with mild interstitial edema but no mention of metastatic disease. MR abdomen showed 8.9x7.1cm central hepatic lesion (favor cholangiocarcinoma) and upper abdominal LN (peripancreatic, portacaval, and savage hepatis). CA 19-9 37 (H), AFP 7.6, CEA 5.1 (H). Heme/Onc consulted for evaluation of malignancy.     Malignancy  - MR abdomen with 8.9x7.1cm central hepatic lesion (favor cholangiocarcinoma) and upper abdominal LN (peripancreatic, portacaval, and savage hepatis)  - CT chest non-con without mention of metastatic disease  - CA 19-9 37 (H), AFP 7.6, CEA 5.1 (H)  - will need biopsy for tissue diagnosis of liver lesion given concern for malignancy     Thrombocytosis  - elevated Plt count since 2016 (473)  - JAK2+ concerning for MPN?  - already on aspirin  - monitor CBC daily    Monoclonal gammopathy  - found to have M spike 0.6  - likely MGUS  - recommend sending kappa and lambda free light chains, serum immunofixation, IgG, IgA, IgM    Full consult to follow after biopsy results.

## 2021-10-23 NOTE — PROGRESS NOTE ADULT - SUBJECTIVE AND OBJECTIVE BOX
[facility] Division of Hospital Medicine  Farida Gutierrez DO  Pager (M-F 8A to 5P): [phone]  Spectra: [phone]    Patient is a 71y old  Male who presents with a chief complaint of Transfer from [facility] for MRA findings (23 Oct 2021 16:03)      SUBJECTIVE / OVERNIGHT EVENTS:  No abd pain, fever, chills, N/V/D.    MEDICATIONS  (STANDING):  aspirin  chewable 81 milliGRAM(s) Oral daily  atorvastatin 20 milliGRAM(s) Oral at bedtime  bisacodyl 5 milliGRAM(s) Oral at bedtime  cloNIDine 0.3 milliGRAM(s) Oral every 8 hours  clopidogrel Tablet 75 milliGRAM(s) Oral daily  doxazosin 4 milliGRAM(s) Oral at bedtime  hydrALAZINE 100 milliGRAM(s) Oral <User Schedule>  influenza   Vaccine 0.5 milliLiter(s) IntraMuscular once  isosorbide   mononitrate ER Tablet (IMDUR) 120 milliGRAM(s) Oral daily  lactulose Syrup 20 Gram(s) Oral two times a day  metoprolol succinate ER 25 milliGRAM(s) Oral two times a day  NIFEdipine XL 90 milliGRAM(s) Oral daily  polyethylene glycol 3350 17 Gram(s) Oral every 12 hours  senna 2 Tablet(s) Oral at bedtime  sodium bicarbonate 650 milliGRAM(s) Oral three times a day  sodium chloride 0.9%. 1000 milliLiter(s) (60 mL/Hr) IV Continuous <Continuous>    MEDICATIONS  (PRN):  acetaminophen    Suspension .. 650 milliGRAM(s) Oral every 6 hours PRN Mild Pain (1 - 3)  simethicone 80 milliGRAM(s) Chew two times a day PRN Upset Stomach      CAPILLARY BLOOD GLUCOSE        I&O's Summary    23 Oct 2021 07:01  -  23 Oct 2021 16:26  --------------------------------------------------------  IN: 480 mL / OUT: 0 mL / NET: 480 mL        PHYSICAL EXAM:  Vital Signs Last 24 Hrs  T(C): 36.3 (23 Oct 2021 13:50), Max: 36.9 (23 Oct 2021 04:59)  T(F): 97.3 (23 Oct 2021 13:50), Max: 98.5 (23 Oct 2021 04:59)  HR: 50 (23 Oct 2021 13:50) (43 - 65)  BP: 145/77 (23 Oct 2021 13:50) (145/77 - 184/88)  BP(mean): --  RR: 18 (23 Oct 2021 13:50) (18 - 18)  SpO2: 100% (23 Oct 2021 13:50) (97% - 100%)    CONSTITUTIONAL: NAD, well-developed  EYES: PERRL; conjunctiva and sclera clear  ENMT: Moist oral mucosa, no pharyngeal injection or exudates; poor dentition   RESPIRATORY: Normal respiratory effort; lungs are clear to auscultation bilaterally  CARDIOVASCULAR: Regular rate and rhythm, normal S1 and S2; No lower extremity edema  ABDOMEN: Nontender to palpation, normoactive bowel sounds, no rebound/guarding  MUSCULOSKELETAL: no clubbing or cyanosis of digits; no joint swelling or tenderness to palpation  PSYCH: A+O to person, place, and time; affect appropriate  NEUROLOGY: no focal deficits       LABS:                        12.2   7.71  )-----------( 843      ( 23 Oct 2021 07:59 )             38.4     10-23    137  |  104  |  42<H>  ----------------------------<  104<H>  4.2   |  17<L>  |  2.80<H>    Ca    9.5      23 Oct 2021 07:59                  RADIOLOGY & ADDITIONAL TESTS:  Results Reviewed:   Imaging Personally Reviewed:  Electrocardiogram Personally Reviewed:    COORDINATION OF CARE:  Care Discussed with Consultants/Other Providers [Y/N]: Neurosurgery   Prior or Outpatient Records Reviewed [Y/N]:

## 2021-10-23 NOTE — CHART NOTE - NSCHARTNOTESELECT_GEN_ALL_CORE
Hematology Oncology Chart Note
Interventional Neuro Radiology/Event Note
Event Note
Interventional Neuro Radiology/Event Note
VTE risk assessment/Event Note

## 2021-10-23 NOTE — PROGRESS NOTE ADULT - SUBJECTIVE AND OBJECTIVE BOX
Subjective: Patient seen and examined. No new events except as noted.   feels ok   hepatology consulted   REVIEW OF SYSTEMS:    CONSTITUTIONAL: + weakness, fevers or chills  EYES/ENT: No visual changes;  No vertigo or throat pain   NECK: No pain or stiffness  RESPIRATORY: No cough, wheezing, hemoptysis; No shortness of breath  CARDIOVASCULAR: No chest pain or palpitations  GASTROINTESTINAL: No abdominal or epigastric pain. No nausea, vomiting, or hematemesis; No diarrhea or constipation. No melena or hematochezia.  GENITOURINARY: No dysuria, frequency or hematuria  NEUROLOGICAL: No numbness or weakness  SKIN: No itching, burning, rashes, or lesions   All other review of systems is negative unless indicated above.    MEDICATIONS:  MEDICATIONS  (STANDING):  aspirin  chewable 81 milliGRAM(s) Oral daily  atorvastatin 20 milliGRAM(s) Oral at bedtime  bisacodyl 5 milliGRAM(s) Oral at bedtime  cloNIDine 0.3 milliGRAM(s) Oral every 8 hours  clopidogrel Tablet 75 milliGRAM(s) Oral daily  doxazosin 4 milliGRAM(s) Oral at bedtime  hydrALAZINE 100 milliGRAM(s) Oral <User Schedule>  influenza   Vaccine 0.5 milliLiter(s) IntraMuscular once  isosorbide   mononitrate ER Tablet (IMDUR) 120 milliGRAM(s) Oral daily  lactulose Syrup 20 Gram(s) Oral two times a day  metoprolol succinate ER 25 milliGRAM(s) Oral two times a day  NIFEdipine XL 90 milliGRAM(s) Oral daily  polyethylene glycol 3350 17 Gram(s) Oral every 12 hours  senna 2 Tablet(s) Oral at bedtime  sodium bicarbonate 650 milliGRAM(s) Oral three times a day  sodium chloride 0.9%. 1000 milliLiter(s) (60 mL/Hr) IV Continuous <Continuous>      PHYSICAL EXAM:  T(C): 36.3 (10-23-21 @ 15:34), Max: 36.9 (10-23-21 @ 04:59)  HR: 44 (10-23-21 @ 17:45) (44 - 65)  BP: 122/67 (10-23-21 @ 17:45) (119/66 - 184/88)  RR: 18 (10-23-21 @ 15:34) (18 - 18)  SpO2: 100% (10-23-21 @ 15:34) (97% - 100%)  Wt(kg): --  I&O's Summary    23 Oct 2021 07:01  -  23 Oct 2021 19:59  --------------------------------------------------------  IN: 480 mL / OUT: 0 mL / NET: 480 mL          Appearance: Normal	  HEENT:   Normal oral mucosa, PERRL, EOMI	  Lymphatic: No lymphadenopathy , no edema  Cardiovascular: Normal S1 S2, No JVD, No murmurs , Peripheral pulses palpable 2+ bilaterally  Respiratory: Lungs clear to auscultation, normal effort 	  Gastrointestinal:  Soft, Non-tender, + BS	  Skin: No rashes, No ecchymoses, No cyanosis, warm to touch  Musculoskeletal: Normal range of motion, normal strength  Psychiatry:  Mood & affect appropriate  Ext: No edema      LABS:    CARDIAC MARKERS:                                12.2   7.71  )-----------( 843      ( 23 Oct 2021 07:59 )             38.4     10-23    137  |  104  |  42<H>  ----------------------------<  104<H>  4.2   |  17<L>  |  2.80<H>    Ca    9.5      23 Oct 2021 07:59      proBNP:   Lipid Profile:   HgA1c:   TSH:             TELEMETRY: 	    ECG:  	  RADIOLOGY: < from: MR Abdomen w/ IV Cont (10.22.21 @ 23:26) >    EXAM:  MR ABDOMEN IC                            PROCEDURE DATE:  10/22/2021            INTERPRETATION:  CLINICAL INFORMATION: Hepatic mass.    COMPARISON: Noncontrast CT abdomen and pelvis 10/11/2021    CONTRAST/COMPLICATIONS:  IV Contrast: Gadavist  7.5 cc administered   0 cc discarded  Oral Contrast: NONE  Complications: None reported at time of study completion    PROCEDURE:  MRI of the abdomen was performed.  MRCP was performed.    FINDINGS:  LOWER CHEST: Within normal limits.    LIVER: No morphologic evidence of cirrhosis. An 8.9 x 7.1 cm central hepatic lesion involving hepatic segments 4, 5 and 6 with possible extension into superior right hepatic lobe segments demonstrates restricted diffusion with peripheral rim enhancement and delayed central enhancement. While post contrast imaging is limited, MR characteristics strongly favor cholangiocarcinoma.  BILE DUCTS: Normal caliber.  GALLBLADDER: Cholelithiasis.  SPLEEN: Within normal limits.  PANCREAS: Within normal limits.  ADRENALS: Within normal limits.  KIDNEYS/URETERS: Bilateral renal cysts including a 3.3 cm hemorrhagic right renal cyst.    VISUALIZED PORTIONS:  BOWEL: Within normal limits.  PERITONEUM: No ascites.  VESSELS: Hepatic and portal veins are patent.  RETROPERITONEUM/LYMPH NODES: Upper abdominal adenopathy including peripancreatic, portacaval and savage hepatis nodes. Reference lesions as follows.    Peripancreatic node anterior to the portal vein (6:22) 3.7 x 2.6 cm.    Portacaval node (6:24) 2.9 x 1.5 cm.    ABDOMINAL WALL: Within normal limits.  BONES: Within normal limits.    IMPRESSION:  Limited post contrast imaging.    Central hepatic mass as above for which the primary differential consideration is cholangiocarcinoma.    Adenopathy in the peripancreatic, savage hepatis and portacaval regions.    --- End of Report ---                CARLOS ARMSTRONG MD; Attending Radiologist  This document has been electronically signed. Oct 23 2021  9:47AM    < end of copied text >    DIAGNOSTIC TESTING:  [ ] Echocardiogram:  [ ]  Catheterization:  [ ] Stress Test:    OTHER:

## 2021-10-23 NOTE — CONSULT NOTE ADULT - ASSESSMENT
69yo M w/ CKD (baseline Cr >3), HTN, HLD who presents as transfer from OSH for aneurysm. Initially presented to OSH after multiple falls and MRI/MRA was concerning for aneurysm vs. thrombus with multiple infarcts. On this admission, MRI brain with acute/subacute R MCA infarct. MRA brain with R sided P-comm aneurysm, mild narrowing of proximal R MCA in M1 region. S/p angio and balloon assisted coiling of R P-comm aneurysm. Neurology thought CVA may have been due to hypercoagulable state from possible underlying malignancy so malignancy workup was pursued. On CT A/P non-con for he was incidentally found to have 8.5cm hepatic lesion. CT chest with mild interstitial edema but no mention of metastatic disease. MR abdomen showed 8.9x7.1cm central hepatic lesion (favor cholangiocarcinoma) and upper abdominal LN (peripancreatic, portacaval, and savage hepatis). CA 19-9 37 (H), AFP 7.6, CEA 5.1 (H). Hepatology consulted for workup management of new dx of probable cholangiocarcinoma and liver lesion.    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 with liver lesion and abdominal LN (peripancreatic, portacaval, and savage hepatis)    Recommendations:  - appreciate heme/onc recs  - per discussion with pt he would like to stay to have inpt liver bx done  - recommend primary team coordinate with IR for potential liver bx vs CT or EUS guided abdominal LN bx  - will be high risk of bleeding given pt is on DAPT    Lilia Malik MD  GI Fellow, PGY-4  Available via [employer] Teams    NON-URGENT CONSULTS:  Please email [email or web address]  AT NIGHT AND ON WEEKENDS:  Contact on-call GI fellow via answering service ([phone]) from 5pm-8am and on weekends/holidays  MONDAY-FRIDAY 8AM-5PM:  Pager# [phone] ([facility]) or [phone] ([facility])  GI Phone# [phone] ([facility])

## 2021-10-23 NOTE — CONSULT NOTE ADULT - SUBJECTIVE AND OBJECTIVE BOX
Chief Complaint:  Patient is a 71y old  Male who presents with a chief complaint of Transfer from [facility] for MRA findings (23 Oct 2021 13:09)      HPI:    Allergies:  No Known Allergies      Home Medications:    Hospital Medications:  acetaminophen    Suspension .. 650 milliGRAM(s) Oral every 6 hours PRN  aspirin  chewable 81 milliGRAM(s) Oral daily  atorvastatin 20 milliGRAM(s) Oral at bedtime  bisacodyl 5 milliGRAM(s) Oral at bedtime  cloNIDine 0.3 milliGRAM(s) Oral every 8 hours  clopidogrel Tablet 75 milliGRAM(s) Oral daily  doxazosin 4 milliGRAM(s) Oral at bedtime  hydrALAZINE 100 milliGRAM(s) Oral <User Schedule>  influenza   Vaccine 0.5 milliLiter(s) IntraMuscular once  isosorbide   mononitrate ER Tablet (IMDUR) 120 milliGRAM(s) Oral daily  lactulose Syrup 20 Gram(s) Oral two times a day  metoprolol succinate ER 25 milliGRAM(s) Oral two times a day  NIFEdipine XL 90 milliGRAM(s) Oral daily  polyethylene glycol 3350 17 Gram(s) Oral every 12 hours  senna 2 Tablet(s) Oral at bedtime  simethicone 80 milliGRAM(s) Chew two times a day PRN  sodium bicarbonate 650 milliGRAM(s) Oral three times a day  sodium chloride 0.9%. 1000 milliLiter(s) IV Continuous <Continuous>      PMHX/PSHX:  Frequent falls    Hypertension    Chronic kidney disease, unspecified CKD stage        Family history:      Social History:     ROS:     General:  No weight loss, fevers, chills, night sweats, fatigue  Eyes:  No vision changes, no yellowing of eyes   ENT:  No throat pain, runny nose  CV:  No chest pain, palpitations  Resp:  No SOB, cough, wheezing  GI:  See HPI  :  No burning with urination, no hematuria   Muscle:  No muscle pain, weakness  Neuro:  No numbness/tingling, memory problems  Psych:  No fatigue, insomnia, mood problems  Heme:  No easy bruisability  Skin:  No rash, itching       PHYSICAL EXAM:     GENERAL:  Appears stated age, well-groomed, well-nourished, no distress  HEENT:  NC/AT,  conjunctivae clear and pink,  no JVD  CHEST:  Full & symmetric excursion, no increased effort, breath sounds clear  HEART:  Regular rhythm, S1, S2, no murmur/rub/S3/S4, no abdominal bruit, no edema  ABDOMEN:  Soft, non-tender, non-distended, normoactive bowel sounds,  no masses ,  EXTREMITIES:  no cyanosis,clubbing or edema  SKIN:  No rash/erythema/ecchymoses/petechiae/wounds/abscess/warm/dry  NEURO:  Alert, oriented    Vital Signs:  Vital Signs Last 24 Hrs  T(C): 36.3 (23 Oct 2021 13:50), Max: 36.9 (23 Oct 2021 04:59)  T(F): 97.3 (23 Oct 2021 13:50), Max: 98.5 (23 Oct 2021 04:59)  HR: 50 (23 Oct 2021 13:50) (43 - 65)  BP: 145/77 (23 Oct 2021 13:50) (145/77 - 184/88)  BP(mean): --  RR: 18 (23 Oct 2021 13:50) (18 - 18)  SpO2: 100% (23 Oct 2021 13:50) (97% - 100%)  Daily     Daily     LABS:                        12.2   7.71  )-----------( 843      ( 23 Oct 2021 07:59 )             38.4     10-23    137  |  104  |  42<H>  ----------------------------<  104<H>  4.2   |  17<L>  |  2.80<H>    Ca    9.5      23 Oct 2021 07:59                Imaging:             Chief Complaint:  Patient is a 71y old  Male who presents with a chief complaint of Transfer from [facility] for MRA findings (23 Oct 2021 13:09)      HPI: 71yo M w/ CKD (baseline Cr >3), HTN, HLD who presents as transfer from OSH for aneurysm. Initially presented to OSH after multiple falls and MRI/MRA was concerning for aneurysm vs. thrombus with multiple infarcts. On this admission, MRI brain with acute/subacute R MCA infarct. MRA brain with R sided P-comm aneurysm, mild narrowing of proximal R MCA in M1 region. S/p angio and balloon assisted coiling of R P-comm aneurysm. Neurology thought CVA may have been due to hypercoagulable state from possible underlying malignancy so malignancy workup was pursued. On CT A/P non-con for he was incidentally found to have 8.5cm hepatic lesion. CT chest with mild interstitial edema but no mention of metastatic disease. MR abdomen showed 8.9x7.1cm central hepatic lesion (favor cholangiocarcinoma) and upper abdominal LN (peripancreatic, portacaval, and savage hepatis). CA 19-9 37 (H), AFP 7.6, CEA 5.1 (H). Hepatology consulted for workup management of new dx of probable cholangiocarcinoma and liver lesion.    Allergies:  No Known Allergies      Home Medications:    Hospital Medications:  acetaminophen    Suspension .. 650 milliGRAM(s) Oral every 6 hours PRN  aspirin  chewable 81 milliGRAM(s) Oral daily  atorvastatin 20 milliGRAM(s) Oral at bedtime  bisacodyl 5 milliGRAM(s) Oral at bedtime  cloNIDine 0.3 milliGRAM(s) Oral every 8 hours  clopidogrel Tablet 75 milliGRAM(s) Oral daily  doxazosin 4 milliGRAM(s) Oral at bedtime  hydrALAZINE 100 milliGRAM(s) Oral <User Schedule>  influenza   Vaccine 0.5 milliLiter(s) IntraMuscular once  isosorbide   mononitrate ER Tablet (IMDUR) 120 milliGRAM(s) Oral daily  lactulose Syrup 20 Gram(s) Oral two times a day  metoprolol succinate ER 25 milliGRAM(s) Oral two times a day  NIFEdipine XL 90 milliGRAM(s) Oral daily  polyethylene glycol 3350 17 Gram(s) Oral every 12 hours  senna 2 Tablet(s) Oral at bedtime  simethicone 80 milliGRAM(s) Chew two times a day PRN  sodium bicarbonate 650 milliGRAM(s) Oral three times a day  sodium chloride 0.9%. 1000 milliLiter(s) IV Continuous <Continuous>      PMHX/PSHX:  Frequent falls    Hypertension    Chronic kidney disease, unspecified CKD stage        Family history:  denies any family hx of malignancy, pancreatic/liver/GI disease    Social History: Denies ETOh or tobacco use    ROS: 14 point ROS negative unless otherwise stated in subjective        PHYSICAL EXAM:     GENERAL:  Appears stated age, thin, in no distress  HEENT:  NC/AT,  conjunctivae clear and pink  CHEST:  Full & symmetric excursion, no increased effort, breath sounds clear  HEART:  Regular rhythm, S1, S2, no murmur/rub/S3/S4  ABDOMEN:  +thin, soft, non-tender, non-distended, normoactive bowel sounds  EXTREMITIES:  no cyanosis,clubbing or edema  SKIN:  No rash/erythema/ecchymoses/petechiae/wounds/abscess/warm/dry  NEURO:  Alert, orientedx3    Vital Signs:  Vital Signs Last 24 Hrs  T(C): 36.3 (23 Oct 2021 13:50), Max: 36.9 (23 Oct 2021 04:59)  T(F): 97.3 (23 Oct 2021 13:50), Max: 98.5 (23 Oct 2021 04:59)  HR: 50 (23 Oct 2021 13:50) (43 - 65)  BP: 145/77 (23 Oct 2021 13:50) (145/77 - 184/88)  BP(mean): --  RR: 18 (23 Oct 2021 13:50) (18 - 18)  SpO2: 100% (23 Oct 2021 13:50) (97% - 100%)  Daily     Daily     LABS:                        12.2   7.71  )-----------( 843      ( 23 Oct 2021 07:59 )             38.4     10-23    137  |  104  |  42<H>  ----------------------------<  104<H>  4.2   |  17<L>  |  2.80<H>    Ca    9.5      23 Oct 2021 07:59                Imaging:        EXAM:  MR ABDOMEN IC                            PROCEDURE DATE:  10/22/2021            INTERPRETATION:  CLINICAL INFORMATION: Hepatic mass.    COMPARISON: Noncontrast CT abdomen and pelvis 10/11/2021    CONTRAST/COMPLICATIONS:  IV Contrast: Gadavist  7.5 cc administered   0 cc discarded  Oral Contrast: NONE  Complications: None reported at time of study completion    PROCEDURE:  MRI of the abdomen was performed.  MRCP was performed.    FINDINGS:  LOWER CHEST: Within normal limits.    LIVER: No morphologic evidence of cirrhosis. An 8.9 x 7.1 cm central hepatic lesion involving hepatic segments 4, 5 and 6 with possible extension into superior right hepatic lobe segments demonstrates restricted diffusion with peripheral rim enhancement and delayed central enhancement. While post contrast imaging is limited, MR characteristics strongly favor cholangiocarcinoma.  BILE DUCTS: Normal caliber.  GALLBLADDER: Cholelithiasis.  SPLEEN: Within normal limits.  PANCREAS: Within normal limits.  ADRENALS: Within normal limits.  KIDNEYS/URETERS: Bilateral renal cysts including a 3.3 cm hemorrhagic right renal cyst.    VISUALIZED PORTIONS:  BOWEL: Within normal limits.  PERITONEUM: No ascites.  VESSELS: Hepatic and portal veins are patent.  RETROPERITONEUM/LYMPH NODES: Upper abdominal adenopathy including peripancreatic, portacaval and savage hepatis nodes. Reference lesions as follows.    Peripancreatic node anterior to the portal vein (6:22) 3.7 x 2.6 cm.    Portacaval node (6:24) 2.9 x 1.5 cm.    ABDOMINAL WALL: Within normal limits.  BONES: Within normal limits.    IMPRESSION:  Limited post contrast imaging.    Central hepatic mass as above for which the primary differential consideration is cholangiocarcinoma.    Adenopathy in the peripancreatic, savage hepatis and portacaval regions.

## 2021-10-23 NOTE — PROGRESS NOTE ADULT - SUBJECTIVE AND OBJECTIVE BOX
Red Team Progress Note  [id number]    Subjective:   Patient seen at bedside this AM. Reports feeling well, without complaints. Tolerating diet, voiding well after MRI. No N/V, no abdominal pain, no fevers, no chest pain or shortness of breath.    Objective:  Vital Signs  T(C): 36.9 (10-23 @ 04:59), Max: 36.9 (10-23 @ 04:59)  HR: 65 (10-23 @ 04:59) (43 - 65)  BP: 160/85 (10-23 @ 04:59) (118/54 - 184/88)  RR: 18 (10-23 @ 04:59) (18 - 18)  SpO2: 98% (10-23 @ 04:59) (98% - 100%)    I&O's Detail      Physical Exam:  GEN: resting in bed comfortably in NAD  RESP: no increased WOB  ABD: soft, non-distended, non-tender  EXTR: warm, well-perfused    Labs:                        12.2   7.71  )-----------( 843      ( 23 Oct 2021 07:59 )             38.4   10-23    137  |  104  |  42<H>  ----------------------------<  104<H>  4.2   |  17<L>  |  2.80<H>    Ca    9.5      23 Oct 2021 07:59    TPro  7.5  /  Alb  3.8  /  TBili  0.2  /  DBili  <0.1  /  AST  44<H>  /  ALT  17  /  AlkPhos  90  10-21    CAPILLARY BLOOD GLUCOSE          Medications:   MEDICATIONS  (STANDING):  aspirin  chewable 81 milliGRAM(s) Oral daily  atorvastatin 20 milliGRAM(s) Oral at bedtime  bisacodyl 5 milliGRAM(s) Oral at bedtime  cloNIDine 0.3 milliGRAM(s) Oral every 8 hours  clopidogrel Tablet 75 milliGRAM(s) Oral daily  doxazosin 4 milliGRAM(s) Oral at bedtime  hydrALAZINE 100 milliGRAM(s) Oral <User Schedule>  influenza   Vaccine 0.5 milliLiter(s) IntraMuscular once  isosorbide   mononitrate ER Tablet (IMDUR) 120 milliGRAM(s) Oral daily  lactulose Syrup 20 Gram(s) Oral two times a day  metoprolol succinate ER 25 milliGRAM(s) Oral two times a day  NIFEdipine XL 90 milliGRAM(s) Oral daily  polyethylene glycol 3350 17 Gram(s) Oral every 12 hours  senna 2 Tablet(s) Oral at bedtime  sodium bicarbonate 650 milliGRAM(s) Oral three times a day  sodium chloride 0.9%. 1000 milliLiter(s) (60 mL/Hr) IV Continuous <Continuous>    MEDICATIONS  (PRN):  acetaminophen    Suspension .. 650 milliGRAM(s) Oral every 6 hours PRN Mild Pain (1 - 3)  simethicone 80 milliGRAM(s) Chew two times a day PRN Upset Stomach      Imaging:  < from: MR Abdomen w/ IV Cont (10.22.21 @ 23:26) >  FINDINGS:  LOWER CHEST: Within normal limits.    LIVER: No morphologic evidence of cirrhosis. An 8.9 x 7.1 cm central hepatic lesion involving hepatic segments 4, 5 and 6 with possible extension into superior right hepatic lobe segments demonstrates restricted diffusion with peripheral rim enhancement and delayed central enhancement. While post contrast imaging is limited, MR characteristics strongly favor cholangiocarcinoma.  BILE DUCTS: Normal caliber.  GALLBLADDER: Cholelithiasis.  SPLEEN: Within normal limits.  PANCREAS: Within normal limits.  ADRENALS: Within normal limits.  KIDNEYS/URETERS: Bilateral renal cysts including a 3.3 cm hemorrhagic right renal cyst.    VISUALIZED PORTIONS:  BOWEL: Within normal limits.  PERITONEUM: No ascites.  VESSELS: Hepatic and portal veins are patent.  RETROPERITONEUM/LYMPH NODES: Upper abdominal adenopathy including peripancreatic, portacaval and savage hepatis nodes. Reference lesions as follows.    Peripancreatic node anterior to the portal vein (6:22) 3.7 x 2.6 cm.    Portacaval node (6:24) 2.9 x 1.5 cm.    ABDOMINAL WALL: Within normal limits.  BONES: Within normal limits.    IMPRESSION:  Limited post contrast imaging.    Central hepatic mass as above for which the primary differential consideration is cholangiocarcinoma.    Adenopathy in the peripancreatic, savage hepatis and portacaval regions.    --- End of Report ---                CARLOS ARMSTRONG MD; Attending Radiologist  This document has been electronically signed. Oct 23 2021  9:47AM    < end of copied text >

## 2021-10-23 NOTE — PROGRESS NOTE ADULT - SUBJECTIVE AND OBJECTIVE BOX
SUBEJCTIVE: Pt seen and examined. Pt denies any complaints.     Vital Signs Last 24 Hrs  T(C): 36.5 (23 Oct 2021 10:00), Max: 36.9 (23 Oct 2021 04:59)  T(F): 97.7 (23 Oct 2021 10:00), Max: 98.5 (23 Oct 2021 04:59)  HR: 49 (23 Oct 2021 10:00) (43 - 65)  BP: 177/90 (23 Oct 2021 10:00) (118/54 - 184/88)  RR: 18 (23 Oct 2021 10:00) (18 - 18)  SpO2: 97% (23 Oct 2021 10:00) (97% - 100%)                        12.2   7.71  )-----------( 843      ( 23 Oct 2021 07:59 )             38.4    10-23    137  |  104  |  42<H>  ----------------------------<  104<H>  4.2   |  17<L>  |  2.80<H>    Ca    9.5      23 Oct 2021 07:59    TPro  7.5  /  Alb  3.8  /  TBili  0.2  /  DBili  <0.1  /  AST  44<H>  /  ALT  17  /  AlkPhos  90  10-21     NEUROIMAGING: < from: CT Chest No Cont (10.15.21 @ 23:27) >  Small bilateral pleural effusions with minimal bilateral groundglass opacities and a few areas of interlobular septal thickening. Findings are suggestive of mild interstitial edema. This is superimposed on a background of emphysema.    Redemonstration of large hypodense hepatic lesion, similar appearance whencompared to prior abdominal pelvis 10/11/2021. As mentioned on prior report, consider contrast enhanced MRI for further evaluation.    < end of copied text >    < from: MR Abdomen w/ IV Cont (10.22.21 @ 23:26) >  IMPRESSION:  Limited post contrast imaging.    Central hepatic mass as above for which the primary differential consideration is cholangiocarcinoma.    Adenopathy in the peripancreatic, savage hepatis and portacaval regions.    < end of copied text >      PHYSICAL EXAM:    General: No Acute Distress     Neurological: Ox3, Following Commands, EOMI, Face Symmetrical, Speech Fluent, Moving all extremities, Muscle Strength normal in all four extremities, No Drift, Sensation to Light Touch Intact    Pulmonary: Clear to Auscultation, No Rales, No Rhonchi, No Wheezes     Cardiovascular: S1, S2, Regular Rate and Rhythm     Gastrointestinal: Soft, Nontender, Nondistended. Denies any pain over RUQ    MEDICATIONS:   Antibiotics:    Neuro:  acetaminophen    Suspension .. 650 milliGRAM(s) Oral every 6 hours PRN Mild Pain (1 - 3)    Anticoagulation:  aspirin  chewable 81 milliGRAM(s) Oral daily  clopidogrel Tablet 75 milliGRAM(s) Oral daily    Cardiology:  cloNIDine 0.3 milliGRAM(s) Oral every 8 hours  doxazosin 4 milliGRAM(s) Oral at bedtime  hydrALAZINE 100 milliGRAM(s) Oral <User Schedule>  isosorbide   mononitrate ER Tablet (IMDUR) 120 milliGRAM(s) Oral daily  metoprolol succinate ER 25 milliGRAM(s) Oral two times a day  NIFEdipine XL 90 milliGRAM(s) Oral daily  atorvastatin 20 milliGRAM(s) Oral at bedtime    Endo:     Pulm:    GI/:  bisacodyl 5 milliGRAM(s) Oral at bedtime  lactulose Syrup 20 Gram(s) Oral two times a day  polyethylene glycol 3350 17 Gram(s) Oral every 12 hours  senna 2 Tablet(s) Oral at bedtime  simethicone 80 milliGRAM(s) Chew two times a day PRN    Other:  influenza   Vaccine 0.5 milliLiter(s) IntraMuscular once  sodium bicarbonate 650 milliGRAM(s) Oral three times a day  sodium chloride 0.9%. 1000 milliLiter(s) IV Continuous <Continuous>

## 2021-10-23 NOTE — PROGRESS NOTE ADULT - ASSESSMENT
69 yo male with CKD (baseline Cr > 3), HTN, and HLD who presented as a transfer from [facility] for higher level of care. Patient initially presented to [facility] on 10/01 after multiple falls the since the day prior. MRI Head at [facility] showed acute vs subacute R MCA territory infarct with associated cytotoxic edema. MRA H showed multilobulated right-sided P-comm aneurysm directed posteriorly and laterally, measuring 8.9 x 7.3 mm.TTE: EF 65-70%, severely enlarged L atrium. CT abdomen 10/11  reveals large hypodense 8.5 cm hepatic lesion concerning for malignancy. 10/12 s/p coiling right PCOMM aneurysm. Post op CT head with evolution of previous infarcts in right MCA, now with new acute lacunar infarctions in the right temporal lobe and cerebellum. On DAPT.    Plan:  - MRI performed, read as likely cholangio involving segments 4,5,6 with peripancreatic and portocaval nodes  - Recommend hepatic function AFP 7.6, Ca19-9 (37), and CEA (5.1)  - New liver lesions most likely metastasis, patient states he does not remember seeing gastroenterologist and does not remember if had colonoscopy, recommend GI work up   - Patient should have PET Scan outpatient as part of workup    Red Surgery  [id number]  Sam Nunez

## 2021-10-23 NOTE — PROGRESS NOTE ADULT - ASSESSMENT
70yoM w/ PMHx of CKD IV (unknown baseline), uncontrolled HTN, ?CAD (patient states he has "heart problems;" TTE performed at [facility] on 10/5 w/ normal LVF, severely enlarged LA, Grade III diastolic dysfunction, mild pulm HTN) who was transfered from [facility] to where he presented w/ hx of multiple falls, found to have acute right MCA territory ischemic infarct w/ extensive chronic microvascular ischemic changes on CT, acute/subacute right-sided MCA distribution infarct with associated cytotoxic edema, on MRI, and Pcomm aneurysm vs thrombus [id number], mild R M1 stenosis on MRA. 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   Medicine re-consulted for liver lesion suspicious for malignancy.

## 2021-10-23 NOTE — PROGRESS NOTE ADULT - ASSESSMENT
ASSESSMENT AND PLAN: 70 year old male with PMHx of CKD with a baseline Cr >3, HTN, HLD presented to OSH after multiple falls. He was transferred from OSH for MRI/MRA findings concerning for aneurysm vs. thrombus with multiple infarcts. CTH 10/1 hydro likely chronic, MRI 10/4 right-sided acute vs. subacute MCA infarcts, Carotid U/S 10/6 no sig stenosis, MRA 10/6 R pcomm aneurysm with possible intra-aneurysmal thrombus [id number], mild R M1 stenosis. Pt underwent angio and balloon assisted coiling of Rt Pcomm aneurysm. Pt had MRI of abdomen done showing liver mass and lymph node involvement.     NEURO: Continue pain management with tylenol   S/p angio of Pt Pcomm aneurysm- continue ASA 81 and Plavix 75     PULM: Encouraged incentive spirometer     CV: HTN: continue clonidine, hydralazine, metoprolol, nifedipine, cardura   HLD: continue statin     ENDO: FS WNL     HEME/ONC:                      DVT ppx: Will follow up with team to start DVT ppx.     RENAL: Continue cardura fr BPH     ID: Afebrile     GI:  MRI abdomen shows hepatic mass with LAD.   General surgery consulted   GI consulted   will call oncology today .   Follow up whether biopsy needed prior to d/c and if follow up can be done at rehab/outpatient     DISCHARGE PLANNING: PT/OT: rehab. Will d/c once inpatient plan determined.   Will D/w Neurosurgeon

## 2021-10-23 NOTE — PROGRESS NOTE ADULT - SUBJECTIVE AND OBJECTIVE BOX
[facility] : Ans Serv [phone], Office [phone]  Dr Arellano/Dr Miller  /Dr Nitin abad /Dr EMMANUEL Hernadez/Dr Sha Hollingsworth/Dr Nathan Maddox /Dr EL Forteu  _______________________________________________________________________________________________    seen and examined today for renal failrue  Interval : Serum Creatinine stable  VITALS:  T(F): 97.7 (10-23-21 @ 10:00), Max: 98.5 (10-23-21 @ 04:59)  HR: 49 (10-23-21 @ 11:12)  BP: 147/82 (10-23-21 @ 11:12)  RR: 18 (10-23-21 @ 11:12)  SpO2: 100% (10-23-21 @ 11:12)  Wt(kg): --    Physical Exam :-  Constitutional: NAD  Neck: Supple.  Respiratory: Bilateral equal breath sounds, no Crackles present.  Cardiovascular: S1, S2 normal, positive Murmur  Gastrointestinal: Bowel Sounds present, soft, non tender.  Extremities: no Edema Feet  Neurological: Alert and Oriented x 3  Psychiatric: Normal mood, normal affect  Data:-  Allergies :   No Known Allergies    Hospital Medications:   MEDICATIONS  (STANDING):  aspirin  chewable 81 milliGRAM(s) Oral daily  atorvastatin 20 milliGRAM(s) Oral at bedtime  bisacodyl 5 milliGRAM(s) Oral at bedtime  cloNIDine 0.3 milliGRAM(s) Oral every 8 hours  clopidogrel Tablet 75 milliGRAM(s) Oral daily  doxazosin 4 milliGRAM(s) Oral at bedtime  hydrALAZINE 100 milliGRAM(s) Oral <User Schedule>  influenza   Vaccine 0.5 milliLiter(s) IntraMuscular once  isosorbide   mononitrate ER Tablet (IMDUR) 120 milliGRAM(s) Oral daily  lactulose Syrup 20 Gram(s) Oral two times a day  metoprolol succinate ER 25 milliGRAM(s) Oral two times a day  NIFEdipine XL 90 milliGRAM(s) Oral daily  polyethylene glycol 3350 17 Gram(s) Oral every 12 hours  senna 2 Tablet(s) Oral at bedtime  sodium bicarbonate 650 milliGRAM(s) Oral three times a day  sodium chloride 0.9%. 1000 milliLiter(s) (60 mL/Hr) IV Continuous <Continuous>    10-23    137  |  104  |  42<H>  ----------------------------<  104<H>  4.2   |  17<L>  |  2.80<H>    Ca    9.5      23 Oct 2021 07:59      Creatinine Trend: 2.80 <--, 2.81 <--, 2.74 <--, 2.88 <--, 3.22 <--                        12.2   7.71  )-----------( 843      ( 23 Oct 2021 07:59 )             38.4

## 2021-10-23 NOTE — PROGRESS NOTE ADULT - PROBLEM SELECTOR PLAN 5
w/ some fluctuations of high and low BPs  c/w imdur, clonidine, hydral, procardia  continue to monitor

## 2021-10-23 NOTE — PROGRESS NOTE ADULT - ASSESSMENT
70yoM w/ PMHx of CKD IV (unknown baseline), uncontrolled HTN, ?CAD (patient states he has "heart problems;" TTE performed at [facility] on 10/5 w/ normal LVF, severely enlarged LA, Grade III diastolic dysfunction, mild pulm HTN) who was transfered from [facility] to where he presented w/ hx of multiple falls, found to have acute right MCA territory ischemic infarct w/ extensive chronic microvascular ischemic changes on CT, acute/subacute right-sided MCA distribution infarct with associated cytotoxic edema, on MRI, and Pcomm aneurysm vs thrombus [id number], mild R M1 stenosis on MRA.

## 2021-10-23 NOTE — PROGRESS NOTE ADULT - ASSESSMENT
70yoM w/ PMHx of CKD with a baseline Cr >3, HTN, HLD presented to OSH after multiple falls. He was transferred from OSH for MRI/MRA findings concerning for aneurysm vs. thrombus       1) CKD stage 4- overall stable  Likely has Hypertensive Nephropathy-> b/l 2.8mg/dl to 3.1mg/dl  Ua and urine lytes reviewed--> +UTI- s/p cipro  urine pro/cr 1.4grams  Renal US --> right kidney 9.3cm and left 9.5 cm with b/l renal cysts  s/p Coiling for PCOMM    Plan:   Cr  fluctuating however overall stable- cont monitor with daily basic metabolic panel   Pt with liver lesions awaiting MRI without Mac--> The cut off for mac is egfr of 30, pts egfr is 25. If MRI with Mac is needed which will  then can pursue. Otherwise would avoid Mac to avoid NSF.  If pt does get MAC-> Place on Iv nacl @ 60cc x 12 hours post Mac.- monitor Urineoutput strictly  avoid nephrotoxins such as ace/arb/nsaid  trend bmp    2) HTN-  bp much better  on nifedipine clonidine  hydralazine, metoprolol  Imdur  120mg po qd  Trend bp    3) metabolic acidosis- likely from ckd- goal bicarb 22   na bicarb 650mg po tid  trend bicarb      Dr Arellano  Nephrology   cell [phone]  office [phone]  ans serv- [phone]  [email or web address] ( wkend coverage for [facility])

## 2021-10-24 PROCEDURE — 99232 SBSQ HOSP IP/OBS MODERATE 35: CPT

## 2021-10-24 PROCEDURE — 99233 SBSQ HOSP IP/OBS HIGH 50: CPT

## 2021-10-24 RX ORDER — METOPROLOL TARTRATE 50 MG
25 TABLET ORAL DAILY
Refills: 0 | Status: DISCONTINUED | OUTPATIENT
Start: 2021-10-25 | End: 2021-10-27

## 2021-10-24 RX ADMIN — POLYETHYLENE GLYCOL 3350 17 GRAM(S): 17 POWDER, FOR SOLUTION ORAL at 16:56

## 2021-10-24 RX ADMIN — LACTULOSE 20 GRAM(S): 10 SOLUTION ORAL at 16:56

## 2021-10-24 RX ADMIN — Medication 100 MILLIGRAM(S): at 11:24

## 2021-10-24 RX ADMIN — Medication 650 MILLIGRAM(S): at 22:18

## 2021-10-24 RX ADMIN — Medication 81 MILLIGRAM(S): at 11:24

## 2021-10-24 RX ADMIN — ATORVASTATIN CALCIUM 20 MILLIGRAM(S): 80 TABLET, FILM COATED ORAL at 22:19

## 2021-10-24 RX ADMIN — Medication 650 MILLIGRAM(S): at 04:35

## 2021-10-24 RX ADMIN — Medication 0.3 MILLIGRAM(S): at 13:55

## 2021-10-24 RX ADMIN — Medication 650 MILLIGRAM(S): at 16:28

## 2021-10-24 RX ADMIN — Medication 100 MILLIGRAM(S): at 16:54

## 2021-10-24 RX ADMIN — Medication 100 MILLIGRAM(S): at 04:35

## 2021-10-24 RX ADMIN — Medication 90 MILLIGRAM(S): at 04:35

## 2021-10-24 RX ADMIN — CLOPIDOGREL BISULFATE 75 MILLIGRAM(S): 75 TABLET, FILM COATED ORAL at 11:25

## 2021-10-24 RX ADMIN — LACTULOSE 20 GRAM(S): 10 SOLUTION ORAL at 04:34

## 2021-10-24 RX ADMIN — Medication 4 MILLIGRAM(S): at 22:18

## 2021-10-24 RX ADMIN — Medication 0.3 MILLIGRAM(S): at 04:35

## 2021-10-24 RX ADMIN — Medication 0.3 MILLIGRAM(S): at 22:18

## 2021-10-24 NOTE — PROGRESS NOTE ADULT - SUBJECTIVE AND OBJECTIVE BOX
Subjective: Patient seen and examined. No new events except as noted.     REVIEW OF SYSTEMS:    CONSTITUTIONAL: + weakness, fevers or chills  EYES/ENT: No visual changes;  No vertigo or throat pain   NECK: No pain or stiffness  RESPIRATORY: No cough, wheezing, hemoptysis; No shortness of breath  CARDIOVASCULAR: No chest pain or palpitations  GASTROINTESTINAL: No abdominal or epigastric pain. No nausea, vomiting, or hematemesis; No diarrhea or constipation. No melena or hematochezia.  GENITOURINARY: No dysuria, frequency or hematuria  NEUROLOGICAL: No numbness or weakness  SKIN: No itching, burning, rashes, or lesions   All other review of systems is negative unless indicated above.    MEDICATIONS:  MEDICATIONS  (STANDING):  aspirin  chewable 81 milliGRAM(s) Oral daily  atorvastatin 20 milliGRAM(s) Oral at bedtime  bisacodyl 5 milliGRAM(s) Oral at bedtime  cloNIDine 0.3 milliGRAM(s) Oral every 8 hours  clopidogrel Tablet 75 milliGRAM(s) Oral daily  doxazosin 4 milliGRAM(s) Oral at bedtime  hydrALAZINE 100 milliGRAM(s) Oral <User Schedule>  influenza   Vaccine 0.5 milliLiter(s) IntraMuscular once  isosorbide   mononitrate ER Tablet (IMDUR) 120 milliGRAM(s) Oral daily  lactulose Syrup 20 Gram(s) Oral two times a day  metoprolol succinate ER 25 milliGRAM(s) Oral two times a day  NIFEdipine XL 90 milliGRAM(s) Oral daily  polyethylene glycol 3350 17 Gram(s) Oral every 12 hours  senna 2 Tablet(s) Oral at bedtime  sodium bicarbonate 650 milliGRAM(s) Oral three times a day  sodium chloride 0.9%. 1000 milliLiter(s) (60 mL/Hr) IV Continuous <Continuous>      PHYSICAL EXAM:  T(C): 36.3 (10-24-21 @ 07:56), Max: 36.7 (10-24-21 @ 00:00)  HR: 44 (10-24-21 @ 07:56) (44 - 55)  BP: 116/60 (10-24-21 @ 07:56) (114/63 - 152/89)  RR: 18 (10-24-21 @ 07:56) (18 - 18)  SpO2: 100% (10-24-21 @ 07:56) (95% - 100%)  Wt(kg): --  I&O's Summary    23 Oct 2021 07:01  -  24 Oct 2021 07:00  --------------------------------------------------------  IN: 480 mL / OUT: 0 mL / NET: 480 mL          Appearance: NAD  HEENT:   Dry oral mucosa, PERRL, EOMI	  Lymphatic: No lymphadenopathy , no edema  Cardiovascular: Normal S1 S2, No JVD, No murmurs , Peripheral pulses palpable 2+ bilaterally  Respiratory: Lungs clear to auscultation, normal effort 	  Gastrointestinal:  Soft, Non-tender, + BS	  Skin: No rashes, No ecchymoses, No cyanosis, warm to touch  Musculoskeletal: Normal range of motion, normal strength  Psychiatry:  Mood & affect appropriate  Ext: No edema      LABS:    CARDIAC MARKERS:                                12.2   7.71  )-----------( 843      ( 23 Oct 2021 07:59 )             38.4     10-23    137  |  104  |  42<H>  ----------------------------<  104<H>  4.2   |  17<L>  |  2.80<H>    Ca    9.5      23 Oct 2021 07:59      proBNP:   Lipid Profile:   HgA1c:   TSH:             TELEMETRY: 	    ECG:  	  RADIOLOGY:   DIAGNOSTIC TESTING:  [ ] Echocardiogram:  [ ]  Catheterization:  [ ] Stress Test:    OTHER:

## 2021-10-24 NOTE — PROGRESS NOTE ADULT - ASSESSMENT
71 yo male with CKD (baseline Cr > 3), HTN, and HLD who presented as a transfer from [facility] for higher level of care. Patient initially presented to [facility] on 10/01 after multiple falls the since the day prior. MRI Head at [facility] showed acute vs subacute R MCA territory infarct with associated cytotoxic edema. MRA H showed multilobulated right-sided P-comm aneurysm directed posteriorly and laterally, measuring 8.9 x 7.3 mm.TTE: EF 65-70%, severely enlarged L atrium. CT abdomen 10/11  reveals large hypodense 8.5 cm hepatic lesion concerning for malignancy. 10/12 s/p coiling right PCOMM aneurysm. Post op CT head with evolution of previous infarcts in right MCA, now with new acute lacunar infarctions in the right temporal lobe and cerebellum. On DAPT.    Plan:  - MRI performed, read as likely cholangio involving segments 4,5,6 with peripancreatic and portocaval nodes  - Appreciate input from hepatology -> rec advanced GI, EUS  - Appreciate input from heme/onc -> rec thrombocytosis workup, IR bx  - Patient should have PET Scan outpatient as part of workup  - Surgical Oncology will follow    Red Surgery  [id number]  Sam Nunez

## 2021-10-24 NOTE — PROGRESS NOTE ADULT - SUBJECTIVE AND OBJECTIVE BOX
Red Team Progress Note  [id number]    Subjective:   Patient seen at bedside this AM. Reports no complaints other than some difficulty sleeping in his chair. Denies f/n/c, n/v.    Objective:  Vital Signs  T(C): 36.4 (10-24 @ 04:00), Max: 36.7 (10-24 @ 00:00)  HR: 45 (10-24 @ 04:00) (44 - 55)  BP: 147/79 (10-24 @ 04:00) (114/63 - 177/90)  RR: 18 (10-24 @ 04:00) (18 - 18)  SpO2: 96% (10-24 @ 04:00) (95% - 100%)      I&O's Detail    10-23-21 @ 07:01  -  10-24-21 @ 07:00  --------------------------------------------------------  IN: 480 mL / OUT: 0 mL / NET: 480 mL        Physical Exam:  GEN: resting in bed comfortably in NAD  RESP: no increased WOB  ABD: soft, non-distended, non-tender  EXTR: warm, well-perfused      Labs:                        12.2   7.71  )-----------( 843      ( 23 Oct 2021 07:59 )             38.4   10-23    137  |  104  |  42<H>  ----------------------------<  104<H>  4.2   |  17<L>  |  2.80<H>    Ca    9.5      23 Oct 2021 07:59      CAPILLARY BLOOD GLUCOSE          Medications:   MEDICATIONS  (STANDING):  aspirin  chewable 81 milliGRAM(s) Oral daily  atorvastatin 20 milliGRAM(s) Oral at bedtime  bisacodyl 5 milliGRAM(s) Oral at bedtime  cloNIDine 0.3 milliGRAM(s) Oral every 8 hours  clopidogrel Tablet 75 milliGRAM(s) Oral daily  doxazosin 4 milliGRAM(s) Oral at bedtime  hydrALAZINE 100 milliGRAM(s) Oral <User Schedule>  influenza   Vaccine 0.5 milliLiter(s) IntraMuscular once  isosorbide   mononitrate ER Tablet (IMDUR) 120 milliGRAM(s) Oral daily  lactulose Syrup 20 Gram(s) Oral two times a day  metoprolol succinate ER 25 milliGRAM(s) Oral two times a day  NIFEdipine XL 90 milliGRAM(s) Oral daily  polyethylene glycol 3350 17 Gram(s) Oral every 12 hours  senna 2 Tablet(s) Oral at bedtime  sodium bicarbonate 650 milliGRAM(s) Oral three times a day  sodium chloride 0.9%. 1000 milliLiter(s) (60 mL/Hr) IV Continuous <Continuous>    MEDICATIONS  (PRN):  acetaminophen    Suspension .. 650 milliGRAM(s) Oral every 6 hours PRN Mild Pain (1 - 3)  simethicone 80 milliGRAM(s) Chew two times a day PRN Upset Stomach      Imaging:

## 2021-10-24 NOTE — PROGRESS NOTE ADULT - ASSESSMENT
71 yo male with CKD (baseline Cr > 3), HTN, and HLD who presented as a transfer from [facility] for higher level of care. Patient initially presented to [facility] on 10/01 after multiple falls the since the day prior. MRI Head at [facility] showed acute vs subacute R MCA territory infarct with associated cytotoxic edema. MRA H showed multilobulated right-sided P-comm aneurysm directed posteriorly and laterally, measuring 8.9 x 7.3 mm.   A1c 5.5, LDL 25.  TTE: EF 65-70%, severely enlarged L atrium.   MRi with R MCA territory infarct   vessels with PCOM on R aneurysm    s/p angio and coil of PCOM aneurysm.   Impression: AMS + mild L hemineglect in setting of R MCA territory infarct seen on MRI Head, ESUS.etiology fo stroke may be hypercaog from maligiancy   o/e LUE drift doing well.  MRI abd with concern for hepatic mass/cholangiocarcinoma   - malignancy workup?   - send APLS labs. beta 2 glycoprotein antibodies, cardiolipin antibodies and lupus anticoagulant   - Dual antiplatelet therapy with ASA 81mg PO daily and Clopidogrel 75mg PO daily x 3 weeks followed by monotherapy with ASA 81mg PO daily.  - lipitor 40. should be high dose   - ARU and PRU therapeutic   - cardio for ILR. cardio following can do outpt   - telemetry  - PT/OT/SS/SLP, OOBC  - check FS, glucose control <180  - GI/DVT ppx  - Counseling on diet, exercise, and medication adherence was done  - Counseling on smoking cessation and alcohol consumption offered when appropriate.  - Pain assessed and judicious use of narcotics when appropriate was discussed.    - Stroke education given when appropriate.  - Importance of fall prevention discussed.   - Differential diagnosis and plan of care discussed with patient and/or family and primary team  - Thank you for allowing me to participate in the care of this patient. Call with questions.   - d/c planning ILR in or outpt. outpt f/u on d/c 1-2 weeks no objection to d/c   Sukh Pierre MD  Vascular Neurology  Office: [phone] .

## 2021-10-24 NOTE — PROGRESS NOTE ADULT - PROBLEM SELECTOR PLAN 1
MRI abd shows central hepatic mass for which the primary differential consideration is cholangiocarcinoma and adenopathy in the peripancreatic, savage hepatis and portacaval regions.  Would require bx for diagnosis - amenable to bx, consult IR.  .   CEA elevated, mild elevation Ca 19-9  GI, surg onc recommendations appreciated. May need EUS w/ advanced GI peripancreatic lymph node  Formally consult heme/onc after biopsy

## 2021-10-24 NOTE — PROGRESS NOTE ADULT - ASSESSMENT
ASSESSMENT AND PLAN: 70 year old male with PMHx of CKD with a baseline Cr >3, HTN, HLD presented to OSH after multiple falls. He was transferred from OSH for MRI/MRA findings concerning for aneurysm vs. thrombus with multiple infarcts. CTH 10/1 hydro likely chronic, MRI 10/4 right-sided acute vs. subacute MCA infarcts, Carotid U/S 10/6 no sig stenosis, MRA 10/6 R pcomm aneurysm with possible intra-aneurysmal thrombus [id number], mild R M1 stenosis. Pt underwent angio and balloon assisted coiling of Rt Pcomm aneurysm. Pt had MRI of abdomen done showing liver mass and lymph node involvement. Consulted gen surgery, GI, oncology and hospitalist yesterday.     NEURO: Continue pain management with tylenol   S/p angio of Pt Pcomm aneurysm- continue ASA 81 and Plavix 75     PULM: Encouraged incentive spirometer     CV: HTN: continue clonidine, hydralazine, metoprolol, nifedipine, cardura    HLD: continue statin     ENDO: FS WNL     HEME/ONC:                      DVT ppx: Will follow up with team to start DVT ppx     RENAL: Continue cardura for BPH     ID: Afebrile     GI:  MRI abdomen shows hepatic mass with LAD.   General surgery, GI and oncology consulted   Biopsy recommended by oncology. IR consult for biospy placed. Follow up Monday with GI if IR biopsy sufficient (?EUS).      DISCHARGE PLANNING: PT/OT: rehab. Will d/c once inpatient plan determined.   Will D/w Neurosurgeon

## 2021-10-24 NOTE — PROGRESS NOTE ADULT - SUBJECTIVE AND OBJECTIVE BOX
[facility] Division of Hospital Medicine  Farida Gutierrez DO  Pager (M-F 8A to 5P): [phone]  Spectra: [phone]      Patient is a 71y old  Male who presents with a chief complaint of Transfer from [facility] for MRA findings (24 Oct 2021 11:45)      SUBJECTIVE / OVERNIGHT EVENTS:  No acute overnight events.   No abd pain, fever, chills, N/V/D, dizziness, palpitations.     MEDICATIONS  (STANDING):  aspirin  chewable 81 milliGRAM(s) Oral daily  atorvastatin 20 milliGRAM(s) Oral at bedtime  bisacodyl 5 milliGRAM(s) Oral at bedtime  cloNIDine 0.3 milliGRAM(s) Oral every 8 hours  clopidogrel Tablet 75 milliGRAM(s) Oral daily  doxazosin 4 milliGRAM(s) Oral at bedtime  hydrALAZINE 100 milliGRAM(s) Oral <User Schedule>  influenza   Vaccine 0.5 milliLiter(s) IntraMuscular once  isosorbide   mononitrate ER Tablet (IMDUR) 120 milliGRAM(s) Oral daily  lactulose Syrup 20 Gram(s) Oral two times a day  metoprolol succinate ER 25 milliGRAM(s) Oral two times a day  NIFEdipine XL 90 milliGRAM(s) Oral daily  polyethylene glycol 3350 17 Gram(s) Oral every 12 hours  senna 2 Tablet(s) Oral at bedtime  sodium bicarbonate 650 milliGRAM(s) Oral three times a day  sodium chloride 0.9%. 1000 milliLiter(s) (60 mL/Hr) IV Continuous <Continuous>    MEDICATIONS  (PRN):  acetaminophen    Suspension .. 650 milliGRAM(s) Oral every 6 hours PRN Mild Pain (1 - 3)  simethicone 80 milliGRAM(s) Chew two times a day PRN Upset Stomach      CAPILLARY BLOOD GLUCOSE        I&O's Summary    23 Oct 2021 07:01  -  24 Oct 2021 07:00  --------------------------------------------------------  IN: 480 mL / OUT: 0 mL / NET: 480 mL        PHYSICAL EXAM:  Vital Signs Last 24 Hrs  T(C): 36.3 (24 Oct 2021 07:56), Max: 36.7 (24 Oct 2021 00:00)  T(F): 97.4 (24 Oct 2021 07:56), Max: 98 (24 Oct 2021 00:00)  HR: 47 (24 Oct 2021 13:53) (44 - 55)  BP: 133/68 (24 Oct 2021 13:53) (114/63 - 152/89)  BP(mean): --  RR: 18 (24 Oct 2021 07:56) (18 - 18)  SpO2: 100% (24 Oct 2021 07:56) (95% - 100%)    CONSTITUTIONAL: NAD, well-developed, sitting in a chair   EYES: PERRL; conjunctiva and sclera clear  ENMT: Moist oral mucosa poor dentition   RESPIRATORY: Normal respiratory effort; lungs are clear to auscultation bilaterally  CARDIOVASCULAR: Regular rate and rhythm, normal S1 and S2; No lower extremity edema  ABDOMEN: Nontender to palpation, normoactive bowel sounds, no rebound/guarding  MUSCULOSKELETAL: no clubbing or cyanosis of digits; no joint swelling or tenderness to palpation  PSYCH: A+O to person, place, and time; affect appropriate  NEUROLOGY: no focal deficits     LABS:                        12.2   7.71  )-----------( 843      ( 23 Oct 2021 07:59 )             38.4     10-23    137  |  104  |  42<H>  ----------------------------<  104<H>  4.2   |  17<L>  |  2.80<H>    Ca    9.5      23 Oct 2021 07:59                  RADIOLOGY & ADDITIONAL TESTS:  Results Reviewed:   Imaging Personally Reviewed:  Electrocardiogram Personally Reviewed:    COORDINATION OF CARE:  Care Discussed with Consultants/Other Providers [Y/N]: Neurosurgery   Prior or Outpatient Records Reviewed [Y/N]:

## 2021-10-24 NOTE — PROGRESS NOTE ADULT - SUBJECTIVE AND OBJECTIVE BOX
Neurology Progress Note    S: Patient seen and examined on floor. no complaints MR obtained liver mass     Medication:  MEDICATIONS  (STANDING):  aspirin  chewable 81 milliGRAM(s) Oral daily  atorvastatin 20 milliGRAM(s) Oral at bedtime  bisacodyl 5 milliGRAM(s) Oral at bedtime  cloNIDine 0.3 milliGRAM(s) Oral every 8 hours  clopidogrel Tablet 75 milliGRAM(s) Oral daily  doxazosin 4 milliGRAM(s) Oral at bedtime  hydrALAZINE 100 milliGRAM(s) Oral <User Schedule>  influenza   Vaccine 0.5 milliLiter(s) IntraMuscular once  isosorbide   mononitrate ER Tablet (IMDUR) 120 milliGRAM(s) Oral daily  lactulose Syrup 20 Gram(s) Oral two times a day  metoprolol succinate ER 25 milliGRAM(s) Oral two times a day  NIFEdipine XL 90 milliGRAM(s) Oral daily  polyethylene glycol 3350 17 Gram(s) Oral every 12 hours  senna 2 Tablet(s) Oral at bedtime  sodium bicarbonate 650 milliGRAM(s) Oral three times a day  sodium chloride 0.9%. 1000 milliLiter(s) (60 mL/Hr) IV Continuous <Continuous>    MEDICATIONS  (PRN):  acetaminophen    Suspension .. 650 milliGRAM(s) Oral every 6 hours PRN Mild Pain (1 - 3)  simethicone 80 milliGRAM(s) Chew two times a day PRN Upset Stomach        Vitals:  Vital Signs Last 24 Hrs  T(C): 36.3 (24 Oct 2021 07:56), Max: 36.7 (24 Oct 2021 00:00)  T(F): 97.4 (24 Oct 2021 07:56), Max: 98 (24 Oct 2021 00:00)  HR: 47 (24 Oct 2021 13:53) (44 - 55)  BP: 133/68 (24 Oct 2021 13:53) (114/63 - 152/89)  BP(mean): --  RR: 18 (24 Oct 2021 07:56) (18 - 18)  SpO2: 100% (24 Oct 2021 07:56) (95% - 100%)      Orthostatic VS  10-20-21 @ 11:10  Lying BP: 183/88 HR: 46  Sitting BP: 163/81 HR: 48  Standing BP: 152/51 HR: --  Site: upper right arm  Mode: electronic    Orthostatic VS  10-20-21 @ 11:10  Lying BP: 183/88 HR: 46  Sitting BP: 163/81 HR: 48  Standing BP: 152/51 HR: --  Site: upper right arm  Mode: electronic      Orthostatic VS  10-16-21 @ 07:27  Lying BP: 198/98 HR: 46  Sitting BP: 182/95 HR: 50  Standing BP: 174/96 HR: 58  Site: --  Mode: --  Orthostatic VS  10-15-21 @ 16:00  Lying BP: 163/76 HR: 49  Sitting BP: 136/77 HR: 51  Standing BP: 136/70 HR: 59  Site: upper right arm  Mode: electronic        General Exam:   General Appearance: Appropriately dressed and in no acute distress       Head: Normocephalic, atraumatic and no dysmorphic features  Ear, Nose, and Throat: Moist mucous membranes  CVS: S1S2+  Resp: No SOB, no wheeze or rhonchi  Abd: soft NTND  Extremities: No edema, no cyanosis  Skin: No bruises, no rashes     Neurological Exam:  Mental Status: Awake, alert and oriented x 3.  Able to follow simple and complex verbal commands. Able to name and repeat. fluent speech. No obvious aphasia or dysarthria noted.   Cranial Nerves: PERRL, EOMI, VFFC, sensation V1-V3 intact,  no obvious facial asymmetry , equal elevation of palate, scm/trap 5/5, tongue is midline on protrusion. no obvious papilledema on fundoscopic exam. Hearing is grossly intact.   Motor: Normal bulk, tone and strength throughout. Fine finger movements were intact and symmetric. no tremors or drift noted except LUE with 4+/5 mild drift noted   Sensation: Intact to light touch and pinprick throughout. no right/left confusion. no extinction to tactile on DSS.    Reflexes: 1+ throughout at biceps, brachioradialis, triceps, patellars and ankles bilaterally and equal. No clonus. R toe and L toe were both downgoing.  Coordination: No dysmetria on FNF   Gait: deferred in ICU    I personally reviewed the below data/images/labs:    CBC Full  -  ( 23 Oct 2021 07:59 )  WBC Count : 7.71 K/uL  RBC Count : 4.13 M/uL  Hemoglobin : 12.2 g/dL  Hematocrit : 38.4 %  Platelet Count - Automated : 843 K/uL  Mean Cell Volume : 93.0 fl  Mean Cell Hemoglobin : 29.5 pg  Mean Cell Hemoglobin Concentration : 31.8 gm/dL  Auto Neutrophil # : x  Auto Lymphocyte # : x  Auto Monocyte # : x  Auto Eosinophil # : x  Auto Basophil # : x  Auto Neutrophil % : x  Auto Lymphocyte % : x  Auto Monocyte % : x  Auto Eosinophil % : x  Auto Basophil % : x      10-23    137  |  104  |  42<H>  ----------------------------<  104<H>  4.2   |  17<L>  |  2.80<H>    Ca    9.5      23 Oct 2021 07:59        < from: CT Head No Cont (10.01.21 @ 18:37) >    EXAM:  CT BRAIN                              PROCEDURE DATE:  10/01/2021          INTERPRETATION:  CT OF THE HEAD WITHOUT CONTRAST    CLINICAL INDICATION: Weakness. Falls.    TECHNIQUE: Volumetric CT acquisition was performed through the brain and reviewed using brain and bone window technique. Dose optimization techniques were utilized including kVp/mA modulation along with iterative reconstructions.      COMPARISON: No prior studies have been submitted for comparison.    FINDINGS:    The ventricular and sulcal size and configuration is age appropriate.   There is no acute loss of gray-white differentiation. There are extensive patchy and confluent areas of hypodensity in the periventricular and subcortical white matter which are non-specific but likely related  chronic microangiopathic ischemic changes.  Small chronic infarction in the right cerebellar hemisphere. Chronic appearing left thalamic lacunar infarct.    There is no evidence of mass effect, midline shift, acute intracranial hemorrhage, or extra-axial collections.     The calvarium is intact. The paranasal sinuses are clear.The mastoid air cells are predominantly clear. The orbits are unremarkable.      IMPRESSION:  No acute intracranial hemorrhage or acute territorial infarction. Extensive chronic microvascular ischemic changes and several chronic infarctions as described. Further evaluation may be obtained with MRI of the brain if no contraindications.    --- End of Report ---            KAMILA SZYMANSKI MD; Attending Radiologist  This document has been electronically signed. Oct  1 2021  7:25PM    < end of copied text >  < from: MR Head No Cont (10.04.21 @ 18:09) >    EXAM:  MR BRAIN                            PROCEDURE DATE:  10/04/2021          INTERPRETATION:  .    CLINICAL INFORMATION: Frequent falls.    TECHNIQUE: Multiplanar multisequential MRI of the brain was acquired without the administration of IV gadolinium.    COMPARISON: Prior CT examination of the head dated 10/1/2021.    FINDINGS: Multiple areas of restricted diffusion are seen within the right MCA territory affecting the right frontal, parietal, and temporal lobes. Associated T2/FLAIR prolongation is seen, compatible with cytotoxic edema. No hemorrhagic transformation is noted.    Chronic lacunar infarcts are again seen within the right cerebellar hemisphere, left thalamus, and bilateral basal ganglia.    Multiple patchy confluent nonspecific T2/FLAIR hyperintense signal changes are noted throughout the bihemispheric white matter without associated mass effect or restricted diffusion.    Mild ventriculomegaly appears unchanged. No abnormal intra-axial fluid collections are notable. Flow-voids are noted throughout the major intracranial vessels, on the T2 weighted images, consistent with their patency. The sellar area appears unremarkable. The paranasal sinuses and mastoid air cells are grossly clear. Calvarial signal appears unremarkable. The orbits appear within normal limits.    IMPRESSION: Acute/subacute right-sided MCA distribution infarct with associated cytotoxic edema and without hemorrhagic transformation.    Multiple additional chronic lacunar infarcts and similar-appearing extensive chronic white matter microvascular type changes, as discussed.      --- End of Report ---            LUIS CARLOS BONNER MD; Attending Radiologist  This document has been electronically signed. Oct  5 2021  3:01PM    < end of copied text >  < from: MR Angio Head No Cont (10.06.21 @ 18:15) >    EXAM:  MR ANGIO BRAIN                            PROCEDURE DATE:  10/06/2021          INTERPRETATION:  INDICATION:  Right MCA infarct.    TECHNIQUE:  MR angiography of the brain was performed using three dimensional time-of-flight (3D-TOF) technique.  Imaging was performed on a 1.5 Lisa magnet.    FINDINGS:    INTERNAL CAROTID ARTERIES:  Bilaterally patent.    [place] OF [place]ILLI[place]:  A right-sided P-comm aneurysm is identified. The aneurysm is directed posteriorly and laterally, the aneurysm appears to be septated and/or bilobed., and measures 8.9 x 7.3 mm.    CEREBRAL ARTERIES:  Both anterior cerebral arteries are patent. Both A1 segments are well formed. Both middle cerebral arteries are patent. There is mild narrowing of the proximal right M1 segment.    VERTEBROBASILAR SYSTEM:  The vertebrobasilar system is patent. There are large posterior communicating arteries bilaterally with dominant supply of the posterior cerebral arteries. Left P1 segment is hypoplastic.    IMPRESSION:    1. 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  2. Mild narrowing of the proximal right middle cerebral artery in the M1 region.  3. The vessels are otherwise patent, as outlined above.    --- End of Report ---EXAM:  CT BRAIN                            PROCEDURE DATE:  10/13/2021      INTERPRETATION:  Noncontrast CT of the brain.    CLINICAL INDICATION: Status post rt pcomm aneurysm coiling    TECHNIQUE : Axial CT scanning of the brain was obtainedfrom the skull base to the vertex without the administration of intravenous contrast. Sagittal and coronal reformats were provided.    COMPARISON: CT brain 10/1/2021. MRI brain 10/4/2021    FINDINGS:    Interval placement of embolic coil mass right parasellar region.    Similar mild ventricular dilatation.    No midline shift. Basal cisterns poorly evaluated due to streak artifact.    No acute intracranial hemorrhage, mass effect, or brain edema. Extensive white matter microvascular ischemic disease.    The visualized paranasal sinuses and mastoid air cells are clear.    IMPRESSION:    Interval placement of embolic coil mass right parasellar region.  No acute intracranial hemorrhage, mass effect, or brain edema.  Similar mild ventricular dilatation.        --- End of Report ---      AFIA DAILY MD; Attending Radiologist  This document has been electronically signed. Oct 13 2021  9:31AM    < end of copied text >      < from: MR Head No Cont (10.15.21 @ 16:40) >    EXAM:  MR BRAIN                            PROCEDURE DATE:  10/15/2021    ATION:  MRI OF THE BRAIN WITHOUT CONTRAST    CLINICAL INDICATION: Status post right posterior communicating artery aneurysm status post balloon assisted coilembolization    TECHNIQUE: Multiplanar multisequence noncontrast MRI of the brain was performed.    COMPARISON: CT brain, 10/13/2021 and MRI brain, 10/4/2021.    FINDINGS:    The ventricular and sulcal size and configuration is age appropriate. Thereare scattered T2/FLAIR hyperintensities in the subcortical and periventricular white matter which are nonspecific but most commonly represent chronic microvascular ischemic changes.    The previously seen acute lacunar infarctions along left MCA distribution are decreased in diffusion restriction intensity, consistent with evolution of acute infarctions to late subacute stage. In addition, new punctate acute lacunar infarctions have developed in the posterior wall of the right temporal lobe and in the left cerebellar hemisphere along right parietal distribution. There is no susceptibility signal to suggest hemorrhagic conversion.  There is a chronic infarction in the right cerebellum and in the left thalamus.    There is susceptibility signalin the right paraclinoid region at the site of the previously coiled right P-comm aneurysm.    There is abnormal extra-axial fluid collection or hydrocephalus. The visualized globes are symmetric bilaterally.    The visualized paranasal sinuses and mastoid bones are adequately developed and aerated.    IMPRESSION:    Comparison previous studies,    There has been interval evolution of previously seen infarcts in right MCA distribution as described. There is development of new acute lacunar infarctions at the posterior pole of the right temporal lobe and in the right cerebellum as described.    There is no intracranial hemorrhage, midline shift or mass effect.    Coil mass in the right paraclinoid region.    Notification to clinician of alert:    Provider   Dr. BARGER  was notified about the final results at 10/15/2021 10:23 AM via telephone by neuroradiologist KEITH Tineo. Readback confirmation was obtained.      JULIO TINEO MD; Attending Radiologist  This document has been electronically signed. Oct 15 2021 10:35AM    < end of copied text >  < from: CT Chest No Cont (10.15.21 @ 23:27) >    EXAM:  CT CHEST                          INTERPRETATION:  CLINICAL INFORMATION: Shortness breath, evaluate for pulmonary infection or pulmonary edema.    COMPARISON: CT chest 9/6/2014. CT abdomen pelvis 10/11/2021.    CONTRAST/COMPLICATIONS:  IV Contrast: None  Oral Contrast: None  Complications: None    PROCEDURE:  CT of the Chest was performed.  Sagittal and coronal reformats were performed.    FINDINGS:    LUNGS AND AIRWAYS: Emphysema. No pulmonary nodules or parenchymal consolidations.  PLEURA: Small bilateral pleural effusions.    MEDIASTINUM AND RED: 1.2 cm right paratracheal lymph node (2:40), nonspecific. No axillary lymphadenopathy    VESSELS: Calcified atherosclerotic plaques in aorta and coronary arteries.    HEART: Multifocal cardiac enlargement is noted. No pericardial effusion. Mitral annular calcifications.    CHEST WALL AND LOWER NECK: Within normal limits.    VISUALIZED UPPER ABDOMEN: Partially imaged large hypodense hepatic lesion measuring 6.9 x 8.8 cm, similar in appearance compared to prior CT abdomen pelvis 10/11/2021. Simple cyst in the left kidney    BONES: Degenerative changes of the spine.    IMPRESSION:    Small bilateral pleural effusions with minimal bilateral groundglass opacities and a few areas of interlobular septal thickening. Findings are suggestive of mild interstitial edema. This is superimposed on a background of emphysema.    Redemonstration of large hypodense hepatic lesion, similar appearance whencompared to prior abdominal pelvis 10/11/2021. As mentioned on prior report, consider contrast enhanced MRI for further evaluation.      ALIYA BERTRAND MD; Resident Radiology  This document has been electronicallysigned.  ABRIL PINEDA MD; Attending Radiologist  This document has been electronically signed. Oct 16 2021  9:09AM    < end of copied text >  < from: MR Abdomen w/ IV Cont (10.22.21 @ 23:26) >    EXAM:  MR ABDOMEN IC                          PROCEDURE DATE:  10/22/2021      INTERPRETATION:  CLINICAL INFORMATION: Hepatic mass.    COMPARISON: Noncontrast CT abdomen and pelvis 10/11/2021    CONTRAST/COMPLICATIONS:  IV Contrast: Gadavist  7.5 cc administered   0 cc discarded  Oral Contrast: NONE  Complications: None reported at time of study completion    PROCEDURE:  MRI of the abdomen was performed.  MRCP was performed.    FINDINGS:  LOWER CHEST: Within normal limits.    LIVER: No morphologic evidence of cirrhosis. An 8.9 x 7.1 cm central hepatic lesion involving hepatic segments 4, 5 and 6 with possible extension into superior right hepatic lobe segments demonstrates restricted diffusion with peripheral rim enhancement and delayed central enhancement. While post contrast imaging is limited, MR characteristics strongly favor cholangiocarcinoma.  BILE DUCTS: Normal caliber.  GALLBLADDER: Cholelithiasis.  SPLEEN: Within normal limits.  PANCREAS: Within normal limits.  ADRENALS: Within normal limits.  KIDNEYS/URETERS: Bilateral renal cysts including a 3.3 cm hemorrhagic right renal cyst.    VISUALIZED PORTIONS:  BOWEL: Within normal limits.  PERITONEUM: No ascites.  VESSELS: Hepatic and portal veins are patent.  RETROPERITONEUM/LYMPH NODES: Upper abdominal adenopathy including peripancreatic, portacaval and savage hepatis nodes. Reference lesions as follows.    Peripancreatic node anterior to the portal vein (6:22) 3.7 x 2.6 cm.    Portacaval node (6:24) 2.9 x 1.5 cm.    ABDOMINAL WALL: Within normal limits.  BONES: Within normal limits.    IMPRESSION:  Limited post contrast imaging.    Central hepatic mass as above for which the primary differential consideration is cholangiocarcinoma.    Adenopathy in the peripancreatic, savage hepatis and portacaval regions.    --- End of Report ---      CARLOS ARMSTRONG MD; Attending Radiologist  This document has been electronically signed. Oct 23 2021  9:47AM    < end of copied text >

## 2021-10-24 NOTE — CONSULT NOTE ADULT - TIME BILLING
As above, percutaneous liver biopsy is a high-risk for bleeding and would necessitate holding meds to some degree prior to embarking on it .  Would consider alternative biopsy sites if technically feasible for tissue diagnosis given recent neurosurgical therapy.
Advanced care planning was discussed with patient and family.  Advanced care planning forms were reviewed and discussed as appropriate.  Differential diagnosis and plan of care discussed with patient after the evaluation.   Pain assessed and judicious use of narcotics when appropriate was discussed.  Importance of Fall prevention discussed.  Counseling on Smoking and Alcohol cessation was offered when appropriate.  Counseling on Diet, exercise, and medication compliance was done.

## 2021-10-24 NOTE — PROGRESS NOTE ADULT - ASSESSMENT
70yoM w/ PMHx of CKD IV (unknown baseline), uncontrolled HTN, ?CAD (patient states he has "heart problems;" TTE performed at [facility] on 10/5 w/ normal LVF, severely enlarged LA, Grade III diastolic dysfunction, mild pulm HTN) who was transfered from [facility] to where he presented w/ hx of multiple falls, found to have acute right MCA territory ischemic infarct w/ extensive chronic microvascular ischemic changes on CT, acute/subacute right-sided MCA distribution infarct with associated cytotoxic edema, on MRI, and Pcomm aneurysm vs thrombus 0que4ct, mild R M1 stenosis on MRA.

## 2021-10-24 NOTE — PROGRESS NOTE ADULT - ASSESSMENT
70yoM w/ PMHx of CKD with a baseline Cr >3, HTN, HLD presented to OSH after multiple falls. He was transferred from OSH for MRI/MRA findings concerning for aneurysm vs. thrombus       1) CKD stage 4- overall stable  Likely has Hypertensive Nephropathy-> b/l 2.8mg/dl to 3.1mg/dl  Ua and urine lytes reviewed--> +UTI- s/p cipro  urine pro/cr 1.4grams  Renal US --> right kidney 9.3cm and left 9.5 cm with b/l renal cysts  s/p Coiling for PCOMM    Plan:   Cr  fluctuating however overall stable- cont monitor with daily basic metabolic panel   Pt with liver lesions awaiting MRI without Mac--> The cut off for mac is egfr of 30, pts egfr is 25. If MRI with Mac is needed which will  then can pursue. Otherwise would avoid Mac to avoid NSF.  If pt does get MAC-> Place on Iv nacl @ 60cc x 12 hours post Mac.- monitor Urineoutput strictly  avoid nephrotoxins such as ace/arb/nsaid  trend bmp    2) HTN-  bp much better  on nifedipine clonidine  hydralazine, metoprolol  Imdur  120mg po qd  Trend bp    3) metabolic acidosis- likely from ckd- goal bicarb 22   na bicarb 650mg po tid  trend bicarb      Dr Arellano  Nephrology   cell [phone]  office [phone]  ans serv- [phone]  [email or web address] ( wkend coverage for [facility])

## 2021-10-24 NOTE — PROGRESS NOTE ADULT - SUBJECTIVE AND OBJECTIVE BOX
SUBJECTIVE: Pt seen and examined. Pt denies any complaints.     Vital Signs Last 24 Hrs  T(C): 36.3 (24 Oct 2021 07:56), Max: 36.7 (24 Oct 2021 00:00)  T(F): 97.4 (24 Oct 2021 07:56), Max: 98 (24 Oct 2021 00:00)  HR: 44 (24 Oct 2021 07:56) (44 - 55)  BP: 116/60 (24 Oct 2021 07:56) (114/63 - 152/89)  RR: 18 (24 Oct 2021 07:56) (18 - 18)  SpO2: 100% (24 Oct 2021 07:56) (95% - 100%)                        12.2   7.71  )-----------( 843      ( 23 Oct 2021 07:59 )             38.4    10-23    137  |  104  |  42<H>  ----------------------------<  104<H>  4.2   |  17<L>  |  2.80<H>    Ca    9.5      23 Oct 2021 07:59     PHYSICAL EXAM:    General: No Acute Distress     Neurological: Awake, alert oriented to person, place and time, Following Commands, Face Symmetrical, Speech Fluent, Moving all extremities, Muscle Strength normal in all four extremities, No Drift, Sensation to Light Touch Intact    Pulmonary: Clear to Auscultation, No Rales, No Rhonchi, No Wheezes     Cardiovascular: S1, S2, Regular Rate and Rhythm     Gastrointestinal: Soft, Nontender, Nondistended     MEDICATIONS:   Antibiotics:    Neuro:  acetaminophen    Suspension .. 650 milliGRAM(s) Oral every 6 hours PRN Mild Pain (1 - 3)    Anticoagulation:  aspirin  chewable 81 milliGRAM(s) Oral daily  clopidogrel Tablet 75 milliGRAM(s) Oral daily    Cardiology:  cloNIDine 0.3 milliGRAM(s) Oral every 8 hours  doxazosin 4 milliGRAM(s) Oral at bedtime  hydrALAZINE 100 milliGRAM(s) Oral <User Schedule>  isosorbide   mononitrate ER Tablet (IMDUR) 120 milliGRAM(s) Oral daily  metoprolol succinate ER 25 milliGRAM(s) Oral two times a day  NIFEdipine XL 90 milliGRAM(s) Oral daily  atorvastatin 20 milliGRAM(s) Oral at bedtime    Endo:     Pulm:    GI/:  bisacodyl 5 milliGRAM(s) Oral at bedtime  lactulose Syrup 20 Gram(s) Oral two times a day  polyethylene glycol 3350 17 Gram(s) Oral every 12 hours  senna 2 Tablet(s) Oral at bedtime  simethicone 80 milliGRAM(s) Chew two times a day PRN    Other:  influenza   Vaccine 0.5 milliLiter(s) IntraMuscular once  sodium bicarbonate 650 milliGRAM(s) Oral three times a day  sodium chloride 0.9%. 1000 milliLiter(s) IV Continuous <Continuous>

## 2021-10-24 NOTE — PROGRESS NOTE ADULT - SUBJECTIVE AND OBJECTIVE BOX
[facility] : Ans Serv [phone], Office [phone]  Dr Arellano/Dr Miller  /Dr Nitin abad /Dr EMMANUEL Hernaedz/Dr Sha Hollingsworth/Dr Nathan Maddox /Dr EL Corey  _______________________________________________________________________________________________    seen and examined today for renal failure  Interval :  VITALS:  T(F): 97.4 (10-24-21 @ 07:56), Max: 98 (10-24-21 @ 00:00)  HR: 44 (10-24-21 @ 07:56)  BP: 116/60 (10-24-21 @ 07:56)  RR: 18 (10-24-21 @ 07:56)  SpO2: 100% (10-24-21 @ 07:56)    10-23 @ 07:01  -  10-24 @ 07:00  --------------------------------------------------------  IN: 480 mL / OUT: 0 mL / NET: 480 mL    Physical Exam :-  Constitutional: NAD  Neck: Supple.  Respiratory: Bilateral equal breath sounds, few Crackles present.  Cardiovascular: S1, S2 normal, positive Murmur  Gastrointestinal: Bowel Sounds present, soft, non tender.  Extremities: no Edema Feet  Neurological: Alert and Oriented x 3  Psychiatric: Normal mood, normal affect  Data:-  Allergies :   No Known Allergies    Hospital Medications:   MEDICATIONS  (STANDING):  aspirin  chewable 81 milliGRAM(s) Oral daily  atorvastatin 20 milliGRAM(s) Oral at bedtime  bisacodyl 5 milliGRAM(s) Oral at bedtime  cloNIDine 0.3 milliGRAM(s) Oral every 8 hours  clopidogrel Tablet 75 milliGRAM(s) Oral daily  doxazosin 4 milliGRAM(s) Oral at bedtime  hydrALAZINE 100 milliGRAM(s) Oral <User Schedule>  influenza   Vaccine 0.5 milliLiter(s) IntraMuscular once  isosorbide   mononitrate ER Tablet (IMDUR) 120 milliGRAM(s) Oral daily  lactulose Syrup 20 Gram(s) Oral two times a day  metoprolol succinate ER 25 milliGRAM(s) Oral two times a day  NIFEdipine XL 90 milliGRAM(s) Oral daily  polyethylene glycol 3350 17 Gram(s) Oral every 12 hours  senna 2 Tablet(s) Oral at bedtime  sodium bicarbonate 650 milliGRAM(s) Oral three times a day  sodium chloride 0.9%. 1000 milliLiter(s) (60 mL/Hr) IV Continuous <Continuous>    10-23    137  |  104  |  42<H>  ----------------------------<  104<H>  4.2   |  17<L>  |  2.80<H>    Ca    9.5      23 Oct 2021 07:59      Creatinine Trend: 2.80 <--, 2.81 <--, 2.74 <--, 2.88 <--, 3.22 <--                        12.2   7.71  )-----------( 843      ( 23 Oct 2021 07:59 )             38.4

## 2021-10-24 NOTE — PROGRESS NOTE ADULT - ASSESSMENT
70yoM w/ PMHx of CKD IV (unknown baseline), uncontrolled HTN, ?CAD (patient states he has "heart problems;" TTE performed at [facility] on 10/5 w/ normal LVF, severely enlarged LA, Grade III diastolic dysfunction, mild pulm HTN) who was transfered from [facility] to where he presented w/ hx of multiple falls, found to have acute right MCA territory ischemic infarct w/ extensive chronic microvascular ischemic changes on CT, acute/subacute right-sided MCA distribution infarct with associated cytotoxic edema, on MRI, and Pcomm aneurysm vs thrombus [id number], mild R M1 stenosis on MRA. 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   Medicine re-consulted for liver lesion suspicious for malignancy.

## 2021-10-24 NOTE — CONSULT NOTE ADULT - SUBJECTIVE AND OBJECTIVE BOX
Vascular & Interventional Radiology    Evaluate for Procedure: liver lesion biopsy    HPI: 71y Male with CKD (baseline Cr >3), HTN, HLD who presents as transfer from OSH for aneurysm. Initially presented to OSH after multiple falls and MRI/MRA was concerning for aneurysm vs. thrombus with multiple infarcts. On this admission, MRI brain with acute/subacute R MCA infarct. MRA brain with R sided P-comm aneurysm, mild narrowing of proximal R MCA in M1 region. S/p angio and balloon assisted coiling of R P-comm aneurysm. On CT A/P non-con for renal stone he was incidentally found to have 8.5cm hepatic lesion. CT chest with mild interstitial edema but no mention of metastatic disease. MR abdomen showed 8.9x7.1cm central hepatic lesion (favor cholangiocarcinoma) and upper abdominal LN (peripancreatic, portacaval, and savage hepatis). CA 19-9 37 (H), AFP 7.6, CEA 5.1 (H).    Allergies:   Medications (Abx/Cardiac/Anticoagulation/Blood Products)  aspirin  chewable: 81 milliGRAM(s) Oral (10-24 @ 11:24)  cloNIDine: 0.3 milliGRAM(s) Oral (10-24 @ 13:55)  clopidogrel Tablet: 75 milliGRAM(s) Oral (10-24 @ 11:25)  doxazosin: 4 milliGRAM(s) Oral (10-23 @ 21:12)  hydrALAZINE: 100 milliGRAM(s) Oral (10-24 @ 16:54)  isosorbide   mononitrate ER Tablet (IMDUR): 120 milliGRAM(s) Oral (10-23 @ 13:49)  metoprolol succinate ER: 25 milliGRAM(s) Oral (10-23 @ 05:30)  NIFEdipine XL: 90 milliGRAM(s) Oral (10-24 @ 04:35)    Data:    T(C): 36.7  HR: 77  BP: 136/82  RR: 18  SpO2: 95%    -WBC 7.71 / HgB 12.2 / Hct 38.4 / Plt 843  -Na 137 / Cl 104 / BUN 42 / Glucose 104  -K 4.2 / CO2 17 / Cr 2.80  -ALT -- / Alk Phos -- / T.Bili --    Imaging: reviewed    Assessment:   71y Male a/w stroke s/p PCOM aneurysm coiling found to have incidentally discovered liver mass concerning for cholangiocarcinoma.    Plan:   - On ASA/Plavix, would need to clarify if this could be held for biopsy.  - Agree w/ GI consult for possible EUS biopsy.  - Will discuss with attending in AM.

## 2021-10-25 DIAGNOSIS — D75.839 THROMBOCYTOSIS, UNSPECIFIED: ICD-10-CM

## 2021-10-25 DIAGNOSIS — D47.2 MONOCLONAL GAMMOPATHY: ICD-10-CM

## 2021-10-25 DIAGNOSIS — Z29.9 ENCOUNTER FOR PROPHYLACTIC MEASURES, UNSPECIFIED: ICD-10-CM

## 2021-10-25 LAB
ANION GAP SERPL CALC-SCNC: 15 MMOL/L — SIGNIFICANT CHANGE UP (ref 5–17)
APTT BLD: 31.1 SEC — SIGNIFICANT CHANGE UP (ref 27.5–35.5)
BLD GP AB SCN SERPL QL: NEGATIVE — SIGNIFICANT CHANGE UP
BUN SERPL-MCNC: 47 MG/DL — HIGH (ref 7–23)
CALCIUM SERPL-MCNC: 9.5 MG/DL — SIGNIFICANT CHANGE UP (ref 8.4–10.5)
CHLORIDE SERPL-SCNC: 103 MMOL/L — SIGNIFICANT CHANGE UP (ref 96–108)
CO2 SERPL-SCNC: 17 MMOL/L — LOW (ref 22–31)
CREAT SERPL-MCNC: 2.87 MG/DL — HIGH (ref 0.5–1.3)
GLUCOSE SERPL-MCNC: 109 MG/DL — HIGH (ref 70–99)
HCT VFR BLD CALC: 37.3 % — LOW (ref 39–50)
HGB BLD-MCNC: 12.1 G/DL — LOW (ref 13–17)
INR BLD: 1.03 RATIO — SIGNIFICANT CHANGE UP (ref 0.88–1.16)
MCHC RBC-ENTMCNC: 29.8 PG — SIGNIFICANT CHANGE UP (ref 27–34)
MCHC RBC-ENTMCNC: 32.4 GM/DL — SIGNIFICANT CHANGE UP (ref 32–36)
MCV RBC AUTO: 91.9 FL — SIGNIFICANT CHANGE UP (ref 80–100)
NRBC # BLD: 0 /100 WBCS — SIGNIFICANT CHANGE UP (ref 0–0)
PLATELET # BLD AUTO: 818 K/UL — HIGH (ref 150–400)
POTASSIUM SERPL-MCNC: 4.3 MMOL/L — SIGNIFICANT CHANGE UP (ref 3.5–5.3)
POTASSIUM SERPL-SCNC: 4.3 MMOL/L — SIGNIFICANT CHANGE UP (ref 3.5–5.3)
PROTHROM AB SERPL-ACNC: 12.3 SEC — SIGNIFICANT CHANGE UP (ref 10.6–13.6)
RBC # BLD: 4.06 M/UL — LOW (ref 4.2–5.8)
RBC # FLD: 15.6 % — HIGH (ref 10.3–14.5)
RH IG SCN BLD-IMP: POSITIVE — SIGNIFICANT CHANGE UP
SARS-COV-2 RNA SPEC QL NAA+PROBE: SIGNIFICANT CHANGE UP
SODIUM SERPL-SCNC: 135 MMOL/L — SIGNIFICANT CHANGE UP (ref 135–145)
WBC # BLD: 7.02 K/UL — SIGNIFICANT CHANGE UP (ref 3.8–10.5)
WBC # FLD AUTO: 7.02 K/UL — SIGNIFICANT CHANGE UP (ref 3.8–10.5)

## 2021-10-25 PROCEDURE — 12345: CPT | Mod: NC,GC

## 2021-10-25 PROCEDURE — 99232 SBSQ HOSP IP/OBS MODERATE 35: CPT

## 2021-10-25 PROCEDURE — 99232 SBSQ HOSP IP/OBS MODERATE 35: CPT | Mod: GC

## 2021-10-25 PROCEDURE — 99221 1ST HOSP IP/OBS SF/LOW 40: CPT | Mod: GC

## 2021-10-25 RX ADMIN — Medication 100 MILLIGRAM(S): at 12:16

## 2021-10-25 RX ADMIN — Medication 100 MILLIGRAM(S): at 04:35

## 2021-10-25 RX ADMIN — Medication 650 MILLIGRAM(S): at 21:49

## 2021-10-25 RX ADMIN — Medication 90 MILLIGRAM(S): at 04:36

## 2021-10-25 RX ADMIN — Medication 100 MILLIGRAM(S): at 16:12

## 2021-10-25 RX ADMIN — Medication 4 MILLIGRAM(S): at 21:49

## 2021-10-25 RX ADMIN — Medication 25 MILLIGRAM(S): at 04:36

## 2021-10-25 RX ADMIN — CLOPIDOGREL BISULFATE 75 MILLIGRAM(S): 75 TABLET, FILM COATED ORAL at 12:20

## 2021-10-25 RX ADMIN — Medication 5 MILLIGRAM(S): at 21:50

## 2021-10-25 RX ADMIN — Medication 650 MILLIGRAM(S): at 15:06

## 2021-10-25 RX ADMIN — Medication 0.3 MILLIGRAM(S): at 21:50

## 2021-10-25 RX ADMIN — Medication 0.3 MILLIGRAM(S): at 04:36

## 2021-10-25 RX ADMIN — Medication 650 MILLIGRAM(S): at 04:35

## 2021-10-25 RX ADMIN — Medication 81 MILLIGRAM(S): at 12:15

## 2021-10-25 RX ADMIN — Medication 0.3 MILLIGRAM(S): at 14:48

## 2021-10-25 RX ADMIN — ISOSORBIDE MONONITRATE 120 MILLIGRAM(S): 60 TABLET, EXTENDED RELEASE ORAL at 12:16

## 2021-10-25 RX ADMIN — ATORVASTATIN CALCIUM 20 MILLIGRAM(S): 80 TABLET, FILM COATED ORAL at 21:50

## 2021-10-25 NOTE — PROGRESS NOTE ADULT - ASSESSMENT
70yoM w/ PMHx of CKD with a baseline Cr >3, HTN, HLD presented to OSH after multiple falls. He was transferred from OSH for MRI/MRA findings concerning for aneurysm vs. thrombus       1) CKD stage 4- overall stable  Likely has Hypertensive Nephropathy-> b/l 2.8mg/dl to 3.1mg/dl  Ua and urine lytes reviewed--> +UTI- s/p cipro  urine pro/cr 1.4grams  Renal US --> right kidney 9.3cm and left 9.5 cm with b/l renal cysts  s/p Coiling for PCOMM  Cr  fluctuating however overall stable- cont monitor with daily basic metabolic panel   s/p NIELS --> no signs of NSF--> will cont to monitor  avoid nephrotoxins such as ace/arb/nsaid  trend bmp    2) HTN-  bp much better  on nifedipine clonidine  hydralazine, metoprolol  Imdur 120mg po qd  Trend bp    3) metabolic acidosis- likely from ckd- goal bicarb 22   na bicarb 650mg po tid  trend bicarb      Dr Hernadez  Nephrology   cell [phone]  office [phone]  ans serv- [phone]  [email or web address] ( wkend coverage for Hospital)

## 2021-10-25 NOTE — CONSULT NOTE ADULT - CONSULT REQUESTED DATE/TIME
09-Oct-2021 16:00
10-Oct-2021 12:26
09-Oct-2021 17:46
23-Oct-2021 16:04
25-Oct-2021 11:45
21-Oct-2021 11:29
10-Oct-2021 21:59
24-Oct-2021
10-Oct-2021 11:27

## 2021-10-25 NOTE — CONSULT NOTE ADULT - ATTENDING COMMENTS
Hepatology Staff: Mario Molina MD  I saw and examined the patient along with   on 10-23-21.    Patient Medical Record, hosptial course was reviewed and summarized as below:    Vitals: Vital Signs Last 24 Hrs  T(C): 36.3 (23 Oct 2021 15:34), Max: 36.9 (23 Oct 2021 04:59)  T(F): 97.3 (23 Oct 2021 15:34), Max: 98.5 (23 Oct 2021 04:59)  HR: 44 (23 Oct 2021 15:34) (43 - 65)  BP: 119/66 (23 Oct 2021 15:34) (119/66 - 184/88)    RR: 18 (23 Oct 2021 15:34) (18 - 18)  SpO2: 100% (23 Oct 2021 15:34) (97% - 100%)    Labs:Creatinine, Serum: 2.80 mg/dL (10-23-21 @ 07:59)    MELD Score:   Imaging Studies:  I/O: I&O's Summary    23 Oct 2021 07:01  -  23 Oct 2021 17:38  --------------------------------------------------------  IN: 480 mL / OUT: 0 mL / NET: 480 mL    Recommendations:  This is a 71-year-old male with an incidental finding of a large 8.9 x 7.1 cm central hepatic lesion involving hepatic segments 4, 5 and 6 with possible extension into the superior right hepatic lobe segments.  Post contrast imaging study was somewhat limited although MRI characteristics strongly suggested cholangiocarcinoma.  Adenopathy in the peripancreatic, savage hepatis and portocaval regions were also noted.    Patient will need further diagnostic evaluations.  Would recommend a targeted liver biopsy of the liver mass and possibly an EUS/FNA of the peripancreatic lymph node to rule out metastasis.  The mass potentially can be biopsied via EUS as well. Consider advanced GI consult.  Consider hematology/oncology consultation for management plan.      Plan discussed with Primary team.
69yo M with PMH of CKD (baseline Cr >3), HTN, HLD who initially presented for cerebral aneurysm s/p repair now on DAPT  Incidentally found to have 9cm central hepatic mass  Also with abdominal lymphadenopathy  Imaging findings of tumor vascular pattern most consistent with iCCA, less likely hepatic metastasis  Normal AFP points against HCC  Note only slightly elevated CA 19-9 and normal serum CEA  Advanced GI consulted for further evaluation and liver mass biopsy    Do not recommend biopsy of the liver mass itself (by GI via EUS or by IR via percutaneous route) regardless of DAPT status as there is a very high risk of seeding from these biopsies which would then make the patient non-operative.  Not clear at this time whether the tumor would be considered resectable at any point if this is a primary iCCA given size/nodes/other comorbidities - would defer to surg onc and med onc for this decision.     From GI standpoint, will plan colonoscopy while on DAPT to ensure no primary colon cancer  - will plan for this on Wednesday  Please give CLD tomorrow and make NPO after midnight on Tuesday night + bowel prep     When the patient is off of Plavix x 5 days (as soon as next week? Can be done as an outpatient) can then plan for EGD to clear the upper GI tract plus EUS/FNA of one of the malignant appearing lymph nodes for tissue diagnosis.  If the patient has been deemed definitively unresectable then can also consider liver mass biopsy.     Thank you for this interesting consult.  Please call the advanced GI service with any questions or concerns.
71 yo male with CKD (baseline Cr > 3), HTN, and HLD who presented as a transfer from [facility] for higher level of care. Patient initially presented to [facility] on 10/01 after multiple falls the since the day prior. MRI Head at [facility] showed acute vs subacute R MCA territory infarct with associated cytotoxic edema. MRA H showed multilobulated right-sided P-comm aneurysm directed posteriorly and laterally, measuring 8.9 x 7.3 mm. Patient is admitted to neurosurgery service with plan for conventional angiogram on Monday (10/11).  A1c 5.5, LDL 25.  TTE: EF 65-70%, severely enlarged L atrium.   MRi with R MCA territory infarct   vessels with PCOM on R aneurysm    Impression: AMS + mild L hemineglect in setting of R MCA territory infarct seen on MRI Head, ESUS.  - Dual antiplatelet therapy with ASA 81mg PO daily and Clopidogrel 75mg PO daily x 3 weeks followed by monotherapy with ASA 81mg PO daily.  - lipitor 40  - cardio for ILR   - telemetry  - PT/OT/SS/SLP, OOBC  - check FS, glucose control <180  - GI/DVT ppx  - Counseling on diet, exercise, and medication adherence was done  - Counseling on smoking cessation and alcohol consumption offered when appropriate.  - Pain assessed and judicious use of narcotics when appropriate was discussed.    - Stroke education given when appropriate.  - Importance of fall prevention discussed.   - Differential diagnosis and plan of care discussed with patient and/or family and primary team  - Thank you for allowing me to participate in the care of this patient. Call with questions.   Sukh Pierre MD  Vascular Neurology  Office: [phone]

## 2021-10-25 NOTE — PROGRESS NOTE ADULT - PROBLEM SELECTOR PLAN 5
w/ some fluctuations of high and low BPs  c/w imdur, clonidine, hydral, procardia  continue to monitor - w/ some fluctuations of high and low BPs  - c/w imdur, clonidine, hydral, procardia  - continue to monitor

## 2021-10-25 NOTE — PROGRESS NOTE ADULT - SUBJECTIVE AND OBJECTIVE BOX
Interval Events:   Patient denies any abdominal pain, nausea /vomiting, diarrhea or melena / bloody bm.     Hospital Medications:  acetaminophen    Suspension .. 650 milliGRAM(s) Oral every 6 hours PRN  aspirin  chewable 81 milliGRAM(s) Oral daily  atorvastatin 20 milliGRAM(s) Oral at bedtime  bisacodyl 5 milliGRAM(s) Oral at bedtime  cloNIDine 0.3 milliGRAM(s) Oral every 8 hours  clopidogrel Tablet 75 milliGRAM(s) Oral daily  doxazosin 4 milliGRAM(s) Oral at bedtime  hydrALAZINE 100 milliGRAM(s) Oral <User Schedule>  influenza   Vaccine 0.5 milliLiter(s) IntraMuscular once  isosorbide   mononitrate ER Tablet (IMDUR) 120 milliGRAM(s) Oral daily  lactulose Syrup 20 Gram(s) Oral two times a day  metoprolol succinate ER 25 milliGRAM(s) Oral daily  NIFEdipine XL 90 milliGRAM(s) Oral daily  polyethylene glycol 3350 17 Gram(s) Oral every 12 hours  senna 2 Tablet(s) Oral at bedtime  simethicone 80 milliGRAM(s) Chew two times a day PRN  sodium bicarbonate 650 milliGRAM(s) Oral three times a day  sodium chloride 0.9%. 1000 milliLiter(s) IV Continuous <Continuous>      ROS: All system reviewed and negative except as mentioned above.    PHYSICAL EXAM:   Vital Signs:  Vital Signs Last 24 Hrs  T(C): 36.3 (25 Oct 2021 09:01), Max: 36.8 (24 Oct 2021 23:34)  T(F): 97.4 (25 Oct 2021 09:01), Max: 98.2 (24 Oct 2021 23:34)  HR: 44 (25 Oct 2021 09:01) (43 - 77)  BP: 144/80 (25 Oct 2021 09:01) (133/68 - 168/80)  BP(mean): --  RR: 18 (25 Oct 2021 09:01) (18 - 18)  SpO2: 99% (25 Oct 2021 09:01) (95% - 100%)  Daily     Daily     GENERAL:  NAD, Appears stated age  HEENT:  NC/AT,  conjunctivae clear and pink, sclera -anicteric  CHEST:  Normal Effort, Breath sounds clear  HEART:  RRR, S1 + S2, no murmurs  ABDOMEN:  Soft, non-tender, non-distended, normoactive bowel sounds,  no masses  EXTREMITIES:  no cyanosis or edema  SKIN:  Warm & Dry. No rash or erythema  NEURO:  Alert, oriented, no focal deficit    LABS:                        12.1   7.02  )-----------( 818      ( 25 Oct 2021 06:05 )             37.3     Mean Cell Volume: 91.9 fl (10-25-21 @ 06:05)    10-25    135  |  103  |  47<H>  ----------------------------<  109<H>  4.3   |  17<L>  |  2.87<H>    Ca    9.5      25 Oct 2021 06:05        PT/INR - ( 25 Oct 2021 06:05 )   PT: 12.3 sec;   INR: 1.03 ratio         PTT - ( 25 Oct 2021 06:05 )  PTT:31.1 sec                            12.1   7.02  )-----------( 818      ( 25 Oct 2021 06:05 )             37.3                         12.2   7.71  )-----------( 843      ( 23 Oct 2021 07:59 )             38.4       Imaging: Images reviewed no new images appreciated.

## 2021-10-25 NOTE — PROGRESS NOTE ADULT - PROBLEM SELECTOR PLAN 8
- found to have M spike 0.6, likely MGUS per hematology  - per heme, obtain light free chains, serum immunofixation, IgA, IgA, IgM  - hematology f/u

## 2021-10-25 NOTE — CONSULT NOTE ADULT - REASON FOR ADMISSION
Transfer from [facility] for MRA findings
Transfer from [facility] for MRA findings
Transfer from [facility] for MRA findings
Transfer from [facility] for MRA findings
Transfer from [facility] for MRA findings
Transfer from [facility] for MRA findings
Transfer from [facility] for MRA findings
Transfer from [facility] for MRA findings
Transfer from [facility] for MRA findings

## 2021-10-25 NOTE — PROGRESS NOTE ADULT - SUBJECTIVE AND OBJECTIVE BOX
Subjective: Patient seen and examined. No new events except as noted.     REVIEW OF SYSTEMS:    CONSTITUTIONAL: + weakness, fevers or chills  EYES/ENT: No visual changes;  No vertigo or throat pain   NECK: No pain or stiffness  RESPIRATORY: No cough, wheezing, hemoptysis; No shortness of breath  CARDIOVASCULAR: No chest pain or palpitations  GASTROINTESTINAL: No abdominal or epigastric pain. No nausea, vomiting, or hematemesis; No diarrhea or constipation. No melena or hematochezia.  GENITOURINARY: No dysuria, frequency or hematuria  NEUROLOGICAL: No numbness or weakness  SKIN: No itching, burning, rashes, or lesions   All other review of systems is negative unless indicated above.    MEDICATIONS:  MEDICATIONS  (STANDING):  aspirin  chewable 81 milliGRAM(s) Oral daily  atorvastatin 20 milliGRAM(s) Oral at bedtime  bisacodyl 5 milliGRAM(s) Oral at bedtime  cloNIDine 0.3 milliGRAM(s) Oral every 8 hours  clopidogrel Tablet 75 milliGRAM(s) Oral daily  doxazosin 4 milliGRAM(s) Oral at bedtime  hydrALAZINE 100 milliGRAM(s) Oral <User Schedule>  influenza   Vaccine 0.5 milliLiter(s) IntraMuscular once  isosorbide   mononitrate ER Tablet (IMDUR) 120 milliGRAM(s) Oral daily  lactulose Syrup 20 Gram(s) Oral two times a day  metoprolol succinate ER 25 milliGRAM(s) Oral daily  NIFEdipine XL 90 milliGRAM(s) Oral daily  polyethylene glycol 3350 17 Gram(s) Oral every 12 hours  senna 2 Tablet(s) Oral at bedtime  sodium bicarbonate 650 milliGRAM(s) Oral three times a day  sodium chloride 0.9%. 1000 milliLiter(s) (60 mL/Hr) IV Continuous <Continuous>      PHYSICAL EXAM:  T(C): 36.3 (10-25-21 @ 09:01), Max: 36.8 (10-24-21 @ 23:34)  HR: 44 (10-25-21 @ 09:01) (43 - 77)  BP: 144/80 (10-25-21 @ 09:01) (133/68 - 168/80)  RR: 18 (10-25-21 @ 09:01) (18 - 18)  SpO2: 99% (10-25-21 @ 09:01) (95% - 100%)  Wt(kg): --  I&O's Summary    25 Oct 2021 07:01  -  25 Oct 2021 11:05  --------------------------------------------------------  IN: 0 mL / OUT: 300 mL / NET: -300 mL          Appearance: Normal	  HEENT:   Normal oral mucosa, PERRL, EOMI	  Lymphatic: No lymphadenopathy , no edema  Cardiovascular: Normal S1 S2, No JVD, No murmurs , Peripheral pulses palpable 2+ bilaterally  Respiratory: Lungs clear to auscultation, normal effort 	  Gastrointestinal:  Soft, Non-tender, + BS	  Skin: No rashes, No ecchymoses, No cyanosis, warm to touch  Musculoskeletal: Normal range of motion, normal strength  Psychiatry:  Mood & affect appropriate  Ext: No edema      LABS:    CARDIAC MARKERS:                                12.1   7.02  )-----------( 818      ( 25 Oct 2021 06:05 )             37.3     10-25    135  |  103  |  47<H>  ----------------------------<  109<H>  4.3   |  17<L>  |  2.87<H>    Ca    9.5      25 Oct 2021 06:05      proBNP:   Lipid Profile:   HgA1c:   TSH:             TELEMETRY: 	    ECG:  	  RADIOLOGY:   DIAGNOSTIC TESTING:  [ ] Echocardiogram:  [ ]  Catheterization:  [ ] Stress Test:    OTHER:

## 2021-10-25 NOTE — PROGRESS NOTE ADULT - PROBLEM SELECTOR PLAN 2
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 monitor clinically  - c/w ASA 81mg daily  - Plavix 75mg daily for at least 3 week per neurosurgery - s/p coiling of right PCOMM artery aneurysm 10/12. Monitor clinically  - c/w ASA 81mg daily  - Plavix 75mg daily for at least 3 weeks per neurosurgery

## 2021-10-25 NOTE — PROGRESS NOTE ADULT - ATTENDING COMMENTS
69 yo M w/ PMHx of CKD IV (unknown baseline), uncontrolled HTN, ?CAD (patient states he has "heart problems;" TTE performed at [facility] [place] on 10/5 w/ normal LVF, severely enlarged LA, Grade III diastolic dysfunction, mild pulm HTN) who was transfered from [facility] [place] to where he presented w/ hx of multiple falls, found to have acute right MCA territory ischemic infarct w/ extensive chronic microvascular ischemic changes on CT, acute/subacute right-sided MCA distribution infarct with associated cytotoxic edema, on MRI, and Pcomm aneurysm vs thrombus [id number], mild R M1 stenosis on MRA. 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 Medicine re-consulted for liver lesion suspicious for cholangiocarcinoma.      Colonoscopy Wednesday per GI. EUS as outpt. No in patient biopsy due to high risk of bleeding from DAPT.  Aspirin for Essential Thrombocytosis, f/u Heme/Onc re: ?MGUS  PT/OT for disposition  Pt lives with melanied's son? Will address formal HCP and living situation with CM/SW    Rest of plan per resident's note

## 2021-10-25 NOTE — PROGRESS NOTE ADULT - SUBJECTIVE AND OBJECTIVE BOX
Neurology Progress Note    S: Patient seen and examined on floor. no complaints MR obtained liver mass, bx pending     Medication:  MEDICATIONS  (STANDING):  aspirin  chewable 81 milliGRAM(s) Oral daily  atorvastatin 20 milliGRAM(s) Oral at bedtime  bisacodyl 5 milliGRAM(s) Oral at bedtime  cloNIDine 0.3 milliGRAM(s) Oral every 8 hours  clopidogrel Tablet 75 milliGRAM(s) Oral daily  doxazosin 4 milliGRAM(s) Oral at bedtime  hydrALAZINE 100 milliGRAM(s) Oral <User Schedule>  influenza   Vaccine 0.5 milliLiter(s) IntraMuscular once  isosorbide   mononitrate ER Tablet (IMDUR) 120 milliGRAM(s) Oral daily  lactulose Syrup 20 Gram(s) Oral two times a day  metoprolol succinate ER 25 milliGRAM(s) Oral daily  NIFEdipine XL 90 milliGRAM(s) Oral daily  polyethylene glycol 3350 17 Gram(s) Oral every 12 hours  senna 2 Tablet(s) Oral at bedtime  sodium bicarbonate 650 milliGRAM(s) Oral three times a day  sodium chloride 0.9%. 1000 milliLiter(s) (60 mL/Hr) IV Continuous <Continuous>    MEDICATIONS  (PRN):  acetaminophen    Suspension .. 650 milliGRAM(s) Oral every 6 hours PRN Mild Pain (1 - 3)  simethicone 80 milliGRAM(s) Chew two times a day PRN Upset Stomach        Vitals:  Vital Signs Last 24 Hrs  T(C): 36.3 (10-25-21 @ 09:01), Max: 36.8 (10-24-21 @ 23:34)  T(F): 97.4 (10-25-21 @ 09:01), Max: 98.2 (10-24-21 @ 23:34)  HR: 44 (10-25-21 @ 09:01) (43 - 77)  BP: 144/80 (10-25-21 @ 09:01) (133/68 - 168/80)  BP(mean): --  RR: 18 (10-25-21 @ 09:01) (18 - 18)  SpO2: 99% (10-25-21 @ 09:01) (95% - 100%)          Orthostatic VS  10-20-21 @ 11:10  Lying BP: 183/88 HR: 46  Sitting BP: 163/81 HR: 48  Standing BP: 152/51 HR: --  Site: upper right arm  Mode: electronic    Orthostatic VS  10-20-21 @ 11:10  Lying BP: 183/88 HR: 46  Sitting BP: 163/81 HR: 48  Standing BP: 152/51 HR: --  Site: upper right arm  Mode: electronic      Orthostatic VS  10-16-21 @ 07:27  Lying BP: 198/98 HR: 46  Sitting BP: 182/95 HR: 50  Standing BP: 174/96 HR: 58  Site: --  Mode: --  Orthostatic VS  10-15-21 @ 16:00  Lying BP: 163/76 HR: 49  Sitting BP: 136/77 HR: 51  Standing BP: 136/70 HR: 59  Site: upper right arm  Mode: electronic        General Exam:   General Appearance: Appropriately dressed and in no acute distress       Head: Normocephalic, atraumatic and no dysmorphic features  Ear, Nose, and Throat: Moist mucous membranes  CVS: S1S2+  Resp: No SOB, no wheeze or rhonchi  Abd: soft NTND  Extremities: No edema, no cyanosis  Skin: No bruises, no rashes     Neurological Exam:  Mental Status: Awake, alert and oriented x 3.  Able to follow simple and complex verbal commands. Able to name and repeat. fluent speech. No obvious aphasia or dysarthria noted.   Cranial Nerves: PERRL, EOMI, VFFC, sensation V1-V3 intact,  no obvious facial asymmetry , equal elevation of palate, scm/trap 5/5, tongue is midline on protrusion. no obvious papilledema on fundoscopic exam. Hearing is grossly intact.   Motor: Normal bulk, tone and strength throughout. Fine finger movements were intact and symmetric. no tremors or drift noted except LUE with 4+/5 mild drift noted   Sensation: Intact to light touch and pinprick throughout. no right/left confusion. no extinction to tactile on DSS.    Reflexes: 1+ throughout at biceps, brachioradialis, triceps, patellars and ankles bilaterally and equal. No clonus. R toe and L toe were both downgoing.  Coordination: No dysmetria on FNF   Gait: deferred in ICU    I personally reviewed the below data/images/labs:    CBC Full  -  ( 25 Oct 2021 06:05 )  WBC Count : 7.02 K/uL  RBC Count : 4.06 M/uL  Hemoglobin : 12.1 g/dL  Hematocrit : 37.3 %  Platelet Count - Automated : 818 K/uL  Mean Cell Volume : 91.9 fl  Mean Cell Hemoglobin : 29.8 pg  Mean Cell Hemoglobin Concentration : 32.4 gm/dL  Auto Neutrophil # : x  Auto Lymphocyte # : x  Auto Monocyte # : x  Auto Eosinophil # : x  Auto Basophil # : x  Auto Neutrophil % : x  Auto Lymphocyte % : x  Auto Monocyte % : x  Auto Eosinophil % : x  Auto Basophil % : x        10-25    135  |  103  |  47<H>  ----------------------------<  109<H>  4.3   |  17<L>  |  2.87<H>    Ca    9.5      25 Oct 2021 06:05          < from: CT Head No Cont (10.01.21 @ 18:37) >    EXAM:  CT BRAIN                              PROCEDURE DATE:  10/01/2021          INTERPRETATION:  CT OF THE HEAD WITHOUT CONTRAST    CLINICAL INDICATION: Weakness. Falls.    TECHNIQUE: Volumetric CT acquisition was performed through the brain and reviewed using brain and bone window technique. Dose optimization techniques were utilized including kVp/mA modulation along with iterative reconstructions.      COMPARISON: No prior studies have been submitted for comparison.    FINDINGS:    The ventricular and sulcal size and configuration is age appropriate.   There is no acute loss of gray-white differentiation. There are extensive patchy and confluent areas of hypodensity in the periventricular and subcortical white matter which are non-specific but likely related  chronic microangiopathic ischemic changes.  Small chronic infarction in the right cerebellar hemisphere. Chronic appearing left thalamic lacunar infarct.    There is no evidence of mass effect, midline shift, acute intracranial hemorrhage, or extra-axial collections.     The calvarium is intact. The paranasal sinuses are clear.The mastoid air cells are predominantly clear. The orbits are unremarkable.      IMPRESSION:  No acute intracranial hemorrhage or acute territorial infarction. Extensive chronic microvascular ischemic changes and several chronic infarctions as described. Further evaluation may be obtained with MRI of the brain if no contraindications.    --- End of Report ---            KAMILA SZYMANSKI MD; Attending Radiologist  This document has been electronically signed. Oct  1 2021  7:25PM    < end of copied text >  < from: MR Head No Cont (10.04.21 @ 18:09) >    EXAM:  MR BRAIN                            PROCEDURE DATE:  10/04/2021          INTERPRETATION:  .    CLINICAL INFORMATION: Frequent falls.    TECHNIQUE: Multiplanar multisequential MRI of the brain was acquired without the administration of IV gadolinium.    COMPARISON: Prior CT examination of the head dated 10/1/2021.    FINDINGS: Multiple areas of restricted diffusion are seen within the right MCA territory affecting the right frontal, parietal, and temporal lobes. Associated T2/FLAIR prolongation is seen, compatible with cytotoxic edema. No hemorrhagic transformation is noted.    Chronic lacunar infarcts are again seen within the right cerebellar hemisphere, left thalamus, and bilateral basal ganglia.    Multiple patchy confluent nonspecific T2/FLAIR hyperintense signal changes are noted throughout the bihemispheric white matter without associated mass effect or restricted diffusion.    Mild ventriculomegaly appears unchanged. No abnormal intra-axial fluid collections are notable. Flow-voids are noted throughout the major intracranial vessels, on the T2 weighted images, consistent with their patency. The sellar area appears unremarkable. The paranasal sinuses and mastoid air cells are grossly clear. Calvarial signal appears unremarkable. The orbits appear within normal limits.    IMPRESSION: Acute/subacute right-sided MCA distribution infarct with associated cytotoxic edema and without hemorrhagic transformation.    Multiple additional chronic lacunar infarcts and similar-appearing extensive chronic white matter microvascular type changes, as discussed.      --- End of Report ---            LUIS CARLOS BONNER MD; Attending Radiologist  This document has been electronically signed. Oct  5 2021  3:01PM    < end of copied text >  < from: MR Angio Head No Cont (10.06.21 @ 18:15) >    EXAM:  MR ANGIO BRAIN                            PROCEDURE DATE:  10/06/2021          INTERPRETATION:  INDICATION:  Right MCA infarct.    TECHNIQUE:  MR angiography of the brain was performed using three dimensional time-of-flight (3D-TOF) technique.  Imaging was performed on a 1.5 Lisa magnet.    FINDINGS:    INTERNAL CAROTID ARTERIES:  Bilaterally patent.    [place] [facility]:  A right-sided P-comm aneurysm is identified. The aneurysm is directed posteriorly and laterally, the aneurysm appears to be septated and/or bilobed., and measures 8.9 x 7.3 mm.    CEREBRAL ARTERIES:  Both anterior cerebral arteries are patent. Both A1 segments are well formed. Both middle cerebral arteries are patent. There is mild narrowing of the proximal right M1 segment.    VERTEBROBASILAR SYSTEM:  The vertebrobasilar system is patent. There are large posterior communicating arteries bilaterally with dominant supply of the posterior cerebral arteries. Left P1 segment is hypoplastic.    IMPRESSION:    1. 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  2. Mild narrowing of the proximal right middle cerebral artery in the M1 region.  3. The vessels are otherwise patent, as outlined above.    --- End of Report ---EXAM:  CT BRAIN                            PROCEDURE DATE:  10/13/2021      INTERPRETATION:  Noncontrast CT of the brain.    CLINICAL INDICATION: Status post rt pcomm aneurysm coiling    TECHNIQUE : Axial CT scanning of the brain was obtainedfrom the skull base to the vertex without the administration of intravenous contrast. Sagittal and coronal reformats were provided.    COMPARISON: CT brain 10/1/2021. MRI brain 10/4/2021    FINDINGS:    Interval placement of embolic coil mass right parasellar region.    Similar mild ventricular dilatation.    No midline shift. Basal cisterns poorly evaluated due to streak artifact.    No acute intracranial hemorrhage, mass effect, or brain edema. Extensive white matter microvascular ischemic disease.    The visualized paranasal sinuses and mastoid air cells are clear.    IMPRESSION:    Interval placement of embolic coil mass right parasellar region.  No acute intracranial hemorrhage, mass effect, or brain edema.  Similar mild ventricular dilatation.        --- End of Report ---      AFIA DAILY MD; Attending Radiologist  This document has been electronically signed. Oct 13 2021  9:31AM    < end of copied text >      < from: MR Head No Cont (10.15.21 @ 16:40) >    EXAM:  MR BRAIN                            PROCEDURE DATE:  10/15/2021    ATION:  MRI OF THE BRAIN WITHOUT CONTRAST    CLINICAL INDICATION: Status post right posterior communicating artery aneurysm status post balloon assisted coilembolization    TECHNIQUE: Multiplanar multisequence noncontrast MRI of the brain was performed.    COMPARISON: CT brain, 10/13/2021 and MRI brain, 10/4/2021.    FINDINGS:    The ventricular and sulcal size and configuration is age appropriate. Thereare scattered T2/FLAIR hyperintensities in the subcortical and periventricular white matter which are nonspecific but most commonly represent chronic microvascular ischemic changes.    The previously seen acute lacunar infarctions along left MCA distribution are decreased in diffusion restriction intensity, consistent with evolution of acute infarctions to late subacute stage. In addition, new punctate acute lacunar infarctions have developed in the posterior wall of the right temporal lobe and in the left cerebellar hemisphere along right parietal distribution. There is no susceptibility signal to suggest hemorrhagic conversion.  There is a chronic infarction in the right cerebellum and in the left thalamus.    There is susceptibility signalin the right paraclinoid region at the site of the previously coiled right P-comm aneurysm.    There is abnormal extra-axial fluid collection or hydrocephalus. The visualized globes are symmetric bilaterally.    The visualized paranasal sinuses and mastoid bones are adequately developed and aerated.    IMPRESSION:    Comparison previous studies,    There has been interval evolution of previously seen infarcts in right MCA distribution as described. There is development of new acute lacunar infarctions at the posterior pole of the right temporal lobe and in the right cerebellum as described.    There is no intracranial hemorrhage, midline shift or mass effect.    Coil mass in the right paraclinoid region.    Notification to clinician of alert:    Provider   Dr. BARGER  was notified about the final results at 10/15/2021 10:23 AM via telephone by neuroradiologist KEITH Tineo. Readback confirmation was obtained.      JULIO TINEO MD; Attending Radiologist  This document has been electronically signed. Oct 15 2021 10:35AM    < end of copied text >  < from: CT Chest No Cont (10.15.21 @ 23:27) >    EXAM:  CT CHEST                          INTERPRETATION:  CLINICAL INFORMATION: Shortness breath, evaluate for pulmonary infection or pulmonary edema.    COMPARISON: CT chest 9/6/2014. CT abdomen pelvis 10/11/2021.    CONTRAST/COMPLICATIONS:  IV Contrast: None  Oral Contrast: None  Complications: None    PROCEDURE:  CT of the Chest was performed.  Sagittal and coronal reformats were performed.    FINDINGS:    LUNGS AND AIRWAYS: Emphysema. No pulmonary nodules or parenchymal consolidations.  PLEURA: Small bilateral pleural effusions.    MEDIASTINUM AND RED: 1.2 cm right paratracheal lymph node (2:40), nonspecific. No axillary lymphadenopathy    VESSELS: Calcified atherosclerotic plaques in aorta and coronary arteries.    HEART: Multifocal cardiac enlargement is noted. No pericardial effusion. Mitral annular calcifications.    CHEST WALL AND LOWER NECK: Within normal limits.    VISUALIZED UPPER ABDOMEN: Partially imaged large hypodense hepatic lesion measuring 6.9 x 8.8 cm, similar in appearance compared to prior CT abdomen pelvis 10/11/2021. Simple cyst in the left kidney    BONES: Degenerative changes of the spine.    IMPRESSION:    Small bilateral pleural effusions with minimal bilateral groundglass opacities and a few areas of interlobular septal thickening. Findings are suggestive of mild interstitial edema. This is superimposed on a background of emphysema.    Redemonstration of large hypodense hepatic lesion, similar appearance whencompared to prior abdominal pelvis 10/11/2021. As mentioned on prior report, consider contrast enhanced MRI for further evaluation.      ALIYA BERTRAND MD; Resident Radiology  This document has been electronicallysigned.  ABRIL PINEDA MD; Attending Radiologist  This document has been electronically signed. Oct 16 2021  9:09AM    < end of copied text >  < from: MR Abdomen w/ IV Cont (10.22.21 @ 23:26) >    EXAM:  MR ABDOMEN IC                          PROCEDURE DATE:  10/22/2021      INTERPRETATION:  CLINICAL INFORMATION: Hepatic mass.    COMPARISON: Noncontrast CT abdomen and pelvis 10/11/2021    CONTRAST/COMPLICATIONS:  IV Contrast: Gadavist  7.5 cc administered   0 cc discarded  Oral Contrast: NONE  Complications: None reported at time of study completion    PROCEDURE:  MRI of the abdomen was performed.  MRCP was performed.    FINDINGS:  LOWER CHEST: Within normal limits.    LIVER: No morphologic evidence of cirrhosis. An 8.9 x 7.1 cm central hepatic lesion involving hepatic segments 4, 5 and 6 with possible extension into superior right hepatic lobe segments demonstrates restricted diffusion with peripheral rim enhancement and delayed central enhancement. While post contrast imaging is limited, MR characteristics strongly favor cholangiocarcinoma.  BILE DUCTS: Normal caliber.  GALLBLADDER: Cholelithiasis.  SPLEEN: Within normal limits.  PANCREAS: Within normal limits.  ADRENALS: Within normal limits.  KIDNEYS/URETERS: Bilateral renal cysts including a 3.3 cm hemorrhagic right renal cyst.    VISUALIZED PORTIONS:  BOWEL: Within normal limits.  PERITONEUM: No ascites.  VESSELS: Hepatic and portal veins are patent.  RETROPERITONEUM/LYMPH NODES: Upper abdominal adenopathy including peripancreatic, portacaval and savage hepatis nodes. Reference lesions as follows.    Peripancreatic node anterior to the portal vein (6:22) 3.7 x 2.6 cm.    Portacaval node (6:24) 2.9 x 1.5 cm.    ABDOMINAL WALL: Within normal limits.  BONES: Within normal limits.    IMPRESSION:  Limited post contrast imaging.    Central hepatic mass as above for which the primary differential consideration is cholangiocarcinoma.    Adenopathy in the peripancreatic, savage hepatis and portacaval regions.    --- End of Report ---      CARLOS ARMSTRONG MD; Attending Radiologist  This document has been electronically signed. Oct 23 2021  9:47AM    < end of copied text >

## 2021-10-25 NOTE — PROGRESS NOTE ADULT - PROBLEM SELECTOR PLAN 2
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 monitor clinically  - c/w ASA 81mg daily  - Plavix 75mg daily for at least 3 week per neurosurgery

## 2021-10-25 NOTE — PROGRESS NOTE ADULT - SUBJECTIVE AND OBJECTIVE BOX
[facility] Division of Hospital Medicine  Renetta Marte MD  Spectra: [phone]      Patient is a 71y old  Male who presents with a chief complaint of Transfer from [facility] for MRA findings (25 Oct 2021 11:44)      SUBJECTIVE / OVERNIGHT EVENTS: no acute events overnight. feels well without complaints. no fever, chills, abd pain, n/v.   ADDITIONAL REVIEW OF SYSTEMS:    MEDICATIONS  (STANDING):  aspirin  chewable 81 milliGRAM(s) Oral daily  atorvastatin 20 milliGRAM(s) Oral at bedtime  bisacodyl 5 milliGRAM(s) Oral at bedtime  cloNIDine 0.3 milliGRAM(s) Oral every 8 hours  clopidogrel Tablet 75 milliGRAM(s) Oral daily  doxazosin 4 milliGRAM(s) Oral at bedtime  hydrALAZINE 100 milliGRAM(s) Oral <User Schedule>  influenza   Vaccine 0.5 milliLiter(s) IntraMuscular once  isosorbide   mononitrate ER Tablet (IMDUR) 120 milliGRAM(s) Oral daily  lactulose Syrup 20 Gram(s) Oral two times a day  metoprolol succinate ER 25 milliGRAM(s) Oral daily  NIFEdipine XL 90 milliGRAM(s) Oral daily  polyethylene glycol 3350 17 Gram(s) Oral every 12 hours  senna 2 Tablet(s) Oral at bedtime  sodium bicarbonate 650 milliGRAM(s) Oral three times a day    MEDICATIONS  (PRN):  acetaminophen    Suspension .. 650 milliGRAM(s) Oral every 6 hours PRN Mild Pain (1 - 3)  simethicone 80 milliGRAM(s) Chew two times a day PRN Upset Stomach      CAPILLARY BLOOD GLUCOSE        I&O's Summary    25 Oct 2021 07:01  -  25 Oct 2021 12:53  --------------------------------------------------------  IN: 0 mL / OUT: 300 mL / NET: -300 mL        PHYSICAL EXAM:  Vital Signs Last 24 Hrs  T(C): 36.3 (25 Oct 2021 12:41), Max: 36.8 (24 Oct 2021 23:34)  T(F): 97.4 (25 Oct 2021 12:41), Max: 98.2 (24 Oct 2021 23:34)  HR: 73 (25 Oct 2021 12:41) (43 - 77)  BP: 130/75 (25 Oct 2021 12:41) (130/75 - 168/80)  BP(mean): --  RR: 18 (25 Oct 2021 12:41) (18 - 18)  SpO2: 96% (25 Oct 2021 12:41) (95% - 100%)    CONSTITUTIONAL: NAD, well-developed, well-groomed  EYES: PERRLA; conjunctiva and sclera clear  ENMT: Moist oral mucosa, no pharyngeal injection or exudates; normal dentition  NECK: Supple, no palpable masses; no thyromegaly  RESPIRATORY: Normal respiratory effort; lungs are clear to auscultation bilaterally  CARDIOVASCULAR: Regular rate and rhythm, normal S1 and S2, no murmur/rub/gallop; No lower extremity edema; Peripheral pulses are 2+ bilaterally  ABDOMEN: Soft, Nondistended, Nontender to palpation, normoactive bowel sounds  MUSCULOSKELETAL:  No clubbing or cyanosis of digits; no joint swelling or tenderness to palpation  PSYCH: A+O to person, place, and time; affect appropriate  NEUROLOGY: CN 2-12 are intact and symmetric; no gross sensory deficits, 5/5 strength throughout  SKIN: No rashes; no palpable lesions    LABS:                        12.1   7.02  )-----------( 818      ( 25 Oct 2021 06:05 )             37.3     10-25    135  |  103  |  47<H>  ----------------------------<  109<H>  4.3   |  17<L>  |  2.87<H>    Ca    9.5      25 Oct 2021 06:05      PT/INR - ( 25 Oct 2021 06:05 )   PT: 12.3 sec;   INR: 1.03 ratio         PTT - ( 25 Oct 2021 06:05 )  PTT:31.1 sec        RADIOLOGY & ADDITIONAL TESTS:  Results Reviewed: no leukocytosis, H/H stable, Cr stable  Imaging Personally Reviewed:  Electrocardiogram Personally Reviewed:    COORDINATION OF CARE:  Care Discussed with Consultants/Other Providers [Y]: neurosurgery NP [place]  Prior or Outpatient Records Reviewed [Y]: IR, Hepatology, Surg Onc, Neurology, Cardiology progress notes   Hospitalist Accept/Progress Note     [facility] Division of Hospital Medicine  Renetta Marte MD  Spectra: [phone]      Patient is a 71y old  Male who presents with a chief complaint of Transfer from [facility] for MRA findings (25 Oct 2021 11:44)      SUBJECTIVE / OVERNIGHT EVENTS: no acute events overnight. feels well without complaints. no fever, chills, abd pain, n/v.   ADDITIONAL REVIEW OF SYSTEMS:    MEDICATIONS  (STANDING):  aspirin  chewable 81 milliGRAM(s) Oral daily  atorvastatin 20 milliGRAM(s) Oral at bedtime  bisacodyl 5 milliGRAM(s) Oral at bedtime  cloNIDine 0.3 milliGRAM(s) Oral every 8 hours  clopidogrel Tablet 75 milliGRAM(s) Oral daily  doxazosin 4 milliGRAM(s) Oral at bedtime  hydrALAZINE 100 milliGRAM(s) Oral <User Schedule>  influenza   Vaccine 0.5 milliLiter(s) IntraMuscular once  isosorbide   mononitrate ER Tablet (IMDUR) 120 milliGRAM(s) Oral daily  lactulose Syrup 20 Gram(s) Oral two times a day  metoprolol succinate ER 25 milliGRAM(s) Oral daily  NIFEdipine XL 90 milliGRAM(s) Oral daily  polyethylene glycol 3350 17 Gram(s) Oral every 12 hours  senna 2 Tablet(s) Oral at bedtime  sodium bicarbonate 650 milliGRAM(s) Oral three times a day    MEDICATIONS  (PRN):  acetaminophen    Suspension .. 650 milliGRAM(s) Oral every 6 hours PRN Mild Pain (1 - 3)  simethicone 80 milliGRAM(s) Chew two times a day PRN Upset Stomach      CAPILLARY BLOOD GLUCOSE        I&O's Summary    25 Oct 2021 07:01  -  25 Oct 2021 12:53  --------------------------------------------------------  IN: 0 mL / OUT: 300 mL / NET: -300 mL        PHYSICAL EXAM:  Vital Signs Last 24 Hrs  T(C): 36.3 (25 Oct 2021 12:41), Max: 36.8 (24 Oct 2021 23:34)  T(F): 97.4 (25 Oct 2021 12:41), Max: 98.2 (24 Oct 2021 23:34)  HR: 73 (25 Oct 2021 12:41) (43 - 77)  BP: 130/75 (25 Oct 2021 12:41) (130/75 - 168/80)  BP(mean): --  RR: 18 (25 Oct 2021 12:41) (18 - 18)  SpO2: 96% (25 Oct 2021 12:41) (95% - 100%)    CONSTITUTIONAL: NAD, well-developed, well-groomed  EYES: PERRLA; conjunctiva and sclera clear  ENMT: Moist oral mucosa, no pharyngeal injection or exudates; normal dentition  NECK: Supple, no palpable masses; no thyromegaly  RESPIRATORY: Normal respiratory effort; lungs are clear to auscultation bilaterally  CARDIOVASCULAR: Regular rate and rhythm, normal S1 and S2, no murmur/rub/gallop; No lower extremity edema; Peripheral pulses are 2+ bilaterally  ABDOMEN: Soft, Nondistended, Nontender to palpation, normoactive bowel sounds  MUSCULOSKELETAL:  No clubbing or cyanosis of digits; no joint swelling or tenderness to palpation  PSYCH: A+O to person, place, and time; affect appropriate  NEUROLOGY: CN 2-12 are intact and symmetric; no gross sensory deficits, 5/5 strength throughout  SKIN: No rashes; no palpable lesions    LABS:                        12.1   7.02  )-----------( 818      ( 25 Oct 2021 06:05 )             37.3     10-25    135  |  103  |  47<H>  ----------------------------<  109<H>  4.3   |  17<L>  |  2.87<H>    Ca    9.5      25 Oct 2021 06:05      PT/INR - ( 25 Oct 2021 06:05 )   PT: 12.3 sec;   INR: 1.03 ratio         PTT - ( 25 Oct 2021 06:05 )  PTT:31.1 sec        RADIOLOGY & ADDITIONAL TESTS:  Results Reviewed: no leukocytosis, H/H stable, Cr stable  Imaging Personally Reviewed:  Electrocardiogram Personally Reviewed:    COORDINATION OF CARE:  Care Discussed with Consultants/Other Providers [Y]: neurosurgery NP [place]  Prior or Outpatient Records Reviewed [Y]: IR, Hepatology, Surg Onc, Neurology, Cardiology progress notes

## 2021-10-25 NOTE — CONSULT NOTE ADULT - CONSULT REASON
abn creatinine requiring neuro angio
Stroke
Liver mass
co-management, pre-operative optimization
Liver mass
MARIANNA on CKD stage 4
Liver biospy needed
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
Cardiac Clearance

## 2021-10-25 NOTE — PROGRESS NOTE ADULT - SUBJECTIVE AND OBJECTIVE BOX
Patient seen and examined  no complaints    No Known Allergies    Hospital Medications:   MEDICATIONS  (STANDING):  aspirin  chewable 81 milliGRAM(s) Oral daily  atorvastatin 20 milliGRAM(s) Oral at bedtime  bisacodyl 5 milliGRAM(s) Oral at bedtime  cloNIDine 0.3 milliGRAM(s) Oral every 8 hours  clopidogrel Tablet 75 milliGRAM(s) Oral daily  doxazosin 4 milliGRAM(s) Oral at bedtime  hydrALAZINE 100 milliGRAM(s) Oral <User Schedule>  influenza   Vaccine 0.5 milliLiter(s) IntraMuscular once  isosorbide   mononitrate ER Tablet (IMDUR) 120 milliGRAM(s) Oral daily  lactulose Syrup 20 Gram(s) Oral two times a day  metoprolol succinate ER 25 milliGRAM(s) Oral daily  NIFEdipine XL 90 milliGRAM(s) Oral daily  polyethylene glycol 3350 17 Gram(s) Oral every 12 hours  senna 2 Tablet(s) Oral at bedtime  sodium bicarbonate 650 milliGRAM(s) Oral three times a day        VITALS:  T(F): 97.4 (10-25-21 @ 12:41), Max: 98.2 (10-24-21 @ 23:34)  HR: 73 (10-25-21 @ 12:41)  BP: 130/75 (10-25-21 @ 12:41)  RR: 18 (10-25-21 @ 12:41)  SpO2: 96% (10-25-21 @ 12:41)  Wt(kg): --    10-25 @ 07:01  -  10-25 @ 13:28  --------------------------------------------------------  IN: 0 mL / OUT: 300 mL / NET: -300 mL        Physical Exam :-  Constitutional: NAD  Neck: Supple.  Respiratory: Bilateral equal breath sounds, few Crackles present.  Cardiovascular: S1, S2 normal, positive Murmur  Gastrointestinal: Bowel Sounds present, soft, non tender.  Extremities: no Edema Feet  Neurological: Alert and Oriented x 3  Psychiatric: Normal mood, normal affect    LABS:  10-25    135  |  103  |  47<H>  ----------------------------<  109<H>  4.3   |  17<L>  |  2.87<H>    Ca    9.5      25 Oct 2021 06:05      Creatinine Trend: 2.87 <--, 2.80 <--, 2.81 <--, 2.74 <--, 2.88 <--, 3.22 <--                        12.1   7.02  )-----------( 818      ( 25 Oct 2021 06:05 )             37.3     Urine Studies:      RADIOLOGY & ADDITIONAL STUDIES:

## 2021-10-25 NOTE — PROGRESS NOTE ADULT - PROBLEM SELECTOR PLAN 1
MRI abd shows central hepatic mass for which the primary differential consideration is cholangiocarcinoma and adenopathy in the peripancreatic, savage hepatis and portacaval regions.  - CEA elevated, Ca 19-9 mildly elevated  - Surgical Onc and hepatology recs appreciated  - would require biopsy for diagnosis  - IR consulted, deferred IR guided bx as patient on DAPT - would need to be held prior to procedure  - [facility] consulted, recs appreciated. 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 recommending against biopsy at this time due to high risk of seeding  - however, will plan for colonoscopy on Wed, 10/27  - clears tomorrow, with bowel prep Tues night and NPO after midnight on Tues night  - will need EGD to clear upper GI tract +/- EUS/FNA of LN if malignant appearing once off plavix x 5 days (needs 3 weeks of plavix per neurosugery) - this, can be done outpatient however  - will need outpatient PET scan  - formal heme/onc consult pending biopsy results MRI abd shows central hepatic mass for which the primary differential consideration is cholangiocarcinoma and adenopathy in the peripancreatic, savage hepatis and portacaval regions.  - CEA elevated, Ca 19-9 mildly elevated  - Surgical Onc and hepatology recs appreciated  - Would require biopsy for diagnosis  - IR consulted, deferred IR guided bx as patient on DAPT - would need to be held prior to procedure  - [facility] consulted, recs appreciated. Per their review, likely iCCA, recommending against biopsy at this time due to high risk of seeding  - however, will plan for colonoscopy on Wed, 10/27  - clears Tuesday 10/26, with bowel prep Tues night and NPO after midnight   - will need EGD to clear upper GI tract +/- EUS/FNA of LN if malignant appearing once off plavix x 5 days (needs 3 weeks of plavix per neurosugery) - can be done outpatient   - will need outpatient PET scan  - formal heme/onc consult pending biopsy results

## 2021-10-25 NOTE — PROGRESS NOTE ADULT - SUBJECTIVE AND OBJECTIVE BOX
Internal Medicine   More Weber | PGY-1    OVERNIGHT EVENTS:       SUBJECTIVE:       MEDICATIONS  (STANDING):  aspirin  chewable 81 milliGRAM(s) Oral daily  atorvastatin 20 milliGRAM(s) Oral at bedtime  bisacodyl 5 milliGRAM(s) Oral at bedtime  cloNIDine 0.3 milliGRAM(s) Oral every 8 hours  clopidogrel Tablet 75 milliGRAM(s) Oral daily  doxazosin 4 milliGRAM(s) Oral at bedtime  hydrALAZINE 100 milliGRAM(s) Oral <User Schedule>  influenza   Vaccine 0.5 milliLiter(s) IntraMuscular once  isosorbide   mononitrate ER Tablet (IMDUR) 120 milliGRAM(s) Oral daily  lactulose Syrup 20 Gram(s) Oral two times a day  metoprolol succinate ER 25 milliGRAM(s) Oral daily  NIFEdipine XL 90 milliGRAM(s) Oral daily  polyethylene glycol 3350 17 Gram(s) Oral every 12 hours  senna 2 Tablet(s) Oral at bedtime  sodium bicarbonate 650 milliGRAM(s) Oral three times a day    MEDICATIONS  (PRN):  acetaminophen    Suspension .. 650 milliGRAM(s) Oral every 6 hours PRN Mild Pain (1 - 3)  simethicone 80 milliGRAM(s) Chew two times a day PRN Upset Stomach        T(F): 97.3 (10-25-21 @ 15:11), Max: 98.2 (10-24-21 @ 23:34)  HR: 77 (10-25-21 @ 15:11) (43 - 77)  BP: 136/82 (10-25-21 @ 15:11) (130/75 - 168/80)  BP(mean): --  RR: 18 (10-25-21 @ 15:11) (18 - 18)  SpO2: 94% (10-25-21 @ 15:11) (94% - 99%)    PHYSICAL EXAM:     GENERAL: NAD, lying in bed comfortably.  HEAD:  Atraumatic, normocephalic.  EYES: EOMI, PERRLA, conjunctiva and sclera clear, no nystagmus noted.  ENT: Moist mucous membranes,   NECK: Supple. No JVD. Trachea midline.  CHEST/LUNG: CTAB. No rales, rhonchi, wheezing, or rubs. Unlabored respirations.  HEART: RRR, no M/R/G, normal S1/S2.  ABDOMEN: Soft, nontender, nondistended, no organomegaly. Normoactive bowel sounds.  EXTREMITIES:  2+ peripheral pulses b/l, brisk capillary refill. No clubbing, cyanosis, or edema.  MSK: No gross deformities noted.   NEURO:  AAOx3, no focal deficits.   SKIN: No rashes or lesions.  PSYCH: Normal mood, affect.    TELEMETRY:    LABS:                        12.1   7.02  )-----------( 818      ( 25 Oct 2021 06:05 )             37.3     10-25    135  |  103  |  47<H>  ----------------------------<  109<H>  4.3   |  17<L>  |  2.87<H>    Ca    9.5      25 Oct 2021 06:05          PT/INR - ( 25 Oct 2021 06:05 )   PT: 12.3 sec;   INR: 1.03 ratio         PTT - ( 25 Oct 2021 06:05 )  PTT:31.1 sec    Creatinine Trend: 2.87<--, 2.80<--, 2.81<--, 2.74<--, 2.88<--, 3.22<--  I&O's Summary    25 Oct 2021 07:01  -  25 Oct 2021 17:49  --------------------------------------------------------  IN: 0 mL / OUT: 300 mL / NET: -300 mL      BNP    RADIOLOGY & ADDITIONAL STUDIES:             Internal Medicine   More Weber | PGY-1    OVERNIGHT EVENTS: KATIE      SUBJECTIVE: Patient was seen and examined at bedside. He has no acute complaints. Denies F/C, N/V/D, headache, dizziness, weakness, shortness of breath, abdominal pain, chest pain or dysuria. Patient responding appropriately to questions and able to make needs known.       MEDICATIONS  (STANDING):  aspirin  chewable 81 milliGRAM(s) Oral daily  atorvastatin 20 milliGRAM(s) Oral at bedtime  bisacodyl 5 milliGRAM(s) Oral at bedtime  cloNIDine 0.3 milliGRAM(s) Oral every 8 hours  clopidogrel Tablet 75 milliGRAM(s) Oral daily  doxazosin 4 milliGRAM(s) Oral at bedtime  hydrALAZINE 100 milliGRAM(s) Oral <User Schedule>  influenza   Vaccine 0.5 milliLiter(s) IntraMuscular once  isosorbide   mononitrate ER Tablet (IMDUR) 120 milliGRAM(s) Oral daily  lactulose Syrup 20 Gram(s) Oral two times a day  metoprolol succinate ER 25 milliGRAM(s) Oral daily  NIFEdipine XL 90 milliGRAM(s) Oral daily  polyethylene glycol 3350 17 Gram(s) Oral every 12 hours  senna 2 Tablet(s) Oral at bedtime  sodium bicarbonate 650 milliGRAM(s) Oral three times a day    MEDICATIONS  (PRN):  acetaminophen    Suspension .. 650 milliGRAM(s) Oral every 6 hours PRN Mild Pain (1 - 3)  simethicone 80 milliGRAM(s) Chew two times a day PRN Upset Stomach        T(F): 97.3 (10-25-21 @ 15:11), Max: 98.2 (10-24-21 @ 23:34)  HR: 77 (10-25-21 @ 15:11) (43 - 77)  BP: 136/82 (10-25-21 @ 15:11) (130/75 - 168/80)  BP(mean): --  RR: 18 (10-25-21 @ 15:11) (18 - 18)  SpO2: 94% (10-25-21 @ 15:11) (94% - 99%)    PHYSICAL EXAM:     GENERAL: NAD, sitting in chair comfortably.  HEAD:  Atraumatic, normocephalic.  EYES: PERRL, miotic, scleral icterus.  ENT: Moist mucous membranes, jaundice of tongue.   CHEST/LUNG: CTAB. No rales, rhonchi, wheezing, or rubs. Unlabored respirations.  HEART: RRR, no M/R/G, normal S1/S2.  ABDOMEN: Distended, nontender, focal nodularity RLQ/periumbilical area. Normoactive bowel sounds.  EXTREMITIES:  2+ peripheral pulses b/l. No clubbing, cyanosis, or edema. Nodularity on L lower tibia.  NEURO:  AAOx3, no focal deficits. Strength 5/5 throughout.  SKIN: Dry, flaky feet.  PSYCH: Normal mood, affect.      LABS:                        12.1   7.02  )-----------( 818      ( 25 Oct 2021 06:05 )             37.3     10-25    135  |  103  |  47<H>  ----------------------------<  109<H>  4.3   |  17<L>  |  2.87<H>    Ca    9.5      25 Oct 2021 06:05          PT/INR - ( 25 Oct 2021 06:05 )   PT: 12.3 sec;   INR: 1.03 ratio         PTT - ( 25 Oct 2021 06:05 )  PTT:31.1 sec    Creatinine Trend: 2.87<--, 2.80<--, 2.81<--, 2.74<--, 2.88<--, 3.22<--  I&O's Summary    25 Oct 2021 07:01  -  25 Oct 2021 17:49  --------------------------------------------------------  IN: 0 mL / OUT: 300 mL / NET: -300 mL

## 2021-10-25 NOTE — PROGRESS NOTE ADULT - ASSESSMENT
71 yo male with CKD (baseline Cr > 3), HTN, and HLD who presented as a transfer from [facility] for higher level of care. Patient initially presented to [facility] on 10/01 after multiple falls the since the day prior. MRI Head at [facility] showed acute vs subacute R MCA territory infarct with associated cytotoxic edema. MRA H showed multilobulated right-sided P-comm aneurysm directed posteriorly and laterally, measuring 8.9 x 7.3 mm.TTE: EF 65-70%, severely enlarged L atrium. CT abdomen 10/11  reveals large hypodense 8.5 cm hepatic lesion concerning for malignancy .    10/12 s/p coiling right PCOMM aneurysm. Post op CT head with evolution of previous infarcts in right MCA, now with new acute lacunar infarctions in the right temporal lobe and cerebellum. On DAPT     Plan    Neuro stable. Continue ASA/ Plavix x 3 weeks total . May hold for biopsy if urgent as per stroke neurology   HTN- BP better controlled  Imdur  120mg On Clonidine 0.3q8 Hydrlazine 100q8 Nifedipine 90 QD  CKD- creatinine stable. on bicarb tabs   Liver lesion-  [facility] consult for possible EUS & biopsy liver lesion .   DVT ppx- off  SQH,as patient  bleeding from subcutaneous heparin admin sites . Patient ambulatory with assistance . Continue venodynes in bed   PT/OT CLYDE- D/c to rehab if GI work up outpatient

## 2021-10-25 NOTE — PROGRESS NOTE ADULT - SUBJECTIVE AND OBJECTIVE BOX
SUBJECTIVE:     OVERNIGHT EVENTS: none    Vital Signs Last 24 Hrs  T(C): 36.3 (25 Oct 2021 12:41), Max: 36.8 (24 Oct 2021 23:34)  T(F): 97.4 (25 Oct 2021 12:41), Max: 98.2 (24 Oct 2021 23:34)  HR: 73 (25 Oct 2021 12:41) (43 - 77)  BP: 130/75 (25 Oct 2021 12:41) (130/75 - 168/80)  BP(mean): --  RR: 18 (25 Oct 2021 12:41) (18 - 18)  SpO2: 96% (25 Oct 2021 12:41) (95% - 100%)    PHYSICAL EXAM:    Neurological: Awake alert Ox3 , Speech clear Following Commands, Moving all Extremities 5/5 except LUE 4+/5     Pulmonary: Clear to Auscultation,    Cardiovascular: S1, S2, Regular rate and rhythm     Gastrointestinal: Soft, Non-tender, Non-distended     Extremities: No calf tenderness     LABS:                        12.1   7.02  )-----------( 818      ( 25 Oct 2021 06:05 )             37.3    10-25    135  |  103  |  47<H>  ----------------------------<  109<H>  4.3   |  17<L>  |  2.87<H>    Ca    9.5      25 Oct 2021 06:05    PT/INR - ( 25 Oct 2021 06:05 )   PT: 12.3 sec;   INR: 1.03 ratio   PTT:31.1 sec          IMAGING:     10/23 MR abd- Central hepatic mass as above for which the primary differential consideration is cholangiocarcinoma.    Adenopathy in the peripancreatic, savage hepatis and portacaval regions.    MEDICATIONS:    acetaminophen    Suspension .. 650 milliGRAM(s) Oral every 6 hours PRN Mild Pain (1 - 3)  cloNIDine 0.3 milliGRAM(s) Oral every 8 hours  doxazosin 4 milliGRAM(s) Oral at bedtime  hydrALAZINE 100 milliGRAM(s) Oral <User Schedule>  isosorbide   mononitrate ER Tablet (IMDUR) 120 milliGRAM(s) Oral daily  metoprolol succinate ER 25 milliGRAM(s) Oral daily  NIFEdipine XL 90 milliGRAM(s) Oral daily  bisacodyl 5 milliGRAM(s) Oral at bedtime  lactulose Syrup 20 Gram(s) Oral two times a day  polyethylene glycol 3350 17 Gram(s) Oral every 12 hours  senna 2 Tablet(s) Oral at bedtime  simethicone 80 milliGRAM(s) Chew two times a day PRN Upset Stomach   aspirin  chewable 81 milliGRAM(s) Oral daily  atorvastatin 20 milliGRAM(s) Oral at bedtime  clopidogrel Tablet 75 milliGRAM(s) Oral daily  influenza   Vaccine 0.5 milliLiter(s) IntraMuscular once  sodium bicarbonate 650 milliGRAM(s) Oral three times a day      DIET:

## 2021-10-25 NOTE — PROGRESS NOTE ADULT - ASSESSMENT
70yoM w/ PMHx of CKD IV (unknown baseline), uncontrolled HTN, ?CAD (patient states he has "heart problems;" TTE performed at [facility] on 10/5 w/ normal LVF, severely enlarged LA, Grade III diastolic dysfunction, mild pulm HTN) who was transfered from [facility] to where he presented w/ hx of multiple falls, found to have acute right MCA territory ischemic infarct w/ extensive chronic microvascular ischemic changes on CT, acute/subacute right-sided MCA distribution infarct with associated cytotoxic edema, on MRI, and Pcomm aneurysm vs thrombus [id number], mild R M1 stenosis on MRA. No

## 2021-10-25 NOTE — CONSULT NOTE ADULT - PROVIDER SPECIALTY LIST ADULT
Hepatology
Intervent Radiology
Neurology
Nephrology
Gastroenterology
Hospitalist
Nephrology
Surgery
Cardiology

## 2021-10-25 NOTE — PROGRESS NOTE ADULT - ASSESSMENT
69 yo male with CKD (baseline Cr > 3), HTN, and HLD who presented as a transfer from [facility] for higher level of care. Patient initially presented to [facility] on 10/01 after multiple falls the since the day prior. MRI Head at [facility] showed acute vs subacute R MCA territory infarct with associated cytotoxic edema. MRA H showed multilobulated right-sided P-comm aneurysm directed posteriorly and laterally, measuring 8.9 x 7.3 mm.TTE: EF 65-70%, severely enlarged L atrium. CT abdomen 10/11  reveals large hypodense 8.5 cm hepatic lesion concerning for malignancy. 10/12 s/p coiling right PCOMM aneurysm. Post op CT head with evolution of previous infarcts in right MCA, now with new acute lacunar infarctions in the right temporal lobe and cerebellum. On DAPT.    Plan:  - MRI performed, read as likely cholangio involving segments 4,5,6 with peripancreatic and portocaval nodes  - GI should be contacted for EGD, Colonoscopy  - Do not necessarily need IR bx or EUS from surgical oncology perspective  - Appreciate input from heme/onc -> rec thrombocytosis workup, IR bx  - Patient should have PET Scan outpatient as part of workup    [facility] Surgery  [id number]  Sam Nunez

## 2021-10-25 NOTE — PROGRESS NOTE ADULT - ASSESSMENT
69yo M w/ CKD (baseline Cr >3), HTN, HLD who presents as transfer from [facility] for aneurysm. Initially presented to OSH after multiple falls and MRI/MRA was concerning for aneurysm vs. thrombus with multiple infarcts. On this admission, MRI brain with acute/subacute R MCA infarct. MRA brain with R sided P-comm aneurysm, mild narrowing of proximal R MCA in M1 region. S/p angio and balloon assisted coiling of R P-comm aneurysm. Neurology thought CVA may have been due to hypercoagulable state from possible underlying malignancy so malignancy workup was pursued. On CT A/P non-con for he was incidentally found to have 8.5cm hepatic lesion. CT chest with mild interstitial edema but no mention of metastatic disease. MR abdomen showed 8.9x7.1cm central hepatic lesion (favor cholangiocarcinoma) and upper abdominal LN (peripancreatic, portacaval, and savage hepatis). CA 19-9 37 (H), AFP 7.6, CEA 5.1 (H). Hepatology consulted for workup management of new dx of probable cholangiocarcinoma and liver lesion.    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 Patient evaluated by Surgical Oncology.       Recommendations:  -Management per Hem/Onc and Surgical Oncology, pending tissue sample    Please reach out to us for any question or concern.     Errol Ortez MD  Gastroenterology/Hepatology Fellow  1st option: [phone] (text or call), ONLY available from 7:00 am to 5:00 pm.   **Contact on-call GI fellow via answering service ([phone]) from 5pm-7am AND on weekends/holidays**  2nd option: Available via Microsoft Teams  3rd option: Pager: [phone]    NON-URGENT CONSULTS:  Please email [email or web address]  AT NIGHT AND ON WEEKENDS:  Contact on-call GI fellow via answering service ([phone]) from 5pm-8am AND on weekends/holidays         69yo M w/ CKD (baseline Cr >3), HTN, HLD who presents as transfer from OSH for aneurysm. Initially presented to OSH after multiple falls and MRI/MRA was concerning for aneurysm vs. thrombus with multiple infarcts. On this admission, MRI brain with acute/subacute R MCA infarct. MRA brain with R sided P-comm aneurysm, mild narrowing of proximal R MCA in M1 region. S/p angio and balloon assisted coiling of R P-comm aneurysm. Neurology thought CVA may have been due to hypercoagulable state from possible underlying malignancy so malignancy workup was pursued. On CT A/P non-con for he was incidentally found to have 8.5cm hepatic lesion. CT chest with mild interstitial edema but no mention of metastatic disease. MR abdomen showed 8.9x7.1cm central hepatic lesion (favor cholangiocarcinoma) and upper abdominal LN (peripancreatic, portacaval, and savage hepatis). CA 19-9 37 (H), AFP 7.6, CEA 5.1 (H). Hepatology consulted for workup management of new dx of probable cholangiocarcinoma and liver lesion.    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 NO plan for biopsy given on DAPT.       Recommendations:  -EGD/Colonoscopy per GI  -Rest per Hem/Onc and Surg Onc.    Please reach out to us for any question or concern.     Errol Ortez MD  Gastroenterology/Hepatology Fellow  1st option: [phone] (text or call), ONLY available from 7:00 am to 5:00 pm.   **Contact on-call GI fellow via answering service ([phone]) from 5pm-7am AND on weekends/holidays**  2nd option: Available via Microsoft Teams  3rd option: Pager: [phone]    NON-URGENT CONSULTS:  Please email [email or web address]  AT NIGHT AND ON WEEKENDS:  Contact on-call GI fellow via answering service ([phone]) from 5pm-8am AND on weekends/holidays

## 2021-10-25 NOTE — PROGRESS NOTE ADULT - PROBLEM SELECTOR PLAN 9
#DVT PPx: on DAPT; holding heparing subq   #Diet: DASH  #Dispo: PT f/u #DVT PPx: on DAPT; holding heparing subq   #Diet: Renal diet  #Dispo: PT f/u #DVT PPx: on DAPT; holding heparing subq   #Diet: Renal diet  #Dispo: PT f/u, SW

## 2021-10-25 NOTE — CONSULT NOTE ADULT - SUBJECTIVE AND OBJECTIVE BOX
Chief Complaint:  Patient is a 71y old  Male who presents with a chief complaint of Transfer from [facility] for MRA findings (25 Oct 2021 11:05)      HPI:  Mr. Huertas is a 69yo M with PMH of CKD (baseline Cr >3), HTN, HLD who presents as transfer from OSH for cerebral aneurysm. Advanced GI consulted for a liver mass.     Patient Initially presented to OSH after multiple falls found to have acute/subacute R MCA infarct. MRA brain with R sided P-comm aneurysm, mild narrowing of proximal R MCA in M1 region. S/p angio and balloon assisted coiling of R P-comm aneurysm, now on aspirin and plavix.    Given concern for a hypercoagulable state a CT A/P was done, showing a 8.5cm hepatic lesion.  MR abdomen showed 8.9x7.1cm central hepatic lesion (favor cholangiocarcinoma) and upper abdominal LN (peripancreatic, portacaval, and savage hepatis). CA 19-9 37 (H), AFP 7.6, CEA 5.1 (H).           Hepatology consulted for workup management of new dx of probable cholangiocarcinoma and liver lesion.  Allergies:  No Known Allergies      Home Medications:    Hospital Medications:  acetaminophen    Suspension .. 650 milliGRAM(s) Oral every 6 hours PRN  aspirin  chewable 81 milliGRAM(s) Oral daily  atorvastatin 20 milliGRAM(s) Oral at bedtime  bisacodyl 5 milliGRAM(s) Oral at bedtime  cloNIDine 0.3 milliGRAM(s) Oral every 8 hours  clopidogrel Tablet 75 milliGRAM(s) Oral daily  doxazosin 4 milliGRAM(s) Oral at bedtime  hydrALAZINE 100 milliGRAM(s) Oral <User Schedule>  influenza   Vaccine 0.5 milliLiter(s) IntraMuscular once  isosorbide   mononitrate ER Tablet (IMDUR) 120 milliGRAM(s) Oral daily  lactulose Syrup 20 Gram(s) Oral two times a day  metoprolol succinate ER 25 milliGRAM(s) Oral daily  NIFEdipine XL 90 milliGRAM(s) Oral daily  polyethylene glycol 3350 17 Gram(s) Oral every 12 hours  senna 2 Tablet(s) Oral at bedtime  simethicone 80 milliGRAM(s) Chew two times a day PRN  sodium bicarbonate 650 milliGRAM(s) Oral three times a day  sodium chloride 0.9%. 1000 milliLiter(s) IV Continuous <Continuous>      PMHX/PSHX:  Frequent falls    Hypertension    Chronic kidney disease, unspecified CKD stage        Family history:      Denies family history of colon cancer/polyps, stomach cancer/polyps, pancreatic cancer/masses, liver cancer/disease, ovarian cancer and endometrial cancer.    Social History:     Tob: Denies  EtOH: As above  Illicit Drugs: Denies    ROS:   General:  No wt loss, fevers, chills, night sweats, fatigue  Eyes:  Good vision, no reported pain  ENT:  No sore throat, pain, runny nose, dysphagia  CV:  No pain, palpitations, hypo/hypertension  Pulm:  No dyspnea, cough, tachypnea, wheezing  GI:  As per HPI  :  No pain, bleeding, incontinence, nocturia  Muscle:  No pain, weakness  Neuro:  No weakness, tingling, memory problems  Psych:  No fatigue, insomnia, mood problems, depression  Endocrine:  No polyuria, polydipsia, cold/heat intolerance  Heme:  No petechiae, ecchymosis, easy bruisability  Skin:  No rash, tattoos, scars, edema    PHYSICAL EXAM:   GENERAL:  No acute distress  HEENT:  Normocephalic/atraumatic, no scleral icterus  CHEST:  No accessory muscle use  HEART:  Regular rate and rhythm  ABDOMEN:  Soft, non-tender, non-distended, normoactive bowel sounds,  no masses, no hepato-splenomegaly, no signs of chronic liver disease  EXTREMITIES: No cyanosis, clubbing, or edema  SKIN:  No rash  NEURO:  Alert and oriented x 3, no asterixis    Vital Signs:  Vital Signs Last 24 Hrs  T(C): 36.3 (25 Oct 2021 09:01), Max: 36.8 (24 Oct 2021 23:34)  T(F): 97.4 (25 Oct 2021 09:01), Max: 98.2 (24 Oct 2021 23:34)  HR: 44 (25 Oct 2021 09:01) (43 - 77)  BP: 144/80 (25 Oct 2021 09:01) (133/68 - 168/80)  BP(mean): --  RR: 18 (25 Oct 2021 09:01) (18 - 18)  SpO2: 99% (25 Oct 2021 09:01) (95% - 100%)  Daily     Daily     LABS:                        12.1   7.02  )-----------( 818      ( 25 Oct 2021 06:05 )             37.3     Mean Cell Volume: 91.9 fl (10-25-21 @ 06:05)    10-25    135  |  103  |  47<H>  ----------------------------<  109<H>  4.3   |  17<L>  |  2.87<H>    Ca    9.5      25 Oct 2021 06:05        PT/INR - ( 25 Oct 2021 06:05 )   PT: 12.3 sec;   INR: 1.03 ratio         PTT - ( 25 Oct 2021 06:05 )  PTT:31.1 sec                            12.1   7.02  )-----------( 818      ( 25 Oct 2021 06:05 )             37.3                         12.2   7.71  )-----------( 843      ( 23 Oct 2021 07:59 )             38.4       Imaging:           Chief Complaint:  Patient is a 71y old  Male who presents with a chief complaint of Transfer from [facility] for MRA findings (25 Oct 2021 11:05)      HPI:  Mr. Huertas is a 69yo M with PMH of CKD (baseline Cr >3), HTN, HLD who presents as transfer from OSH for cerebral aneurysm. Advanced GI consulted for a liver mass.     Patient Initially presented to OSH after multiple falls found to have acute/subacute R MCA infarct. MRA brain with R sided P-comm aneurysm, mild narrowing of proximal R MCA in M1 region. S/p angio and balloon assisted coiling of R P-comm aneurysm, now on aspirin and plavix.    Given concern for a hypercoagulable state a CT A/P was done, showing a 8.5cm hepatic lesion.  MR abdomen showed 8.9x7.1cm central hepatic lesion (favor cholangiocarcinoma) and upper abdominal LN (peripancreatic, portacaval, and savage hepatis). IR consulted for possible biopsy - deferred as patient is on DAPT.    Patient currently feels well. No n/v/d/c.       Allergies:  No Known Allergies      Home Medications:  aspirin 81 mg oral tablet: 1 tab(s) orally once a day (09 Oct 2021 16:10)  atorvastatin 20 mg oral tablet: 1 tab(s) orally once a day (09 Oct 2021 16:10)  cloNIDine 0.1 mg oral tablet: 1 tab(s) orally 3 times a day (09 Oct 2021 16:10)  hydrALAZINE 100 mg oral tablet: 1 tab(s) orally 3 times a day (09 Oct 2021 16:12)  isosorbide mononitrate 60 mg oral tablet, extended release: 1 tab(s) orally once a day (in the morning) (09 Oct 2021 16:12)  Nifedical XL 60 mg oral tablet, extended release: 1 tab(s) orally once a day (09 Oct 2021 16:12)  Plavix 75 mg oral tablet: 1 tab(s) orally once a day (09 Oct 2021 16:12)    Hospital Medications:  acetaminophen    Suspension .. 650 milliGRAM(s) Oral every 6 hours PRN  aspirin  chewable 81 milliGRAM(s) Oral daily  atorvastatin 20 milliGRAM(s) Oral at bedtime  bisacodyl 5 milliGRAM(s) Oral at bedtime  cloNIDine 0.3 milliGRAM(s) Oral every 8 hours  clopidogrel Tablet 75 milliGRAM(s) Oral daily  doxazosin 4 milliGRAM(s) Oral at bedtime  hydrALAZINE 100 milliGRAM(s) Oral <User Schedule>  influenza   Vaccine 0.5 milliLiter(s) IntraMuscular once  isosorbide   mononitrate ER Tablet (IMDUR) 120 milliGRAM(s) Oral daily  lactulose Syrup 20 Gram(s) Oral two times a day  metoprolol succinate ER 25 milliGRAM(s) Oral daily  NIFEdipine XL 90 milliGRAM(s) Oral daily  polyethylene glycol 3350 17 Gram(s) Oral every 12 hours  senna 2 Tablet(s) Oral at bedtime  simethicone 80 milliGRAM(s) Chew two times a day PRN  sodium bicarbonate 650 milliGRAM(s) Oral three times a day  sodium chloride 0.9%. 1000 milliLiter(s) IV Continuous <Continuous>      PMHX/PSHX:  Frequent falls    Hypertension    Chronic kidney disease, unspecified CKD stage        Family history:      Denies family history of colon cancer/polyps, stomach cancer/polyps, pancreatic cancer/masses, liver cancer/disease, ovarian cancer and endometrial cancer.    Social History:     Tob: Denies  EtOH: As above  Illicit Drugs: Denies    ROS:   General:  No wt loss, fevers, chills, night sweats, fatigue  Eyes:  Good vision, no reported pain  ENT:  No sore throat, pain, runny nose, dysphagia  CV:  No pain, palpitations, hypo/hypertension  Pulm:  No dyspnea, cough, tachypnea, wheezing  GI:  As per HPI  :  No pain, bleeding, incontinence, nocturia  Muscle:  No pain, weakness  Neuro:  No weakness, tingling, memory problems  Psych:  No fatigue, insomnia, mood problems, depression  Endocrine:  No polyuria, polydipsia, cold/heat intolerance  Heme:  No petechiae, ecchymosis, easy bruisability  Skin:  No rash, tattoos, scars, edema    PHYSICAL EXAM:   GENERAL:  No acute distress  HEENT:  Normocephalic/atraumatic, no scleral icterus  CHEST:  No accessory muscle use  HEART:  Regular rate and rhythm  ABDOMEN:  Soft, non-tender, non-distended  EXTREMITIES: No cyanosis, clubbing, or edema  SKIN:  No rash  NEURO:  Alert and oriented x 3, no asterixis    Vital Signs:  Vital Signs Last 24 Hrs  T(C): 36.3 (25 Oct 2021 09:01), Max: 36.8 (24 Oct 2021 23:34)  T(F): 97.4 (25 Oct 2021 09:01), Max: 98.2 (24 Oct 2021 23:34)  HR: 44 (25 Oct 2021 09:01) (43 - 77)  BP: 144/80 (25 Oct 2021 09:01) (133/68 - 168/80)  BP(mean): --  RR: 18 (25 Oct 2021 09:01) (18 - 18)  SpO2: 99% (25 Oct 2021 09:01) (95% - 100%)  Daily     Daily     LABS:                        12.1   7.02  )-----------( 818      ( 25 Oct 2021 06:05 )             37.3     Mean Cell Volume: 91.9 fl (10-25-21 @ 06:05)    10-25    135  |  103  |  47<H>  ----------------------------<  109<H>  4.3   |  17<L>  |  2.87<H>    Ca    9.5      25 Oct 2021 06:05        PT/INR - ( 25 Oct 2021 06:05 )   PT: 12.3 sec;   INR: 1.03 ratio         PTT - ( 25 Oct 2021 06:05 )  PTT:31.1 sec                            12.1   7.02  )-----------( 818      ( 25 Oct 2021 06:05 )             37.3                         12.2   7.71  )-----------( 843      ( 23 Oct 2021 07:59 )             38.4       Imaging:    EXAM:  MR ABDOMEN IC                            PROCEDURE DATE:  10/22/2021            INTERPRETATION:  CLINICAL INFORMATION: Hepatic mass.    COMPARISON: Noncontrast CT abdomen and pelvis 10/11/2021    CONTRAST/COMPLICATIONS:  IV Contrast: Gadavist  7.5 cc administered   0 cc discarded  Oral Contrast: NONE  Complications: None reported at time of study completion    PROCEDURE:  MRI of the abdomen was performed.  MRCP was performed.    FINDINGS:  LOWER CHEST: Within normal limits.    LIVER: No morphologic evidence of cirrhosis. An 8.9 x 7.1 cm central hepatic lesion involving hepatic segments 4, 5 and 6 with possible extension into superior right hepatic lobe segments demonstrates restricted diffusion with peripheral rim enhancement and delayed central enhancement. While post contrast imaging is limited, MR characteristics strongly favor cholangiocarcinoma.  BILE DUCTS: Normal caliber.  GALLBLADDER: Cholelithiasis.  SPLEEN: Within normal limits.  PANCREAS: Within normal limits.  ADRENALS: Within normal limits.  KIDNEYS/URETERS: Bilateral renal cysts including a 3.3 cm hemorrhagic right renal cyst.    VISUALIZED PORTIONS:  BOWEL: Within normal limits.  PERITONEUM: No ascites.  VESSELS: Hepatic and portal veins are patent.  RETROPERITONEUM/LYMPH NODES: Upper abdominal adenopathy including peripancreatic, portacaval and savage hepatis nodes. Reference lesions as follows.    Peripancreatic node anterior to the portal vein (6:22) 3.7 x 2.6 cm.    Portacaval node (6:24) 2.9 x 1.5 cm.    ABDOMINAL WALL: Within normal limits.  BONES: Within normal limits.    IMPRESSION:  Limited post contrast imaging.    Central hepatic mass as above for which the primary differential consideration is cholangiocarcinoma.    Adenopathy in the peripancreatic, savage hepatis and portacaval regions.    --- End of Report ---                CARLOS ARMSTRONG MD; Attending Radiologist  This document has been electronically signed. Oct 23 2021  9:47AM

## 2021-10-25 NOTE — PROGRESS NOTE ADULT - SUBJECTIVE AND OBJECTIVE BOX
Red Team Progress Note  [id number]    Subjective:   Patient seen at bedside this AM. Denies f/n/c, abdominal pain, n/v, bowel dysfunction.    Objective:  Vital Signs  T(C): 36.4 (10-25 @ 04:15), Max: 36.8 (10-24 @ 23:34)  HR: 43 (10-25 @ 04:15) (43 - 77)  BP: 168/80 (10-25 @ 04:15) (116/60 - 168/80)  RR: 18 (10-25 @ 04:15) (18 - 18)  SpO2: 98% (10-25 @ 04:15) (95% - 100%)    I&O's Detail      Physical Exam:  GEN: resting in bed comfortably in NAD  RESP: no increased WOB  ABD: soft, non-distended, non-tender  EXTR: warm, well-perfused    Labs:                        12.1   7.02  )-----------( 818      ( 25 Oct 2021 06:05 )             37.3   10-25    135  |  103  |  47<H>  ----------------------------<  109<H>  4.3   |  17<L>  |  2.87<H>    Ca    9.5      25 Oct 2021 06:05      CAPILLARY BLOOD GLUCOSE          Medications:   MEDICATIONS  (STANDING):  aspirin  chewable 81 milliGRAM(s) Oral daily  atorvastatin 20 milliGRAM(s) Oral at bedtime  bisacodyl 5 milliGRAM(s) Oral at bedtime  cloNIDine 0.3 milliGRAM(s) Oral every 8 hours  clopidogrel Tablet 75 milliGRAM(s) Oral daily  doxazosin 4 milliGRAM(s) Oral at bedtime  hydrALAZINE 100 milliGRAM(s) Oral <User Schedule>  influenza   Vaccine 0.5 milliLiter(s) IntraMuscular once  isosorbide   mononitrate ER Tablet (IMDUR) 120 milliGRAM(s) Oral daily  lactulose Syrup 20 Gram(s) Oral two times a day  metoprolol succinate ER 25 milliGRAM(s) Oral daily  NIFEdipine XL 90 milliGRAM(s) Oral daily  polyethylene glycol 3350 17 Gram(s) Oral every 12 hours  senna 2 Tablet(s) Oral at bedtime  sodium bicarbonate 650 milliGRAM(s) Oral three times a day  sodium chloride 0.9%. 1000 milliLiter(s) (60 mL/Hr) IV Continuous <Continuous>    MEDICATIONS  (PRN):  acetaminophen    Suspension .. 650 milliGRAM(s) Oral every 6 hours PRN Mild Pain (1 - 3)  simethicone 80 milliGRAM(s) Chew two times a day PRN Upset Stomach      Imaging:

## 2021-10-25 NOTE — PROGRESS NOTE ADULT - ASSESSMENT
69 yo M w/ PMHx of CKD IV (unknown baseline), uncontrolled HTN, ?CAD (patient states he has "heart problems;" TTE performed at [facility] on 10/5 w/ normal LVF, severely enlarged LA, Grade III diastolic dysfunction, mild pulm HTN) who was transfered from [facility] to where he presented w/ hx of multiple falls, found to have acute right MCA territory ischemic infarct w/ extensive chronic microvascular ischemic changes on CT, acute/subacute right-sided MCA distribution infarct with associated cytotoxic edema, on MRI, and Pcomm aneurysm vs thrombus [id number], mild R M1 stenosis on MRA. 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 Medicine re-consulted for liver lesion suspicious for cholangiocarcinoma.

## 2021-10-25 NOTE — CONSULT NOTE ADULT - CONSULT REQUESTED BY NAME
Dr. Greenberg
Neurosurgery
Primary Team
Dr. Greenberg
Neurosurgery
Link
Sean Greenberg
Dr. Greenberg

## 2021-10-25 NOTE — CONSULT NOTE ADULT - ASSESSMENT
71yo M with PMH of CKD (baseline Cr >3), HTN, HLD who presents as transfer from OSH for cerebral aneurysm. Advanced GI consulted for a liver mass.     # Liver mass - with radiologic findings concerning for cholangiocarcinoma. Adenopathy noted. Would prefer to avoid directly sampling the mass as it can introduce peritoneal seeding. Can plan for EUS and peripancreatic LN sampling when patient is off DAPT (planned for 3 weeks total per neurology's latest note).  # Cerebral Aneurysm - s/p coiling on DAPT (planned for 3 weeks total)   # CKD4   # Thrombocytosis - +JAK2 mutation    Recommendations:  - Can plan for EUS + LN sampling when off DAPT (can be done as outpatient)  - EGD + colonoscopy (can plan to do at the same time as the EUS)  - Clarify with neurology when patient can be taken off DAPT    Thank you for involving us in the care of this patient. Please reach out if any further questions.    Teja Oliveira, PGY-5  GI Fellow    Available on [employer] Teams  Pager [phone] ([facility]) or [phone] ([facility])  After 5PM/Weekends, please contact the on-call GI fellow: [phone]  Available through [employer] Teams       69yo M with PMH of CKD (baseline Cr >3), HTN, HLD who presents as transfer from OSH for cerebral aneurysm. Advanced GI consulted for a liver mass.     # Liver mass - with radiologic findings concerning for cholangiocarcinoma. Adenopathy noted. Would prefer to avoid directly sampling the mass as it can introduce peritoneal seeding. Can plan for EUS and peripancreatic LN sampling when patient is off DAPT (planned for 3 weeks total per neurology's latest note).  # Cerebral Aneurysm - s/p coiling on DAPT (planned for 3 weeks total)   # CKD4   # Thrombocytosis - +JAK2 mutation    Recommendations:  - Can plan for EGD+EUS + LN sampling when off DAPT (can be done as outpatient)  - Will plan for a diagnostic colonoscopy on Wednesday  - Please place patient on clear liquid diet starting midnight  - Clarify with neurology when patient can be taken off DAPT    Thank you for involving us in the care of this patient. Please reach out if any further questions.    Teja Oliveira, PGY-5  GI Fellow    Available on [employer] Teams  Pager [phone] ([facility]) or [phone] ([facility])  After 5PM/Weekends, please contact the on-call GI fellow: [phone]  Available through [employer] Teams

## 2021-10-25 NOTE — PROGRESS NOTE ADULT - PROBLEM SELECTOR PLAN 1
MRI abd shows central hepatic mass for which the primary differential consideration is cholangiocarcinoma and adenopathy in the peripancreatic, savage hepatis and portacaval regions.  - CEA elevated, Ca 19-9 mildly elevated  - Surgical Onc and hepatology recs appreciated  - would require biopsy for diagnosis  - IR consulted, deferred IR guided bx as patient on DAPT - would need to be held prior to procedure  - Advanced GI consulted today for evaluation for possible EUS for bx as well as EGD/colonoscopy  - will need outpatient PET scan  - formal heme/onc consult pending biopsy results MRI abd shows central hepatic mass for which the primary differential consideration is cholangiocarcinoma and adenopathy in the peripancreatic, savage hepatis and portacaval regions.  - CEA elevated, Ca 19-9 mildly elevated  - Surgical Onc and hepatology recs appreciated  - would require biopsy for diagnosis  - IR consulted, deferred IR guided bx as patient on DAPT - would need to be held prior to procedure  - Advanced GI consulted, recs appreciated. 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 recommending against biopsy at this time due to high risk of seeding  - however, will plan for colonoscopy on Wed, 10/27  - clears tomorrow, with bowel prep Tues night and NPO after midnight on Tues night  - will need EGD to clear upper GI tract +/- EUS/FNA of LN if malignant appearing once off plavix x 5 days (needs 3 weeks of plavix per neurosugery) - this, can be done outpatient however  - will need outpatient PET scan  - formal heme/onc consult pending biopsy results

## 2021-10-25 NOTE — PROGRESS NOTE ADULT - NSPROGADDITIONALINFOA_GEN_ALL_CORE
.  Renetta Marte MD  Division of Hospital Medicine  [facility]   Spectra: [phone]    Plan discussed with patient and neurosurgery NP Floresita. .  Renetta Marte MD  Division of Hospital Medicine  [facility]   Spectra: [phone]    Patient accepted for transfer to medicine service on 10/25/21.    Plan discussed with patient and neurosurgery NP Jerrod

## 2021-10-25 NOTE — PROGRESS NOTE ADULT - ASSESSMENT
69 yo M w/ PMHx of CKD IV (unknown baseline), uncontrolled HTN, ?CAD (patient states he has "heart problems;" TTE performed at [facility] on 10/5 w/ normal LVF, severely enlarged LA, Grade III diastolic dysfunction, mild pulm HTN) who was transfered from [facility] to where he presented w/ hx of multiple falls, found to have acute right MCA territory ischemic infarct w/ extensive chronic microvascular ischemic changes on CT, acute/subacute right-sided MCA distribution infarct with associated cytotoxic edema, on MRI, and Pcomm aneurysm vs thrombus [id number], mild R M1 stenosis on MRA. 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 Medicine re-consulted for liver lesion suspicious for cholangiocarcinoma.

## 2021-10-25 NOTE — PROGRESS NOTE ADULT - ASSESSMENT
71 yo male with CKD (baseline Cr > 3), HTN, and HLD who presented as a transfer from [facility] for higher level of care. Patient initially presented to [facility] on 10/01 after multiple falls the since the day prior. MRI Head at [facility] showed acute vs subacute R MCA territory infarct with associated cytotoxic edema. MRA H showed multilobulated right-sided P-comm aneurysm directed posteriorly and laterally, measuring 8.9 x 7.3 mm.   A1c 5.5, LDL 25.  TTE: EF 65-70%, severely enlarged L atrium.   MRi with R MCA territory infarct   vessels with PCOM on R aneurysm    s/p angio and coil of PCOM aneurysm.   Impression: AMS + mild L hemineglect in setting of R MCA territory infarct seen on MRI Head, ESUS.etiology fo stroke may be hypercaog from maligiancy   o/e LUE drift doing well.  MRI abd with concern for hepatic mass/cholangiocarcinoma   - malignancy workup? pending liver bx   - send APLS labs. beta 2 glycoprotein antibodies, cardiolipin antibodies and lupus anticoagulant   - Dual antiplatelet therapy with ASA 81mg PO daily and Clopidogrel 75mg PO daily x 3 weeks followed by monotherapy with ASA 81mg PO daily.  - lipitor 40. should be high dose   - ARU and PRU therapeutic   - cardio for ILR. cardio following can do outpt   - telemetry  - PT/OT/SS/SLP, OOBC  - check FS, glucose control <180  - GI/DVT ppx  - Counseling on diet, exercise, and medication adherence was done  - Counseling on smoking cessation and alcohol consumption offered when appropriate.  - Pain assessed and judicious use of narcotics when appropriate was discussed.    - Stroke education given when appropriate.  - Importance of fall prevention discussed.   - Differential diagnosis and plan of care discussed with patient and/or family and primary team  - Thank you for allowing me to participate in the care of this patient. Call with questions.   - d/c planning ILR in or outpt. outpt f/u on d/c 1-2 weeks no objection to d/c   Sukh Pierre MD  Vascular Neurology  Office: [phone] .   71 yo male with CKD (baseline Cr > 3), HTN, and HLD who presented as a transfer from [facility] for higher level of care. Patient initially presented to [facility] on 10/01 after multiple falls the since the day prior. MRI Head at [facility] showed acute vs subacute R MCA territory infarct with associated cytotoxic edema. MRA H showed multilobulated right-sided P-comm aneurysm directed posteriorly and laterally, measuring 8.9 x 7.3 mm.   A1c 5.5, LDL 25.  TTE: EF 65-70%, severely enlarged L atrium.   MRi with R MCA territory infarct   vessels with PCOM on R aneurysm    s/p angio and coil of PCOM aneurysm.   Impression: AMS + mild L hemineglect in setting of R MCA territory infarct seen on MRI Head, ESUS.etiology fo stroke may be hypercaog from maligiancy   o/e LUE drift doing well.  MRI abd with concern for hepatic mass/cholangiocarcinoma   - malignancy workup? pending liver bx   - send APLS labs. beta 2 glycoprotein antibodies, cardiolipin antibodies and lupus anticoagulant   - Dual antiplatelet therapy with ASA 81mg PO daily and Clopidogrel 75mg PO daily x 3 weeks followed by monotherapy with ASA 81mg PO daily. can hold for biopsy   - lipitor 40. should be high dose   - ARU and PRU therapeutic   - cardio for ILR. cardio following can do outpt   - telemetry  - PT/OT/SS/SLP, OOBC  - check FS, glucose control <180  - GI/DVT ppx  - Counseling on diet, exercise, and medication adherence was done  - Counseling on smoking cessation and alcohol consumption offered when appropriate.  - Pain assessed and judicious use of narcotics when appropriate was discussed.    - Stroke education given when appropriate.  - Importance of fall prevention discussed.   - Differential diagnosis and plan of care discussed with patient and/or family and primary team  - Thank you for allowing me to participate in the care of this patient. Call with questions.   - d/c planning ILR in or outpt. outpt f/u on d/c 1-2 weeks no objection to d/c   Sukh Pierre MD  Vascular Neurology  Office: [phone] .

## 2021-10-25 NOTE — PROGRESS NOTE ADULT - PROBLEM SELECTOR PLAN 3
loop recorder per neuro/cards - can be as an outpatient - loop recorder per neuro/cards - can be as an outpatient

## 2021-10-26 LAB
ALBUMIN SERPL ELPH-MCNC: 3.8 G/DL — SIGNIFICANT CHANGE UP (ref 3.3–5)
ALP SERPL-CCNC: 101 U/L — SIGNIFICANT CHANGE UP (ref 40–120)
ALT FLD-CCNC: 10 U/L — SIGNIFICANT CHANGE UP (ref 10–45)
ANION GAP SERPL CALC-SCNC: 15 MMOL/L — SIGNIFICANT CHANGE UP (ref 5–17)
AST SERPL-CCNC: 16 U/L — SIGNIFICANT CHANGE UP (ref 10–40)
BASOPHILS # BLD AUTO: 0.08 K/UL — SIGNIFICANT CHANGE UP (ref 0–0.2)
BASOPHILS NFR BLD AUTO: 1 % — SIGNIFICANT CHANGE UP (ref 0–2)
BILIRUB SERPL-MCNC: 0.2 MG/DL — SIGNIFICANT CHANGE UP (ref 0.2–1.2)
BUN SERPL-MCNC: 52 MG/DL — HIGH (ref 7–23)
CALCIUM SERPL-MCNC: 9.2 MG/DL — SIGNIFICANT CHANGE UP (ref 8.4–10.5)
CHLORIDE SERPL-SCNC: 103 MMOL/L — SIGNIFICANT CHANGE UP (ref 96–108)
CO2 SERPL-SCNC: 18 MMOL/L — LOW (ref 22–31)
CREAT SERPL-MCNC: 3.12 MG/DL — HIGH (ref 0.5–1.3)
EOSINOPHIL # BLD AUTO: 0.28 K/UL — SIGNIFICANT CHANGE UP (ref 0–0.5)
EOSINOPHIL NFR BLD AUTO: 3.5 % — SIGNIFICANT CHANGE UP (ref 0–6)
GLUCOSE SERPL-MCNC: 111 MG/DL — HIGH (ref 70–99)
HCT VFR BLD CALC: 33.1 % — LOW (ref 39–50)
HGB BLD-MCNC: 10.7 G/DL — LOW (ref 13–17)
IGA FLD-MCNC: 270 MG/DL — SIGNIFICANT CHANGE UP (ref 84–499)
IGG FLD-MCNC: 1445 MG/DL — SIGNIFICANT CHANGE UP (ref 610–1660)
IGM SERPL-MCNC: 42 MG/DL — SIGNIFICANT CHANGE UP (ref 35–242)
IMM GRANULOCYTES NFR BLD AUTO: 0.6 % — SIGNIFICANT CHANGE UP (ref 0–1.5)
KAPPA LC SER QL IFE: 11.16 MG/DL — HIGH (ref 0.33–1.94)
KAPPA LC SER QL IFE: 11.16 MG/DL — HIGH (ref 0.33–1.94)
KAPPA/LAMBDA FREE LIGHT CHAIN RATIO, SERUM: 1.66 RATIO — HIGH (ref 0.26–1.65)
KAPPA/LAMBDA FREE LIGHT CHAIN RATIO, SERUM: 1.66 RATIO — HIGH (ref 0.26–1.65)
LAMBDA LC SER QL IFE: 6.72 MG/DL — HIGH (ref 0.57–2.63)
LAMBDA LC SER QL IFE: 6.72 MG/DL — HIGH (ref 0.57–2.63)
LYMPHOCYTES # BLD AUTO: 1.55 K/UL — SIGNIFICANT CHANGE UP (ref 1–3.3)
LYMPHOCYTES # BLD AUTO: 19.1 % — SIGNIFICANT CHANGE UP (ref 13–44)
MAGNESIUM SERPL-MCNC: 2.5 MG/DL — SIGNIFICANT CHANGE UP (ref 1.6–2.6)
MCHC RBC-ENTMCNC: 29.7 PG — SIGNIFICANT CHANGE UP (ref 27–34)
MCHC RBC-ENTMCNC: 32.3 GM/DL — SIGNIFICANT CHANGE UP (ref 32–36)
MCV RBC AUTO: 91.9 FL — SIGNIFICANT CHANGE UP (ref 80–100)
MONOCYTES # BLD AUTO: 0.94 K/UL — HIGH (ref 0–0.9)
MONOCYTES NFR BLD AUTO: 11.6 % — SIGNIFICANT CHANGE UP (ref 2–14)
NEUTROPHILS # BLD AUTO: 5.2 K/UL — SIGNIFICANT CHANGE UP (ref 1.8–7.4)
NEUTROPHILS NFR BLD AUTO: 64.2 % — SIGNIFICANT CHANGE UP (ref 43–77)
NRBC # BLD: 0 /100 WBCS — SIGNIFICANT CHANGE UP (ref 0–0)
PHOSPHATE SERPL-MCNC: 3.6 MG/DL — SIGNIFICANT CHANGE UP (ref 2.5–4.5)
PLATELET # BLD AUTO: 812 K/UL — HIGH (ref 150–400)
POTASSIUM SERPL-MCNC: 4.3 MMOL/L — SIGNIFICANT CHANGE UP (ref 3.5–5.3)
POTASSIUM SERPL-SCNC: 4.3 MMOL/L — SIGNIFICANT CHANGE UP (ref 3.5–5.3)
PROT SERPL-MCNC: 6.9 G/DL — SIGNIFICANT CHANGE UP (ref 6–8.3)
RBC # BLD: 3.6 M/UL — LOW (ref 4.2–5.8)
RBC # FLD: 15.7 % — HIGH (ref 10.3–14.5)
SODIUM SERPL-SCNC: 136 MMOL/L — SIGNIFICANT CHANGE UP (ref 135–145)
WBC # BLD: 8.1 K/UL — SIGNIFICANT CHANGE UP (ref 3.8–10.5)
WBC # FLD AUTO: 8.1 K/UL — SIGNIFICANT CHANGE UP (ref 3.8–10.5)

## 2021-10-26 PROCEDURE — 99233 SBSQ HOSP IP/OBS HIGH 50: CPT | Mod: GC

## 2021-10-26 PROCEDURE — 99232 SBSQ HOSP IP/OBS MODERATE 35: CPT | Mod: GC

## 2021-10-26 RX ORDER — SOD SULF/SODIUM/NAHCO3/KCL/PEG
4000 SOLUTION, RECONSTITUTED, ORAL ORAL ONCE
Refills: 0 | Status: COMPLETED | OUTPATIENT
Start: 2021-10-26 | End: 2021-10-26

## 2021-10-26 RX ADMIN — POLYETHYLENE GLYCOL 3350 17 GRAM(S): 17 POWDER, FOR SOLUTION ORAL at 21:34

## 2021-10-26 RX ADMIN — Medication 650 MILLIGRAM(S): at 14:45

## 2021-10-26 RX ADMIN — Medication 0.3 MILLIGRAM(S): at 14:48

## 2021-10-26 RX ADMIN — SENNA PLUS 2 TABLET(S): 8.6 TABLET ORAL at 21:25

## 2021-10-26 RX ADMIN — Medication 4 MILLIGRAM(S): at 21:25

## 2021-10-26 RX ADMIN — Medication 81 MILLIGRAM(S): at 12:02

## 2021-10-26 RX ADMIN — Medication 0.3 MILLIGRAM(S): at 21:25

## 2021-10-26 RX ADMIN — Medication 100 MILLIGRAM(S): at 18:55

## 2021-10-26 RX ADMIN — Medication 5 MILLIGRAM(S): at 21:26

## 2021-10-26 RX ADMIN — Medication 100 MILLIGRAM(S): at 04:58

## 2021-10-26 RX ADMIN — CLOPIDOGREL BISULFATE 75 MILLIGRAM(S): 75 TABLET, FILM COATED ORAL at 12:02

## 2021-10-26 RX ADMIN — Medication 0.3 MILLIGRAM(S): at 04:58

## 2021-10-26 RX ADMIN — ATORVASTATIN CALCIUM 20 MILLIGRAM(S): 80 TABLET, FILM COATED ORAL at 21:26

## 2021-10-26 RX ADMIN — ISOSORBIDE MONONITRATE 120 MILLIGRAM(S): 60 TABLET, EXTENDED RELEASE ORAL at 12:02

## 2021-10-26 RX ADMIN — Medication 100 MILLIGRAM(S): at 11:40

## 2021-10-26 RX ADMIN — Medication 90 MILLIGRAM(S): at 04:58

## 2021-10-26 RX ADMIN — Medication 650 MILLIGRAM(S): at 04:59

## 2021-10-26 RX ADMIN — Medication 650 MILLIGRAM(S): at 21:26

## 2021-10-26 RX ADMIN — LACTULOSE 20 GRAM(S): 10 SOLUTION ORAL at 18:55

## 2021-10-26 NOTE — PROGRESS NOTE ADULT - ATTENDING COMMENTS
69 yo M w/ PMHx of CKD IV (unknown baseline), uncontrolled HTN, ?CAD (patient states he has "heart problems;" TTE performed at [facility] on 10/5 w/ normal LVF, severely enlarged LA, Grade III diastolic dysfunction, mild pulm HTN) who was transfered from [facility] to where he presented w/ hx of multiple falls, found to have acute right MCA territory ischemic infarct w/ extensive chronic microvascular ischemic changes on CT, acute/subacute right-sided MCA distribution infarct with associated cytotoxic edema, on MRI, and Pcomm aneurysm vs thrombus [id number], mild R M1 stenosis on MRA. 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 Medicine re-consulted for liver lesion suspicious for cholangiocarcinoma.    K/L elevated, f/u Renal. Clear liquid diet and prep for Colonoscopy tonight for procedure tomorrow.  EUS as outpt. No in patient biopsy due to high risk of bleeding from DAPT.  Aspirin for Essential Thrombocytosis, f/u Heme/Onc re: ?MGUS  PT/OT for disposition  Pt lives with zoila's son? Will address formal HCP and living situation with CM/SW  DC planning post workup    Rest of plan per resident's note

## 2021-10-26 NOTE — PROGRESS NOTE ADULT - ASSESSMENT
71 yo M w/ PMHx of CKD IV (unknown baseline), uncontrolled HTN, ?CAD (patient states he has "heart problems;" TTE performed at [facility] on 10/5 w/ normal LVF, severely enlarged LA, Grade III diastolic dysfunction, mild pulm HTN) who was transfered from [facility] to where he presented w/ hx of multiple falls, found to have acute right MCA territory ischemic infarct w/ extensive chronic microvascular ischemic changes on CT, acute/subacute right-sided MCA distribution infarct with associated cytotoxic edema, on MRI, and Pcomm aneurysm vs thrombus [id number], mild R M1 stenosis on MRA. 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 Medicine re-consulted for liver lesion suspicious for cholangiocarcinoma.

## 2021-10-26 NOTE — PROGRESS NOTE ADULT - PROBLEM SELECTOR PLAN 2
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 Monitor clinically  - c/w ASA 81mg daily  - Plavix 75mg daily for at least 3 weeks per neurosurgery

## 2021-10-26 NOTE — PROGRESS NOTE ADULT - PROBLEM SELECTOR PLAN 1
MRI abd shows central hepatic mass for which the primary differential consideration is cholangiocarcinoma and adenopathy in the peripancreatic, savage hepatis and portacaval regions.  - CEA elevated, Ca 19-9 mildly elevated  - Surgical Onc and hepatology recs appreciated  - Would require biopsy for diagnosis  - IR consulted, deferred IR guided bx as patient on DAPT - would need to be held prior to procedure  - [facility] consulted, recs appreciated. 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

## 2021-10-26 NOTE — PROGRESS NOTE ADULT - SUBJECTIVE AND OBJECTIVE BOX
Neurology Progress Note    S: Patient seen and examined on floor. no complaints MR obtained liver mass    Medication:  MEDICATIONS  (STANDING):  aspirin  chewable 81 milliGRAM(s) Oral daily  atorvastatin 20 milliGRAM(s) Oral at bedtime  bisacodyl 5 milliGRAM(s) Oral at bedtime  cloNIDine 0.3 milliGRAM(s) Oral every 8 hours  clopidogrel Tablet 75 milliGRAM(s) Oral daily  doxazosin 4 milliGRAM(s) Oral at bedtime  hydrALAZINE 100 milliGRAM(s) Oral <User Schedule>  influenza   Vaccine 0.5 milliLiter(s) IntraMuscular once  isosorbide   mononitrate ER Tablet (IMDUR) 120 milliGRAM(s) Oral daily  lactulose Syrup 20 Gram(s) Oral two times a day  metoprolol succinate ER 25 milliGRAM(s) Oral daily  NIFEdipine XL 90 milliGRAM(s) Oral daily  polyethylene glycol 3350 17 Gram(s) Oral every 12 hours  senna 2 Tablet(s) Oral at bedtime  sodium bicarbonate 650 milliGRAM(s) Oral three times a day    MEDICATIONS  (PRN):  acetaminophen    Suspension .. 650 milliGRAM(s) Oral every 6 hours PRN Mild Pain (1 - 3)  simethicone 80 milliGRAM(s) Chew two times a day PRN Upset Stomach        Vitals:  Vital Signs Last 24 Hrs  Vital Signs Last 24 Hrs  T(C): 37 (10-26-21 @ 08:58), Max: 37.1 (10-26-21 @ 04:45)  T(F): 98.6 (10-26-21 @ 08:58), Max: 98.7 (10-26-21 @ 04:45)  HR: 62 (10-26-21 @ 08:58) (47 - 77)  BP: 145/76 (10-26-21 @ 08:58) (130/75 - 171/76)  BP(mean): --  RR: 17 (10-26-21 @ 08:58) (17 - 18)  SpO2: 99% (10-26-21 @ 08:58) (94% - 99%)              Orthostatic VS  10-20-21 @ 11:10  Lying BP: 183/88 HR: 46  Sitting BP: 163/81 HR: 48  Standing BP: 152/51 HR: --  Site: upper right arm  Mode: electronic    Orthostatic VS  10-20-21 @ 11:10  Lying BP: 183/88 HR: 46  Sitting BP: 163/81 HR: 48  Standing BP: 152/51 HR: --  Site: upper right arm  Mode: electronic      Orthostatic VS  10-16-21 @ 07:27  Lying BP: 198/98 HR: 46  Sitting BP: 182/95 HR: 50  Standing BP: 174/96 HR: 58  Site: --  Mode: --  Orthostatic VS  10-15-21 @ 16:00  Lying BP: 163/76 HR: 49  Sitting BP: 136/77 HR: 51  Standing BP: 136/70 HR: 59  Site: upper right arm  Mode: electronic        General Exam:   General Appearance: Appropriately dressed and in no acute distress       Head: Normocephalic, atraumatic and no dysmorphic features  Ear, Nose, and Throat: Moist mucous membranes  CVS: S1S2+  Resp: No SOB, no wheeze or rhonchi  Abd: soft NTND  Extremities: No edema, no cyanosis  Skin: No bruises, no rashes     Neurological Exam:  Mental Status: Awake, alert and oriented x 3.  Able to follow simple and complex verbal commands. Able to name and repeat. fluent speech. No obvious aphasia or dysarthria noted.   Cranial Nerves: PERRL, EOMI, VFFC, sensation V1-V3 intact,  no obvious facial asymmetry , equal elevation of palate, scm/trap 5/5, tongue is midline on protrusion. no obvious papilledema on fundoscopic exam. Hearing is grossly intact.   Motor: Normal bulk, tone and strength throughout. Fine finger movements were intact and symmetric. no tremors or drift noted except LUE with 4+/5 mild drift noted   Sensation: Intact to light touch and pinprick throughout. no right/left confusion. no extinction to tactile on DSS.    Reflexes: 1+ throughout at biceps, brachioradialis, triceps, patellars and ankles bilaterally and equal. No clonus. R toe and L toe were both downgoing.  Coordination: No dysmetria on FNF   Gait: deferred in ICU    I personally reviewed the below data/images/labs:    CBC Full  -  ( 26 Oct 2021 06:24 )  WBC Count : 8.10 K/uL  RBC Count : 3.60 M/uL  Hemoglobin : 10.7 g/dL  Hematocrit : 33.1 %  Platelet Count - Automated : 812 K/uL  Mean Cell Volume : 91.9 fl  Mean Cell Hemoglobin : 29.7 pg  Mean Cell Hemoglobin Concentration : 32.3 gm/dL  Auto Neutrophil # : 5.20 K/uL  Auto Lymphocyte # : 1.55 K/uL  Auto Monocyte # : 0.94 K/uL  Auto Eosinophil # : 0.28 K/uL  Auto Basophil # : 0.08 K/uL  Auto Neutrophil % : 64.2 %  Auto Lymphocyte % : 19.1 %  Auto Monocyte % : 11.6 %  Auto Eosinophil % : 3.5 %  Auto Basophil % : 1.0 %      10-26    136  |  103  |  52<H>  ----------------------------<  111<H>  4.3   |  18<L>  |  3.12<H>    Ca    9.2      26 Oct 2021 06:24  Phos  3.6     10-26  Mg     2.5     10-26    TPro  6.9  /  Alb  3.8  /  TBili  0.2  /  DBili  x   /  AST  16  /  ALT  10  /  AlkPhos  101  10-26          < from: CT Head No Cont (10.01.21 @ 18:37) >    EXAM:  CT BRAIN                              PROCEDURE DATE:  10/01/2021          INTERPRETATION:  CT OF THE HEAD WITHOUT CONTRAST    CLINICAL INDICATION: Weakness. Falls.    TECHNIQUE: Volumetric CT acquisition was performed through the brain and reviewed using brain and bone window technique. Dose optimization techniques were utilized including kVp/mA modulation along with iterative reconstructions.      COMPARISON: No prior studies have been submitted for comparison.    FINDINGS:    The ventricular and sulcal size and configuration is age appropriate.   There is no acute loss of gray-white differentiation. There are extensive patchy and confluent areas of hypodensity in the periventricular and subcortical white matter which are non-specific but likely related  chronic microangiopathic ischemic changes.  Small chronic infarction in the right cerebellar hemisphere. Chronic appearing left thalamic lacunar infarct.    There is no evidence of mass effect, midline shift, acute intracranial hemorrhage, or extra-axial collections.     The calvarium is intact. The paranasal sinuses are clear.The mastoid air cells are predominantly clear. The orbits are unremarkable.      IMPRESSION:  No acute intracranial hemorrhage or acute territorial infarction. Extensive chronic microvascular ischemic changes and several chronic infarctions as described. Further evaluation may be obtained with MRI of the brain if no contraindications.    --- End of Report ---            KAMILA SZYMANSKI MD; Attending Radiologist  This document has been electronically signed. Oct  1 2021  7:25PM    < end of copied text >  < from: MR Head No Cont (10.04.21 @ 18:09) >    EXAM:  MR BRAIN                            PROCEDURE DATE:  10/04/2021          INTERPRETATION:  .    CLINICAL INFORMATION: Frequent falls.    TECHNIQUE: Multiplanar multisequential MRI of the brain was acquired without the administration of IV gadolinium.    COMPARISON: Prior CT examination of the head dated 10/1/2021.    FINDINGS: Multiple areas of restricted diffusion are seen within the right MCA territory affecting the right frontal, parietal, and temporal lobes. Associated T2/FLAIR prolongation is seen, compatible with cytotoxic edema. No hemorrhagic transformation is noted.    Chronic lacunar infarcts are again seen within the right cerebellar hemisphere, left thalamus, and bilateral basal ganglia.    Multiple patchy confluent nonspecific T2/FLAIR hyperintense signal changes are noted throughout the bihemispheric white matter without associated mass effect or restricted diffusion.    Mild ventriculomegaly appears unchanged. No abnormal intra-axial fluid collections are notable. Flow-voids are noted throughout the major intracranial vessels, on the T2 weighted images, consistent with their patency. The sellar area appears unremarkable. The paranasal sinuses and mastoid air cells are grossly clear. Calvarial signal appears unremarkable. The orbits appear within normal limits.    IMPRESSION: Acute/subacute right-sided MCA distribution infarct with associated cytotoxic edema and without hemorrhagic transformation.    Multiple additional chronic lacunar infarcts and similar-appearing extensive chronic white matter microvascular type changes, as discussed.      --- End of Report ---            LUIS CARLOS BONNER MD; Attending Radiologist  This document has been electronically signed. Oct  5 2021  3:01PM    < end of copied text >  < from: MR Angio Head No Cont (10.06.21 @ 18:15) >    EXAM:  MR ANGIO BRAIN                            PROCEDURE DATE:  10/06/2021          INTERPRETATION:  INDICATION:  Right MCA infarct.    TECHNIQUE:  MR angiography of the brain was performed using three dimensional time-of-flight (3D-TOF) technique.  Imaging was performed on a 1.5 Lisa magnet.    FINDINGS:    INTERNAL CAROTID ARTERIES:  Bilaterally patent.    [place] [facility]:  A right-sided P-comm aneurysm is identified. The aneurysm is directed posteriorly and laterally, the aneurysm appears to be septated and/or bilobed., and measures 8.9 x 7.3 mm.    CEREBRAL ARTERIES:  Both anterior cerebral arteries are patent. Both A1 segments are well formed. Both middle cerebral arteries are patent. There is mild narrowing of the proximal right M1 segment.    VERTEBROBASILAR SYSTEM:  The vertebrobasilar system is patent. There are large posterior communicating arteries bilaterally with dominant supply of the posterior cerebral arteries. Left P1 segment is hypoplastic.    IMPRESSION:    1. 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  2. Mild narrowing of the proximal right middle cerebral artery in the M1 region.  3. The vessels are otherwise patent, as outlined above.    --- End of Report ---EXAM:  CT BRAIN                            PROCEDURE DATE:  10/13/2021      INTERPRETATION:  Noncontrast CT of the brain.    CLINICAL INDICATION: Status post rt pcomm aneurysm coiling    TECHNIQUE : Axial CT scanning of the brain was obtainedfrom the skull base to the vertex without the administration of intravenous contrast. Sagittal and coronal reformats were provided.    COMPARISON: CT brain 10/1/2021. MRI brain 10/4/2021    FINDINGS:    Interval placement of embolic coil mass right parasellar region.    Similar mild ventricular dilatation.    No midline shift. Basal cisterns poorly evaluated due to streak artifact.    No acute intracranial hemorrhage, mass effect, or brain edema. Extensive white matter microvascular ischemic disease.    The visualized paranasal sinuses and mastoid air cells are clear.    IMPRESSION:    Interval placement of embolic coil mass right parasellar region.  No acute intracranial hemorrhage, mass effect, or brain edema.  Similar mild ventricular dilatation.        --- End of Report ---      AFIA DAILY MD; Attending Radiologist  This document has been electronically signed. Oct 13 2021  9:31AM    < end of copied text >      < from: MR Head No Cont (10.15.21 @ 16:40) >    EXAM:  MR BRAIN                            PROCEDURE DATE:  10/15/2021    ATION:  MRI OF THE BRAIN WITHOUT CONTRAST    CLINICAL INDICATION: Status post right posterior communicating artery aneurysm status post balloon assisted coilembolization    TECHNIQUE: Multiplanar multisequence noncontrast MRI of the brain was performed.    COMPARISON: CT brain, 10/13/2021 and MRI brain, 10/4/2021.    FINDINGS:    The ventricular and sulcal size and configuration is age appropriate. Thereare scattered T2/FLAIR hyperintensities in the subcortical and periventricular white matter which are nonspecific but most commonly represent chronic microvascular ischemic changes.    The previously seen acute lacunar infarctions along left MCA distribution are decreased in diffusion restriction intensity, consistent with evolution of acute infarctions to late subacute stage. In addition, new punctate acute lacunar infarctions have developed in the posterior wall of the right temporal lobe and in the left cerebellar hemisphere along right parietal distribution. There is no susceptibility signal to suggest hemorrhagic conversion.  There is a chronic infarction in the right cerebellum and in the left thalamus.    There is susceptibility signalin the right paraclinoid region at the site of the previously coiled right P-comm aneurysm.    There is abnormal extra-axial fluid collection or hydrocephalus. The visualized globes are symmetric bilaterally.    The visualized paranasal sinuses and mastoid bones are adequately developed and aerated.    IMPRESSION:    Comparison previous studies,    There has been interval evolution of previously seen infarcts in right MCA distribution as described. There is development of new acute lacunar infarctions at the posterior pole of the right temporal lobe and in the right cerebellum as described.    There is no intracranial hemorrhage, midline shift or mass effect.    Coil mass in the right paraclinoid region.    Notification to clinician of alert:    Provider   Dr. BARGER  was notified about the final results at 10/15/2021 10:23 AM via telephone by neuroradiologist KEITH Tineo. Readback confirmation was obtained.      JULIO TINEO MD; Attending Radiologist  This document has been electronically signed. Oct 15 2021 10:35AM    < end of copied text >  < from: CT Chest No Cont (10.15.21 @ 23:27) >    EXAM:  CT CHEST                          INTERPRETATION:  CLINICAL INFORMATION: Shortness breath, evaluate for pulmonary infection or pulmonary edema.    COMPARISON: CT chest 9/6/2014. CT abdomen pelvis 10/11/2021.    CONTRAST/COMPLICATIONS:  IV Contrast: None  Oral Contrast: None  Complications: None    PROCEDURE:  CT of the Chest was performed.  Sagittal and coronal reformats were performed.    FINDINGS:    LUNGS AND AIRWAYS: Emphysema. No pulmonary nodules or parenchymal consolidations.  PLEURA: Small bilateral pleural effusions.    MEDIASTINUM AND RED: 1.2 cm right paratracheal lymph node (2:40), nonspecific. No axillary lymphadenopathy    VESSELS: Calcified atherosclerotic plaques in aorta and coronary arteries.    HEART: Multifocal cardiac enlargement is noted. No pericardial effusion. Mitral annular calcifications.    CHEST WALL AND LOWER NECK: Within normal limits.    VISUALIZED UPPER ABDOMEN: Partially imaged large hypodense hepatic lesion measuring 6.9 x 8.8 cm, similar in appearance compared to prior CT abdomen pelvis 10/11/2021. Simple cyst in the left kidney    BONES: Degenerative changes of the spine.    IMPRESSION:    Small bilateral pleural effusions with minimal bilateral groundglass opacities and a few areas of interlobular septal thickening. Findings are suggestive of mild interstitial edema. This is superimposed on a background of emphysema.    Redemonstration of large hypodense hepatic lesion, similar appearance whencompared to prior abdominal pelvis 10/11/2021. As mentioned on prior report, consider contrast enhanced MRI for further evaluation.      ALIYA BERTRAND MD; Resident Radiology  This document has been electronicallysigned.  ABRIL PINEDA MD; Attending Radiologist  This document has been electronically signed. Oct 16 2021  9:09AM    < end of copied text >  < from: MR Abdomen w/ IV Cont (10.22.21 @ 23:26) >    EXAM:  MR ABDOMEN IC                          PROCEDURE DATE:  10/22/2021      INTERPRETATION:  CLINICAL INFORMATION: Hepatic mass.    COMPARISON: Noncontrast CT abdomen and pelvis 10/11/2021    CONTRAST/COMPLICATIONS:  IV Contrast: Gadavist  7.5 cc administered   0 cc discarded  Oral Contrast: NONE  Complications: None reported at time of study completion    PROCEDURE:  MRI of the abdomen was performed.  MRCP was performed.    FINDINGS:  LOWER CHEST: Within normal limits.    LIVER: No morphologic evidence of cirrhosis. An 8.9 x 7.1 cm central hepatic lesion involving hepatic segments 4, 5 and 6 with possible extension into superior right hepatic lobe segments demonstrates restricted diffusion with peripheral rim enhancement and delayed central enhancement. While post contrast imaging is limited, MR characteristics strongly favor cholangiocarcinoma.  BILE DUCTS: Normal caliber.  GALLBLADDER: Cholelithiasis.  SPLEEN: Within normal limits.  PANCREAS: Within normal limits.  ADRENALS: Within normal limits.  KIDNEYS/URETERS: Bilateral renal cysts including a 3.3 cm hemorrhagic right renal cyst.    VISUALIZED PORTIONS:  BOWEL: Within normal limits.  PERITONEUM: No ascites.  VESSELS: Hepatic and portal veins are patent.  RETROPERITONEUM/LYMPH NODES: Upper abdominal adenopathy including peripancreatic, portacaval and savage hepatis nodes. Reference lesions as follows.    Peripancreatic node anterior to the portal vein (6:22) 3.7 x 2.6 cm.    Portacaval node (6:24) 2.9 x 1.5 cm.    ABDOMINAL WALL: Within normal limits.  BONES: Within normal limits.    IMPRESSION:  Limited post contrast imaging.    Central hepatic mass as above for which the primary differential consideration is cholangiocarcinoma.    Adenopathy in the peripancreatic, savage hepatis and portacaval regions.    --- End of Report ---      CARLOS ARMSTRONG MD; Attending Radiologist  This document has been electronically signed. Oct 23 2021  9:47AM    < end of copied text >

## 2021-10-26 NOTE — PROGRESS NOTE ADULT - PROBLEM SELECTOR PLAN 5
- w/ some fluctuations of high and low BPs  - c/w imdur, clonidine, hydral, procardia  - continue to monitor

## 2021-10-26 NOTE — PROGRESS NOTE ADULT - SUBJECTIVE AND OBJECTIVE BOX
Patient seen and examined  no complaints    No Known Allergies    Hospital Medications:   MEDICATIONS  (STANDING):  aspirin  chewable 81 milliGRAM(s) Oral daily  atorvastatin 20 milliGRAM(s) Oral at bedtime  bisacodyl 5 milliGRAM(s) Oral at bedtime  cloNIDine 0.3 milliGRAM(s) Oral every 8 hours  clopidogrel Tablet 75 milliGRAM(s) Oral daily  doxazosin 4 milliGRAM(s) Oral at bedtime  hydrALAZINE 100 milliGRAM(s) Oral <User Schedule>  influenza   Vaccine 0.5 milliLiter(s) IntraMuscular once  isosorbide   mononitrate ER Tablet (IMDUR) 120 milliGRAM(s) Oral daily  lactulose Syrup 20 Gram(s) Oral two times a day  metoprolol succinate ER 25 milliGRAM(s) Oral daily  NIFEdipine XL 90 milliGRAM(s) Oral daily  polyethylene glycol 3350 17 Gram(s) Oral every 12 hours  polyethylene glycol/electrolyte Solution. 4000 milliLiter(s) Oral once  senna 2 Tablet(s) Oral at bedtime  sodium bicarbonate 650 milliGRAM(s) Oral three times a day        VITALS:  T(F): 98.1 (10-26-21 @ 13:56), Max: 98.7 (10-26-21 @ 04:45)  HR: 82 (10-26-21 @ 13:56)  BP: 157/80 (10-26-21 @ 13:56)  RR: 17 (10-26-21 @ 13:56)  SpO2: 94% (10-26-21 @ 13:56)  Wt(kg): --    10-25 @ 07:01  -  10-26 @ 07:00  --------------------------------------------------------  IN: 0 mL / OUT: 700 mL / NET: -700 mL    10-26 @ 07:01  -  10-26 @ 15:28  --------------------------------------------------------  IN: 240 mL / OUT: 400 mL / NET: -160 mL        Physical Exam :-  Constitutional: NAD  Neck: Supple.  Respiratory: Bilateral equal breath sounds, few Crackles present.  Cardiovascular: S1, S2 normal, positive Murmur  Gastrointestinal: Bowel Sounds present, soft, non tender.  Extremities: no Edema Feet  Neurological: Alert and Oriented x 3  Psychiatric: Normal mood, normal affect    LABS:  10-26    136  |  103  |  52<H>  ----------------------------<  111<H>  4.3   |  18<L>  |  3.12<H>    Ca    9.2      26 Oct 2021 06:24  Phos  3.6     10-26  Mg     2.5     10-26    TPro  6.9  /  Alb  3.8  /  TBili  0.2  /  DBili      /  AST  16  /  ALT  10  /  AlkPhos  101  10-26    Creatinine Trend: 3.12 <--, 2.87 <--, 2.80 <--, 2.81 <--, 2.74 <--, 2.88 <--                        10.7   8.10  )-----------( 812      ( 26 Oct 2021 06:24 )             33.1     Urine Studies:      RADIOLOGY & ADDITIONAL STUDIES:

## 2021-10-26 NOTE — PROGRESS NOTE ADULT - ASSESSMENT
71yo M with PMH of CKD (baseline Cr >3), HTN, HLD who presents as transfer from OSH for cerebral aneurysm. [facility] GI consulted for a liver mass.     # Liver mass - with radiologic findings concerning for cholangiocarcinoma. Adenopathy noted. Would prefer to avoid directly sampling the mass as it can introduce peritoneal seeding. Can plan for EUS and peripancreatic LN sampling when patient is off DAPT (planned for 3 weeks total per neurology's latest note).  # Cerebral Aneurysm - s/p coiling on DAPT (planned for 3 weeks total)   # CKD4   # Thrombocytosis - +JAK2 mutation    Recommendations:  - Can plan for EGD+EUS + LN sampling when off DAPT (can be done as outpatient) - per neurosurgery note patient is planned for 3 weeks total of DAPT starting 10/12 - so can tentatively plan for EUS the week of 11/8.  - Will plan for a diagnostic colonoscopy tomorrow  - NPO midnight  - CLD today  - Prep ordered by GI fellow  - 3AM CBC, CMP, coags    Thank you for involving us in the care of this patient. Please reach out if any further questions.    Teja Oliveira, PGY-5  GI Fellow    Available on [employer] Teams  Pager [phone] ([facility]) or [phone] ([facility])  After 5PM/Weekends, please contact the on-call GI fellow: [phone]  Available through [employer] Teams

## 2021-10-26 NOTE — PROGRESS NOTE ADULT - ASSESSMENT
69 yo male with CKD (baseline Cr > 3), HTN, and HLD who presented as a transfer from [facility] for higher level of care. Patient initially presented to [facility] on 10/01 after multiple falls the since the day prior. MRI Head at [facility] showed acute vs subacute R MCA territory infarct with associated cytotoxic edema. MRA H showed multilobulated right-sided P-comm aneurysm directed posteriorly and laterally, measuring 8.9 x 7.3 mm.   A1c 5.5, LDL 25.  TTE: EF 65-70%, severely enlarged L atrium.   MRi with R MCA territory infarct   vessels with PCOM on R aneurysm    s/p angio and coil of PCOM aneurysm.   Impression: AMS + mild L hemineglect in setting of R MCA territory infarct seen on MRI Head, ESUS.etiology fo stroke may be hypercaog from maligiancy   o/e LUE drift doing well.  MRI abd with concern for hepatic mass/cholangiocarcinoma   - malignancy workup? pending liver bx.  colonoscopy planned for 10/27   - send APLS labs. beta 2 glycoprotein antibodies, cardiolipin antibodies and lupus anticoagulant   - Dual antiplatelet therapy with ASA 81mg PO daily and Clopidogrel 75mg PO daily x 3 weeks followed by monotherapy with ASA 81mg PO daily. can hold for biopsy   - lipitor 40. should be high dose   - ARU and PRU therapeutic   - cardio for ILR. cardio following can do outpt   - telemetry  - PT/OT/SS/SLP, OOBC  - check FS, glucose control <180  - GI/DVT ppx  - Counseling on diet, exercise, and medication adherence was done  - Counseling on smoking cessation and alcohol consumption offered when appropriate.  - Pain assessed and judicious use of narcotics when appropriate was discussed.    - Stroke education given when appropriate.  - Importance of fall prevention discussed.   - Differential diagnosis and plan of care discussed with patient and/or family and primary team  - Thank you for allowing me to participate in the care of this patient. Call with questions.   - d/c planning when medically cleared   Sukh Pierre MD  Vascular Neurology  Office: [phone] .

## 2021-10-26 NOTE — PROGRESS NOTE ADULT - PROBLEM SELECTOR PLAN 8
- found to have M spike 0.6, likely MGUS per hematology  - K/L light chain ratio elevated  - hematology f/u - found to have M spike 0.6, likely MGUS per hematology  - K/L light chain ratio elevated, normal IgA/IgM/IgG.  - hematology f/u

## 2021-10-26 NOTE — PROGRESS NOTE ADULT - SUBJECTIVE AND OBJECTIVE BOX
Internal Medicine   Morenani Weber | PGY-1    OVERNIGHT EVENTS: KATIE      SUBJECTIVE: Patient was seen at bedside this morning. He is angry with having been placed on a clear diet in preparation for his colonoscopy tomorrow and is refusing examination at this time.      MEDICATIONS  (STANDING):  aspirin  chewable 81 milliGRAM(s) Oral daily  atorvastatin 20 milliGRAM(s) Oral at bedtime  bisacodyl 5 milliGRAM(s) Oral at bedtime  cloNIDine 0.3 milliGRAM(s) Oral every 8 hours  clopidogrel Tablet 75 milliGRAM(s) Oral daily  doxazosin 4 milliGRAM(s) Oral at bedtime  hydrALAZINE 100 milliGRAM(s) Oral <User Schedule>  influenza   Vaccine 0.5 milliLiter(s) IntraMuscular once  isosorbide   mononitrate ER Tablet (IMDUR) 120 milliGRAM(s) Oral daily  lactulose Syrup 20 Gram(s) Oral two times a day  metoprolol succinate ER 25 milliGRAM(s) Oral daily  NIFEdipine XL 90 milliGRAM(s) Oral daily  polyethylene glycol 3350 17 Gram(s) Oral every 12 hours  polyethylene glycol/electrolyte Solution. 4000 milliLiter(s) Oral once  senna 2 Tablet(s) Oral at bedtime  sodium bicarbonate 650 milliGRAM(s) Oral three times a day    MEDICATIONS  (PRN):  acetaminophen    Suspension .. 650 milliGRAM(s) Oral every 6 hours PRN Mild Pain (1 - 3)  simethicone 80 milliGRAM(s) Chew two times a day PRN Upset Stomach        T(F): 98.1 (10-26-21 @ 13:56), Max: 98.7 (10-26-21 @ 04:45)  HR: 82 (10-26-21 @ 13:56) (47 - 82)  BP: 157/80 (10-26-21 @ 13:56) (138/70 - 171/76)  BP(mean): --  RR: 17 (10-26-21 @ 13:56) (16 - 18)  SpO2: 94% (10-26-21 @ 13:56) (94% - 99%)    PHYSICAL EXAM:  Deferred        LABS:                        10.7   8.10  )-----------( 812      ( 26 Oct 2021 06:24 )             33.1     10-26    136  |  103  |  52<H>  ----------------------------<  111<H>  4.3   |  18<L>  |  3.12<H>    Ca    9.2      26 Oct 2021 06:24  Phos  3.6     10-26  Mg     2.5     10-26    TPro  6.9  /  Alb  3.8  /  TBili  0.2  /  DBili  x   /  AST  16  /  ALT  10  /  AlkPhos  101  10-26        PT/INR - ( 25 Oct 2021 06:05 )   PT: 12.3 sec;   INR: 1.03 ratio         PTT - ( 25 Oct 2021 06:05 )  PTT:31.1 sec    Creatinine Trend: 3.12<--, 2.87<--, 2.80<--, 2.81<--, 2.74<--, 2.88<--  I&O's Summary    25 Oct 2021 07:01  -  26 Oct 2021 07:00  --------------------------------------------------------  IN: 0 mL / OUT: 700 mL / NET: -700 mL    26 Oct 2021 07:01  -  26 Oct 2021 17:14  --------------------------------------------------------  IN: 240 mL / OUT: 400 mL / NET: -160 mL

## 2021-10-26 NOTE — MEDICAL STUDENT PROGRESS NOTE(EDUCATION) - NS MD HP STUD ASPLAN PLAN FT
1) Hepatic lesion.   MRA showed central hepatic mass 8.9 x 7.1 with adenopathy in the peripancreatic, savage hepatis and portacaval regions, likely differential is cholangiocarcinoma, pancreatic, HCC, colorectal, ADPKD possible as well.  - Elevated CEA, , normal AFP  - IR consulted, deferred IR guided bx due to risk of bleeding as patient on DAPT   - Advanced GI consulted, likely iCCA, recommending against biopsy at this time due to high risk of seeding  - Colonoscopy on 10/27/21  - clears Tuesday 10/26/21, bowel prep Tues night and NPO after midnight   - will need EGD to clear upper GI tract +/- EUS/FNA of LN if malignant appearing once off plavix x 5 days (needs 3 weeks of plavix per neurosugery) - can be done outpatient   - will need outpatient PET scan  - formal heme/onc consult pending biopsy results.    2) Cerebral aneurysm.   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 Continue to monitor  - c/w ASA 81mg daily  - Plavix 75mg daily for at least 3 weeks per neurosurgery    3) Stroke   Initially presented to [facility], found to have acute right MCA territory ischemic infarct w/ extensive chronic microvascular ischemic changes on CT, acute/subacute right-sided MCA distribution infarct with associated cytotoxic edema, on MRI  - Outpatient Implantable Loop Recorder per neuro/cards    4) NSVT (nonsustained ventricular tachycardia).   Bradycardia to 40s, asymptomatic 10/26/21  - c/w reduced metoprolol XL 25mg daily per cardio 1) Hepatic lesion.   MRA showed central hepatic mass 8.9 x 7.1 with adenopathy in the peripancreatic, savage hepatis and portacaval regions, likely differential is cholangiocarcinoma, pancreatic, HCC, colorectal, ADPKD possible as well.  - Elevated CEA, , normal AFP  - IR consulted, deferred IR guided bx due to risk of bleeding as patient on DAPT   - Advanced GI consulted, likely iCCA, recommending against biopsy at this time due to high risk of seeding  - Colonoscopy on 10/27/21  - clears Tuesday 10/26/21, bowel prep Tues night and NPO after midnight   - will need EGD to clear upper GI tract +/- EUS/FNA of LN if malignant appearing once off plavix x 5 days (needs 3 weeks of plavix per neurosugery) - can be done outpatient   - will need outpatient PET scan  - formal heme/onc consult pending biopsy results.    2) Cerebral aneurysm.   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 Continue to monitor  - c/w ASA 81mg daily  - Plavix 75mg daily for at least 3 weeks per neurosurgery    3) Stroke   Initially presented to [facility], found to have acute right MCA territory ischemic infarct w/ extensive chronic microvascular ischemic changes on CT, acute/subacute right-sided MCA distribution infarct with associated cytotoxic edema, on MRI  - Outpatient Implantable Loop Recorder per neuro/cards    4) NSVT (nonsustained ventricular tachycardia).   Bradycardia to 40s, asymptomatic 10/26/21  - c/w reduced metoprolol XL 25mg daily per cardio    5) Hypertension.   Fluctuations of high and low BPs, mostly within target  - c/w imdur, clonidine, hydral, procardia  - continue to monito    6) Stage 4 chronic kidney disease.   - Nephro following  - Cr stable  - Ctm BMP    7) Thrombocytosis.   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  - Avoid nephrotoxins    8) Monoclonal gammopathy.   - found to have M spike 0.6, likely MGUS per hematology  - IgA IgM wnl, kappa/lambda ratio elevated at 1.66  - hematology f/u.    9) Prophylactic measure.   ·  Plan: #DVT PPx: on DAPT; holding heparin subq   #Diet: Renal diet  #Dispo: PT f/u, SW

## 2021-10-26 NOTE — PROGRESS NOTE ADULT - ASSESSMENT
70yoM w/ PMHx of CKD with a baseline Cr >3, HTN, HLD presented to OSH after multiple falls. He was transferred from OSH for MRI/MRA findings concerning for aneurysm vs. thrombus       1) CKD stage 4- overall stable  Likely has Hypertensive Nephropathy-> b/l 2.8mg/dl to 3.1mg/dl  Ua and urine lytes reviewed--> +UTI- s/p cipro  urine pro/cr 1.4grams  Renal US --> right kidney 9.3cm and left 9.5 cm with b/l renal cysts  s/p Coiling for PCOMM  Cr  fluctuating however overall stable- cont monitor with daily basic metabolic panel   s/p NIELS --> no signs of NSF--> will cont to monitor  avoid nephrotoxins such as ace/arb/nsaid  trend bmp    2) HTN-  bp rising again  on nifedipine clonidine  hydralazine,   Imdur 120mg po qd  If bp stays high then inc metoprolol from 25mg to 50mg   Trend bp    3) metabolic acidosis- likely from ckd- goal bicarb 22   na bicarb 650mg po tid  trend bicarb      Dr Hernadez  Nephrology   cell [phone]  office [phone]  ans serv- [phone]  [email or web address] - [employer] ( wkend coverage for Hospital)    70yoM w/ PMHx of CKD with a baseline Cr >3, HTN, HLD presented to OSH after multiple falls. He was transferred from OSH for MRI/MRA findings concerning for aneurysm vs. thrombus       1) CKD stage 4- overall stable  Likely has Hypertensive Nephropathy-> b/l 2.8mg/dl to 3.1mg/dl  Ua and urine lytes reviewed--> +UTI- s/p cipro  urine pro/cr 1.4grams  K/L 11.16/6.72---> ratio is < 2---> monitor for now  Renal US --> right kidney 9.3cm and left 9.5 cm with b/l renal cysts  s/p Coiling for PCOMM  Cr  fluctuating however overall stable- cont monitor with daily basic metabolic panel   s/p NIELS --> no signs of NSF--> will cont to monitor  avoid nephrotoxins such as ace/arb/nsaid  trend bmp    2) HTN-  bp rising again  on nifedipine clonidine  hydralazine,   Imdur 120mg po qd  If bp stays high then inc metoprolol from 25mg to 50mg   Trend bp    3) metabolic acidosis- likely from ckd-   na bicarb 650mg po tid  trend bicarb      Dr Hernadez  Nephrology   cell [phone]  office [phone]  ans serv- [phone]  [email or web address] - [employer] ( wkend coverage for Hospital)

## 2021-10-26 NOTE — PROGRESS NOTE ADULT - ASSESSMENT
70yoM w/ PMHx of CKD IV (unknown baseline), uncontrolled HTN, ?CAD (patient states he has "heart problems;" TTE performed at [facility] on 10/5 w/ normal LVF, severely enlarged LA, Grade III diastolic dysfunction, mild pulm HTN) who was transfered from [facility] to where he presented w/ hx of multiple falls, found to have acute right MCA territory ischemic infarct w/ extensive chronic microvascular ischemic changes on CT, acute/subacute right-sided MCA distribution infarct with associated cytotoxic edema, on MRI, and Pcomm aneurysm vs thrombus [id number], mild R M1 stenosis on MRA.

## 2021-10-26 NOTE — PROGRESS NOTE ADULT - ATTENDING COMMENTS
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  Please call the advanced GI service with any questions or concerns.

## 2021-10-26 NOTE — MEDICAL STUDENT PROGRESS NOTE(EDUCATION) - NS MD HP STUD ASPLAN ASSES FT
70 y/o M with h/o CKD IV, uncontrolled HTN, NSVT and grade III diastolic dysfunction who was transferred from [facility] where he initially presented for multiple falls and was found to have R sided MCA infarct and R pcom aneurysm now s/p coil (10/12/21) and DAPT therapy, with incidental finding of central hepatic lesion measuring 8.9 x 7.1 with peripancreatic, savage caval, and savage hepatis adenopathy and elevated CEA, , and normal AFP.

## 2021-10-26 NOTE — PROGRESS NOTE ADULT - SUBJECTIVE AND OBJECTIVE BOX
Interval Events:   - No acute events    Allergies:  No Known Allergies      Hospital Medications:  acetaminophen    Suspension .. 650 milliGRAM(s) Oral every 6 hours PRN  aspirin  chewable 81 milliGRAM(s) Oral daily  atorvastatin 20 milliGRAM(s) Oral at bedtime  bisacodyl 5 milliGRAM(s) Oral at bedtime  cloNIDine 0.3 milliGRAM(s) Oral every 8 hours  clopidogrel Tablet 75 milliGRAM(s) Oral daily  doxazosin 4 milliGRAM(s) Oral at bedtime  hydrALAZINE 100 milliGRAM(s) Oral <User Schedule>  influenza   Vaccine 0.5 milliLiter(s) IntraMuscular once  isosorbide   mononitrate ER Tablet (IMDUR) 120 milliGRAM(s) Oral daily  lactulose Syrup 20 Gram(s) Oral two times a day  metoprolol succinate ER 25 milliGRAM(s) Oral daily  NIFEdipine XL 90 milliGRAM(s) Oral daily  polyethylene glycol 3350 17 Gram(s) Oral every 12 hours  senna 2 Tablet(s) Oral at bedtime  simethicone 80 milliGRAM(s) Chew two times a day PRN  sodium bicarbonate 650 milliGRAM(s) Oral three times a day      PMHX/PSHX:  Frequent falls    Hypertension    Chronic kidney disease, unspecified CKD stage        Family history:      ROS:   General:  No wt loss, fevers, chills, night sweats, fatigue,   Eyes:  Good vision, no reported pain  ENT:  No sore throat, pain, runny nose, dysphagia  CV:  No pain, palpitations, hypo/hypertension  Pulm:  No dyspnea, cough, tachypnea, wheezing  GI:  As per HPI  :  No pain, bleeding, incontinence, nocturia  Muscle:  No pain, weakness  Neuro:  No weakness, tingling, memory problems  Psych:  No fatigue, insomnia, mood problems, depression  Endocrine:  No polyuria, polydipsia, cold/heat intolerance  Heme:  No petechiae, ecchymosis, easy bruisability  Skin:  No rash, tattoos, scars, edema      PHYSICAL EXAM:   Vital Signs:  Vital Signs Last 24 Hrs  T(C): 37 (26 Oct 2021 08:58), Max: 37.1 (26 Oct 2021 04:45)  T(F): 98.6 (26 Oct 2021 08:58), Max: 98.7 (26 Oct 2021 04:45)  HR: 62 (26 Oct 2021 08:58) (47 - 77)  BP: 145/76 (26 Oct 2021 08:58) (130/75 - 171/76)  BP(mean): --  RR: 17 (26 Oct 2021 08:58) (17 - 18)  SpO2: 99% (26 Oct 2021 08:58) (94% - 99%)  Daily     Daily       10-25-21 @ 07:01  -  10-26-21 @ 07:00  --------------------------------------------------------  IN: 0 mL / OUT: 700 mL / NET: -700 mL    10-26-21 @ 07:01  -  10-26-21 @ 12:03  --------------------------------------------------------  IN: 120 mL / OUT: 0 mL / NET: 120 mL      GENERAL:  No acute distress  HEENT:  Normocephalic/atraumatic, no scleral icterus  CHEST:  No accessory muscle use  HEART:  Regular rate and rhythm  ABDOMEN:  Soft, non-tender, non-distended  EXTREMITIES: No cyanosis, clubbing, or edema  SKIN:  No rash  NEURO:  Alert and oriented x 3, no asterixis    LABS:                        10.7   8.10  )-----------( 812      ( 26 Oct 2021 06:24 )             33.1     Mean Cell Volume: 91.9 fl (10-26-21 @ 06:24)    10-26    136  |  103  |  52<H>  ----------------------------<  111<H>  4.3   |  18<L>  |  3.12<H>    Ca    9.2      26 Oct 2021 06:24  Phos  3.6     10-26  Mg     2.5     10-26    TPro  6.9  /  Alb  3.8  /  TBili  0.2  /  DBili  x   /  AST  16  /  ALT  10  /  AlkPhos  101  10-26    LIVER FUNCTIONS - ( 26 Oct 2021 06:24 )  Alb: 3.8 g/dL / Pro: 6.9 g/dL / ALK PHOS: 101 U/L / ALT: 10 U/L / AST: 16 U/L / GGT: x           PT/INR - ( 25 Oct 2021 06:05 )   PT: 12.3 sec;   INR: 1.03 ratio         PTT - ( 25 Oct 2021 06:05 )  PTT:31.1 sec                            10.7   8.10  )-----------( 812      ( 26 Oct 2021 06:24 )             33.1                         12.1   7.02  )-----------( 818      ( 25 Oct 2021 06:05 )             37.3       Imaging:

## 2021-10-26 NOTE — PROGRESS NOTE ADULT - SUBJECTIVE AND OBJECTIVE BOX
Subjective: Patient seen and examined. No new events except as noted.   resting comfortably     REVIEW OF SYSTEMS:    CONSTITUTIONAL: + weakness, fevers or chills  EYES/ENT: No visual changes;  No vertigo or throat pain   NECK: No pain or stiffness  RESPIRATORY: No cough, wheezing, hemoptysis; No shortness of breath  CARDIOVASCULAR: No chest pain or palpitations  GASTROINTESTINAL: No abdominal or epigastric pain. No nausea, vomiting, or hematemesis; No diarrhea or constipation. No melena or hematochezia.  GENITOURINARY: No dysuria, frequency or hematuria  NEUROLOGICAL: No numbness or weakness  SKIN: No itching, burning, rashes, or lesions   All other review of systems is negative unless indicated above.    MEDICATIONS:  MEDICATIONS  (STANDING):  aspirin  chewable 81 milliGRAM(s) Oral daily  atorvastatin 20 milliGRAM(s) Oral at bedtime  bisacodyl 5 milliGRAM(s) Oral at bedtime  cloNIDine 0.3 milliGRAM(s) Oral every 8 hours  clopidogrel Tablet 75 milliGRAM(s) Oral daily  doxazosin 4 milliGRAM(s) Oral at bedtime  hydrALAZINE 100 milliGRAM(s) Oral <User Schedule>  influenza   Vaccine 0.5 milliLiter(s) IntraMuscular once  isosorbide   mononitrate ER Tablet (IMDUR) 120 milliGRAM(s) Oral daily  lactulose Syrup 20 Gram(s) Oral two times a day  metoprolol succinate ER 25 milliGRAM(s) Oral daily  NIFEdipine XL 90 milliGRAM(s) Oral daily  polyethylene glycol 3350 17 Gram(s) Oral every 12 hours  senna 2 Tablet(s) Oral at bedtime  sodium bicarbonate 650 milliGRAM(s) Oral three times a day      PHYSICAL EXAM:  T(C): 37 (10-26-21 @ 08:58), Max: 37.1 (10-26-21 @ 04:45)  HR: 62 (10-26-21 @ 08:58) (47 - 77)  BP: 145/76 (10-26-21 @ 08:58) (130/75 - 171/76)  RR: 17 (10-26-21 @ 08:58) (17 - 18)  SpO2: 99% (10-26-21 @ 08:58) (94% - 99%)  Wt(kg): --  I&O's Summary    25 Oct 2021 07:01  -  26 Oct 2021 07:00  --------------------------------------------------------  IN: 0 mL / OUT: 700 mL / NET: -700 mL    26 Oct 2021 07:01  -  26 Oct 2021 10:34  --------------------------------------------------------  IN: 120 mL / OUT: 0 mL / NET: 120 mL          Appearance: Normal	  HEENT:   Normal oral mucosa, PERRL, EOMI	  Lymphatic: No lymphadenopathy , no edema  Cardiovascular: Normal S1 S2, No JVD, No murmurs , Peripheral pulses palpable 2+ bilaterally  Respiratory: Lungs clear to auscultation, normal effort 	  Gastrointestinal:  Soft, Non-tender, + BS	  Skin: No rashes, No ecchymoses, No cyanosis, warm to touch  Musculoskeletal: Normal range of motion, normal strength  Psychiatry:  Mood & affect appropriate  Ext: No edema      LABS:    CARDIAC MARKERS:                                10.7   8.10  )-----------( 812      ( 26 Oct 2021 06:24 )             33.1     10-26    136  |  103  |  52<H>  ----------------------------<  111<H>  4.3   |  18<L>  |  3.12<H>    Ca    9.2      26 Oct 2021 06:24  Phos  3.6     10-26  Mg     2.5     10-26    TPro  6.9  /  Alb  3.8  /  TBili  0.2  /  DBili  x   /  AST  16  /  ALT  10  /  AlkPhos  101  10-26    proBNP:   Lipid Profile:   HgA1c:   TSH:             TELEMETRY: 	    ECG:  	  RADIOLOGY:   DIAGNOSTIC TESTING:  [ ] Echocardiogram:  [ ]  Catheterization:  [ ] Stress Test:    OTHER:

## 2021-10-27 LAB
ALBUMIN SERPL ELPH-MCNC: 3.9 G/DL — SIGNIFICANT CHANGE UP (ref 3.3–5)
ALP SERPL-CCNC: 103 U/L — SIGNIFICANT CHANGE UP (ref 40–120)
ALT FLD-CCNC: 10 U/L — SIGNIFICANT CHANGE UP (ref 10–45)
ANION GAP SERPL CALC-SCNC: 14 MMOL/L — SIGNIFICANT CHANGE UP (ref 5–17)
AST SERPL-CCNC: 16 U/L — SIGNIFICANT CHANGE UP (ref 10–40)
BASOPHILS # BLD AUTO: 0.07 K/UL — SIGNIFICANT CHANGE UP (ref 0–0.2)
BASOPHILS NFR BLD AUTO: 1 % — SIGNIFICANT CHANGE UP (ref 0–2)
BILIRUB SERPL-MCNC: 0.3 MG/DL — SIGNIFICANT CHANGE UP (ref 0.2–1.2)
BUN SERPL-MCNC: 37 MG/DL — HIGH (ref 7–23)
CALCIUM SERPL-MCNC: 9.6 MG/DL — SIGNIFICANT CHANGE UP (ref 8.4–10.5)
CHLORIDE SERPL-SCNC: 104 MMOL/L — SIGNIFICANT CHANGE UP (ref 96–108)
CO2 SERPL-SCNC: 19 MMOL/L — LOW (ref 22–31)
CREAT SERPL-MCNC: 2.71 MG/DL — HIGH (ref 0.5–1.3)
DRVVT SCREEN TO CONFIRM RATIO: SIGNIFICANT CHANGE UP
EOSINOPHIL # BLD AUTO: 0.22 K/UL — SIGNIFICANT CHANGE UP (ref 0–0.5)
EOSINOPHIL NFR BLD AUTO: 3.1 % — SIGNIFICANT CHANGE UP (ref 0–6)
GLUCOSE SERPL-MCNC: 121 MG/DL — HIGH (ref 70–99)
HCT VFR BLD CALC: 36.2 % — LOW (ref 39–50)
HGB BLD-MCNC: 11.5 G/DL — LOW (ref 13–17)
IMM GRANULOCYTES NFR BLD AUTO: 0.3 % — SIGNIFICANT CHANGE UP (ref 0–1.5)
INR BLD: 1.1 RATIO — SIGNIFICANT CHANGE UP (ref 0.88–1.16)
LA NT DPL PPP QL: 41.6 SEC — SIGNIFICANT CHANGE UP
LYMPHOCYTES # BLD AUTO: 1.47 K/UL — SIGNIFICANT CHANGE UP (ref 1–3.3)
LYMPHOCYTES # BLD AUTO: 20.5 % — SIGNIFICANT CHANGE UP (ref 13–44)
MAGNESIUM SERPL-MCNC: 2.3 MG/DL — SIGNIFICANT CHANGE UP (ref 1.6–2.6)
MCHC RBC-ENTMCNC: 29.6 PG — SIGNIFICANT CHANGE UP (ref 27–34)
MCHC RBC-ENTMCNC: 31.8 GM/DL — LOW (ref 32–36)
MCV RBC AUTO: 93.3 FL — SIGNIFICANT CHANGE UP (ref 80–100)
MONOCYTES # BLD AUTO: 0.59 K/UL — SIGNIFICANT CHANGE UP (ref 0–0.9)
MONOCYTES NFR BLD AUTO: 8.2 % — SIGNIFICANT CHANGE UP (ref 2–14)
NEUTROPHILS # BLD AUTO: 4.8 K/UL — SIGNIFICANT CHANGE UP (ref 1.8–7.4)
NEUTROPHILS NFR BLD AUTO: 66.9 % — SIGNIFICANT CHANGE UP (ref 43–77)
NORMALIZED SCT PPP-RTO: 0.92 RATIO — SIGNIFICANT CHANGE UP (ref 0–1.16)
NORMALIZED SCT PPP-RTO: SIGNIFICANT CHANGE UP
NRBC # BLD: 0 /100 WBCS — SIGNIFICANT CHANGE UP (ref 0–0)
PHOSPHATE SERPL-MCNC: 3.1 MG/DL — SIGNIFICANT CHANGE UP (ref 2.5–4.5)
PLATELET # BLD AUTO: 786 K/UL — HIGH (ref 150–400)
POTASSIUM SERPL-MCNC: 4 MMOL/L — SIGNIFICANT CHANGE UP (ref 3.5–5.3)
POTASSIUM SERPL-SCNC: 4 MMOL/L — SIGNIFICANT CHANGE UP (ref 3.5–5.3)
PROT SERPL-MCNC: 7.3 G/DL — SIGNIFICANT CHANGE UP (ref 6–8.3)
PROTHROM AB SERPL-ACNC: 13.1 SEC — SIGNIFICANT CHANGE UP (ref 10.6–13.6)
RBC # BLD: 3.88 M/UL — LOW (ref 4.2–5.8)
RBC # FLD: 15.6 % — HIGH (ref 10.3–14.5)
SODIUM SERPL-SCNC: 137 MMOL/L — SIGNIFICANT CHANGE UP (ref 135–145)
WBC # BLD: 7.17 K/UL — SIGNIFICANT CHANGE UP (ref 3.8–10.5)
WBC # FLD AUTO: 7.17 K/UL — SIGNIFICANT CHANGE UP (ref 3.8–10.5)

## 2021-10-27 PROCEDURE — 99233 SBSQ HOSP IP/OBS HIGH 50: CPT | Mod: GC

## 2021-10-27 PROCEDURE — 99232 SBSQ HOSP IP/OBS MODERATE 35: CPT | Mod: GC

## 2021-10-27 RX ORDER — SOD SULF/SODIUM/NAHCO3/KCL/PEG
4000 SOLUTION, RECONSTITUTED, ORAL ORAL ONCE
Refills: 0 | Status: COMPLETED | OUTPATIENT
Start: 2021-10-27 | End: 2021-10-27

## 2021-10-27 RX ORDER — METOPROLOL TARTRATE 50 MG
25 TABLET ORAL DAILY
Refills: 0 | Status: DISCONTINUED | OUTPATIENT
Start: 2021-10-27 | End: 2021-11-03

## 2021-10-27 RX ADMIN — Medication 0.3 MILLIGRAM(S): at 21:27

## 2021-10-27 RX ADMIN — Medication 650 MILLIGRAM(S): at 21:27

## 2021-10-27 RX ADMIN — Medication 90 MILLIGRAM(S): at 06:33

## 2021-10-27 RX ADMIN — Medication 650 MILLIGRAM(S): at 06:33

## 2021-10-27 RX ADMIN — Medication 650 MILLIGRAM(S): at 14:38

## 2021-10-27 RX ADMIN — Medication 4000 MILLILITER(S): at 11:46

## 2021-10-27 RX ADMIN — LACTULOSE 20 GRAM(S): 10 SOLUTION ORAL at 06:32

## 2021-10-27 RX ADMIN — Medication 0.3 MILLIGRAM(S): at 06:33

## 2021-10-27 RX ADMIN — Medication 25 MILLIGRAM(S): at 10:24

## 2021-10-27 RX ADMIN — Medication 100 MILLIGRAM(S): at 10:23

## 2021-10-27 RX ADMIN — CLOPIDOGREL BISULFATE 75 MILLIGRAM(S): 75 TABLET, FILM COATED ORAL at 11:47

## 2021-10-27 RX ADMIN — ATORVASTATIN CALCIUM 20 MILLIGRAM(S): 80 TABLET, FILM COATED ORAL at 21:27

## 2021-10-27 RX ADMIN — Medication 100 MILLIGRAM(S): at 17:16

## 2021-10-27 RX ADMIN — Medication 4 MILLIGRAM(S): at 21:28

## 2021-10-27 RX ADMIN — Medication 100 MILLIGRAM(S): at 01:22

## 2021-10-27 RX ADMIN — Medication 0.3 MILLIGRAM(S): at 14:38

## 2021-10-27 RX ADMIN — ISOSORBIDE MONONITRATE 120 MILLIGRAM(S): 60 TABLET, EXTENDED RELEASE ORAL at 11:49

## 2021-10-27 RX ADMIN — Medication 81 MILLIGRAM(S): at 11:47

## 2021-10-27 RX ADMIN — POLYETHYLENE GLYCOL 3350 17 GRAM(S): 17 POWDER, FOR SOLUTION ORAL at 06:35

## 2021-10-27 NOTE — PROGRESS NOTE ADULT - SUBJECTIVE AND OBJECTIVE BOX
Internal Medicine   More Weber | PGY-1    OVERNIGHT EVENTS:      SUBJECTIVE:       MEDICATIONS  (STANDING):  aspirin  chewable 81 milliGRAM(s) Oral daily  atorvastatin 20 milliGRAM(s) Oral at bedtime  bisacodyl 5 milliGRAM(s) Oral at bedtime  cloNIDine 0.3 milliGRAM(s) Oral every 8 hours  clopidogrel Tablet 75 milliGRAM(s) Oral daily  doxazosin 4 milliGRAM(s) Oral at bedtime  hydrALAZINE 100 milliGRAM(s) Oral <User Schedule>  influenza   Vaccine 0.5 milliLiter(s) IntraMuscular once  isosorbide   mononitrate ER Tablet (IMDUR) 120 milliGRAM(s) Oral daily  lactulose Syrup 20 Gram(s) Oral two times a day  metoprolol succinate ER 25 milliGRAM(s) Oral daily  NIFEdipine XL 90 milliGRAM(s) Oral daily  polyethylene glycol 3350 17 Gram(s) Oral every 12 hours  senna 2 Tablet(s) Oral at bedtime  sodium bicarbonate 650 milliGRAM(s) Oral three times a day    MEDICATIONS  (PRN):  acetaminophen    Suspension .. 650 milliGRAM(s) Oral every 6 hours PRN Mild Pain (1 - 3)  simethicone 80 milliGRAM(s) Chew two times a day PRN Upset Stomach        T(F): 97.6 (10-27-21 @ 07:02), Max: 98.7 (10-26-21 @ 12:00)  HR: 47 (10-27-21 @ 07:02) (44 - 82)  BP: 172/87 (10-27-21 @ 07:02) (145/76 - 172/87)  BP(mean): --  RR: 18 (10-27-21 @ 07:02) (16 - 19)  SpO2: 100% (10-27-21 @ 07:02) (94% - 100%)    PHYSICAL EXAM:     GENERAL: NAD, lying in bed comfortably.  HEAD:  Atraumatic, normocephalic.  EYES: EOMI, PERRLA, conjunctiva and sclera clear, no nystagmus noted.  ENT: Moist mucous membranes,   NECK: Supple. No JVD. Trachea midline.  CHEST/LUNG: CTAB. No rales, rhonchi, wheezing, or rubs. Unlabored respirations.  HEART: RRR, no M/R/G, normal S1/S2.  ABDOMEN: Soft, nontender, nondistended, no organomegaly. Normoactive bowel sounds.  EXTREMITIES:  2+ peripheral pulses b/l, brisk capillary refill. No clubbing, cyanosis, or edema.  MSK: No gross deformities noted.   NEURO:  AAOx3, no focal deficits.   SKIN: No rashes or lesions.  PSYCH: Normal mood, affect.    TELEMETRY:    LABS:                        11.5   7.17  )-----------( 786      ( 27 Oct 2021 02:54 )             36.2     10-27    137  |  104  |  37<H>  ----------------------------<  121<H>  4.0   |  19<L>  |  2.71<H>    Ca    9.6      27 Oct 2021 02:54  Phos  3.1     10-27  Mg     2.3     10-27    TPro  7.3  /  Alb  3.9  /  TBili  0.3  /  DBili  x   /  AST  16  /  ALT  10  /  AlkPhos  103  10-27        PT/INR - ( 27 Oct 2021 02:54 )   PT: 13.1 sec;   INR: 1.10 ratio             Creatinine Trend: 2.71<--, 3.12<--, 2.87<--, 2.80<--, 2.81<--, 2.74<--  I&O's Summary    26 Oct 2021 07:01  -  27 Oct 2021 07:00  --------------------------------------------------------  IN: 240 mL / OUT: 400 mL / NET: -160 mL      BNP    RADIOLOGY & ADDITIONAL STUDIES:             Internal Medicine   More Weber | PGY-1    OVERNIGHT EVENTS: KATIE      SUBJECTIVE: Patient was seen and examined at bedside this morning. Unclear whether he has taken his bowel prep for his colonoscopy today; reports he has but noted as "not given" in chart. Otherwise patient has no complaints. Denies any F/C, nausea/vomiting, headache, shortness of breath, abdominal pain or chest pain/palpitations. Patient responding appropriately to questions and able to make needs known.       MEDICATIONS  (STANDING):  aspirin  chewable 81 milliGRAM(s) Oral daily  atorvastatin 20 milliGRAM(s) Oral at bedtime  bisacodyl 5 milliGRAM(s) Oral at bedtime  cloNIDine 0.3 milliGRAM(s) Oral every 8 hours  clopidogrel Tablet 75 milliGRAM(s) Oral daily  doxazosin 4 milliGRAM(s) Oral at bedtime  hydrALAZINE 100 milliGRAM(s) Oral <User Schedule>  influenza   Vaccine 0.5 milliLiter(s) IntraMuscular once  isosorbide   mononitrate ER Tablet (IMDUR) 120 milliGRAM(s) Oral daily  lactulose Syrup 20 Gram(s) Oral two times a day  metoprolol succinate ER 25 milliGRAM(s) Oral daily  NIFEdipine XL 90 milliGRAM(s) Oral daily  polyethylene glycol 3350 17 Gram(s) Oral every 12 hours  senna 2 Tablet(s) Oral at bedtime  sodium bicarbonate 650 milliGRAM(s) Oral three times a day    MEDICATIONS  (PRN):  acetaminophen    Suspension .. 650 milliGRAM(s) Oral every 6 hours PRN Mild Pain (1 - 3)  simethicone 80 milliGRAM(s) Chew two times a day PRN Upset Stomach        T(F): 97.6 (10-27-21 @ 07:02), Max: 98.7 (10-26-21 @ 12:00)  HR: 47 (10-27-21 @ 07:02) (44 - 82)  BP: 172/87 (10-27-21 @ 07:02) (145/76 - 172/87)  BP(mean): --  RR: 18 (10-27-21 @ 07:02) (16 - 19)  SpO2: 100% (10-27-21 @ 07:02) (94% - 100%)    PHYSICAL EXAM:     GENERAL: NAD, lying in bed comfortably.  HEAD:  Atraumatic, normocephalic.  CHEST/LUNG: CTAB. No rales, rhonchi, wheezing, or rubs. Unlabored respirations.  HEART: RRR, no M/R/G, normal S1/S2.  ABDOMEN: Distended L abdomen, unclear if from hepatomegaly as pt is lying on side and does not move for exam. Nontender, Normoactive bowel sounds.  EXTREMITIES:  2+ peripheral pulses b/l. No clubbing, cyanosis, or edema.  SKIN: No rashes or lesions.  PSYCH: Normal mood, affect.      LABS:                        11.5   7.17  )-----------( 786      ( 27 Oct 2021 02:54 )             36.2     10-27    137  |  104  |  37<H>  ----------------------------<  121<H>  4.0   |  19<L>  |  2.71<H>    Ca    9.6      27 Oct 2021 02:54  Phos  3.1     10-27  Mg     2.3     10-27    TPro  7.3  /  Alb  3.9  /  TBili  0.3  /  DBili  x   /  AST  16  /  ALT  10  /  AlkPhos  103  10-27        PT/INR - ( 27 Oct 2021 02:54 )   PT: 13.1 sec;   INR: 1.10 ratio             Creatinine Trend: 2.71<--, 3.12<--, 2.87<--, 2.80<--, 2.81<--, 2.74<--  I&O's Summary    26 Oct 2021 07:01  -  27 Oct 2021 07:00  --------------------------------------------------------  IN: 240 mL / OUT: 400 mL / NET: -160 mL

## 2021-10-27 NOTE — PROGRESS NOTE ADULT - SUBJECTIVE AND OBJECTIVE BOX
Neurology Progress Note    S: Patient seen and examined on floor. no complaints MR obtained liver mass, pending colonoscopy     Medication:  MEDICATIONS  (STANDING):  aspirin  chewable 81 milliGRAM(s) Oral daily  atorvastatin 20 milliGRAM(s) Oral at bedtime  bisacodyl 5 milliGRAM(s) Oral at bedtime  cloNIDine 0.3 milliGRAM(s) Oral every 8 hours  clopidogrel Tablet 75 milliGRAM(s) Oral daily  doxazosin 4 milliGRAM(s) Oral at bedtime  hydrALAZINE 100 milliGRAM(s) Oral <User Schedule>  influenza   Vaccine 0.5 milliLiter(s) IntraMuscular once  isosorbide   mononitrate ER Tablet (IMDUR) 120 milliGRAM(s) Oral daily  lactulose Syrup 20 Gram(s) Oral two times a day  metoprolol succinate ER 25 milliGRAM(s) Oral daily  NIFEdipine XL 90 milliGRAM(s) Oral daily  polyethylene glycol 3350 17 Gram(s) Oral every 12 hours  senna 2 Tablet(s) Oral at bedtime  sodium bicarbonate 650 milliGRAM(s) Oral three times a day    MEDICATIONS  (PRN):  acetaminophen    Suspension .. 650 milliGRAM(s) Oral every 6 hours PRN Mild Pain (1 - 3)  bisacodyl 5 milliGRAM(s) Oral once PRN Constipation  simethicone 80 milliGRAM(s) Chew two times a day PRN Upset Stomach          Vitals:  Vital Signs Last 24 Hrs  T(C): 36.3 (10-27-21 @ 15:29), Max: 36.7 (10-27-21 @ 03:01)  T(F): 97.4 (10-27-21 @ 15:29), Max: 98.1 (10-27-21 @ 03:01)  HR: 50 (10-27-21 @ 15:29) (44 - 58)  BP: 147/69 (10-27-21 @ 15:29) (146/66 - 172/87)  BP(mean): --  RR: 18 (10-27-21 @ 15:29) (18 - 19)  SpO2: 100% (10-27-21 @ 15:29) (99% - 100%)          Orthostatic VS  10-20-21 @ 11:10  Lying BP: 183/88 HR: 46  Sitting BP: 163/81 HR: 48  Standing BP: 152/51 HR: --  Site: upper right arm  Mode: electronic    Orthostatic VS  10-20-21 @ 11:10  Lying BP: 183/88 HR: 46  Sitting BP: 163/81 HR: 48  Standing BP: 152/51 HR: --  Site: upper right arm  Mode: electronic      Orthostatic VS  10-16-21 @ 07:27  Lying BP: 198/98 HR: 46  Sitting BP: 182/95 HR: 50  Standing BP: 174/96 HR: 58  Site: --  Mode: --  Orthostatic VS  10-15-21 @ 16:00  Lying BP: 163/76 HR: 49  Sitting BP: 136/77 HR: 51  Standing BP: 136/70 HR: 59  Site: upper right arm  Mode: electronic        General Exam:   General Appearance: Appropriately dressed and in no acute distress       Head: Normocephalic, atraumatic and no dysmorphic features  Ear, Nose, and Throat: Moist mucous membranes  CVS: S1S2+  Resp: No SOB, no wheeze or rhonchi  Abd: soft NTND  Extremities: No edema, no cyanosis  Skin: No bruises, no rashes     Neurological Exam:  Mental Status: Awake, alert and oriented x 3.  Able to follow simple and complex verbal commands. Able to name and repeat. fluent speech. No obvious aphasia or dysarthria noted.   Cranial Nerves: PERRL, EOMI, VFFC, sensation V1-V3 intact,  no obvious facial asymmetry , equal elevation of palate, scm/trap 5/5, tongue is midline on protrusion. no obvious papilledema on fundoscopic exam. Hearing is grossly intact.   Motor: Normal bulk, tone and strength throughout. Fine finger movements were intact and symmetric. no tremors or drift noted except LUE with 4+/5 mild drift noted   Sensation: Intact to light touch and pinprick throughout. no right/left confusion. no extinction to tactile on DSS.    Reflexes: 1+ throughout at biceps, brachioradialis, triceps, patellars and ankles bilaterally and equal. No clonus. R toe and L toe were both downgoing.  Coordination: No dysmetria on FNF   Gait: deferred in ICU    I personally reviewed the below data/images/labs:    CBC Full  -  ( 27 Oct 2021 02:54 )  WBC Count : 7.17 K/uL  RBC Count : 3.88 M/uL  Hemoglobin : 11.5 g/dL  Hematocrit : 36.2 %  Platelet Count - Automated : 786 K/uL  Mean Cell Volume : 93.3 fl  Mean Cell Hemoglobin : 29.6 pg  Mean Cell Hemoglobin Concentration : 31.8 gm/dL  Auto Neutrophil # : 4.80 K/uL  Auto Lymphocyte # : 1.47 K/uL  Auto Monocyte # : 0.59 K/uL  Auto Eosinophil # : 0.22 K/uL  Auto Basophil # : 0.07 K/uL  Auto Neutrophil % : 66.9 %  Auto Lymphocyte % : 20.5 %  Auto Monocyte % : 8.2 %  Auto Eosinophil % : 3.1 %  Auto Basophil % : 1.0 %      10-27    137  |  104  |  37<H>  ----------------------------<  121<H>  4.0   |  19<L>  |  2.71<H>    Ca    9.6      27 Oct 2021 02:54  Phos  3.1     10-27  Mg     2.3     10-27    TPro  7.3  /  Alb  3.9  /  TBili  0.3  /  DBili  x   /  AST  16  /  ALT  10  /  AlkPhos  103  10-27      < from: CT Head No Cont (10.01.21 @ 18:37) >    EXAM:  CT BRAIN                              PROCEDURE DATE:  10/01/2021          INTERPRETATION:  CT OF THE HEAD WITHOUT CONTRAST    CLINICAL INDICATION: Weakness. Falls.    TECHNIQUE: Volumetric CT acquisition was performed through the brain and reviewed using brain and bone window technique. Dose optimization techniques were utilized including kVp/mA modulation along with iterative reconstructions.      COMPARISON: No prior studies have been submitted for comparison.    FINDINGS:    The ventricular and sulcal size and configuration is age appropriate.   There is no acute loss of gray-white differentiation. There are extensive patchy and confluent areas of hypodensity in the periventricular and subcortical white matter which are non-specific but likely related  chronic microangiopathic ischemic changes.  Small chronic infarction in the right cerebellar hemisphere. Chronic appearing left thalamic lacunar infarct.    There is no evidence of mass effect, midline shift, acute intracranial hemorrhage, or extra-axial collections.     The calvarium is intact. The paranasal sinuses are clear.The mastoid air cells are predominantly clear. The orbits are unremarkable.      IMPRESSION:  No acute intracranial hemorrhage or acute territorial infarction. Extensive chronic microvascular ischemic changes and several chronic infarctions as described. Further evaluation may be obtained with MRI of the brain if no contraindications.    --- End of Report ---            KAMILA SZYMANSKI MD; Attending Radiologist  This document has been electronically signed. Oct  1 2021  7:25PM    < end of copied text >  < from: MR Head No Cont (10.04.21 @ 18:09) >    EXAM:  MR BRAIN                            PROCEDURE DATE:  10/04/2021          INTERPRETATION:  .    CLINICAL INFORMATION: Frequent falls.    TECHNIQUE: Multiplanar multisequential MRI of the brain was acquired without the administration of IV gadolinium.    COMPARISON: Prior CT examination of the head dated 10/1/2021.    FINDINGS: Multiple areas of restricted diffusion are seen within the right MCA territory affecting the right frontal, parietal, and temporal lobes. Associated T2/FLAIR prolongation is seen, compatible with cytotoxic edema. No hemorrhagic transformation is noted.    Chronic lacunar infarcts are again seen within the right cerebellar hemisphere, left thalamus, and bilateral basal ganglia.    Multiple patchy confluent nonspecific T2/FLAIR hyperintense signal changes are noted throughout the bihemispheric white matter without associated mass effect or restricted diffusion.    Mild ventriculomegaly appears unchanged. No abnormal intra-axial fluid collections are notable. Flow-voids are noted throughout the major intracranial vessels, on the T2 weighted images, consistent with their patency. The sellar area appears unremarkable. The paranasal sinuses and mastoid air cells are grossly clear. Calvarial signal appears unremarkable. The orbits appear within normal limits.    IMPRESSION: Acute/subacute right-sided MCA distribution infarct with associated cytotoxic edema and without hemorrhagic transformation.    Multiple additional chronic lacunar infarcts and similar-appearing extensive chronic white matter microvascular type changes, as discussed.      --- End of Report ---            LUIS CARLOS BONNER MD; Attending Radiologist  This document has been electronically signed. Oct  5 2021  3:01PM    < end of copied text >  < from: MR Angio Head No Cont (10.06.21 @ 18:15) >    EXAM:  MR ANGIO BRAIN                            PROCEDURE DATE:  10/06/2021          INTERPRETATION:  INDICATION:  Right MCA infarct.    TECHNIQUE:  MR angiography of the brain was performed using three dimensional time-of-flight (3D-TOF) technique.  Imaging was performed on a 1.5 Lisa magnet.    FINDINGS:    INTERNAL CAROTID ARTERIES:  Bilaterally patent.    [place] [facility]:  A right-sided P-comm aneurysm is identified. The aneurysm is directed posteriorly and laterally, the aneurysm appears to be septated and/or bilobed., and measures 8.9 x 7.3 mm.    CEREBRAL ARTERIES:  Both anterior cerebral arteries are patent. Both A1 segments are well formed. Both middle cerebral arteries are patent. There is mild narrowing of the proximal right M1 segment.    VERTEBROBASILAR SYSTEM:  The vertebrobasilar system is patent. There are large posterior communicating arteries bilaterally with dominant supply of the posterior cerebral arteries. Left P1 segment is hypoplastic.    IMPRESSION:    1. 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  2. Mild narrowing of the proximal right middle cerebral artery in the M1 region.  3. The vessels are otherwise patent, as outlined above.    --- End of Report ---EXAM:  CT BRAIN                            PROCEDURE DATE:  10/13/2021      INTERPRETATION:  Noncontrast CT of the brain.    CLINICAL INDICATION: Status post rt pcomm aneurysm coiling    TECHNIQUE : Axial CT scanning of the brain was obtainedfrom the skull base to the vertex without the administration of intravenous contrast. Sagittal and coronal reformats were provided.    COMPARISON: CT brain 10/1/2021. MRI brain 10/4/2021    FINDINGS:    Interval placement of embolic coil mass right parasellar region.    Similar mild ventricular dilatation.    No midline shift. Basal cisterns poorly evaluated due to streak artifact.    No acute intracranial hemorrhage, mass effect, or brain edema. Extensive white matter microvascular ischemic disease.    The visualized paranasal sinuses and mastoid air cells are clear.    IMPRESSION:    Interval placement of embolic coil mass right parasellar region.  No acute intracranial hemorrhage, mass effect, or brain edema.  Similar mild ventricular dilatation.        --- End of Report ---      AFIA DAILY MD; Attending Radiologist  This document has been electronically signed. Oct 13 2021  9:31AM    < end of copied text >      < from: MR Head No Cont (10.15.21 @ 16:40) >    EXAM:  MR BRAIN                            PROCEDURE DATE:  10/15/2021    ATION:  MRI OF THE BRAIN WITHOUT CONTRAST    CLINICAL INDICATION: Status post right posterior communicating artery aneurysm status post balloon assisted coilembolization    TECHNIQUE: Multiplanar multisequence noncontrast MRI of the brain was performed.    COMPARISON: CT brain, 10/13/2021 and MRI brain, 10/4/2021.    FINDINGS:    The ventricular and sulcal size and configuration is age appropriate. Thereare scattered T2/FLAIR hyperintensities in the subcortical and periventricular white matter which are nonspecific but most commonly represent chronic microvascular ischemic changes.    The previously seen acute lacunar infarctions along left MCA distribution are decreased in diffusion restriction intensity, consistent with evolution of acute infarctions to late subacute stage. In addition, new punctate acute lacunar infarctions have developed in the posterior wall of the right temporal lobe and in the left cerebellar hemisphere along right parietal distribution. There is no susceptibility signal to suggest hemorrhagic conversion.  There is a chronic infarction in the right cerebellum and in the left thalamus.    There is susceptibility signalin the right paraclinoid region at the site of the previously coiled right P-comm aneurysm.    There is abnormal extra-axial fluid collection or hydrocephalus. The visualized globes are symmetric bilaterally.    The visualized paranasal sinuses and mastoid bones are adequately developed and aerated.    IMPRESSION:    Comparison previous studies,    There has been interval evolution of previously seen infarcts in right MCA distribution as described. There is development of new acute lacunar infarctions at the posterior pole of the right temporal lobe and in the right cerebellum as described.    There is no intracranial hemorrhage, midline shift or mass effect.    Coil mass in the right paraclinoid region.    Notification to clinician of alert:    Provider   Dr. BARGER  was notified about the final results at 10/15/2021 10:23 AM via telephone by neuroradiologist KEITH Tineo. Readback confirmation was obtained.      JULIO TINEO MD; Attending Radiologist  This document has been electronically signed. Oct 15 2021 10:35AM    < end of copied text >  < from: CT Chest No Cont (10.15.21 @ 23:27) >    EXAM:  CT CHEST                          INTERPRETATION:  CLINICAL INFORMATION: Shortness breath, evaluate for pulmonary infection or pulmonary edema.    COMPARISON: CT chest 9/6/2014. CT abdomen pelvis 10/11/2021.    CONTRAST/COMPLICATIONS:  IV Contrast: None  Oral Contrast: None  Complications: None    PROCEDURE:  CT of the Chest was performed.  Sagittal and coronal reformats were performed.    FINDINGS:    LUNGS AND AIRWAYS: Emphysema. No pulmonary nodules or parenchymal consolidations.  PLEURA: Small bilateral pleural effusions.    MEDIASTINUM AND RED: 1.2 cm right paratracheal lymph node (2:40), nonspecific. No axillary lymphadenopathy    VESSELS: Calcified atherosclerotic plaques in aorta and coronary arteries.    HEART: Multifocal cardiac enlargement is noted. No pericardial effusion. Mitral annular calcifications.    CHEST WALL AND LOWER NECK: Within normal limits.    VISUALIZED UPPER ABDOMEN: Partially imaged large hypodense hepatic lesion measuring 6.9 x 8.8 cm, similar in appearance compared to prior CT abdomen pelvis 10/11/2021. Simple cyst in the left kidney    BONES: Degenerative changes of the spine.    IMPRESSION:    Small bilateral pleural effusions with minimal bilateral groundglass opacities and a few areas of interlobular septal thickening. Findings are suggestive of mild interstitial edema. This is superimposed on a background of emphysema.    Redemonstration of large hypodense hepatic lesion, similar appearance whencompared to prior abdominal pelvis 10/11/2021. As mentioned on prior report, consider contrast enhanced MRI for further evaluation.      ALIYA BERTRAND MD; Resident Radiology  This document has been electronicallysigned.  ABRIL PINEDA MD; Attending Radiologist  This document has been electronically signed. Oct 16 2021  9:09AM    < end of copied text >  < from: MR Abdomen w/ IV Cont (10.22.21 @ 23:26) >    EXAM:  MR ABDOMEN IC                          PROCEDURE DATE:  10/22/2021      INTERPRETATION:  CLINICAL INFORMATION: Hepatic mass.    COMPARISON: Noncontrast CT abdomen and pelvis 10/11/2021    CONTRAST/COMPLICATIONS:  IV Contrast: Gadavist  7.5 cc administered   0 cc discarded  Oral Contrast: NONE  Complications: None reported at time of study completion    PROCEDURE:  MRI of the abdomen was performed.  MRCP was performed.    FINDINGS:  LOWER CHEST: Within normal limits.    LIVER: No morphologic evidence of cirrhosis. An 8.9 x 7.1 cm central hepatic lesion involving hepatic segments 4, 5 and 6 with possible extension into superior right hepatic lobe segments demonstrates restricted diffusion with peripheral rim enhancement and delayed central enhancement. While post contrast imaging is limited, MR characteristics strongly favor cholangiocarcinoma.  BILE DUCTS: Normal caliber.  GALLBLADDER: Cholelithiasis.  SPLEEN: Within normal limits.  PANCREAS: Within normal limits.  ADRENALS: Within normal limits.  KIDNEYS/URETERS: Bilateral renal cysts including a 3.3 cm hemorrhagic right renal cyst.    VISUALIZED PORTIONS:  BOWEL: Within normal limits.  PERITONEUM: No ascites.  VESSELS: Hepatic and portal veins are patent.  RETROPERITONEUM/LYMPH NODES: Upper abdominal adenopathy including peripancreatic, portacaval and savage hepatis nodes. Reference lesions as follows.    Peripancreatic node anterior to the portal vein (6:22) 3.7 x 2.6 cm.    Portacaval node (6:24) 2.9 x 1.5 cm.    ABDOMINAL WALL: Within normal limits.  BONES: Within normal limits.    IMPRESSION:  Limited post contrast imaging.    Central hepatic mass as above for which the primary differential consideration is cholangiocarcinoma.    Adenopathy in the peripancreatic, savage hepatis and portacaval regions.    --- End of Report ---      CARLOS ARMSTRONG MD; Attending Radiologist  This document has been electronically signed. Oct 23 2021  9:47AM    < end of copied text >

## 2021-10-27 NOTE — PROGRESS NOTE ADULT - ATTENDING COMMENTS
As above.    Impression:    #1.  Consulted for hepatic mass, imaging concerning for cholangiocarcinoma, but primary team wants also to look for metastatic disease from GI tract, which is less likely but possible.    #2.  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  Chronic renal insufficiency    #4.  Thrombocytosis    Recommendation    #1.  No colonoscopy today, patient did not prep.  No colonoscopy tomorrow, as patient ate solid food today brought by family.  If plan for colonoscopy Friday, would need clear liquid diet Thursday, prep Thursday night, then NPO after MN for Friday colonoscopy.    #2.  Patient will need eventual diagnostic sampling of liver lesion or associated lymphadenopathy after Plavix can be safely held from neurologic perspective (earliest date 11/8/21).  Options include EUS guided sampling of lymph nodes or IR percutaneous image guided biopsy (if patient is not a surgical candidate for curative resection).

## 2021-10-27 NOTE — PROGRESS NOTE ADULT - ASSESSMENT
69 yo M w/ PMHx of CKD IV (unknown baseline), uncontrolled HTN, ?CAD (patient states he has "heart problems;" TTE performed at [facility] on 10/5 w/ normal LVF, severely enlarged LA, Grade III diastolic dysfunction, mild pulm HTN) who was transfered from [facility] to where he presented w/ hx of multiple falls, found to have acute right MCA territory ischemic infarct w/ extensive chronic microvascular ischemic changes on CT, acute/subacute right-sided MCA distribution infarct with associated cytotoxic edema, on MRI, and Pcomm aneurysm vs thrombus [id number], mild R M1 stenosis on MRA. 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 Medicine re-consulted for liver lesion suspicious for cholangiocarcinoma.

## 2021-10-27 NOTE — PROGRESS NOTE ADULT - ASSESSMENT
70yoM w/ PMHx of CKD IV (unknown baseline), uncontrolled HTN, ?CAD (patient states he has "heart problems;" TTE performed at [facility] on 10/5 w/ normal LVF, severely enlarged LA, Grade III diastolic dysfunction, mild pulm HTN) who was transfered from [facility] to where he presented w/ hx of multiple falls, found to have acute right MCA territory ischemic infarct w/ extensive chronic microvascular ischemic changes on CT, acute/subacute right-sided MCA distribution infarct with associated cytotoxic edema, on MRI, and Pcomm aneurysm vs thrombus [id number], mild R M1 stenosis on MRA.

## 2021-10-27 NOTE — PROGRESS NOTE ADULT - PROBLEM SELECTOR PLAN 1
MRI abd shows central hepatic mass for which the primary differential consideration is cholangiocarcinoma and adenopathy in the peripancreatic, savage hepatis and portacaval regions.  - CEA elevated, Ca 19-9 mildly elevated  - Surgical Onc and hepatology recs appreciated  - Would require biopsy for diagnosis  - IR consulted, deferred IR guided bx as patient on DAPT - would need to be held prior to procedure  - [facility] consulted, recs appreciated. Per their review, likely iCCA, recommending against biopsy at this time due to high risk of seeding  - however, will plan for colonoscopy on Wed 10/27  - clears Tuesday 10/26, with bowel prep Tues night and NPO after midnight   - will need EGD to clear upper GI tract +/- EUS/FNA of LN if malignant appearing once off plavix x 5 days (needs 3 weeks of plavix per neurosugery) - can be done outpatient   - will need outpatient PET scan  - formal heme/onc consult pending biopsy results MRI abd shows central hepatic mass for which the primary differential consideration is cholangiocarcinoma and adenopathy in the peripancreatic, savage hepatis and portacaval regions.  - CEA elevated, Ca 19-9 mildly elevated  - Surgical Onc and hepatology recs appreciated  - Would require biopsy for diagnosis  - IR consulted, deferred IR guided bx as patient on DAPT - would need to be held prior to procedure  - [facility] consulted, recs appreciated. Per their review, likely iCCA, recommending against biopsy at this time due to high risk of seeding  - plan for colonoscopy 10/27   - clears Tuesday 10/26, with bowel prep Tues night and NPO after midnight     - noted as "not given" in chart, may have to postpone  - will need EGD to clear upper GI tract +/- EUS/FNA of LN if malignant appearing once off plavix x 5 days (needs 3 weeks of plavix per neurosugery) - can be done outpatient   - will need outpatient PET scan  - formal heme/onc consult pending biopsy results

## 2021-10-27 NOTE — PROGRESS NOTE ADULT - ASSESSMENT
70yoM w/ PMHx of CKD with a baseline Cr >3, HTN, HLD presented to OSH after multiple falls. He was transferred from OSH for MRI/MRA findings concerning for aneurysm vs. thrombus       1) CKD stage 4- overall stable  Likely has Hypertensive Nephropathy-> b/l 2.8mg/dl to 3.1mg/dl  Ua and urine lytes reviewed--> +UTI- s/p cipro  urine pro/cr 1.4grams  K/L 11.16/6.72---> ratio is < 2---> monitor for now  Renal US --> right kidney 9.3cm and left 9.5 cm with b/l renal cysts  s/p Coiling for PCOMM  Cr  fluctuating however overall stable- cont monitor with daily basic metabolic panel   s/p NIELS --> no signs of NSF--> will cont to monitor  avoid nephrotoxins such as ace/arb/nsaid  trend bmp    2) HTN-  bp rising again  on nifedipine clonidine  hydralazine,   Imdur 120mg po qd  If bp stays high then inc metoprolol from 25mg to 50mg   Trend bp    3) metabolic acidosis- likely from ckd-   na bicarb 650mg po tid  trend bicarb      Dr Hernadez  Nephrology   cell [phone]  office [phone]  ans serv- [phone]  [email or web address] ( wkend coverage for [facility])

## 2021-10-27 NOTE — PROGRESS NOTE ADULT - SUBJECTIVE AND OBJECTIVE BOX
Subjective: Patient seen and examined. No new events except as noted.   for colonoscopy today   no cp     REVIEW OF SYSTEMS:    CONSTITUTIONAL: + weakness, fevers or chills  EYES/ENT: No visual changes;  No vertigo or throat pain   NECK: No pain or stiffness  RESPIRATORY: No cough, wheezing, hemoptysis; No shortness of breath  CARDIOVASCULAR: No chest pain or palpitations  GASTROINTESTINAL: No abdominal or epigastric pain. No nausea, vomiting, or hematemesis; No diarrhea or constipation. No melena or hematochezia.  GENITOURINARY: No dysuria, frequency or hematuria  NEUROLOGICAL: No numbness or weakness  SKIN: No itching, burning, rashes, or lesions   All other review of systems is negative unless indicated above.    MEDICATIONS:  MEDICATIONS  (STANDING):  aspirin  chewable 81 milliGRAM(s) Oral daily  atorvastatin 20 milliGRAM(s) Oral at bedtime  bisacodyl 5 milliGRAM(s) Oral at bedtime  cloNIDine 0.3 milliGRAM(s) Oral every 8 hours  clopidogrel Tablet 75 milliGRAM(s) Oral daily  doxazosin 4 milliGRAM(s) Oral at bedtime  hydrALAZINE 100 milliGRAM(s) Oral <User Schedule>  influenza   Vaccine 0.5 milliLiter(s) IntraMuscular once  isosorbide   mononitrate ER Tablet (IMDUR) 120 milliGRAM(s) Oral daily  lactulose Syrup 20 Gram(s) Oral two times a day  metoprolol succinate ER 25 milliGRAM(s) Oral daily  NIFEdipine XL 90 milliGRAM(s) Oral daily  polyethylene glycol 3350 17 Gram(s) Oral every 12 hours  senna 2 Tablet(s) Oral at bedtime  sodium bicarbonate 650 milliGRAM(s) Oral three times a day      PHYSICAL EXAM:  T(C): 36.4 (10-27-21 @ 07:02), Max: 37.1 (10-26-21 @ 12:00)  HR: 47 (10-27-21 @ 07:02) (44 - 82)  BP: 172/87 (10-27-21 @ 07:02) (146/66 - 172/87)  RR: 18 (10-27-21 @ 07:02) (16 - 19)  SpO2: 100% (10-27-21 @ 07:02) (94% - 100%)  Wt(kg): --  I&O's Summary    26 Oct 2021 07:01  -  27 Oct 2021 07:00  --------------------------------------------------------  IN: 240 mL / OUT: 400 mL / NET: -160 mL          Appearance: Normal	  HEENT:   Normal oral mucosa, PERRL, EOMI	  Lymphatic: No lymphadenopathy , no edema  Cardiovascular: Normal S1 S2, No JVD, No murmurs , Peripheral pulses palpable 2+ bilaterally  Respiratory: Lungs clear to auscultation, normal effort 	  Gastrointestinal:  Soft, Non-tender, + BS	  Skin: No rashes, No ecchymoses, No cyanosis, warm to touch  Musculoskeletal: Normal range of motion, normal strength  Psychiatry:  Mood & affect appropriate  Ext: No edema      LABS:    CARDIAC MARKERS:                                11.5   7.17  )-----------( 786      ( 27 Oct 2021 02:54 )             36.2     10-27    137  |  104  |  37<H>  ----------------------------<  121<H>  4.0   |  19<L>  |  2.71<H>    Ca    9.6      27 Oct 2021 02:54  Phos  3.1     10-27  Mg     2.3     10-27    TPro  7.3  /  Alb  3.9  /  TBili  0.3  /  DBili  x   /  AST  16  /  ALT  10  /  AlkPhos  103  10-27    proBNP:   Lipid Profile:   HgA1c:   TSH:             TELEMETRY: 	    ECG:  	  RADIOLOGY:   DIAGNOSTIC TESTING:  [ ] Echocardiogram:  [ ]  Catheterization:  [ ] Stress Test:    OTHER:

## 2021-10-27 NOTE — PROGRESS NOTE ADULT - PROBLEM SELECTOR PLAN 8
- found to have M spike 0.6, likely MGUS per hematology  - K/L light chain ratio elevated, normal IgA/IgM/IgG.  - hematology f/u

## 2021-10-27 NOTE — PROGRESS NOTE ADULT - ASSESSMENT
71 yo male with CKD (baseline Cr > 3), HTN, and HLD who presented as a transfer from [facility] for higher level of care. Patient initially presented to [facility] on 10/01 after multiple falls the since the day prior. MRI Head at [facility] showed acute vs subacute R MCA territory infarct with associated cytotoxic edema. MRA H showed multilobulated right-sided P-comm aneurysm directed posteriorly and laterally, measuring 8.9 x 7.3 mm.TTE: EF 65-70%, severely enlarged L atrium. CT abdomen 10/11  reveals large hypodense 8.5 cm hepatic lesion concerning for malignancy. 10/12 s/p coiling right PCOMM aneurysm. Post op CT head with evolution of previous infarcts in right MCA, now with new acute lacunar infarctions in the right temporal lobe and cerebellum. On DAPT.    Plan:  - MRI performed, read as likely cholangio involving segments 4,5,6 with peripancreatic and portocaval nodes  - Colonoscopy today, FU  - Do not necessarily need IR bx or EUS from surgical oncology perspective  - Patient should have PET Scan outpatient as part of workup  - Per GI will do EGD 11/8 after dapt has been on board for 3 weeks    Red Surgery  [id number]  Sam Nunez

## 2021-10-27 NOTE — PROGRESS NOTE ADULT - ASSESSMENT
71 yo male with CKD (baseline Cr > 3), HTN, and HLD who presented as a transfer from [facility] for higher level of care. Patient initially presented to [facility] on 10/01 after multiple falls the since the day prior. MRI Head at [facility] showed acute vs subacute R MCA territory infarct with associated cytotoxic edema. MRA H showed multilobulated right-sided P-comm aneurysm directed posteriorly and laterally, measuring 8.9 x 7.3 mm.   A1c 5.5, LDL 25.  TTE: EF 65-70%, severely enlarged L atrium.   MRi with R MCA territory infarct   vessels with PCOM on R aneurysm    s/p angio and coil of PCOM aneurysm.   Impression: AMS + mild L hemineglect in setting of R MCA territory infarct seen on MRI Head, ESUS.etiology fo stroke may be hypercaog from maligiancy   o/e LUE drift doing well.  MRI abd with concern for hepatic mass/cholangiocarcinoma   - malignancy workup? pending liver bx.  colonoscopy planned for 10/27, postponed to tomorrow   - send APLS labs. beta 2 glycoprotein antibodies, cardiolipin antibodies and lupus anticoagulant   - Dual antiplatelet therapy with ASA 81mg PO daily and Clopidogrel 75mg PO daily x 3 weeks followed by monotherapy with ASA 81mg PO daily. can hold for biopsy   - lipitor 40. should be high dose   - ARU and PRU therapeutic   - cardio for ILR. cardio following can do outpt   - telemetry  - PT/OT/SS/SLP, OOBC  - check FS, glucose control <180  - GI/DVT ppx  - Counseling on diet, exercise, and medication adherence was done  - Counseling on smoking cessation and alcohol consumption offered when appropriate.  - Pain assessed and judicious use of narcotics when appropriate was discussed.    - Stroke education given when appropriate.  - Importance of fall prevention discussed.   - Differential diagnosis and plan of care discussed with patient and/or family and primary team  - Thank you for allowing me to participate in the care of this patient. Call with questions.   - d/c planning when medically cleared   Sukh Pierre MD  Vascular Neurology  Office: [phone] .

## 2021-10-27 NOTE — PROGRESS NOTE ADULT - ASSESSMENT
69yo M with PMH of CKD (baseline Cr >3), HTN, HLD who presents as transfer from OSH for cerebral aneurysm. [facility] consulted for a liver mass.     # Liver mass - with radiologic findings concerning for cholangiocarcinoma. Adenopathy noted. Would prefer to avoid directly sampling the mass as it can introduce peritoneal seeding. Can plan for EUS and peripancreatic LN sampling when patient is off DAPT (planned for 3 weeks total per neurology's latest note).  # Cerebral Aneurysm - s/p coiling on DAPT (planned for 3 weeks total)   # CKD4   # Thrombocytosis - +JAK2 mutation    Recommendations:  - Can plan for EGD+EUS + LN sampling when off DAPT (can be done as outpatient) - per neurosurgery note patient is planned for 3 weeks total of DAPT starting 10/12 - so can tentatively plan for EUS the week of 11/8.  - Will plan for a diagnostic colonoscopy tomorrow  - NPO midnight  - CLD today  - Prep ordered by primary team  - 3AM CBC, CMP, coags    Thank you for involving us in the care of this patient. Please reach out if any further questions.    Teja Oliveira, PGY-5  GI Fellow    Available on [employer] Teams  Pager [phone] ([facility]) or [phone] ([facility])  After 5PM/Weekends, please contact the on-call GI fellow: [phone]  Available through [employer] Teams

## 2021-10-27 NOTE — PROGRESS NOTE ADULT - SUBJECTIVE AND OBJECTIVE BOX
Patient seen and examined  no complaints    No Known Allergies    Hospital Medications:   MEDICATIONS  (STANDING):  aspirin  chewable 81 milliGRAM(s) Oral daily  atorvastatin 20 milliGRAM(s) Oral at bedtime  bisacodyl 5 milliGRAM(s) Oral at bedtime  cloNIDine 0.3 milliGRAM(s) Oral every 8 hours  clopidogrel Tablet 75 milliGRAM(s) Oral daily  doxazosin 4 milliGRAM(s) Oral at bedtime  hydrALAZINE 100 milliGRAM(s) Oral <User Schedule>  influenza   Vaccine 0.5 milliLiter(s) IntraMuscular once  isosorbide   mononitrate ER Tablet (IMDUR) 120 milliGRAM(s) Oral daily  lactulose Syrup 20 Gram(s) Oral two times a day  metoprolol succinate ER 25 milliGRAM(s) Oral daily  NIFEdipine XL 90 milliGRAM(s) Oral daily  polyethylene glycol 3350 17 Gram(s) Oral every 12 hours  senna 2 Tablet(s) Oral at bedtime  sodium bicarbonate 650 milliGRAM(s) Oral three times a day      VITALS:  T(F): 97.5 (10-27-21 @ 11:41), Max: 98.1 (10-26-21 @ 13:56)  HR: 47 (10-27-21 @ 11:41)  BP: 162/77 (10-27-21 @ 11:41)  RR: 18 (10-27-21 @ 11:41)  SpO2: 100% (10-27-21 @ 11:41)  Wt(kg): --    10-26 @ 07:01  -  10-27 @ 07:00  --------------------------------------------------------  IN: 240 mL / OUT: 400 mL / NET: -160 mL    10-27 @ 07:01  -  10-27 @ 13:45  --------------------------------------------------------  IN: 0 mL / OUT: 400 mL / NET: -400 mL          Physical Exam :-  Constitutional: NAD  Neck: Supple.  Respiratory: Bilateral equal breath sounds, few Crackles present.  Cardiovascular: S1, S2 normal, positive Murmur  Gastrointestinal: Bowel Sounds present, soft, non tender.  Extremities: no Edema Feet  Neurological: Alert and Oriented x 3  Psychiatric: Normal mood, normal affect    LABS:  10-27    137  |  104  |  37<H>  ----------------------------<  121<H>  4.0   |  19<L>  |  2.71<H>    Ca    9.6      27 Oct 2021 02:54  Phos  3.1     10-27  Mg     2.3     10-27    TPro  7.3  /  Alb  3.9  /  TBili  0.3  /  DBili      /  AST  16  /  ALT  10  /  AlkPhos  103  10-27    Creatinine Trend: 2.71 <--, 3.12 <--, 2.87 <--, 2.80 <--, 2.81 <--, 2.74 <--                        11.5   7.17  )-----------( 786      ( 27 Oct 2021 02:54 )             36.2     Urine Studies:      RADIOLOGY & ADDITIONAL STUDIES:

## 2021-10-27 NOTE — PROGRESS NOTE ADULT - ATTENDING COMMENTS
71 yo M w/ PMHx of CKD IV (unknown baseline), uncontrolled HTN, ?CAD (patient states he has "heart problems;" TTE performed at [facility] on 10/5 w/ normal LVF, severely enlarged LA, Grade III diastolic dysfunction, mild pulm HTN) who was transfered from [facility] to where he presented w/ hx of multiple falls, found to have acute right MCA territory ischemic infarct w/ extensive chronic microvascular ischemic changes on CT, acute/subacute right-sided MCA distribution infarct with associated cytotoxic edema, on MRI, and Pcomm aneurysm vs thrombus [id number], mild R M1 stenosis on MRA. 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 Medicine re-consulted for liver lesion suspicious for cholangiocarcinoma.    Refused Colonoscopy prep yesterday. He has been drinking the prep today. Continue Clear liquids - will attempt to find something on the menu that he is happy with and discuss with GI to schedule early.     K/L elevated, no further workup as yet per Renal.   EUS as outpt. No in patient biopsy due to high risk of bleeding from DAPT.  Surgery says biopsy by IR not needed. Outpatient PET scan  Aspirin for Essential Thrombocytosis, f/u Heme/Onc re: ?MGUS  PT/OT for disposition  DC planning post workup    Rest of plan per resident's note

## 2021-10-27 NOTE — PROGRESS NOTE ADULT - SUBJECTIVE AND OBJECTIVE BOX
Interval Events:   - Patient did not prep overnight  - Reports loose, thick stools    Allergies:  No Known Allergies      Hospital Medications:  acetaminophen    Suspension .. 650 milliGRAM(s) Oral every 6 hours PRN  aspirin  chewable 81 milliGRAM(s) Oral daily  atorvastatin 20 milliGRAM(s) Oral at bedtime  bisacodyl 5 milliGRAM(s) Oral at bedtime  bisacodyl 5 milliGRAM(s) Oral once PRN  cloNIDine 0.3 milliGRAM(s) Oral every 8 hours  clopidogrel Tablet 75 milliGRAM(s) Oral daily  doxazosin 4 milliGRAM(s) Oral at bedtime  hydrALAZINE 100 milliGRAM(s) Oral <User Schedule>  influenza   Vaccine 0.5 milliLiter(s) IntraMuscular once  isosorbide   mononitrate ER Tablet (IMDUR) 120 milliGRAM(s) Oral daily  lactulose Syrup 20 Gram(s) Oral two times a day  metoprolol succinate ER 25 milliGRAM(s) Oral daily  NIFEdipine XL 90 milliGRAM(s) Oral daily  polyethylene glycol 3350 17 Gram(s) Oral every 12 hours  polyethylene glycol/electrolyte Solution. 4000 milliLiter(s) Oral once  senna 2 Tablet(s) Oral at bedtime  simethicone 80 milliGRAM(s) Chew two times a day PRN  sodium bicarbonate 650 milliGRAM(s) Oral three times a day      PMHX/PSHX:  Frequent falls    Hypertension    Chronic kidney disease, unspecified CKD stage        Family history:      ROS:   General:  No wt loss, fevers, chills, night sweats, fatigue,   Eyes:  Good vision, no reported pain  ENT:  No sore throat, pain, runny nose, dysphagia  CV:  No pain, palpitations, hypo/hypertension  Pulm:  No dyspnea, cough, tachypnea, wheezing  GI:  As per HPI  :  No pain, bleeding, incontinence, nocturia  Muscle:  No pain, weakness  Neuro:  No weakness, tingling, memory problems  Psych:  No fatigue, insomnia, mood problems, depression  Endocrine:  No polyuria, polydipsia, cold/heat intolerance  Heme:  No petechiae, ecchymosis, easy bruisability  Skin:  No rash, tattoos, scars, edema      PHYSICAL EXAM:   Vital Signs:  Vital Signs Last 24 Hrs  T(C): 36.4 (27 Oct 2021 07:02), Max: 37.1 (26 Oct 2021 12:00)  T(F): 97.6 (27 Oct 2021 07:02), Max: 98.7 (26 Oct 2021 12:00)  HR: 47 (27 Oct 2021 07:02) (44 - 82)  BP: 172/87 (27 Oct 2021 07:02) (146/66 - 172/87)  BP(mean): --  RR: 18 (27 Oct 2021 07:02) (16 - 19)  SpO2: 100% (27 Oct 2021 07:02) (94% - 100%)  Daily     Daily       10-26-21 @ 07:01  -  10-27-21 @ 07:00  --------------------------------------------------------  IN: 240 mL / OUT: 400 mL / NET: -160 mL        GENERAL:  No acute distress  HEENT:  Normocephalic/atraumatic, no scleral icterus  CHEST:  No accessory muscle use  HEART:  Regular rate and rhythm  ABDOMEN:  Soft, non-tender, non-distended  EXTREMITIES: No cyanosis, clubbing, or edema  SKIN:  No rash  NEURO:  Alert and oriented x 3, no asterixis    LABS:                        11.5   7.17  )-----------( 786      ( 27 Oct 2021 02:54 )             36.2     Mean Cell Volume: 93.3 fl (10-27-21 @ 02:54)    10-27    137  |  104  |  37<H>  ----------------------------<  121<H>  4.0   |  19<L>  |  2.71<H>    Ca    9.6      27 Oct 2021 02:54  Phos  3.1     10-27  Mg     2.3     10-27    TPro  7.3  /  Alb  3.9  /  TBili  0.3  /  DBili  x   /  AST  16  /  ALT  10  /  AlkPhos  103  10-27    LIVER FUNCTIONS - ( 27 Oct 2021 02:54 )  Alb: 3.9 g/dL / Pro: 7.3 g/dL / ALK PHOS: 103 U/L / ALT: 10 U/L / AST: 16 U/L / GGT: x           PT/INR - ( 27 Oct 2021 02:54 )   PT: 13.1 sec;   INR: 1.10 ratio                              11.5   7.17  )-----------( 786      ( 27 Oct 2021 02:54 )             36.2                         10.7   8.10  )-----------( 812      ( 26 Oct 2021 06:24 )             33.1                         12.1   7.02  )-----------( 818      ( 25 Oct 2021 06:05 )             37.3       Imaging:

## 2021-10-27 NOTE — MEDICAL STUDENT PROGRESS NOTE(EDUCATION) - NS MD HP STUD ASPLAN ASSES FT
70 y/o M with h/o CKD IV, uncontrolled HTN, NSVT and grade III diastolic dysfunction who was transferred from [facility] where he initially presented for multiple falls and was found to have R sided MCA infarct and R pcom aneurysm now s/p coil (10/12/21) and DAPT therapy, with incidental finding of central hepatic lesion measuring 8.9 x 7.1 with peripancreatic, savage caval, and savage hepatis adenopathy and elevated CEA, , and normal AFP. 70 y/o M with h/o CKD IV, uncontrolled HTN, NSVT and grade III diastolic dysfunction who was transferred from [facility] where he initially presented for multiple falls and was found to have R sided MCA infarct and R pcom aneurysm now s/p coil (10/12/21) and DAPT therapy, with incidental finding of central hepatic lesion measuring 8.9 x 7.1 with peripancreatic, savage caval, and savage hepatis adenopathy and elevated CEA, , and normal AFP concerning for cholangiocarcinoma.

## 2021-10-27 NOTE — PROGRESS NOTE ADULT - SUBJECTIVE AND OBJECTIVE BOX
Red Team Progress Note  [id number]    Subjective:   Patient seen at bedside this AM. No issues overnight, tolerated prep and having clear bowel movements. No abdominal pain, n/v. No f/n/c.    Objective:  Vital Signs  T(C): 36.4 (10-27 @ 07:02), Max: 37.1 (10-26 @ 12:00)  HR: 47 (10-27 @ 07:02) (44 - 82)  BP: 172/87 (10-27 @ 07:02) (146/66 - 172/87)  RR: 18 (10-27 @ 07:02) (16 - 19)  SpO2: 100% (10-27 @ 07:02) (94% - 100%)  10-26-21 @ 07:01  -  10-27-21 @ 07:00  --------------------------------------------------------  IN:  Total IN: 0 mL    OUT:    Voided (mL): 400 mL  Total OUT: 400 mL    Total NET: -400 mL          I&O's Detail    10-26-21 @ 07:01  -  10-27-21 @ 07:00  --------------------------------------------------------  IN: 240 mL / OUT: 400 mL / NET: -160 mL        Physical Exam:  GEN: resting in bed comfortably in NAD  RESP: no increased WOB  ABD: soft, non-distended, non-tender  EXTR: warm, well-perfused    Labs:                        11.5   7.17  )-----------( 786      ( 27 Oct 2021 02:54 )             36.2   10-27    137  |  104  |  37<H>  ----------------------------<  121<H>  4.0   |  19<L>  |  2.71<H>    Ca    9.6      27 Oct 2021 02:54  Phos  3.1     10-27  Mg     2.3     10-27    TPro  7.3  /  Alb  3.9  /  TBili  0.3  /  DBili  x   /  AST  16  /  ALT  10  /  AlkPhos  103  10-27    CAPILLARY BLOOD GLUCOSE          Medications:   MEDICATIONS  (STANDING):  aspirin  chewable 81 milliGRAM(s) Oral daily  atorvastatin 20 milliGRAM(s) Oral at bedtime  bisacodyl 5 milliGRAM(s) Oral at bedtime  cloNIDine 0.3 milliGRAM(s) Oral every 8 hours  clopidogrel Tablet 75 milliGRAM(s) Oral daily  doxazosin 4 milliGRAM(s) Oral at bedtime  hydrALAZINE 100 milliGRAM(s) Oral <User Schedule>  influenza   Vaccine 0.5 milliLiter(s) IntraMuscular once  isosorbide   mononitrate ER Tablet (IMDUR) 120 milliGRAM(s) Oral daily  lactulose Syrup 20 Gram(s) Oral two times a day  metoprolol succinate ER 25 milliGRAM(s) Oral daily  NIFEdipine XL 90 milliGRAM(s) Oral daily  polyethylene glycol 3350 17 Gram(s) Oral every 12 hours  senna 2 Tablet(s) Oral at bedtime  sodium bicarbonate 650 milliGRAM(s) Oral three times a day    MEDICATIONS  (PRN):  acetaminophen    Suspension .. 650 milliGRAM(s) Oral every 6 hours PRN Mild Pain (1 - 3)  simethicone 80 milliGRAM(s) Chew two times a day PRN Upset Stomach      Imaging:

## 2021-10-27 NOTE — MEDICAL STUDENT PROGRESS NOTE(EDUCATION) - SUBJECTIVE AND OBJECTIVE BOX
Pt is a 70y/o M with h/o CKD IV, uncontrolled HTN, and NSVT with diastolic dysfunction transferred from [facility] with imaging findings of a liver mass.    ON:     MEDICATIONS  (STANDING):  aspirin  chewable 81 milliGRAM(s) Oral daily  atorvastatin 20 milliGRAM(s) Oral at bedtime  bisacodyl 5 milliGRAM(s) Oral at bedtime  cloNIDine 0.3 milliGRAM(s) Oral every 8 hours  clopidogrel Tablet 75 milliGRAM(s) Oral daily  doxazosin 4 milliGRAM(s) Oral at bedtime  hydrALAZINE 100 milliGRAM(s) Oral <User Schedule>  influenza   Vaccine 0.5 milliLiter(s) IntraMuscular once  isosorbide   mononitrate ER Tablet (IMDUR) 120 milliGRAM(s) Oral daily  lactulose Syrup 20 Gram(s) Oral two times a day  metoprolol succinate ER 25 milliGRAM(s) Oral daily  NIFEdipine XL 90 milliGRAM(s) Oral daily  polyethylene glycol 3350 17 Gram(s) Oral every 12 hours  senna 2 Tablet(s) Oral at bedtime  sodium bicarbonate 650 milliGRAM(s) Oral three times a day    MEDICATIONS  (PRN):  acetaminophen    Suspension .. 650 milliGRAM(s) Oral every 6 hours PRN Mild Pain (1 - 3)  simethicone 80 milliGRAM(s) Chew two times a day PRN Upset Stomach      T(C): 36.4 (10-27-21 @ 07:02), Max: 37.1 (10-26-21 @ 12:00)  T(F): 97.6 (10-27-21 @ 07:02), Max: 98.7 (10-26-21 @ 12:00)  HR: 47 (10-27-21 @ 07:02) (44 - 82)  BP: 172/87 (10-27-21 @ 07:02) (145/76 - 172/87)  ABP: --  ABP(mean): --  RR: 18 (10-27-21 @ 07:02) (16 - 19)  SpO2: 100% (10-27-21 @ 07:02) (94% - 100%)      LOS: 18d    GENERAL: NAD, lying in bed comfortably  HEAD:  Atraumatic, Normocephalic  EYES: EOMI, PERRLA, conjunctiva and sclera clear  ENT: Moist mucous membranes  NECK: Supple, No JVD  CHEST/LUNG: CTA b/l; No rales, rhonchi, wheezing, or rubs. Unlabored respirations  HEART: RRR; No murmurs, rubs, or gallops  ABDOMEN: BS+; Soft, NT, ND  EXTREMITIES:  2+ Peripheral Pulses, brisk capillary refill. No clubbing, cyanosis, or edema  NERVOUS SYSTEM:  A&Ox3, no focal deficits   SKIN: No rashes or lesions                          11.5   7.17  )-----------( 786      ( 27 Oct 2021 02:54 )             36.2       10-27    137  |  104  |  37<H>  ----------------------------<  121<H>  4.0   |  19<L>  |  2.71<H>    Ca    9.6      27 Oct 2021 02:54  Phos  3.1     10-27  Mg     2.3     10-27    TPro  7.3  /  Alb  3.9  /  TBili  0.3  /  DBili  x   /  AST  16  /  ALT  10  /  AlkPhos  103  10-27        < from: MR Abdomen w/ IV Cont (10.22.21 @ 23:26) >  IMPRESSION:  Limited post contrast imaging.    Central hepatic mass as above for which the primary differential consideration is cholangiocarcinoma.    Adenopathy in the peripancreatic, savage hepatis and portacaval regions.    < end of copied text >  < from: MR Abdomen w/ IV Cont (10.22.21 @ 23:26) >  LIVER: No morphologic evidence of cirrhosis. An 8.9 x 7.1 cm central hepatic lesion involving hepatic segments 4, 5 and 6 with possible extension into superior right hepatic lobe segments demonstrates restricted diffusion with peripheral rim enhancement and delayed central enhancement. While post contrast imaging is limited, MR characteristics strongly favor cholangiocarcinoma.    < end of copied text >  < from: MR Abdomen w/ IV Cont (10.22.21 @ 23:26) >  KIDNEYS/URETERS: Bilateral renal cysts including a 3.3 cm hemorrhagic right renal cyst.    < end of copied text >       Pt is a 70y/o M with h/o CKD IV, uncontrolled HTN, and NSVT with diastolic dysfunction transferred from [facility] with imaging findings of a liver mass.    ON: KATIE, pt reports no fever/chills cp or sob. Pt was seen at bedside and was upset that he was placed on clear diet and couldn't have anything to eat or drink per NPO, but was open for examination. Pt repeatedly stated that he wishes to go home once the colonoscopy is done.    MEDICATIONS  (STANDING):  aspirin  chewable 81 milliGRAM(s) Oral daily  atorvastatin 20 milliGRAM(s) Oral at bedtime  bisacodyl 5 milliGRAM(s) Oral at bedtime  cloNIDine 0.3 milliGRAM(s) Oral every 8 hours  clopidogrel Tablet 75 milliGRAM(s) Oral daily  doxazosin 4 milliGRAM(s) Oral at bedtime  hydrALAZINE 100 milliGRAM(s) Oral <User Schedule>  influenza   Vaccine 0.5 milliLiter(s) IntraMuscular once  isosorbide   mononitrate ER Tablet (IMDUR) 120 milliGRAM(s) Oral daily  lactulose Syrup 20 Gram(s) Oral two times a day  metoprolol succinate ER 25 milliGRAM(s) Oral daily  NIFEdipine XL 90 milliGRAM(s) Oral daily  polyethylene glycol 3350 17 Gram(s) Oral every 12 hours  senna 2 Tablet(s) Oral at bedtime  sodium bicarbonate 650 milliGRAM(s) Oral three times a day    MEDICATIONS  (PRN):  acetaminophen    Suspension .. 650 milliGRAM(s) Oral every 6 hours PRN Mild Pain (1 - 3)  simethicone 80 milliGRAM(s) Chew two times a day PRN Upset Stomach      T(C): 36.4 (10-27-21 @ 07:02), Max: 37.1 (10-26-21 @ 12:00)  T(F): 97.6 (10-27-21 @ 07:02), Max: 98.7 (10-26-21 @ 12:00)  HR: 47 (10-27-21 @ 07:02) (44 - 82)  BP: 172/87 (10-27-21 @ 07:02) (145/76 - 172/87)  ABP: --  ABP(mean): --  RR: 18 (10-27-21 @ 07:02) (16 - 19)  SpO2: 100% (10-27-21 @ 07:02) (94% - 100%)      LOS: 18d    GENERAL: NAD, lying in bed comfortably  HEAD:  Atraumatic, Normocephalic  EYES: EOMI, PERRLA, conjunctiva and sclera clear  ENT: Moist mucous membranes  NECK: Supple, No JVD  CHEST/LUNG: CTA b/l; No rales, rhonchi, wheezing, or rubs. Unlabored respirations  HEART: RRR; No murmurs, rubs, or gallops  ABDOMEN: BS+; Soft, NT, ND  EXTREMITIES:  2+ Peripheral Pulses, brisk capillary refill. No clubbing, cyanosis, or edema  NERVOUS SYSTEM:  A&Ox3, no focal deficits   SKIN: No rashes or lesions                          11.5   7.17  )-----------( 786      ( 27 Oct 2021 02:54 )             36.2       10-27    137  |  104  |  37<H>  ----------------------------<  121<H>  4.0   |  19<L>  |  2.71<H>    Ca    9.6      27 Oct 2021 02:54  Phos  3.1     10-27  Mg     2.3     10-27    TPro  7.3  /  Alb  3.9  /  TBili  0.3  /  DBili  x   /  AST  16  /  ALT  10  /  AlkPhos  103  10-27        < from: MR Abdomen w/ IV Cont (10.22.21 @ 23:26) >  IMPRESSION:  Limited post contrast imaging.    Central hepatic mass as above for which the primary differential consideration is cholangiocarcinoma.    Adenopathy in the peripancreatic, savage hepatis and portacaval regions.    < end of copied text >  < from: MR Abdomen w/ IV Cont (10.22.21 @ 23:26) >  LIVER: No morphologic evidence of cirrhosis. An 8.9 x 7.1 cm central hepatic lesion involving hepatic segments 4, 5 and 6 with possible extension into superior right hepatic lobe segments demonstrates restricted diffusion with peripheral rim enhancement and delayed central enhancement. While post contrast imaging is limited, MR characteristics strongly favor cholangiocarcinoma.    < end of copied text >  < from: MR Abdomen w/ IV Cont (10.22.21 @ 23:26) >  KIDNEYS/URETERS: Bilateral renal cysts including a 3.3 cm hemorrhagic right renal cyst.    < end of copied text >       Pt is a 70y/o M with h/o CKD IV, uncontrolled HTN, and NSVT with diastolic dysfunction transferred from [facility] with imaging findings of a liver mass.    ON: KATIE, pt reports no fever/chills cp or sob. Pt was seen at bedside and was upset that he was placed on clear diet and couldn't have anything to eat or drink per NPO, but was open for examination. Pt repeatedly stated that he wishes to go home once the colonoscopy is done. Unclear whether pt received full bowel prep for colonoscopy today.    MEDICATIONS  (STANDING):  aspirin  chewable 81 milliGRAM(s) Oral daily  atorvastatin 20 milliGRAM(s) Oral at bedtime  bisacodyl 5 milliGRAM(s) Oral at bedtime  cloNIDine 0.3 milliGRAM(s) Oral every 8 hours  clopidogrel Tablet 75 milliGRAM(s) Oral daily  doxazosin 4 milliGRAM(s) Oral at bedtime  hydrALAZINE 100 milliGRAM(s) Oral <User Schedule>  influenza   Vaccine 0.5 milliLiter(s) IntraMuscular once  isosorbide   mononitrate ER Tablet (IMDUR) 120 milliGRAM(s) Oral daily  lactulose Syrup 20 Gram(s) Oral two times a day  metoprolol succinate ER 25 milliGRAM(s) Oral daily  NIFEdipine XL 90 milliGRAM(s) Oral daily  polyethylene glycol 3350 17 Gram(s) Oral every 12 hours  senna 2 Tablet(s) Oral at bedtime  sodium bicarbonate 650 milliGRAM(s) Oral three times a day    MEDICATIONS  (PRN):  acetaminophen    Suspension .. 650 milliGRAM(s) Oral every 6 hours PRN Mild Pain (1 - 3)  simethicone 80 milliGRAM(s) Chew two times a day PRN Upset Stomach      T(C): 36.4 (10-27-21 @ 07:02), Max: 37.1 (10-26-21 @ 12:00)  T(F): 97.6 (10-27-21 @ 07:02), Max: 98.7 (10-26-21 @ 12:00)  HR: 47 (10-27-21 @ 07:02) (44 - 82)  BP: 172/87 (10-27-21 @ 07:02) (145/76 - 172/87)  ABP: --  ABP(mean): --  RR: 18 (10-27-21 @ 07:02) (16 - 19)  SpO2: 100% (10-27-21 @ 07:02) (94% - 100%)      LOS: 18d    GENERAL: NAD, lying in bed comfortably  HEAD:  Atraumatic, Normocephalic  EYES: EOMI, PERRLA, conjunctiva and sclera clear  ENT: Moist mucous membranes  NECK: Supple, No JVD  CHEST/LUNG: CTA b/l; No rales, rhonchi, wheezing, or rubs. Unlabored respirations  HEART: RRR; No murmurs, rubs, or gallops  ABDOMEN: BS+; Soft, NT, ND  EXTREMITIES:  2+ Peripheral Pulses, brisk capillary refill. No clubbing, cyanosis, or edema  NERVOUS SYSTEM:  A&Ox3, no focal deficits   SKIN: No rashes or lesions                          11.5   7.17  )-----------( 786      ( 27 Oct 2021 02:54 )             36.2       10-27    137  |  104  |  37<H>  ----------------------------<  121<H>  4.0   |  19<L>  |  2.71<H>    Ca    9.6      27 Oct 2021 02:54  Phos  3.1     10-27  Mg     2.3     10-27    TPro  7.3  /  Alb  3.9  /  TBili  0.3  /  DBili  x   /  AST  16  /  ALT  10  /  AlkPhos  103  10-27        < from: MR Abdomen w/ IV Cont (10.22.21 @ 23:26) >  IMPRESSION:  Limited post contrast imaging.    Central hepatic mass as above for which the primary differential consideration is cholangiocarcinoma.    Adenopathy in the peripancreatic, savage hepatis and portacaval regions.    < end of copied text >  < from: MR Abdomen w/ IV Cont (10.22.21 @ 23:26) >  LIVER: No morphologic evidence of cirrhosis. An 8.9 x 7.1 cm central hepatic lesion involving hepatic segments 4, 5 and 6 with possible extension into superior right hepatic lobe segments demonstrates restricted diffusion with peripheral rim enhancement and delayed central enhancement. While post contrast imaging is limited, MR characteristics strongly favor cholangiocarcinoma.    < end of copied text >  < from: MR Abdomen w/ IV Cont (10.22.21 @ 23:26) >  KIDNEYS/URETERS: Bilateral renal cysts including a 3.3 cm hemorrhagic right renal cyst.    < end of copied text >

## 2021-10-27 NOTE — MEDICAL STUDENT PROGRESS NOTE(EDUCATION) - NS MD HP STUD ASPLAN PLAN FT
1) Hepatic lesion.   MRA showed central hepatic mass 8.9 x 7.1 with adenopathy in the peripancreatic, savage hepatis and portacaval regions, likely differential is cholangiocarcinoma, pancreatic, HCC, colorectal, ADPKD possible as well.  - Elevated CEA, , normal AFP  - IR consulted, deferred IR guided bx due to risk of bleeding as patient on DAPT   - Advanced GI consulted, likely iCCA, recommending against biopsy at this time due to high risk of seeding  - Colonoscopy on 10/27/21  - will need EGD to clear upper GI tract +/- EUS/FNA of LN if malignant appearing once off plavix x 5 days (needs 3 weeks of plavix per neurosugery) - can be done outpatient   - will need outpatient PET scan  - formal heme/onc consult pending biopsy results.    2) Cerebral aneurysm.   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 Continue to monitor  - c/w ASA 81mg daily  - Plavix 75mg daily for at least 3 weeks per neurosurgery    3) Stroke   Initially presented to [facility], found to have acute right MCA territory ischemic infarct w/ extensive chronic microvascular ischemic changes on CT, acute/subacute right-sided MCA distribution infarct with associated cytotoxic edema, on MRI  - Outpatient Implantable Loop Recorder per neuro/cards    4) NSVT (nonsustained ventricular tachycardia).   Bradycardia to 40s, asymptomatic 10/26/21  - c/w reduced metoprolol XL 25mg daily per cardio    5) Hypertension.   Fluctuations of high and low BPs, mostly within target  - c/w imdur, clonidine, hydral, procardia  - continue to monito    6) Stage 4 chronic kidney disease.   - Nephro following  - Cr stable  - Ctm BMP    7) Thrombocytosis.   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  - Avoid nephrotoxins    8) Monoclonal gammopathy.   - found to have M spike 0.6, likely MGUS per hematology  - IgA IgM wnl, kappa/lambda ratio elevated at 1.66  - Monitor for now per nephro as ratio <2    9) Prophylactic measure.   ·  Plan: #DVT PPx: on DAPT; holding heparin subq   #Diet: Renal diet  #Dispo: PT f/u, SW 1) Hepatic lesion.   MRA showed central hepatic mass 8.9 x 7.1 with adenopathy in the peripancreatic, savage hepatis and portacaval regions, likely differential is cholangiocarcinoma, pancreatic, HCC, colorectal, ADPKD less likely but possible as well given combination of pcom aneurysm, renal cyst, and liver mass.  - Elevated CEA, , normal AFP  - IR consulted, deferred IR guided bx due to risk of bleeding as patient on DAPT   - Advanced GI consulted, likely iCCA, recommending against biopsy at this time due to high risk of seeding  - Colonoscopy on 10/27/21  - will need EGD to clear upper GI tract +/- EUS/FNA of LN if malignant appearing once off plavix x 5 days (needs 3 weeks of plavix per neurosugery, DAPT was started on 10/9/21) - can be done outpatient   - will need outpatient PET scan  - formal heme/onc consult pending biopsy results.    2) Cerebral aneurysm.   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 Continue to monitor  - c/w ASA 81mg daily  - Plavix 75mg daily for at least 3 weeks per neurosurgery    3) Stroke   Initially presented to [facility], found to have acute right MCA territory ischemic infarct w/ extensive chronic microvascular ischemic changes on CT, acute/subacute right-sided MCA distribution infarct with associated cytotoxic edema, on MRI  - Outpatient Implantable Loop Recorder per neuro/cards    4) NSVT (nonsustained ventricular tachycardia).   Bradycardia to 40s, asymptomatic 10/26/21  - c/w reduced metoprolol XL 25mg daily per cardio    5) Hypertension.   Fluctuations of high and low BPs, mostly within target  - c/w imdur, clonidine, hydral, procardia  - continue to monito    6) Stage 4 chronic kidney disease.   - Nephro following  - Cr stable  - Ctm BMP    7) Thrombocytosis.   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  - Avoid nephrotoxins    8) Monoclonal gammopathy.   - found to have M spike 0.6, likely MGUS per hematology  - IgA IgM wnl, kappa/lambda ratio elevated at 1.66  - Monitor for now per nephro as ratio <2    9) Prophylactic measure.   ·  Plan: #DVT PPx: on DAPT; holding heparin subq   #Diet: Renal diet  #Dispo: PT f/u, SW 1) Hepatic lesion.   MRA showed central hepatic mass 8.9 x 7.1 with adenopathy in the peripancreatic, savage hepatis and portacaval regions, likely differential is cholangiocarcinoma, pancreatic, HCC, colorectal, ADPKD less likely but possible as well given combination of pcom aneurysm, renal cyst, and liver mass.  - Elevated CEA, , normal AFP  - IR consulted, deferred IR guided bx due to risk of bleeding as patient on DAPT   - Advanced GI consulted, likely iCCA, recommending against biopsy at this time due to high risk of seeding  - Colonoscopy pushed to 10/28//21  - will need EGD to clear upper GI tract +/- EUS/FNA of LN if malignant appearing once off plavix x 5 days (needs 3 weeks of plavix per neurosugery, DAPT was started on 10/9/21) - can be done outpatient   - will need outpatient PET scan  - formal heme/onc consult pending biopsy results.    2) Cerebral aneurysm.   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 Continue to monitor  - c/w ASA 81mg daily  - Plavix 75mg daily for at least 3 weeks per neurosurgery    3) Stroke   Initially presented to [facility], found to have acute right MCA territory ischemic infarct w/ extensive chronic microvascular ischemic changes on CT, acute/subacute right-sided MCA distribution infarct with associated cytotoxic edema, on MRI  - Outpatient Implantable Loop Recorder per neuro/cards    4) NSVT (nonsustained ventricular tachycardia).   Bradycardia to 40s, asymptomatic 10/26/21  - c/w reduced metoprolol XL 25mg daily per cardio    5) Hypertension.   Fluctuations of high and low BPs  - c/w imdur, clonidine, hydral, procardia  - pt consistently hypertensive, currently maxed out on Clonidine, Hydralazine, and Imdur.   - Bradycardia to 40s, do not increase beta-blocker dosage  - Consider Procardia 120mg (currently 90mg)    6) Stage 4 chronic kidney disease.   - Nephro following  - Cr stable  - Ctm BMP    7) Thrombocytosis.   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  - Avoid nephrotoxins    8) Monoclonal gammopathy.   - found to have M spike 0.6, likely MGUS per hematology  - IgA IgM wnl, kappa/lambda ratio elevated at 1.66  - Monitor for now per nephro as ratio <2    9) Prophylactic measure.   ·  Plan: #DVT PPx: on DAPT; holding heparin subq   #Diet: Renal diet  #Dispo: PT f/u, SW

## 2021-10-28 LAB
ALBUMIN SERPL ELPH-MCNC: 3.7 G/DL — SIGNIFICANT CHANGE UP (ref 3.3–5)
ALP SERPL-CCNC: 102 U/L — SIGNIFICANT CHANGE UP (ref 40–120)
ALT FLD-CCNC: 11 U/L — SIGNIFICANT CHANGE UP (ref 10–45)
ANION GAP SERPL CALC-SCNC: 13 MMOL/L — SIGNIFICANT CHANGE UP (ref 5–17)
AST SERPL-CCNC: 19 U/L — SIGNIFICANT CHANGE UP (ref 10–40)
BASOPHILS # BLD AUTO: 0.09 K/UL — SIGNIFICANT CHANGE UP (ref 0–0.2)
BASOPHILS NFR BLD AUTO: 1.3 % — SIGNIFICANT CHANGE UP (ref 0–2)
BILIRUB SERPL-MCNC: 0.3 MG/DL — SIGNIFICANT CHANGE UP (ref 0.2–1.2)
BLD GP AB SCN SERPL QL: NEGATIVE — SIGNIFICANT CHANGE UP
BUN SERPL-MCNC: 35 MG/DL — HIGH (ref 7–23)
CALCIUM SERPL-MCNC: 9.6 MG/DL — SIGNIFICANT CHANGE UP (ref 8.4–10.5)
CHLORIDE SERPL-SCNC: 104 MMOL/L — SIGNIFICANT CHANGE UP (ref 96–108)
CO2 SERPL-SCNC: 19 MMOL/L — LOW (ref 22–31)
CREAT SERPL-MCNC: 2.8 MG/DL — HIGH (ref 0.5–1.3)
EOSINOPHIL # BLD AUTO: 0.28 K/UL — SIGNIFICANT CHANGE UP (ref 0–0.5)
EOSINOPHIL NFR BLD AUTO: 3.9 % — SIGNIFICANT CHANGE UP (ref 0–6)
GLUCOSE SERPL-MCNC: 112 MG/DL — HIGH (ref 70–99)
HCT VFR BLD CALC: 33.2 % — LOW (ref 39–50)
HGB BLD-MCNC: 10.6 G/DL — LOW (ref 13–17)
IMM GRANULOCYTES NFR BLD AUTO: 0.6 % — SIGNIFICANT CHANGE UP (ref 0–1.5)
INR BLD: 1.09 RATIO — SIGNIFICANT CHANGE UP (ref 0.88–1.16)
INTERPRETATION SERPL IFE-IMP: SIGNIFICANT CHANGE UP
LYMPHOCYTES # BLD AUTO: 1.6 K/UL — SIGNIFICANT CHANGE UP (ref 1–3.3)
LYMPHOCYTES # BLD AUTO: 22.5 % — SIGNIFICANT CHANGE UP (ref 13–44)
MAGNESIUM SERPL-MCNC: 2.4 MG/DL — SIGNIFICANT CHANGE UP (ref 1.6–2.6)
MCHC RBC-ENTMCNC: 29.5 PG — SIGNIFICANT CHANGE UP (ref 27–34)
MCHC RBC-ENTMCNC: 31.9 GM/DL — LOW (ref 32–36)
MCV RBC AUTO: 92.5 FL — SIGNIFICANT CHANGE UP (ref 80–100)
MONOCYTES # BLD AUTO: 0.59 K/UL — SIGNIFICANT CHANGE UP (ref 0–0.9)
MONOCYTES NFR BLD AUTO: 8.3 % — SIGNIFICANT CHANGE UP (ref 2–14)
NEUTROPHILS # BLD AUTO: 4.52 K/UL — SIGNIFICANT CHANGE UP (ref 1.8–7.4)
NEUTROPHILS NFR BLD AUTO: 63.4 % — SIGNIFICANT CHANGE UP (ref 43–77)
NRBC # BLD: 0 /100 WBCS — SIGNIFICANT CHANGE UP (ref 0–0)
PHOSPHATE SERPL-MCNC: 2.9 MG/DL — SIGNIFICANT CHANGE UP (ref 2.5–4.5)
PLATELET # BLD AUTO: 779 K/UL — HIGH (ref 150–400)
POTASSIUM SERPL-MCNC: 4.6 MMOL/L — SIGNIFICANT CHANGE UP (ref 3.5–5.3)
POTASSIUM SERPL-SCNC: 4.6 MMOL/L — SIGNIFICANT CHANGE UP (ref 3.5–5.3)
PROT SERPL-MCNC: 7.3 G/DL — SIGNIFICANT CHANGE UP (ref 6–8.3)
PROTHROM AB SERPL-ACNC: 13 SEC — SIGNIFICANT CHANGE UP (ref 10.6–13.6)
RBC # BLD: 3.59 M/UL — LOW (ref 4.2–5.8)
RBC # FLD: 15.6 % — HIGH (ref 10.3–14.5)
RH IG SCN BLD-IMP: POSITIVE — SIGNIFICANT CHANGE UP
SARS-COV-2 RNA SPEC QL NAA+PROBE: SIGNIFICANT CHANGE UP
SODIUM SERPL-SCNC: 136 MMOL/L — SIGNIFICANT CHANGE UP (ref 135–145)
WBC # BLD: 7.12 K/UL — SIGNIFICANT CHANGE UP (ref 3.8–10.5)
WBC # FLD AUTO: 7.12 K/UL — SIGNIFICANT CHANGE UP (ref 3.8–10.5)

## 2021-10-28 PROCEDURE — 99233 SBSQ HOSP IP/OBS HIGH 50: CPT | Mod: GC

## 2021-10-28 PROCEDURE — 99232 SBSQ HOSP IP/OBS MODERATE 35: CPT | Mod: GC

## 2021-10-28 RX ORDER — HYDRALAZINE HCL 50 MG
100 TABLET ORAL
Refills: 0 | Status: DISCONTINUED | OUTPATIENT
Start: 2021-10-28 | End: 2021-11-02

## 2021-10-28 RX ORDER — SOD SULF/SODIUM/NAHCO3/KCL/PEG
1000 SOLUTION, RECONSTITUTED, ORAL ORAL ONCE
Refills: 0 | Status: DISCONTINUED | OUTPATIENT
Start: 2021-10-28 | End: 2021-10-28

## 2021-10-28 RX ORDER — NIFEDIPINE 30 MG
120 TABLET, EXTENDED RELEASE 24 HR ORAL DAILY
Refills: 0 | Status: DISCONTINUED | OUTPATIENT
Start: 2021-10-28 | End: 2021-11-03

## 2021-10-28 RX ORDER — HYDRALAZINE HCL 50 MG
10 TABLET ORAL ONCE
Refills: 0 | Status: COMPLETED | OUTPATIENT
Start: 2021-10-28 | End: 2021-10-28

## 2021-10-28 RX ORDER — SOD SULF/SODIUM/NAHCO3/KCL/PEG
1000 SOLUTION, RECONSTITUTED, ORAL ORAL ONCE
Refills: 0 | Status: COMPLETED | OUTPATIENT
Start: 2021-10-28 | End: 2021-10-28

## 2021-10-28 RX ADMIN — Medication 0.3 MILLIGRAM(S): at 22:00

## 2021-10-28 RX ADMIN — POLYETHYLENE GLYCOL 3350 17 GRAM(S): 17 POWDER, FOR SOLUTION ORAL at 18:31

## 2021-10-28 RX ADMIN — LACTULOSE 20 GRAM(S): 10 SOLUTION ORAL at 18:31

## 2021-10-28 RX ADMIN — Medication 10 MILLIGRAM(S): at 00:16

## 2021-10-28 RX ADMIN — Medication 4 MILLIGRAM(S): at 22:00

## 2021-10-28 RX ADMIN — ATORVASTATIN CALCIUM 20 MILLIGRAM(S): 80 TABLET, FILM COATED ORAL at 22:00

## 2021-10-28 RX ADMIN — Medication 100 MILLIGRAM(S): at 14:15

## 2021-10-28 RX ADMIN — Medication 1000 MILLILITER(S): at 21:59

## 2021-10-28 RX ADMIN — CLOPIDOGREL BISULFATE 75 MILLIGRAM(S): 75 TABLET, FILM COATED ORAL at 15:02

## 2021-10-28 RX ADMIN — Medication 100 MILLIGRAM(S): at 03:56

## 2021-10-28 RX ADMIN — Medication 0.3 MILLIGRAM(S): at 05:08

## 2021-10-28 RX ADMIN — ISOSORBIDE MONONITRATE 120 MILLIGRAM(S): 60 TABLET, EXTENDED RELEASE ORAL at 15:02

## 2021-10-28 RX ADMIN — Medication 650 MILLIGRAM(S): at 05:08

## 2021-10-28 RX ADMIN — POLYETHYLENE GLYCOL 3350 17 GRAM(S): 17 POWDER, FOR SOLUTION ORAL at 05:09

## 2021-10-28 RX ADMIN — Medication 81 MILLIGRAM(S): at 15:01

## 2021-10-28 RX ADMIN — Medication 90 MILLIGRAM(S): at 05:09

## 2021-10-28 RX ADMIN — Medication 650 MILLIGRAM(S): at 14:16

## 2021-10-28 RX ADMIN — Medication 100 MILLIGRAM(S): at 18:31

## 2021-10-28 RX ADMIN — Medication 0.3 MILLIGRAM(S): at 15:02

## 2021-10-28 RX ADMIN — Medication 650 MILLIGRAM(S): at 22:00

## 2021-10-28 RX ADMIN — LACTULOSE 20 GRAM(S): 10 SOLUTION ORAL at 05:09

## 2021-10-28 RX ADMIN — SENNA PLUS 2 TABLET(S): 8.6 TABLET ORAL at 22:01

## 2021-10-28 NOTE — PROGRESS NOTE ADULT - SUBJECTIVE AND OBJECTIVE BOX
Interval Events:   - Prepping for colonoscopy for tomorrow, still having semi-solid stools   - Pt denies abd pain, n/v/f/c      Allergies:  No Known Allergies      Hospital Medications:  acetaminophen    Suspension .. 650 milliGRAM(s) Oral every 6 hours PRN  aspirin  chewable 81 milliGRAM(s) Oral daily  atorvastatin 20 milliGRAM(s) Oral at bedtime  bisacodyl 5 milliGRAM(s) Oral at bedtime  bisacodyl 5 milliGRAM(s) Oral once PRN  cloNIDine 0.3 milliGRAM(s) Oral every 8 hours  clopidogrel Tablet 75 milliGRAM(s) Oral daily  doxazosin 4 milliGRAM(s) Oral at bedtime  hydrALAZINE 100 milliGRAM(s) Oral <User Schedule>  influenza   Vaccine 0.5 milliLiter(s) IntraMuscular once  isosorbide   mononitrate ER Tablet (IMDUR) 120 milliGRAM(s) Oral daily  lactulose Syrup 20 Gram(s) Oral two times a day  metoprolol succinate ER 25 milliGRAM(s) Oral daily  NIFEdipine XL 90 milliGRAM(s) Oral daily  polyethylene glycol 3350 17 Gram(s) Oral every 12 hours  polyethylene glycol/electrolyte Solution. 4000 milliLiter(s) Oral once  senna 2 Tablet(s) Oral at bedtime  simethicone 80 milliGRAM(s) Chew two times a day PRN  sodium bicarbonate 650 milliGRAM(s) Oral three times a day      PMHX/PSHX:  Frequent falls    Hypertension    Chronic kidney disease, unspecified CKD stage        Family history:      ROS:   General:  No wt loss, fevers, chills, night sweats, fatigue,   Eyes:  Good vision, no reported pain  ENT:  No sore throat, pain, runny nose, dysphagia  CV:  No pain, palpitations, hypo/hypertension  Pulm:  No dyspnea, cough, tachypnea, wheezing  GI:  As per HPI  :  No pain, bleeding, incontinence, nocturia  Muscle:  No pain, weakness  Neuro:  No weakness, tingling, memory problems  Psych:  No fatigue, insomnia, mood problems, depression  Endocrine:  No polyuria, polydipsia, cold/heat intolerance  Heme:  No petechiae, ecchymosis, easy bruisability  Skin:  No rash, tattoos, scars, edema      PHYSICAL EXAM:   Vital Signs Last 24 Hrs  T(C): 36.4 (28 Oct 2021 12:00), Max: 37.1 (28 Oct 2021 00:00)  T(F): 97.6 (28 Oct 2021 12:00), Max: 98.8 (28 Oct 2021 00:00)  HR: 50 (28 Oct 2021 12:00) (47 - 53)  BP: 144/67 (28 Oct 2021 12:00) (113/71 - 218/105)  BP(mean): --  RR: 18 (28 Oct 2021 12:00) (18 - 18)  SpO2: 99% (28 Oct 2021 12:00) (99% - 100%)      GENERAL:  No acute distress  HEENT:  Normocephalic/atraumatic, no scleral icterus  CHEST:  No accessory muscle use  HEART:  Regular rate and rhythm  ABDOMEN:  Soft, non-tender, non-distended  EXTREMITIES: No cyanosis, clubbing, or edema  SKIN:  No rash  NEURO:  Alert and oriented x 3, no asterixis    LABS:                        10.6   7.12  )-----------( 779      ( 28 Oct 2021 05:44 )             33.2     10-28    136  |  104  |  35<H>  ----------------------------<  112<H>  4.6   |  19<L>  |  2.80<H>    Ca    9.6      28 Oct 2021 05:44  Phos  2.9     10-28  Mg     2.4     10-28    TPro  7.3  /  Alb  3.7  /  TBili  0.3  /  DBili  x   /  AST  19  /  ALT  11  /  AlkPhos  102  10-28    LIVER FUNCTIONS - ( 28 Oct 2021 05:44 )  Alb: 3.7 g/dL / Pro: 7.3 g/dL / ALK PHOS: 102 U/L / ALT: 11 U/L / AST: 19 U/L / GGT: x           PT/INR - ( 28 Oct 2021 05:44 )   PT: 13.0 sec;   INR: 1.09 ratio                           Imaging:

## 2021-10-28 NOTE — PROGRESS NOTE ADULT - ASSESSMENT
69 yo M w/ PMHx of CKD IV (unknown baseline), uncontrolled HTN, ?CAD (patient states he has "heart problems;" TTE performed at [facility] on 10/5 w/ normal LVF, severely enlarged LA, Grade III diastolic dysfunction, mild pulm HTN) who was transfered from [facility] to where he presented w/ hx of multiple falls, found to have acute right MCA territory ischemic infarct w/ extensive chronic microvascular ischemic changes on CT, acute/subacute right-sided MCA distribution infarct with associated cytotoxic edema, on MRI, and Pcomm aneurysm vs thrombus [id number], mild R M1 stenosis on MRA. 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 Medicine re-consulted for liver lesion suspicious for cholangiocarcinoma.

## 2021-10-28 NOTE — PROGRESS NOTE ADULT - ASSESSMENT
71yo M with PMH of CKD (baseline Cr >3), HTN, HLD who presents as transfer from OSH for cerebral aneurysm. Advanced GI consulted for a liver mass (to be biopsied off DAPT x 3 weeks) and pending inpatient colonoscopy for malignancy work up.     Impression:  # Liver mass - with radiologic findings concerning for cholangiocarcinoma. Adenopathy noted. Would prefer to avoid directly sampling the mass as it can introduce peritoneal seeding. Can plan for EUS and peripancreatic LN sampling when patient is off DAPT (planned for 3 weeks total per neurology's latest note).  # Cerebral Aneurysm - s/p coiling on DAPT (planned for 3 weeks total)   # CKD4   # Thrombocytosis - +JAK2 mutation    Recommendations:  - Will plan for a diagnostic colonoscopy tomorrow (10/29)  - NPO midnight  - CLD today  - Prep ordered by primary team (ensure 4L golytely today)  - 3AM CBC, CMP, coags  - Can plan for EGD+EUS + LN sampling when off DAPT (can be done as outpatient) - per neurosurgery note patient is planned for 3 weeks total of DAPT starting 10/12 - so can tentatively plan for EUS the week of 11/8.      Thank you for involving us in the care of this patient, please reach out if any further questions.     Alejo Belle MD  Gastroenterology Fellow, PGY5    Available on [employer] Teams  [phone] ([facility])  [id number] ([facility])  Please contact on call fellow weekdays after 5pm-7am and weekends: [phone]

## 2021-10-28 NOTE — PROGRESS NOTE ADULT - SUBJECTIVE AND OBJECTIVE BOX
Subjective: Patient seen and examined. No new events except as noted.     REVIEW OF SYSTEMS:    CONSTITUTIONAL: + weakness, fevers or chills  EYES/ENT: No visual changes;  No vertigo or throat pain   NECK: No pain or stiffness  RESPIRATORY: No cough, wheezing, hemoptysis; No shortness of breath  CARDIOVASCULAR: No chest pain or palpitations  GASTROINTESTINAL: No abdominal or epigastric pain. No nausea, vomiting, or hematemesis; No diarrhea or constipation. No melena or hematochezia.  GENITOURINARY: No dysuria, frequency or hematuria  NEUROLOGICAL: No numbness or weakness  SKIN: No itching, burning, rashes, or lesions   All other review of systems is negative unless indicated above.    MEDICATIONS:  MEDICATIONS  (STANDING):  aspirin  chewable 81 milliGRAM(s) Oral daily  atorvastatin 20 milliGRAM(s) Oral at bedtime  bisacodyl 5 milliGRAM(s) Oral at bedtime  cloNIDine 0.3 milliGRAM(s) Oral every 8 hours  clopidogrel Tablet 75 milliGRAM(s) Oral daily  doxazosin 4 milliGRAM(s) Oral at bedtime  hydrALAZINE 100 milliGRAM(s) Oral <User Schedule>  influenza   Vaccine 0.5 milliLiter(s) IntraMuscular once  isosorbide   mononitrate ER Tablet (IMDUR) 120 milliGRAM(s) Oral daily  lactulose Syrup 20 Gram(s) Oral two times a day  metoprolol succinate ER 25 milliGRAM(s) Oral daily  NIFEdipine  milliGRAM(s) Oral daily  polyethylene glycol 3350 17 Gram(s) Oral every 12 hours  senna 2 Tablet(s) Oral at bedtime  sodium bicarbonate 650 milliGRAM(s) Oral three times a day      PHYSICAL EXAM:  T(C): 36.3 (10-28-21 @ 09:13), Max: 37.1 (10-28-21 @ 00:00)  HR: 53 (10-28-21 @ 09:13) (47 - 53)  BP: 113/71 (10-28-21 @ 09:13) (113/71 - 218/105)  RR: 18 (10-28-21 @ 09:13) (18 - 18)  SpO2: 99% (10-28-21 @ 09:13) (99% - 100%)  Wt(kg): --  I&O's Summary    27 Oct 2021 07:01  -  28 Oct 2021 07:00  --------------------------------------------------------  IN: 0 mL / OUT: 800 mL / NET: -800 mL          Appearance: Normal	  HEENT:   Normal oral mucosa, PERRL, EOMI	  Lymphatic: No lymphadenopathy , no edema  Cardiovascular: Normal S1 S2, No JVD, No murmurs , Peripheral pulses palpable 2+ bilaterally  Respiratory: Lungs clear to auscultation, normal effort 	  Gastrointestinal:  Soft, Non-tender, + BS	  Skin: No rashes, No ecchymoses, No cyanosis, warm to touch  Musculoskeletal: Normal range of motion, normal strength  Psychiatry:  Mood & affect appropriate  Ext: No edema      LABS:    CARDIAC MARKERS:                                10.6   7.12  )-----------( 779      ( 28 Oct 2021 05:44 )             33.2     10-28    136  |  104  |  35<H>  ----------------------------<  112<H>  4.6   |  19<L>  |  2.80<H>    Ca    9.6      28 Oct 2021 05:44  Phos  2.9     10-28  Mg     2.4     10-28    TPro  7.3  /  Alb  3.7  /  TBili  0.3  /  DBili  x   /  AST  19  /  ALT  11  /  AlkPhos  102  10-28    TELEMETRY: 	    ECG:  	  RADIOLOGY:   DIAGNOSTIC TESTING:  [ ] Echocardiogram:  [ ]  Catheterization:  [ ] Stress Test:    OTHER:

## 2021-10-28 NOTE — PROGRESS NOTE ADULT - PROBLEM SELECTOR PLAN 5
- w/ some fluctuations of high and low BPs  - c/w imdur, clonidine, hydral, procardia  - continue to monitor - w/ some fluctuations of high and low BPs  - c/w imdur, clonidine, hydral, procardia  - Hypertensive to 215/98 ON 10/28 - procardia increased to 120  - consider adding another agent, favor HCTZ/chlorthalidone vs. ACE/ARBs (would need to discuss with nephro) as pt does not meet GFR cutoff for spironolactone.  - continue to monitor - w/ some fluctuations of high and low BPs  - c/w imdur, clonidine, hydral, procardia  - Hypertensive to 215/98 ON 10/28 - procardia increased to 120 with improvement  - consider adding another agent, favor HCTZ vs. changing metoprolol to coreg baby dose if he is persistently hypertensive  - continue to monitor

## 2021-10-28 NOTE — PROGRESS NOTE ADULT - PROBLEM SELECTOR PLAN 8
- found to have M spike 0.6, likely MGUS per hematology  - K/L light chain ratio elevated, normal IgA/IgM/IgG.  - hematology f/u - found to have M spike 0.6, likely MGUS per hematology  - K/L light chain ratio elevated, normal IgA/IgM/IgG     - nephro monitoring  - hematology f/u - found to have M spike 0.6, likely MGUS per hematology  - K/L light chain ratio elevated, normal IgA/IgM/IgG, IgG on serum immunofixation    - nephro monitoring  - hematology f/u

## 2021-10-28 NOTE — PROGRESS NOTE ADULT - ASSESSMENT
71 yo male with CKD (baseline Cr > 3), HTN, and HLD who presented as a transfer from [facility] for higher level of care. Patient initially presented to [facility] on 10/01 after multiple falls the since the day prior. MRI Head at [facility] showed acute vs subacute R MCA territory infarct with associated cytotoxic edema. MRA H showed multilobulated right-sided P-comm aneurysm directed posteriorly and laterally, measuring 8.9 x 7.3 mm.TTE: EF 65-70%, severely enlarged L atrium. CT abdomen 10/11  reveals large hypodense 8.5 cm hepatic lesion concerning for malignancy. 10/12 s/p coiling right PCOMM aneurysm. Post op CT head with evolution of previous infarcts in right MCA, now with new acute lacunar infarctions in the right temporal lobe and cerebellum. On DAPT.    Plan:  - MRI performed, read as likely cholangio involving segments 4,5,6 with peripancreatic and portocaval nodes  - Colonoscopy on Friday if patient can have adequate prep  - Patient should have PET Scan outpatient as part of workup  - Per GI will do EGD 11/8 after dapt has been on board for 3 weeks    [facility]  [id number]  Sam Nunez

## 2021-10-28 NOTE — PROGRESS NOTE ADULT - SUBJECTIVE AND OBJECTIVE BOX
Internal Medicine   More Weber | PGY-1    OVERNIGHT EVENTS:      SUBJECTIVE:       MEDICATIONS  (STANDING):  aspirin  chewable 81 milliGRAM(s) Oral daily  atorvastatin 20 milliGRAM(s) Oral at bedtime  bisacodyl 5 milliGRAM(s) Oral at bedtime  cloNIDine 0.3 milliGRAM(s) Oral every 8 hours  clopidogrel Tablet 75 milliGRAM(s) Oral daily  doxazosin 4 milliGRAM(s) Oral at bedtime  hydrALAZINE 100 milliGRAM(s) Oral <User Schedule>  influenza   Vaccine 0.5 milliLiter(s) IntraMuscular once  isosorbide   mononitrate ER Tablet (IMDUR) 120 milliGRAM(s) Oral daily  lactulose Syrup 20 Gram(s) Oral two times a day  metoprolol succinate ER 25 milliGRAM(s) Oral daily  NIFEdipine  milliGRAM(s) Oral daily  polyethylene glycol 3350 17 Gram(s) Oral every 12 hours  senna 2 Tablet(s) Oral at bedtime  sodium bicarbonate 650 milliGRAM(s) Oral three times a day    MEDICATIONS  (PRN):  acetaminophen    Suspension .. 650 milliGRAM(s) Oral every 6 hours PRN Mild Pain (1 - 3)  bisacodyl 5 milliGRAM(s) Oral once PRN Constipation  simethicone 80 milliGRAM(s) Chew two times a day PRN Upset Stomach        T(F): 98.2 (10-28-21 @ 05:46), Max: 98.8 (10-28-21 @ 00:00)  HR: 48 (10-28-21 @ 05:46) (47 - 50)  BP: 218/105 (10-28-21 @ 05:46) (146/80 - 218/105)  BP(mean): --  RR: 18 (10-28-21 @ 05:46) (18 - 18)  SpO2: 99% (10-28-21 @ 05:46) (99% - 100%)    PHYSICAL EXAM:     GENERAL: NAD, lying in bed comfortably.  HEAD:  Atraumatic, normocephalic.  EYES: EOMI, PERRLA, conjunctiva and sclera clear, no nystagmus noted.  ENT: Moist mucous membranes,   NECK: Supple. No JVD. Trachea midline.  CHEST/LUNG: CTAB. No rales, rhonchi, wheezing, or rubs. Unlabored respirations.  HEART: RRR, no M/R/G, normal S1/S2.  ABDOMEN: Soft, nontender, nondistended, no organomegaly. Normoactive bowel sounds.  EXTREMITIES:  2+ peripheral pulses b/l, brisk capillary refill. No clubbing, cyanosis, or edema.  MSK: No gross deformities noted.   NEURO:  AAOx3, no focal deficits.   SKIN: No rashes or lesions.  PSYCH: Normal mood, affect.    TELEMETRY:    LABS:                        10.6   7.12  )-----------( 779      ( 28 Oct 2021 05:44 )             33.2     10-28    136  |  104  |  35<H>  ----------------------------<  112<H>  4.6   |  19<L>  |  2.80<H>    Ca    9.6      28 Oct 2021 05:44  Phos  2.9     10-28  Mg     2.4     10-28    TPro  7.3  /  Alb  3.7  /  TBili  0.3  /  DBili  x   /  AST  19  /  ALT  11  /  AlkPhos  102  10-28        PT/INR - ( 28 Oct 2021 05:44 )   PT: 13.0 sec;   INR: 1.09 ratio             Creatinine Trend: 2.80<--, 2.71<--, 3.12<--, 2.87<--, 2.80<--, 2.81<--  I&O's Summary    27 Oct 2021 07:01  -  28 Oct 2021 07:00  --------------------------------------------------------  IN: 0 mL / OUT: 800 mL / NET: -800 mL      BNP    RADIOLOGY & ADDITIONAL STUDIES:             Internal Medicine   More Weber | PGY-1    OVERNIGHT EVENTS: Hypertensive to 215/98, given 10 hydral IV push. Started on Procardia 120 this AM.      SUBJECTIVE: Patient was seen and examined at bedside this morning. He states he is not in the best mood but overall he is well. Was asx during hypertensive episode. He has been on clears since midnight with plans for colonoscopy tomorrow (postponed from today) as he ate a home cooked meal yesterday afternoon. Has not had a BM since yesterday after meal. Denies F/C, headache, shortness of breath, abdominal pain or chest pain. Patient responding appropriately to questions and able to make needs known.       MEDICATIONS  (STANDING):  aspirin  chewable 81 milliGRAM(s) Oral daily  atorvastatin 20 milliGRAM(s) Oral at bedtime  bisacodyl 5 milliGRAM(s) Oral at bedtime  cloNIDine 0.3 milliGRAM(s) Oral every 8 hours  clopidogrel Tablet 75 milliGRAM(s) Oral daily  doxazosin 4 milliGRAM(s) Oral at bedtime  hydrALAZINE 100 milliGRAM(s) Oral <User Schedule>  influenza   Vaccine 0.5 milliLiter(s) IntraMuscular once  isosorbide   mononitrate ER Tablet (IMDUR) 120 milliGRAM(s) Oral daily  lactulose Syrup 20 Gram(s) Oral two times a day  metoprolol succinate ER 25 milliGRAM(s) Oral daily  NIFEdipine  milliGRAM(s) Oral daily  polyethylene glycol 3350 17 Gram(s) Oral every 12 hours  senna 2 Tablet(s) Oral at bedtime  sodium bicarbonate 650 milliGRAM(s) Oral three times a day    MEDICATIONS  (PRN):  acetaminophen    Suspension .. 650 milliGRAM(s) Oral every 6 hours PRN Mild Pain (1 - 3)  bisacodyl 5 milliGRAM(s) Oral once PRN Constipation  simethicone 80 milliGRAM(s) Chew two times a day PRN Upset Stomach        T(F): 98.2 (10-28-21 @ 05:46), Max: 98.8 (10-28-21 @ 00:00)  HR: 48 (10-28-21 @ 05:46) (47 - 50)  BP: 218/105 (10-28-21 @ 05:46) (146/80 - 218/105)  BP(mean): --  RR: 18 (10-28-21 @ 05:46) (18 - 18)  SpO2: 99% (10-28-21 @ 05:46) (99% - 100%)    PHYSICAL EXAM:     GENERAL: NAD, lying in bed comfortably.  HEAD:  Atraumatic, normocephalic.  CHEST/LUNG: CTAB. No rales, rhonchi, wheezing, or rubs. Unlabored respirations.  HEART: RRR, no M/R/G, normal S1/S2.  ABDOMEN: +Dull to percussion LUQ, focal nodularity LUQ. Normoactive bowel sounds.  EXTREMITIES:  2+ peripheral pulses b/l. No clubbing, cyanosis, or edema.  PSYCH: Normal mood, affect.      LABS:                        10.6   7.12  )-----------( 779      ( 28 Oct 2021 05:44 )             33.2     10-28    136  |  104  |  35<H>  ----------------------------<  112<H>  4.6   |  19<L>  |  2.80<H>    Ca    9.6      28 Oct 2021 05:44  Phos  2.9     10-28  Mg     2.4     10-28    TPro  7.3  /  Alb  3.7  /  TBili  0.3  /  DBili  x   /  AST  19  /  ALT  11  /  AlkPhos  102  10-28        PT/INR - ( 28 Oct 2021 05:44 )   PT: 13.0 sec;   INR: 1.09 ratio             Creatinine Trend: 2.80<--, 2.71<--, 3.12<--, 2.87<--, 2.80<--, 2.81<--  I&O's Summary    27 Oct 2021 07:01  -  28 Oct 2021 07:00  --------------------------------------------------------  IN: 0 mL / OUT: 800 mL / NET: -800 mL

## 2021-10-28 NOTE — PROGRESS NOTE ADULT - ATTENDING COMMENTS
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  Please call the advanced GI service with any questions or concerns.

## 2021-10-28 NOTE — PROGRESS NOTE ADULT - ATTENDING COMMENTS
69 yo M w/ PMHx of CKD IV (unknown baseline), uncontrolled HTN, ?CAD (patient states he has "heart problems;" TTE performed at [facility] on 10/5 w/ normal LVF, severely enlarged LA, Grade III diastolic dysfunction, mild pulm HTN) who was transfered from [facility] to where he presented w/ hx of multiple falls, found to have acute right MCA territory ischemic infarct w/ extensive chronic microvascular ischemic changes on CT, acute/subacute right-sided MCA distribution infarct with associated cytotoxic edema, on MRI, and Pcomm aneurysm vs thrombus [id number], mild R M1 stenosis on MRA. 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 Medicine re-consulted for liver lesion suspicious for cholangiocarcinoma.    NAD. Ate a full meal yesterday. Clear liquid diet today and prep for Colonoscopy tomorrow.    K/L elevated, no further workup as yet per Renal.   EUS as outpt. No in patient biopsy due to high risk of bleeding from DAPT.  Surgery says biopsy by IR not needed. Outpatient PET scan  Aspirin for Essential Thrombocytosis, f/u Heme/Onc re: ?MGUS  DC planning post colonoscopy, likely home    Rest of plan per resident's note

## 2021-10-28 NOTE — PROGRESS NOTE ADULT - PROBLEM SELECTOR PLAN 1
MRI abd shows central hepatic mass for which the primary differential consideration is cholangiocarcinoma and adenopathy in the peripancreatic, savage hepatis and portacaval regions.  - CEA elevated, Ca 19-9 mildly elevated  - Surgical Onc and hepatology recs appreciated  - Would require biopsy for diagnosis  - IR consulted, deferred IR guided bx as patient on DAPT - would need to be held prior to procedure  - Advanced GI consulted, recs appreciated. Per their review, likely iCCA, recommending against biopsy at this time due to high risk of seeding  - plan for colonoscopy 10/27   - clears Tuesday 10/26, with bowel prep Tues night and NPO after midnight     - noted as "not given" in chart, may have to postpone  - will need EGD to clear upper GI tract +/- EUS/FNA of LN if malignant appearing once off plavix x 5 days (needs 3 weeks of plavix per neurosugery) - can be done outpatient   - will need outpatient PET scan  - formal heme/onc consult pending biopsy results MRI abd shows central hepatic mass for which the primary differential consideration is cholangiocarcinoma and adenopathy in the peripancreatic, savage hepatis and portacaval regions.  - CEA elevated, Ca 19-9 mildly elevated  - Surgical Onc and hepatology recs appreciated  - Would require biopsy for diagnosis  - IR consulted, deferred IR guided bx as patient on DAPT - would need to be held prior to procedure  - Advanced [facility] consulted, recs appreciated. Per their review, likely iCCA, recommending against biopsy at this time due to high risk of seeding   - clears Thursday 10/28  - NPO after midnight   - plan for colonoscopy tomorrow 10/29  - will need EGD to clear upper GI tract +/- EUS/FNA of LN if malignant appearing once off plavix x 5 days (needs 3 weeks of plavix per neurosugery) - can be done outpatient   - will need outpatient PET scan  - formal heme/onc consult pending biopsy results

## 2021-10-28 NOTE — MEDICAL STUDENT PROGRESS NOTE(EDUCATION) - SUBJECTIVE AND OBJECTIVE BOX
Pt is a 72y/o M with h/o CKD IV, uncontrolled HTN, and NSVT with diastolic dysfunction transferred from [facility] with imaging findings of a liver mass.    ON: Systolic to 215 ON, given a push of 10mg Hydralazine. ______    MEDICATIONS  (STANDING):  aspirin  chewable 81 milliGRAM(s) Oral daily  atorvastatin 20 milliGRAM(s) Oral at bedtime  bisacodyl 5 milliGRAM(s) Oral at bedtime  cloNIDine 0.3 milliGRAM(s) Oral every 8 hours  clopidogrel Tablet 75 milliGRAM(s) Oral daily  doxazosin 4 milliGRAM(s) Oral at bedtime  hydrALAZINE 100 milliGRAM(s) Oral <User Schedule>  influenza   Vaccine 0.5 milliLiter(s) IntraMuscular once  isosorbide   mononitrate ER Tablet (IMDUR) 120 milliGRAM(s) Oral daily  lactulose Syrup 20 Gram(s) Oral two times a day  metoprolol succinate ER 25 milliGRAM(s) Oral daily  NIFEdipine  milliGRAM(s) Oral daily  polyethylene glycol 3350 17 Gram(s) Oral every 12 hours  senna 2 Tablet(s) Oral at bedtime  sodium bicarbonate 650 milliGRAM(s) Oral three times a day    MEDICATIONS  (PRN):  acetaminophen    Suspension .. 650 milliGRAM(s) Oral every 6 hours PRN Mild Pain (1 - 3)  bisacodyl 5 milliGRAM(s) Oral once PRN Constipation  simethicone 80 milliGRAM(s) Chew two times a day PRN Upset Stomach      T(C): 36.8 (10-28-21 @ 05:46), Max: 37.1 (10-28-21 @ 00:00)  T(F): 98.2 (10-28-21 @ 05:46), Max: 98.8 (10-28-21 @ 00:00)  HR: 48 (10-28-21 @ 05:46) (47 - 50)  BP: 218/105 (10-28-21 @ 05:46) (146/80 - 218/105)  ABP: --  ABP(mean): --  RR: 18 (10-28-21 @ 05:46) (18 - 18)  SpO2: 99% (10-28-21 @ 05:46) (99% - 100%)      LOS: 19d    GENERAL: NAD, lying in bed comfortably  HEAD:  Atraumatic, Normocephalic  EYES: EOMI, PERRLA, conjunctiva and sclera clear  ENT: Moist mucous membranes  NECK: Supple, No JVD  CHEST/LUNG: CTA b/l; No rales, rhonchi, wheezing, or rubs. Unlabored respirations  HEART: RRR; No murmurs, rubs, or gallops  ABDOMEN: BS+, nodularity palpated along right quadrants, nontender on palpation  EXTREMITIES:  2+ Peripheral Pulses, brisk capillary refill. No clubbing, cyanosis, or edema  NERVOUS SYSTEM:  A&Ox3, no focal deficits   SKIN: No rashes or lesions                          10.6   7.12  )-----------( 779      ( 28 Oct 2021 05:44 )             33.2       10-28    136  |  104  |  35<H>  ----------------------------<  112<H>  4.6   |  19<L>  |  2.80<H>    Ca    9.6      28 Oct 2021 05:44  Phos  2.9     10-28  Mg     2.4     10-28    TPro  7.3  /  Alb  3.7  /  TBili  0.3  /  DBili  x   /  AST  19  /  ALT  11  /  AlkPhos  102  10-28        < from: MR Abdomen w/ IV Cont (10.22.21 @ 23:26) >  IMPRESSION:  Limited post contrast imaging.    Central hepatic mass as above for which the primary differential consideration is cholangiocarcinoma.    Adenopathy in the peripancreatic, savage hepatis and portacaval regions.    < end of copied text >  < from: MR Abdomen w/ IV Cont (10.22.21 @ 23:26) >  LIVER: No morphologic evidence of cirrhosis. An 8.9 x 7.1 cm central hepatic lesion involving hepatic segments 4, 5 and 6 with possible extension into superior right hepatic lobe segments demonstrates restricted diffusion with peripheral rim enhancement and delayed central enhancement. While post contrast imaging is limited, MR characteristics strongly favor cholangiocarcinoma.    < end of copied text >  < from: MR Abdomen w/ IV Cont (10.22.21 @ 23:26) >  KIDNEYS/URETERS: Bilateral renal cysts including a 3.3 cm hemorrhagic right renal cyst.    < end of copied text >       Pt is a 70y/o M with h/o CKD IV, uncontrolled HTN, and NSVT with diastolic dysfunction transferred from [facility] with imaging findings of a liver mass.    ON: Systolic to 215 ON, given a push of 10mg Hydralazine. Reports no headache, dizziness, blurry vision, cp, or sob. Reports no BM since yesterday. Has been on clears, to be NPO tonight for colonoscopy tomorrow.     MEDICATIONS  (STANDING):  aspirin  chewable 81 milliGRAM(s) Oral daily  atorvastatin 20 milliGRAM(s) Oral at bedtime  bisacodyl 5 milliGRAM(s) Oral at bedtime  cloNIDine 0.3 milliGRAM(s) Oral every 8 hours  clopidogrel Tablet 75 milliGRAM(s) Oral daily  doxazosin 4 milliGRAM(s) Oral at bedtime  hydrALAZINE 100 milliGRAM(s) Oral <User Schedule>  influenza   Vaccine 0.5 milliLiter(s) IntraMuscular once  isosorbide   mononitrate ER Tablet (IMDUR) 120 milliGRAM(s) Oral daily  lactulose Syrup 20 Gram(s) Oral two times a day  metoprolol succinate ER 25 milliGRAM(s) Oral daily  NIFEdipine  milliGRAM(s) Oral daily  polyethylene glycol 3350 17 Gram(s) Oral every 12 hours  senna 2 Tablet(s) Oral at bedtime  sodium bicarbonate 650 milliGRAM(s) Oral three times a day    MEDICATIONS  (PRN):  acetaminophen    Suspension .. 650 milliGRAM(s) Oral every 6 hours PRN Mild Pain (1 - 3)  bisacodyl 5 milliGRAM(s) Oral once PRN Constipation  simethicone 80 milliGRAM(s) Chew two times a day PRN Upset Stomach      T(C): 36.8 (10-28-21 @ 05:46), Max: 37.1 (10-28-21 @ 00:00)  T(F): 98.2 (10-28-21 @ 05:46), Max: 98.8 (10-28-21 @ 00:00)  HR: 48 (10-28-21 @ 05:46) (47 - 50)  BP: 218/105 (10-28-21 @ 05:46) (146/80 - 218/105)  ABP: --  ABP(mean): --  RR: 18 (10-28-21 @ 05:46) (18 - 18)  SpO2: 99% (10-28-21 @ 05:46) (99% - 100%)      LOS: 19d    GENERAL: NAD, lying in bed comfortably  HEAD:  Atraumatic, Normocephalic  EYES: EOMI, PERRLA, conjunctiva and sclera clear  ENT: Moist mucous membranes  NECK: Supple, No JVD  CHEST/LUNG: CTA b/l; No rales, rhonchi, wheezing, or rubs. Unlabored respirations  HEART: RRR; No murmurs, rubs, or gallops  ABDOMEN: BS+, nodularity palpated along right quadrants, nontender on palpation  EXTREMITIES:  2+ Peripheral Pulses, brisk capillary refill. No clubbing, cyanosis, or edema  NERVOUS SYSTEM:  A&Ox3, no focal deficits   SKIN: No rashes or lesions                          10.6   7.12  )-----------( 779      ( 28 Oct 2021 05:44 )             33.2       10-28    136  |  104  |  35<H>  ----------------------------<  112<H>  4.6   |  19<L>  |  2.80<H>    Ca    9.6      28 Oct 2021 05:44  Phos  2.9     10-28  Mg     2.4     10-28    TPro  7.3  /  Alb  3.7  /  TBili  0.3  /  DBili  x   /  AST  19  /  ALT  11  /  AlkPhos  102  10-28        < from: MR Abdomen w/ IV Cont (10.22.21 @ 23:26) >  IMPRESSION:  Limited post contrast imaging.    Central hepatic mass as above for which the primary differential consideration is cholangiocarcinoma.    Adenopathy in the peripancreatic, savage hepatis and portacaval regions.    < end of copied text >  < from: MR Abdomen w/ IV Cont (10.22.21 @ 23:26) >  LIVER: No morphologic evidence of cirrhosis. An 8.9 x 7.1 cm central hepatic lesion involving hepatic segments 4, 5 and 6 with possible extension into superior right hepatic lobe segments demonstrates restricted diffusion with peripheral rim enhancement and delayed central enhancement. While post contrast imaging is limited, MR characteristics strongly favor cholangiocarcinoma.    < end of copied text >  < from: MR Abdomen w/ IV Cont (10.22.21 @ 23:26) >  KIDNEYS/URETERS: Bilateral renal cysts including a 3.3 cm hemorrhagic right renal cyst.    < end of copied text >

## 2021-10-28 NOTE — PROGRESS NOTE ADULT - SUBJECTIVE AND OBJECTIVE BOX
Patient seen and examined  no complaints    No Known Allergies    Hospital Medications:   MEDICATIONS  (STANDING):  aspirin  chewable 81 milliGRAM(s) Oral daily  atorvastatin 20 milliGRAM(s) Oral at bedtime  bisacodyl 5 milliGRAM(s) Oral at bedtime  cloNIDine 0.3 milliGRAM(s) Oral every 8 hours  clopidogrel Tablet 75 milliGRAM(s) Oral daily  doxazosin 4 milliGRAM(s) Oral at bedtime  hydrALAZINE 100 milliGRAM(s) Oral <User Schedule>  influenza   Vaccine 0.5 milliLiter(s) IntraMuscular once  isosorbide   mononitrate ER Tablet (IMDUR) 120 milliGRAM(s) Oral daily  lactulose Syrup 20 Gram(s) Oral two times a day  metoprolol succinate ER 25 milliGRAM(s) Oral daily  NIFEdipine  milliGRAM(s) Oral daily  polyethylene glycol 3350 17 Gram(s) Oral every 12 hours  senna 2 Tablet(s) Oral at bedtime  sodium bicarbonate 650 milliGRAM(s) Oral three times a day        VITALS:  T(F): 97.6 (10-28-21 @ 12:00), Max: 98.8 (10-28-21 @ 00:00)  HR: 50 (10-28-21 @ 12:00)  BP: 144/67 (10-28-21 @ 12:00)  RR: 18 (10-28-21 @ 12:00)  SpO2: 99% (10-28-21 @ 12:00)  Wt(kg): --    10-27 @ 07:01  -  10-28 @ 07:00  --------------------------------------------------------  IN: 0 mL / OUT: 800 mL / NET: -800 mL    Physical Exam :-  Constitutional: NAD  Neck: Supple.  Respiratory: Bilateral equal breath sounds, few Crackles present.  Cardiovascular: S1, S2 normal, positive Murmur  Gastrointestinal: Bowel Sounds present, soft, non tender.  Extremities: no Edema Feet  Neurological: Alert and Oriented x 3  Psychiatric: Normal mood, normal affect    LABS:  10-28    136  |  104  |  35<H>  ----------------------------<  112<H>  4.6   |  19<L>  |  2.80<H>    Ca    9.6      28 Oct 2021 05:44  Phos  2.9     10-28  Mg     2.4     10-28    TPro  7.3  /  Alb  3.7  /  TBili  0.3  /  DBili      /  AST  19  /  ALT  11  /  AlkPhos  102  10-28    Creatinine Trend: 2.80 <--, 2.71 <--, 3.12 <--, 2.87 <--, 2.80 <--, 2.81 <--                        10.6   7.12  )-----------( 779      ( 28 Oct 2021 05:44 )             33.2     Urine Studies:      RADIOLOGY & ADDITIONAL STUDIES:

## 2021-10-28 NOTE — MEDICAL STUDENT PROGRESS NOTE(EDUCATION) - NS MD HP STUD ASPLAN PLAN FT
1) Hepatic lesion.   MRA showed central hepatic mass 8.9 x 7.1 with adenopathy in the peripancreatic, savage hepatis and portacaval regions, likely differential is cholangiocarcinoma, pancreatic, HCC, colorectal, ADPKD less likely but possible as well given combination of pcom aneurysm, renal cyst, and liver mass.  - Elevated CEA, , normal AFP  - IR consulted, deferred IR guided bx due to risk of bleeding as patient on DAPT   - Advanced GI consulted, likely iCCA, recommending against biopsy at this time due to high risk of seeding  - Colonoscopy pushed to 10/29//21, pt sneaked in a solid meal  - will need EGD to clear upper GI tract +/- EUS/FNA of LN if malignant appearing once off plavix x 5 days (needs 3 weeks of plavix per neurosugery, DAPT was started on 10/9/21) - can be done outpatient   - will need outpatient PET scan  - formal heme/onc consult pending biopsy results.    2) Cerebral aneurysm.   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 Continue to monitor  - c/w ASA 81mg daily  - Plavix 75mg daily for at least 3 weeks per neurosurgery    3) Stroke   Initially presented to [facility], found to have acute right MCA territory ischemic infarct w/ extensive chronic microvascular ischemic changes on CT, acute/subacute right-sided MCA distribution infarct with associated cytotoxic edema, on MRI  - Outpatient Implantable Loop Recorder per neuro/cards    4) NSVT (nonsustained ventricular tachycardia).   Bradycardia to 40s, asymptomatic 10/26/21  - c/w reduced metoprolol XL 25mg daily per cardio    5) Hypertension.   Fluctuations of high and low BPs  - c/w imdur, clonidine, hydral, procardia  - pt consistently hypertensive, systolic to 215 ON. Currently maxed out on Clonidine, Hydralazine, and Imdur.   - Bradycardic to 40s, do not increase beta-blocker dosage  - Consider Procardia 120mg (currently 90mg)    6) Stage 4 chronic kidney disease.   - Nephro following  - Cr stable  - Ctm BMP    7) Thrombocytosis.   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  - Avoid nephrotoxins    8) Monoclonal gammopathy.   - found to have M spike 0.6, likely MGUS per hematology  - IgA IgM wnl, kappa/lambda ratio elevated at 1.66  - Monitor for now per nephro as ratio <2    9) Prophylactic measure.   ·  Plan: #DVT PPx: on DAPT; holding heparin subq   #Diet: Renal diet  #Dispo: PT f/u, SW 1) Hepatic lesion.   MRA showed central hepatic mass 8.9 x 7.1 with adenopathy in the peripancreatic, savage hepatis and portacaval regions, likely differential is cholangiocarcinoma, pancreatic, HCC, colorectal, ADPKD less likely but possible as well given combination of pcom aneurysm, renal cyst, and liver mass.  - Elevated CEA, , normal AFP  - IR consulted, deferred IR guided bx due to risk of bleeding as patient on DAPT   - Advanced GI consulted, likely iCCA, recommending against biopsy at this time due to high risk of seeding  - Colonoscopy pushed to 10/29//21, pt sneaked in a solid meal  - will need EGD to clear upper GI tract +/- EUS/FNA of LN if malignant appearing once off plavix x 5 days (needs 3 weeks of plavix per neurosugery, DAPT was started on 10/9/21) - can be done outpatient   - will need outpatient PET scan  - formal heme/onc consult pending biopsy results.    2) Cerebral aneurysm.   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 Continue to monitor  - c/w ASA 81mg daily  - Plavix 75mg daily for at least 3 weeks per neurosurgery    3) Stroke   Initially presented to [facility], found to have acute right MCA territory ischemic infarct w/ extensive chronic microvascular ischemic changes on CT, acute/subacute right-sided MCA distribution infarct with associated cytotoxic edema, on MRI  - Outpatient Implantable Loop Recorder per neuro/cards    4) NSVT (nonsustained ventricular tachycardia).   Bradycardia to 40s, asymptomatic 10/26/21  - c/w reduced metoprolol XL 25mg daily per cardio    5) Hypertension.   Fluctuations of high and low BPs  - c/w imdur, clonidine, hydral  - pt consistently hypertensive, systolic to 215 ON. Currently maxed out on Clonidine, Hydralazine, and Imdur.   - Procardia to 120mg, consider switching Metoprolol to equivalent Coreg for additional antihypertensive effect or thiazide diuretic (see assessment)  - Bradycardic to 40s, do not increase beta-blocker dosage    6) Stage 4 chronic kidney disease.   - Nephro following  - Cr stable  - Ctm BMP    7) Thrombocytosis.   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  - Avoid nephrotoxins    8) Monoclonal gammopathy.   - found to have M spike 0.6, likely MGUS per hematology  - IgA IgM wnl, kappa/lambda ratio elevated at 1.66  - Monitor for now per nephro as ratio <2    9) Prophylactic measure.   ·  Plan: #DVT PPx: on DAPT; holding heparin subq   #Diet: Renal diet  #Dispo: PT f/u, SW

## 2021-10-28 NOTE — PROGRESS NOTE ADULT - ASSESSMENT
70yoM w/ PMHx of CKD with a baseline Cr >3, HTN, HLD presented to OSH after multiple falls. He was transferred from OSH for MRI/MRA findings concerning for aneurysm vs. thrombus       1) CKD stage 4- overall stable  Likely has Hypertensive Nephropathy-> b/l 2.8mg/dl to 3.1mg/dl  Ua and urine lytes reviewed--> +UTI- s/p cipro  urine pro/cr 1.4grams  K/L 11.16/6.72---> ratio is < 2---> monitor for now  Renal US --> right kidney 9.3cm and left 9.5 cm with b/l renal cysts  s/p Coiling for PCOMM  Cr  fluctuating however overall stable- cont monitor with daily basic metabolic panel   s/p NIELS --> no signs of NSF--> will cont to monitor  avoid nephrotoxins such as ace/arb/nsaid  trend bmp    2) HTN-  bp rising again  on nifedipine clonidine  hydralazine,   Imdur 120mg po qd   inc metoprolol from 25mg to 50mg   Trend bp    3) metabolic acidosis- likely from ckd-   na bicarb 650mg po tid  trend bicarb      Dr Hernadez  Nephrology   cell [phone]  office [phone]  ans serv- [phone]  [email or web address] ( wkend coverage for [facility])

## 2021-10-28 NOTE — PROGRESS NOTE ADULT - ASSESSMENT
70yoM w/ PMHx of CKD IV (unknown baseline), uncontrolled HTN, ?CAD (patient states he has "heart problems;" TTE performed at [facility] on 10/5 w/ normal LVF, severely enlarged LA, Grade III diastolic dysfunction, mild pulm HTN) who was transfered from [facility] to where he presented w/ hx of multiple falls, found to have acute right MCA territory ischemic infarct w/ extensive chronic microvascular ischemic changes on CT, acute/subacute right-sided MCA distribution infarct with associated cytotoxic edema, on MRI, and Pcomm aneurysm vs thrombus [id number], mild R M1 stenosis on MRA.

## 2021-10-28 NOTE — PROGRESS NOTE ADULT - SUBJECTIVE AND OBJECTIVE BOX
Red Team Progress Note  [id number]    Subjective:   Patient seen at bedside this AM. Resting. Per nurse patient has been refusing prep for the most part. Family came yesterday and fed him solid food. No recent bowel movement.    Objective:  Vital Signs  T(C): 36.8 (10-28 @ 05:46), Max: 37.1 (10-28 @ 00:00)  HR: 49 (10-28 @ 06:20) (47 - 50)  BP: 160/80 (10-28 @ 06:20) (146/80 - 218/105)  RR: 18 (10-28 @ 05:46) (18 - 18)  SpO2: 99% (10-28 @ 05:46) (99% - 100%)  10-27-21 @ 07:01  -  10-28-21 @ 07:00  --------------------------------------------------------  IN:  Total IN: 0 mL    OUT:    Voided (mL): 800 mL  Total OUT: 800 mL    Total NET: -800 mL          I&O's Detail    10-27-21 @ 07:01  -  10-28-21 @ 07:00  --------------------------------------------------------  IN: 0 mL / OUT: 800 mL / NET: -800 mL        Physical Exam:  GEN: resting in bed comfortably in NAD  RESP: no increased WOB  CARD: Hypertensive, HR sinus bradycardia  ABD: Minimally distended    Labs:                        10.6   7.12  )-----------( 779      ( 28 Oct 2021 05:44 )             33.2   10-28    136  |  104  |  35<H>  ----------------------------<  112<H>  4.6   |  19<L>  |  2.80<H>    Ca    9.6      28 Oct 2021 05:44  Phos  2.9     10-28  Mg     2.4     10-28    TPro  7.3  /  Alb  3.7  /  TBili  0.3  /  DBili  x   /  AST  19  /  ALT  11  /  AlkPhos  102  10-28    CAPILLARY BLOOD GLUCOSE          Medications:   MEDICATIONS  (STANDING):  aspirin  chewable 81 milliGRAM(s) Oral daily  atorvastatin 20 milliGRAM(s) Oral at bedtime  bisacodyl 5 milliGRAM(s) Oral at bedtime  cloNIDine 0.3 milliGRAM(s) Oral every 8 hours  clopidogrel Tablet 75 milliGRAM(s) Oral daily  doxazosin 4 milliGRAM(s) Oral at bedtime  hydrALAZINE 100 milliGRAM(s) Oral <User Schedule>  influenza   Vaccine 0.5 milliLiter(s) IntraMuscular once  isosorbide   mononitrate ER Tablet (IMDUR) 120 milliGRAM(s) Oral daily  lactulose Syrup 20 Gram(s) Oral two times a day  metoprolol succinate ER 25 milliGRAM(s) Oral daily  NIFEdipine  milliGRAM(s) Oral daily  polyethylene glycol 3350 17 Gram(s) Oral every 12 hours  senna 2 Tablet(s) Oral at bedtime  sodium bicarbonate 650 milliGRAM(s) Oral three times a day    MEDICATIONS  (PRN):  acetaminophen    Suspension .. 650 milliGRAM(s) Oral every 6 hours PRN Mild Pain (1 - 3)  bisacodyl 5 milliGRAM(s) Oral once PRN Constipation  simethicone 80 milliGRAM(s) Chew two times a day PRN Upset Stomach      Imaging:

## 2021-10-28 NOTE — MEDICAL STUDENT PROGRESS NOTE(EDUCATION) - NS MD HP STUD ASPLAN ASSES FT
70 y/o M with h/o CKD IV, uncontrolled HTN, NSVT and grade III diastolic dysfunction who was transferred from [facility] where he initially presented for multiple falls and was found to have R sided MCA infarct and R pcom aneurysm now s/p coil (10/12/21) and DAPT therapy, with incidental finding of central hepatic lesion measuring 8.9 x 7.1 with peripancreatic, savage caval, and savage hepatis adenopathy and elevated CEA, , and normal AFP concerning for cholangiocarcinoma. 72 y/o M with h/o CKD IV, uncontrolled HTN, NSVT and grade III diastolic dysfunction who was transferred from [facility] where he initially presented for multiple falls and was found to have R sided MCA infarct and R pcom aneurysm now s/p coil (10/12/21) and DAPT therapy, with incidental finding of central hepatic lesion measuring 8.9 x 7.1 with peripancreatic, savage caval, and savage hepatis adenopathy and elevated CEA, , and normal AFP concerning for cholangiocarcinoma.  Pt was hypertensive to 218/105 overnight with prior episodes of elevated SBP despite current regimen of Clonidine 0.3mg q8h, Procardia 90mg qday, Hydralazine 100mg qday, Imdur 120mg, and Metoprolol 25mg. First-line management for HTN includes thiazides, ACEi/ARBs, and CCBs. Pt's creatinine clearance from (10/28/21) AM labs was 25mL/min, restricting usage of ACEi or ARBs which should ideally be used if creatinine clearance is >30. Procardia was increased from 90mg to 120mg today, leaving thiazide diuretics as the next management. KDOQI guideline suggests the usage of thiazides for CKD I-III and loop diuretics for CKD IV+ due to decreased effectiveness. However there is literature evidence citing no difference in effectiveness of thiazide vs loop diuretics in CKD IV+ patients. In 2012 a double-blinded randomized trial by Fabio et al (doi 10.1111/j.4690-6562.2011.44124.x) demonstrated no difference between HCTZ and Furosemide treatment in lowering BP (average decrease in systolic ~10) in CKD IV+ pts with average age of 65, good external validity for this pt. If Procardia 90-->120 does not achieve sufficient antihypertensive effect, consider thiazide as next line management. Switching Metoprolol to equivalent dosage of Coreg may be effective given the alpha blockage effect, although pt has history of hypotension at home

## 2021-10-29 LAB
ANION GAP SERPL CALC-SCNC: 15 MMOL/L — SIGNIFICANT CHANGE UP (ref 5–17)
BASOPHILS # BLD AUTO: 0.06 K/UL — SIGNIFICANT CHANGE UP (ref 0–0.2)
BASOPHILS NFR BLD AUTO: 1 % — SIGNIFICANT CHANGE UP (ref 0–2)
BUN SERPL-MCNC: 27 MG/DL — HIGH (ref 7–23)
CALCIUM SERPL-MCNC: 9.6 MG/DL — SIGNIFICANT CHANGE UP (ref 8.4–10.5)
CHLORIDE SERPL-SCNC: 102 MMOL/L — SIGNIFICANT CHANGE UP (ref 96–108)
CO2 SERPL-SCNC: 20 MMOL/L — LOW (ref 22–31)
CREAT SERPL-MCNC: 2.71 MG/DL — HIGH (ref 0.5–1.3)
EOSINOPHIL # BLD AUTO: 0.25 K/UL — SIGNIFICANT CHANGE UP (ref 0–0.5)
EOSINOPHIL NFR BLD AUTO: 4.2 % — SIGNIFICANT CHANGE UP (ref 0–6)
GLUCOSE SERPL-MCNC: 106 MG/DL — HIGH (ref 70–99)
HCT VFR BLD CALC: 34.1 % — LOW (ref 39–50)
HGB BLD-MCNC: 10.8 G/DL — LOW (ref 13–17)
IMM GRANULOCYTES NFR BLD AUTO: 0.2 % — SIGNIFICANT CHANGE UP (ref 0–1.5)
INR BLD: 1.09 RATIO — SIGNIFICANT CHANGE UP (ref 0.88–1.16)
LYMPHOCYTES # BLD AUTO: 1.52 K/UL — SIGNIFICANT CHANGE UP (ref 1–3.3)
LYMPHOCYTES # BLD AUTO: 25.4 % — SIGNIFICANT CHANGE UP (ref 13–44)
MAGNESIUM SERPL-MCNC: 2.3 MG/DL — SIGNIFICANT CHANGE UP (ref 1.6–2.6)
MCHC RBC-ENTMCNC: 29.3 PG — SIGNIFICANT CHANGE UP (ref 27–34)
MCHC RBC-ENTMCNC: 31.7 GM/DL — LOW (ref 32–36)
MCV RBC AUTO: 92.4 FL — SIGNIFICANT CHANGE UP (ref 80–100)
MONOCYTES # BLD AUTO: 0.48 K/UL — SIGNIFICANT CHANGE UP (ref 0–0.9)
MONOCYTES NFR BLD AUTO: 8 % — SIGNIFICANT CHANGE UP (ref 2–14)
NEUTROPHILS # BLD AUTO: 3.67 K/UL — SIGNIFICANT CHANGE UP (ref 1.8–7.4)
NEUTROPHILS NFR BLD AUTO: 61.2 % — SIGNIFICANT CHANGE UP (ref 43–77)
NRBC # BLD: 0 /100 WBCS — SIGNIFICANT CHANGE UP (ref 0–0)
PHOSPHATE SERPL-MCNC: 2.8 MG/DL — SIGNIFICANT CHANGE UP (ref 2.5–4.5)
PLATELET # BLD AUTO: 755 K/UL — HIGH (ref 150–400)
POTASSIUM SERPL-MCNC: 4.1 MMOL/L — SIGNIFICANT CHANGE UP (ref 3.5–5.3)
POTASSIUM SERPL-SCNC: 4.1 MMOL/L — SIGNIFICANT CHANGE UP (ref 3.5–5.3)
PROTHROM AB SERPL-ACNC: 13 SEC — SIGNIFICANT CHANGE UP (ref 10.6–13.6)
RBC # BLD: 3.69 M/UL — LOW (ref 4.2–5.8)
RBC # FLD: 15.5 % — HIGH (ref 10.3–14.5)
SODIUM SERPL-SCNC: 137 MMOL/L — SIGNIFICANT CHANGE UP (ref 135–145)
WBC # BLD: 5.99 K/UL — SIGNIFICANT CHANGE UP (ref 3.8–10.5)
WBC # FLD AUTO: 5.99 K/UL — SIGNIFICANT CHANGE UP (ref 3.8–10.5)

## 2021-10-29 PROCEDURE — 93010 ELECTROCARDIOGRAM REPORT: CPT

## 2021-10-29 PROCEDURE — 45330 DIAGNOSTIC SIGMOIDOSCOPY: CPT | Mod: GC

## 2021-10-29 RX ORDER — SODIUM CHLORIDE 9 MG/ML
500 INJECTION INTRAMUSCULAR; INTRAVENOUS; SUBCUTANEOUS
Refills: 0 | Status: COMPLETED | OUTPATIENT
Start: 2021-10-29 | End: 2021-10-29

## 2021-10-29 RX ORDER — HYDRALAZINE HCL 50 MG
5 TABLET ORAL ONCE
Refills: 0 | Status: COMPLETED | OUTPATIENT
Start: 2021-10-29 | End: 2021-10-29

## 2021-10-29 RX ADMIN — Medication 0.3 MILLIGRAM(S): at 05:26

## 2021-10-29 RX ADMIN — Medication 0.3 MILLIGRAM(S): at 22:19

## 2021-10-29 RX ADMIN — Medication 0.3 MILLIGRAM(S): at 15:04

## 2021-10-29 RX ADMIN — POLYETHYLENE GLYCOL 3350 17 GRAM(S): 17 POWDER, FOR SOLUTION ORAL at 05:26

## 2021-10-29 RX ADMIN — Medication 100 MILLIGRAM(S): at 15:05

## 2021-10-29 RX ADMIN — Medication 5 MILLIGRAM(S): at 01:20

## 2021-10-29 RX ADMIN — Medication 650 MILLIGRAM(S): at 15:06

## 2021-10-29 RX ADMIN — Medication 650 MILLIGRAM(S): at 22:19

## 2021-10-29 RX ADMIN — Medication 4 MILLIGRAM(S): at 22:18

## 2021-10-29 RX ADMIN — Medication 650 MILLIGRAM(S): at 05:27

## 2021-10-29 RX ADMIN — Medication 100 MILLIGRAM(S): at 03:56

## 2021-10-29 RX ADMIN — Medication 120 MILLIGRAM(S): at 05:27

## 2021-10-29 RX ADMIN — CLOPIDOGREL BISULFATE 75 MILLIGRAM(S): 75 TABLET, FILM COATED ORAL at 15:06

## 2021-10-29 RX ADMIN — ATORVASTATIN CALCIUM 20 MILLIGRAM(S): 80 TABLET, FILM COATED ORAL at 22:19

## 2021-10-29 RX ADMIN — Medication 81 MILLIGRAM(S): at 15:06

## 2021-10-29 RX ADMIN — Medication 100 MILLIGRAM(S): at 22:18

## 2021-10-29 RX ADMIN — ISOSORBIDE MONONITRATE 120 MILLIGRAM(S): 60 TABLET, EXTENDED RELEASE ORAL at 15:06

## 2021-10-29 NOTE — PROGRESS NOTE ADULT - ASSESSMENT
69 yo male with CKD (baseline Cr > 3), HTN, and HLD who presented as a transfer from [facility] for higher level of care. Patient initially presented to [facility] on 10/01 after multiple falls the since the day prior. MRI Head at [facility] showed acute vs subacute R MCA territory infarct with associated cytotoxic edema. MRA H showed multilobulated right-sided P-comm aneurysm directed posteriorly and laterally, measuring 8.9 x 7.3 mm.   A1c 5.5, LDL 25.  TTE: EF 65-70%, severely enlarged L atrium.   MRi with R MCA territory infarct   vessels with PCOM on R aneurysm    s/p angio and coil of PCOM aneurysm.   Impression: AMS + mild L hemineglect in setting of R MCA territory infarct seen on MRI Head, ESUS.etiology fo stroke may be hypercaog from maligiancy   o/e LUE drift doing well.  MRI abd with concern for hepatic mass/cholangiocarcinoma   - malignancy workup? pending liver bx.  colonoscopy planned for 10/29 today   - send APLS labs. beta 2 glycoprotein antibodies, cardiolipin antibodies and lupus anticoagulant   - Dual antiplatelet therapy with ASA 81mg PO daily and Clopidogrel 75mg PO daily x 3 weeks followed by monotherapy with ASA 81mg PO daily. can hold for biopsy   - lipitor 40. should be high dose   - ARU and PRU therapeutic   - cardio for ILR. cardio following can do outpt   - telemetry  - PT/OT/SS/SLP, OOBC  - check FS, glucose control <180  - GI/DVT ppx  - Counseling on diet, exercise, and medication adherence was done  - Counseling on smoking cessation and alcohol consumption offered when appropriate.  - Pain assessed and judicious use of narcotics when appropriate was discussed.    - Stroke education given when appropriate.  - Importance of fall prevention discussed.   - Differential diagnosis and plan of care discussed with patient and/or family and primary team  - Thank you for allowing me to participate in the care of this patient. Call with questions.      Sukh Pierre MD  Vascular Neurology  Office: [phone] .

## 2021-10-29 NOTE — PRE-ANESTHESIA EVALUATION ADULT - NSANTHOSAYNRD_GEN_A_CORE
No. KOLBY screening performed.  STOP BANG Legend: 0-2 = LOW Risk; 3-4 = INTERMEDIATE Risk; 5-8 = HIGH Risk

## 2021-10-29 NOTE — PROGRESS NOTE ADULT - SUBJECTIVE AND OBJECTIVE BOX
Subjective: Patient seen and examined. No new events except as noted.     REVIEW OF SYSTEMS:    CONSTITUTIONAL:+ weakness, fevers or chills  EYES/ENT: No visual changes;  No vertigo or throat pain   NECK: No pain or stiffness  RESPIRATORY: No cough, wheezing, hemoptysis; No shortness of breath  CARDIOVASCULAR: No chest pain or palpitations  GASTROINTESTINAL: No abdominal or epigastric pain. No nausea, vomiting, or hematemesis; No diarrhea or constipation. No melena or hematochezia.  GENITOURINARY: No dysuria, frequency or hematuria  NEUROLOGICAL: No numbness or weakness  SKIN: No itching, burning, rashes, or lesions   All other review of systems is negative unless indicated above.    MEDICATIONS:  MEDICATIONS  (STANDING):  aspirin  chewable 81 milliGRAM(s) Oral daily  atorvastatin 20 milliGRAM(s) Oral at bedtime  bisacodyl 5 milliGRAM(s) Oral at bedtime  cloNIDine 0.3 milliGRAM(s) Oral every 8 hours  clopidogrel Tablet 75 milliGRAM(s) Oral daily  doxazosin 4 milliGRAM(s) Oral at bedtime  hydrALAZINE 100 milliGRAM(s) Oral <User Schedule>  influenza   Vaccine 0.5 milliLiter(s) IntraMuscular once  isosorbide   mononitrate ER Tablet (IMDUR) 120 milliGRAM(s) Oral daily  lactulose Syrup 20 Gram(s) Oral two times a day  metoprolol succinate ER 25 milliGRAM(s) Oral daily  NIFEdipine  milliGRAM(s) Oral daily  polyethylene glycol 3350 17 Gram(s) Oral every 12 hours  senna 2 Tablet(s) Oral at bedtime  sodium bicarbonate 650 milliGRAM(s) Oral three times a day  sodium chloride 0.9%. 500 milliLiter(s) (30 mL/Hr) IV Continuous <Continuous>      PHYSICAL EXAM:  T(C): 36.7 (10-29-21 @ 11:31), Max: 37 (10-29-21 @ 05:15)  HR: 43 (10-29-21 @ 11:31) (43 - 60)  BP: 132/67 (10-29-21 @ 11:31) (132/67 - 191/73)  RR: 16 (10-29-21 @ 11:31) (16 - 18)  SpO2: 98% (10-29-21 @ 11:31) (98% - 100%)  Wt(kg): --  I&O's Summary    28 Oct 2021 07:01  -  29 Oct 2021 07:00  --------------------------------------------------------  IN: 240 mL / OUT: 950 mL / NET: -710 mL      Height (cm): 182.9 (10-29 @ 11:31)  Weight (kg): 73.9 (10-29 @ 11:31)  BMI (kg/m2): 22.1 (10-29 @ 11:31)  BSA (m2): 1.95 (10-29 @ 11:31)    Appearance: Normal	  HEENT:   Normal oral mucosa, PERRL, EOMI	  Lymphatic: No lymphadenopathy , no edema  Cardiovascular: Normal S1 S2, No JVD, No murmurs , Peripheral pulses palpable 2+ bilaterally  Respiratory: Lungs clear to auscultation, normal effort 	  Gastrointestinal:  Soft, Non-tender, + BS	  Skin: No rashes, No ecchymoses, No cyanosis, warm to touch  Musculoskeletal: Normal range of motion, normal strength  Psychiatry:  Mood & affect appropriate  Ext: No edema      LABS:    CARDIAC MARKERS:                                10.8   5.99  )-----------( 755      ( 29 Oct 2021 07:08 )             34.1     10-29    137  |  102  |  27<H>  ----------------------------<  106<H>  4.1   |  20<L>  |  2.71<H>    Ca    9.6      29 Oct 2021 07:08  Phos  2.8     10-29  Mg     2.3     10-29    TPro  7.3  /  Alb  3.7  /  TBili  0.3  /  DBili  x   /  AST  19  /  ALT  11  /  AlkPhos  102  10-28    proBNP:   Lipid Profile:   HgA1c:   TSH:             TELEMETRY: 	    ECG:  	  RADIOLOGY:   DIAGNOSTIC TESTING:  [ ] Echocardiogram:  [ ]  Catheterization:  [ ] Stress Test:    OTHER:

## 2021-10-29 NOTE — DIETITIAN INITIAL EVALUATION ADULT. - ORAL INTAKE PTA/DIET HISTORY
Pt reports having a good appetite and PO intake PTA; consuming >75% of most meals. Follows regular diet without restrictions. Pt denies any known food allergies or intolerances. Pt denies any micronutrient supplementation at home. Denies any difficulty chewing/swallowing.

## 2021-10-29 NOTE — PROGRESS NOTE ADULT - SUBJECTIVE AND OBJECTIVE BOX
Red Team Progress Note  [id number]    Subjective: Patient seen and assessed at bedside this AM. Resting. Per nurse, patient was bowel prepped and patient confirmed he had BM. Colonoscopy today.    Objective:  Vital Signs  T(C): 36.8 (10-28 @ 05:46), Max: 37.1 (10-28 @ 00:00)  HR: 49 (10-28 @ 06:20) (47 - 50)  BP: 160/80 (10-28 @ 06:20) (146/80 - 218/105)  RR: 18 (10-28 @ 05:46) (18 - 18)  SpO2: 99% (10-28 @ 05:46) (99% - 100%)  10-27-21 @ 07:01  -  10-28-21 @ 07:00  --------------------------------------------------------  IN:  Total IN: 0 mL    OUT:    Voided (mL): 800 mL  Total OUT: 800 mL    Total NET: -800 mL          I&O's Detail    10-27-21 @ 07:01  -  10-28-21 @ 07:00  --------------------------------------------------------  IN: 0 mL / OUT: 800 mL / NET: -800 mL        Physical Exam:  GEN: resting in bed comfortably in NAD  RESP: no increased WOB  CARD: Hypertensive, HR sinus bradycardia  ABD: Minimally distended    Labs:                        10.6   7.12  )-----------( 779      ( 28 Oct 2021 05:44 )             33.2   10-28    136  |  104  |  35<H>  ----------------------------<  112<H>  4.6   |  19<L>  |  2.80<H>    Ca    9.6      28 Oct 2021 05:44  Phos  2.9     10-28  Mg     2.4     10-28    TPro  7.3  /  Alb  3.7  /  TBili  0.3  /  DBili  x   /  AST  19  /  ALT  11  /  AlkPhos  102  10-28    CAPILLARY BLOOD GLUCOSE          Medications:   MEDICATIONS  (STANDING):  aspirin  chewable 81 milliGRAM(s) Oral daily  atorvastatin 20 milliGRAM(s) Oral at bedtime  bisacodyl 5 milliGRAM(s) Oral at bedtime  cloNIDine 0.3 milliGRAM(s) Oral every 8 hours  clopidogrel Tablet 75 milliGRAM(s) Oral daily  doxazosin 4 milliGRAM(s) Oral at bedtime  hydrALAZINE 100 milliGRAM(s) Oral <User Schedule>  influenza   Vaccine 0.5 milliLiter(s) IntraMuscular once  isosorbide   mononitrate ER Tablet (IMDUR) 120 milliGRAM(s) Oral daily  lactulose Syrup 20 Gram(s) Oral two times a day  metoprolol succinate ER 25 milliGRAM(s) Oral daily  NIFEdipine  milliGRAM(s) Oral daily  polyethylene glycol 3350 17 Gram(s) Oral every 12 hours  senna 2 Tablet(s) Oral at bedtime  sodium bicarbonate 650 milliGRAM(s) Oral three times a day    MEDICATIONS  (PRN):  acetaminophen    Suspension .. 650 milliGRAM(s) Oral every 6 hours PRN Mild Pain (1 - 3)  bisacodyl 5 milliGRAM(s) Oral once PRN Constipation  simethicone 80 milliGRAM(s) Chew two times a day PRN Upset Stomach      Imaging:

## 2021-10-29 NOTE — DIETITIAN INITIAL EVALUATION ADULT. - PHYSCIAL ASSESSMENT
No noted pressure injuries as per documentation.  Nutrition focused physical exam deferred at this time per pt preference.

## 2021-10-29 NOTE — PROGRESS NOTE ADULT - PROBLEM SELECTOR PLAN 1
MRI abd shows central hepatic mass for which the primary differential consideration is cholangiocarcinoma and adenopathy in the peripancreatic, savage hepatis and portacaval regions.  - CEA elevated, Ca 19-9 mildly elevated  - Surgical Onc and hepatology recs appreciated  - Would require biopsy for diagnosis  - IR consulted, deferred IR guided bx as patient on DAPT - would need to be held prior to procedure  - [facility] consulted, recs appreciated. 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 MRI abd shows central hepatic mass for which the primary differential consideration is cholangiocarcinoma and adenopathy in the peripancreatic, savage hepatis and portacaval regions.  - CEA elevated, Ca 19-9 mildly elevated  - Surgical Onc and hepatology recs appreciated  - Would require biopsy for diagnosis  - IR consulted, deferred IR guided bx as patient on DAPT - would need to be held prior to procedure  - [facility] consulted, recs appreciated. 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

## 2021-10-29 NOTE — PROGRESS NOTE ADULT - SUBJECTIVE AND OBJECTIVE BOX
Internal Medicine   More Weber | PGY-1    OVERNIGHT EVENTS:      SUBJECTIVE:       MEDICATIONS  (STANDING):  aspirin  chewable 81 milliGRAM(s) Oral daily  atorvastatin 20 milliGRAM(s) Oral at bedtime  bisacodyl 5 milliGRAM(s) Oral at bedtime  cloNIDine 0.3 milliGRAM(s) Oral every 8 hours  clopidogrel Tablet 75 milliGRAM(s) Oral daily  doxazosin 4 milliGRAM(s) Oral at bedtime  hydrALAZINE 100 milliGRAM(s) Oral <User Schedule>  influenza   Vaccine 0.5 milliLiter(s) IntraMuscular once  isosorbide   mononitrate ER Tablet (IMDUR) 120 milliGRAM(s) Oral daily  lactulose Syrup 20 Gram(s) Oral two times a day  metoprolol succinate ER 25 milliGRAM(s) Oral daily  NIFEdipine  milliGRAM(s) Oral daily  polyethylene glycol 3350 17 Gram(s) Oral every 12 hours  senna 2 Tablet(s) Oral at bedtime  sodium bicarbonate 650 milliGRAM(s) Oral three times a day    MEDICATIONS  (PRN):  acetaminophen    Suspension .. 650 milliGRAM(s) Oral every 6 hours PRN Mild Pain (1 - 3)  bisacodyl 5 milliGRAM(s) Oral once PRN Constipation  simethicone 80 milliGRAM(s) Chew two times a day PRN Upset Stomach        T(F): 97.5 (10-29-21 @ 00:06), Max: 98.2 (10-28-21 @ 15:44)  HR: 48 (10-29-21 @ 03:40) (43 - 60)  BP: 168/84 (10-29-21 @ 03:40) (113/71 - 188/80)  BP(mean): --  RR: 18 (10-29-21 @ 00:06) (16 - 18)  SpO2: 100% (10-29-21 @ 00:06) (99% - 100%)    PHYSICAL EXAM:     GENERAL: NAD, lying in bed comfortably.  HEAD:  Atraumatic, normocephalic.  EYES: EOMI, PERRLA, conjunctiva and sclera clear, no nystagmus noted.  ENT: Moist mucous membranes,   NECK: Supple. No JVD. Trachea midline.  CHEST/LUNG: CTAB. No rales, rhonchi, wheezing, or rubs. Unlabored respirations.  HEART: RRR, no M/R/G, normal S1/S2.  ABDOMEN: Soft, nontender, nondistended, no organomegaly. Normoactive bowel sounds.  EXTREMITIES:  2+ peripheral pulses b/l, brisk capillary refill. No clubbing, cyanosis, or edema.  MSK: No gross deformities noted.   NEURO:  AAOx3, no focal deficits.   SKIN: No rashes or lesions.  PSYCH: Normal mood, affect.    TELEMETRY:    LABS:                        10.6   7.12  )-----------( 779      ( 28 Oct 2021 05:44 )             33.2     10-28    136  |  104  |  35<H>  ----------------------------<  112<H>  4.6   |  19<L>  |  2.80<H>    Ca    9.6      28 Oct 2021 05:44  Phos  2.9     10-28  Mg     2.4     10-28    TPro  7.3  /  Alb  3.7  /  TBili  0.3  /  DBili  x   /  AST  19  /  ALT  11  /  AlkPhos  102  10-28        PT/INR - ( 28 Oct 2021 05:44 )   PT: 13.0 sec;   INR: 1.09 ratio             Creatinine Trend: 2.80<--, 2.71<--, 3.12<--, 2.87<--, 2.80<--, 2.81<--  I&O's Summary    27 Oct 2021 07:01  -  28 Oct 2021 07:00  --------------------------------------------------------  IN: 0 mL / OUT: 800 mL / NET: -800 mL    28 Oct 2021 07:01  -  29 Oct 2021 06:39  --------------------------------------------------------  IN: 0 mL / OUT: 700 mL / NET: -700 mL      BNP    RADIOLOGY & ADDITIONAL STUDIES:             Internal Medicine   Morenatty Weber | PGY-1    OVERNIGHT EVENTS: Finished bowel prep.       SUBJECTIVE: Patient was seen and examined at bedside this morning. Finished bowel prep last night and pending colonoscopy this AM. Denies any F/C, SOB, CP, abdominal pain, headache, or shortness of breath. Patient responding appropriately to questions and able to make needs known.       MEDICATIONS  (STANDING):  aspirin  chewable 81 milliGRAM(s) Oral daily  atorvastatin 20 milliGRAM(s) Oral at bedtime  bisacodyl 5 milliGRAM(s) Oral at bedtime  cloNIDine 0.3 milliGRAM(s) Oral every 8 hours  clopidogrel Tablet 75 milliGRAM(s) Oral daily  doxazosin 4 milliGRAM(s) Oral at bedtime  hydrALAZINE 100 milliGRAM(s) Oral <User Schedule>  influenza   Vaccine 0.5 milliLiter(s) IntraMuscular once  isosorbide   mononitrate ER Tablet (IMDUR) 120 milliGRAM(s) Oral daily  lactulose Syrup 20 Gram(s) Oral two times a day  metoprolol succinate ER 25 milliGRAM(s) Oral daily  NIFEdipine  milliGRAM(s) Oral daily  polyethylene glycol 3350 17 Gram(s) Oral every 12 hours  senna 2 Tablet(s) Oral at bedtime  sodium bicarbonate 650 milliGRAM(s) Oral three times a day    MEDICATIONS  (PRN):  acetaminophen    Suspension .. 650 milliGRAM(s) Oral every 6 hours PRN Mild Pain (1 - 3)  bisacodyl 5 milliGRAM(s) Oral once PRN Constipation  simethicone 80 milliGRAM(s) Chew two times a day PRN Upset Stomach        T(F): 97.5 (10-29-21 @ 00:06), Max: 98.2 (10-28-21 @ 15:44)  HR: 48 (10-29-21 @ 03:40) (43 - 60)  BP: 168/84 (10-29-21 @ 03:40) (113/71 - 188/80)  BP(mean): --  RR: 18 (10-29-21 @ 00:06) (16 - 18)  SpO2: 100% (10-29-21 @ 00:06) (99% - 100%)    PHYSICAL EXAM:     GENERAL: NAD, lying in bed comfortably.  HEAD:  Atraumatic, normocephalic.  CHEST/LUNG: CTAB. No rales, rhonchi, wheezing, or rubs. Unlabored respirations.  HEART: Bradycardic, no M/R/G, normal S1/S2.  ABDOMEN: Soft, nontender, nondistended, focal nodularity midway between RUQ and RLQ. Normoactive bowel sounds.  EXTREMITIES:  2+ peripheral pulses b/l. No clubbing, cyanosis, or edema.  MSK: No gross deformities noted.   SKIN: No rashes or lesions.  PSYCH: Normal mood, affect.        LABS:                        10.6   7.12  )-----------( 779      ( 28 Oct 2021 05:44 )             33.2     10-28    136  |  104  |  35<H>  ----------------------------<  112<H>  4.6   |  19<L>  |  2.80<H>    Ca    9.6      28 Oct 2021 05:44  Phos  2.9     10-28  Mg     2.4     10-28    TPro  7.3  /  Alb  3.7  /  TBili  0.3  /  DBili  x   /  AST  19  /  ALT  11  /  AlkPhos  102  10-28        PT/INR - ( 28 Oct 2021 05:44 )   PT: 13.0 sec;   INR: 1.09 ratio             Creatinine Trend: 2.80<--, 2.71<--, 3.12<--, 2.87<--, 2.80<--, 2.81<--  I&O's Summary    27 Oct 2021 07:01  -  28 Oct 2021 07:00  --------------------------------------------------------  IN: 0 mL / OUT: 800 mL / NET: -800 mL    28 Oct 2021 07:01  -  29 Oct 2021 06:39  --------------------------------------------------------  IN: 0 mL / OUT: 700 mL / NET: -700 mL      BNP    RADIOLOGY & ADDITIONAL STUDIES:

## 2021-10-29 NOTE — PRE PROCEDURE NOTE - PRE PROCEDURE EVALUATION
Attending Physician:   Dr. Mcbride                         Procedure:   Colonoscopy    Indication for Procedure:   Suspected Mass  ________________________________________________________  PAST MEDICAL & SURGICAL HISTORY:    Frequent falls  Hypertension  Chronic kidney disease, unspecified CKD stage      ALLERGIES:  No Known Allergies    HOME MEDICATIONS:  aspirin 81 mg oral tablet: 1 tab(s) orally once a day  atorvastatin 20 mg oral tablet: 1 tab(s) orally once a day  cloNIDine 0.1 mg oral tablet: 1 tab(s) orally 3 times a day  hydrALAZINE 100 mg oral tablet: 1 tab(s) orally 3 times a day  isosorbide mononitrate 60 mg oral tablet, extended release: 1 tab(s) orally once a day (in the morning)  Nifedical XL 60 mg oral tablet, extended release: 1 tab(s) orally once a day  Plavix 75 mg oral tablet: 1 tab(s) orally once a day    AICD/PPM: [ ] yes   [X ] no    PERTINENT LAB DATA:                        10.8   5.99  )-----------( 755      ( 29 Oct 2021 07:08 )             34.1     10-29    137  |  102  |  27<H>  ----------------------------<  106<H>  4.1   |  20<L>  |  2.71<H>    Ca    9.6      29 Oct 2021 07:08  Phos  2.8     10-29  Mg     2.3     10-29    TPro  7.3  /  Alb  3.7  /  TBili  0.3  /  DBili  x   /  AST  19  /  ALT  11  /  AlkPhos  102  10-28  PT/INR - ( 29 Oct 2021 07:08 )   PT: 13.0 sec;   INR: 1.09 ratio      PHYSICAL EXAMINATION:    T(C): 36.7  HR: 43  BP: 132/67  RR: 16  SpO2: 98%    Constitutional: NAD    Neck:  No JVD  Respiratory: CTAB/L  Cardiovascular: S1 and S2  Gastrointestinal: BS+, soft, NT/ND  Extremities: No peripheral edema  Neurological: A/O x 4      COMMENTS:    The patient is a suitable candidate for the planned procedure unless box checked [ ]  No, explain:

## 2021-10-29 NOTE — PROGRESS NOTE ADULT - PROBLEM SELECTOR PLAN 9
#DVT PPx: on DAPT; holding heparing subq   #Diet: Renal diet  #Dispo: PT, SW #DVT PPx: on DAPT; holding heparing subq   #Diet: Renal diet  #Dispo: [facility]

## 2021-10-29 NOTE — DIETITIAN INITIAL EVALUATION ADULT. - OTHER INFO
Pt reports UBW ~160lb. Denies any recent known weight changes. Dosing wt on admission (10/9). Wt obtained 10/13 of 207lb - ?accuracy.    Pt denies nausea, vomiting. Pt s/p colonoscopy today. Reports good appetite and PO intake during admission "if food is good" normally eating >75% of meals. Pt dislikes institutional foods. Amenable to Ensure supplements to promote adequate PO intake.     Pt declined nutrition education at this time.

## 2021-10-29 NOTE — PROGRESS NOTE ADULT - PROBLEM SELECTOR PLAN 5
- w/ some fluctuations of high and low BPs  - c/w imdur, clonidine, hydral, procardia  - Hypertensive to 215/98 ON 10/28 - procardia increased to 120 with improvement  - KDOQI guideline suggests the usage of thiazides for CKD I-III and loop diuretics for CKD IV+ d/t decreased effectiveness. However there is literature evidence citing no difference in effectiveness of thiazide vs loop diuretics in CKD IV+ pts. In 2012 a double-blinded randomized trial by Fabio et al (doi 10.1111/[email or web address].x) demonstrated no difference between HCTZ and Furosemide treatment in lowering BP (average decrease in systolic ~10) in CKD IV+ pts with average age of 65, good external validity for this pt. If Procardia 90-->120 does not achieve sufficient antihypertensive effect, consider thiazide as next line management vs. changing metoprolol to coreg baby dose  - continue to monitor

## 2021-10-29 NOTE — PROGRESS NOTE ADULT - SUBJECTIVE AND OBJECTIVE BOX
Neurology Progress Note    S: Patient seen and examined on floor. no complaints MR obtained liver mass, pending colonoscopy today     Medication:  MEDICATIONS  (STANDING):  aspirin  chewable 81 milliGRAM(s) Oral daily  atorvastatin 20 milliGRAM(s) Oral at bedtime  bisacodyl 5 milliGRAM(s) Oral at bedtime  cloNIDine 0.3 milliGRAM(s) Oral every 8 hours  clopidogrel Tablet 75 milliGRAM(s) Oral daily  doxazosin 4 milliGRAM(s) Oral at bedtime  hydrALAZINE 100 milliGRAM(s) Oral <User Schedule>  influenza   Vaccine 0.5 milliLiter(s) IntraMuscular once  isosorbide   mononitrate ER Tablet (IMDUR) 120 milliGRAM(s) Oral daily  lactulose Syrup 20 Gram(s) Oral two times a day  metoprolol succinate ER 25 milliGRAM(s) Oral daily  NIFEdipine  milliGRAM(s) Oral daily  polyethylene glycol 3350 17 Gram(s) Oral every 12 hours  senna 2 Tablet(s) Oral at bedtime  sodium bicarbonate 650 milliGRAM(s) Oral three times a day  sodium chloride 0.9%. 500 milliLiter(s) (30 mL/Hr) IV Continuous <Continuous>    MEDICATIONS  (PRN):  acetaminophen    Suspension .. 650 milliGRAM(s) Oral every 6 hours PRN Mild Pain (1 - 3)  bisacodyl 5 milliGRAM(s) Oral once PRN Constipation  simethicone 80 milliGRAM(s) Chew two times a day PRN Upset Stomach      Vitals:  Vital Signs Last 24 Hrs  Vital Signs Last 24 Hrs  T(C): 36.7 (10-29-21 @ 09:43), Max: 37 (10-29-21 @ 05:15)  T(F): 98.1 (10-29-21 @ 09:43), Max: 98.6 (10-29-21 @ 05:15)  HR: 43 (10-29-21 @ 09:43) (43 - 60)  BP: 132/67 (10-29-21 @ 09:43) (132/67 - 191/73)  BP(mean): --  RR: 16 (10-29-21 @ 09:43) (16 - 18)  SpO2: 98% (10-29-21 @ 09:43) (98% - 100%)        Orthostatic VS  10-20-21 @ 11:10  Lying BP: 183/88 HR: 46  Sitting BP: 163/81 HR: 48  Standing BP: 152/51 HR: --  Site: upper right arm  Mode: electronic    Orthostatic VS  10-20-21 @ 11:10  Lying BP: 183/88 HR: 46  Sitting BP: 163/81 HR: 48  Standing BP: 152/51 HR: --  Site: upper right arm  Mode: electronic      Orthostatic VS  10-16-21 @ 07:27  Lying BP: 198/98 HR: 46  Sitting BP: 182/95 HR: 50  Standing BP: 174/96 HR: 58  Site: --  Mode: --  Orthostatic VS  10-15-21 @ 16:00  Lying BP: 163/76 HR: 49  Sitting BP: 136/77 HR: 51  Standing BP: 136/70 HR: 59  Site: upper right arm  Mode: electronic        General Exam:   General Appearance: Appropriately dressed and in no acute distress       Head: Normocephalic, atraumatic and no dysmorphic features  Ear, Nose, and Throat: Moist mucous membranes  CVS: S1S2+  Resp: No SOB, no wheeze or rhonchi  Abd: soft NTND  Extremities: No edema, no cyanosis  Skin: No bruises, no rashes     Neurological Exam:  Mental Status: Awake, alert and oriented x 3.  Able to follow simple and complex verbal commands. Able to name and repeat. fluent speech. No obvious aphasia or dysarthria noted.   Cranial Nerves: PERRL, EOMI, VFFC, sensation V1-V3 intact,  no obvious facial asymmetry , equal elevation of palate, scm/trap 5/5, tongue is midline on protrusion. no obvious papilledema on fundoscopic exam. Hearing is grossly intact.   Motor: Normal bulk, tone and strength throughout. Fine finger movements were intact and symmetric. no tremors or drift noted except LUE with 4+/5 mild drift noted   Sensation: Intact to light touch and pinprick throughout. no right/left confusion. no extinction to tactile on DSS.    Reflexes: 1+ throughout at biceps, brachioradialis, triceps, patellars and ankles bilaterally and equal. No clonus. R toe and L toe were both downgoing.  Coordination: No dysmetria on FNF   Gait: deferred in ICU    I personally reviewed the below data/images/labs:    CBC Full  -  ( 29 Oct 2021 07:08 )  WBC Count : 5.99 K/uL  RBC Count : 3.69 M/uL  Hemoglobin : 10.8 g/dL  Hematocrit : 34.1 %  Platelet Count - Automated : 755 K/uL  Mean Cell Volume : 92.4 fl  Mean Cell Hemoglobin : 29.3 pg  Mean Cell Hemoglobin Concentration : 31.7 gm/dL  Auto Neutrophil # : 3.67 K/uL  Auto Lymphocyte # : 1.52 K/uL  Auto Monocyte # : 0.48 K/uL  Auto Eosinophil # : 0.25 K/uL  Auto Basophil # : 0.06 K/uL  Auto Neutrophil % : 61.2 %  Auto Lymphocyte % : 25.4 %  Auto Monocyte % : 8.0 %  Auto Eosinophil % : 4.2 %  Auto Basophil % : 1.0 %    10-29    137  |  102  |  27<H>  ----------------------------<  106<H>  4.1   |  20<L>  |  2.71<H>    Ca    9.6      29 Oct 2021 07:08  Phos  2.8     10-29  Mg     2.3     10-29    TPro  7.3  /  Alb  3.7  /  TBili  0.3  /  DBili  x   /  AST  19  /  ALT  11  /  AlkPhos  102  10-28      < from: CT Head No Cont (10.01.21 @ 18:37) >    EXAM:  CT BRAIN                              PROCEDURE DATE:  10/01/2021          INTERPRETATION:  CT OF THE HEAD WITHOUT CONTRAST    CLINICAL INDICATION: Weakness. Falls.    TECHNIQUE: Volumetric CT acquisition was performed through the brain and reviewed using brain and bone window technique. Dose optimization techniques were utilized including kVp/mA modulation along with iterative reconstructions.      COMPARISON: No prior studies have been submitted for comparison.    FINDINGS:    The ventricular and sulcal size and configuration is age appropriate.   There is no acute loss of gray-white differentiation. There are extensive patchy and confluent areas of hypodensity in the periventricular and subcortical white matter which are non-specific but likely related  chronic microangiopathic ischemic changes.  Small chronic infarction in the right cerebellar hemisphere. Chronic appearing left thalamic lacunar infarct.    There is no evidence of mass effect, midline shift, acute intracranial hemorrhage, or extra-axial collections.     The calvarium is intact. The paranasal sinuses are clear.The mastoid air cells are predominantly clear. The orbits are unremarkable.      IMPRESSION:  No acute intracranial hemorrhage or acute territorial infarction. Extensive chronic microvascular ischemic changes and several chronic infarctions as described. Further evaluation may be obtained with MRI of the brain if no contraindications.    --- End of Report ---            KAMILA SZYMANSKI MD; Attending Radiologist  This document has been electronically signed. Oct  1 2021  7:25PM    < end of copied text >  < from: MR Head No Cont (10.04.21 @ 18:09) >    EXAM:  MR BRAIN                            PROCEDURE DATE:  10/04/2021          INTERPRETATION:  .    CLINICAL INFORMATION: Frequent falls.    TECHNIQUE: Multiplanar multisequential MRI of the brain was acquired without the administration of IV gadolinium.    COMPARISON: Prior CT examination of the head dated 10/1/2021.    FINDINGS: Multiple areas of restricted diffusion are seen within the right MCA territory affecting the right frontal, parietal, and temporal lobes. Associated T2/FLAIR prolongation is seen, compatible with cytotoxic edema. No hemorrhagic transformation is noted.    Chronic lacunar infarcts are again seen within the right cerebellar hemisphere, left thalamus, and bilateral basal ganglia.    Multiple patchy confluent nonspecific T2/FLAIR hyperintense signal changes are noted throughout the bihemispheric white matter without associated mass effect or restricted diffusion.    Mild ventriculomegaly appears unchanged. No abnormal intra-axial fluid collections are notable. Flow-voids are noted throughout the major intracranial vessels, on the T2 weighted images, consistent with their patency. The sellar area appears unremarkable. The paranasal sinuses and mastoid air cells are grossly clear. Calvarial signal appears unremarkable. The orbits appear within normal limits.    IMPRESSION: Acute/subacute right-sided MCA distribution infarct with associated cytotoxic edema and without hemorrhagic transformation.    Multiple additional chronic lacunar infarcts and similar-appearing extensive chronic white matter microvascular type changes, as discussed.      --- End of Report ---            LUIS CARLOS BONNER MD; Attending Radiologist  This document has been electronically signed. Oct  5 2021  3:01PM    < end of copied text >  < from: MR Angio Head No Cont (10.06.21 @ 18:15) >    EXAM:  MR ANGIO BRAIN                            PROCEDURE DATE:  10/06/2021          INTERPRETATION:  INDICATION:  Right MCA infarct.    TECHNIQUE:  MR angiography of the brain was performed using three dimensional time-of-flight (3D-TOF) technique.  Imaging was performed on a 1.5 Lisa magnet.    FINDINGS:    INTERNAL CAROTID ARTERIES:  Bilaterally patent.    [place] [facility]:  A right-sided P-comm aneurysm is identified. The aneurysm is directed posteriorly and laterally, the aneurysm appears to be septated and/or bilobed., and measures 8.9 x 7.3 mm.    CEREBRAL ARTERIES:  Both anterior cerebral arteries are patent. Both A1 segments are well formed. Both middle cerebral arteries are patent. There is mild narrowing of the proximal right M1 segment.    VERTEBROBASILAR SYSTEM:  The vertebrobasilar system is patent. There are large posterior communicating arteries bilaterally with dominant supply of the posterior cerebral arteries. Left P1 segment is hypoplastic.    IMPRESSION:    1. 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  2. Mild narrowing of the proximal right middle cerebral artery in the M1 region.  3. The vessels are otherwise patent, as outlined above.    --- End of Report ---EXAM:  CT BRAIN                            PROCEDURE DATE:  10/13/2021      INTERPRETATION:  Noncontrast CT of the brain.    CLINICAL INDICATION: Status post rt pcomm aneurysm coiling    TECHNIQUE : Axial CT scanning of the brain was obtainedfrom the skull base to the vertex without the administration of intravenous contrast. Sagittal and coronal reformats were provided.    COMPARISON: CT brain 10/1/2021. MRI brain 10/4/2021    FINDINGS:    Interval placement of embolic coil mass right parasellar region.    Similar mild ventricular dilatation.    No midline shift. Basal cisterns poorly evaluated due to streak artifact.    No acute intracranial hemorrhage, mass effect, or brain edema. Extensive white matter microvascular ischemic disease.    The visualized paranasal sinuses and mastoid air cells are clear.    IMPRESSION:    Interval placement of embolic coil mass right parasellar region.  No acute intracranial hemorrhage, mass effect, or brain edema.  Similar mild ventricular dilatation.        --- End of Report ---      AFIA DAILY MD; Attending Radiologist  This document has been electronically signed. Oct 13 2021  9:31AM    < end of copied text >      < from: MR Head No Cont (10.15.21 @ 16:40) >    EXAM:  MR BRAIN                            PROCEDURE DATE:  10/15/2021    ATION:  MRI OF THE BRAIN WITHOUT CONTRAST    CLINICAL INDICATION: Status post right posterior communicating artery aneurysm status post balloon assisted coilembolization    TECHNIQUE: Multiplanar multisequence noncontrast MRI of the brain was performed.    COMPARISON: CT brain, 10/13/2021 and MRI brain, 10/4/2021.    FINDINGS:    The ventricular and sulcal size and configuration is age appropriate. Thereare scattered T2/FLAIR hyperintensities in the subcortical and periventricular white matter which are nonspecific but most commonly represent chronic microvascular ischemic changes.    The previously seen acute lacunar infarctions along left MCA distribution are decreased in diffusion restriction intensity, consistent with evolution of acute infarctions to late subacute stage. In addition, new punctate acute lacunar infarctions have developed in the posterior wall of the right temporal lobe and in the left cerebellar hemisphere along right parietal distribution. There is no susceptibility signal to suggest hemorrhagic conversion.  There is a chronic infarction in the right cerebellum and in the left thalamus.    There is susceptibility signalin the right paraclinoid region at the site of the previously coiled right P-comm aneurysm.    There is abnormal extra-axial fluid collection or hydrocephalus. The visualized globes are symmetric bilaterally.    The visualized paranasal sinuses and mastoid bones are adequately developed and aerated.    IMPRESSION:    Comparison previous studies,    There has been interval evolution of previously seen infarcts in right MCA distribution as described. There is development of new acute lacunar infarctions at the posterior pole of the right temporal lobe and in the right cerebellum as described.    There is no intracranial hemorrhage, midline shift or mass effect.    Coil mass in the right paraclinoid region.    Notification to clinician of alert:    Provider   Dr. BARGER  was notified about the final results at 10/15/2021 10:23 AM via telephone by neuroradiologist KEITH Tineo. Readback confirmation was obtained.      JULIO TINEO MD; Attending Radiologist  This document has been electronically signed. Oct 15 2021 10:35AM    < end of copied text >  < from: CT Chest No Cont (10.15.21 @ 23:27) >    EXAM:  CT CHEST                          INTERPRETATION:  CLINICAL INFORMATION: Shortness breath, evaluate for pulmonary infection or pulmonary edema.    COMPARISON: CT chest 9/6/2014. CT abdomen pelvis 10/11/2021.    CONTRAST/COMPLICATIONS:  IV Contrast: None  Oral Contrast: None  Complications: None    PROCEDURE:  CT of the Chest was performed.  Sagittal and coronal reformats were performed.    FINDINGS:    LUNGS AND AIRWAYS: Emphysema. No pulmonary nodules or parenchymal consolidations.  PLEURA: Small bilateral pleural effusions.    MEDIASTINUM AND RED: 1.2 cm right paratracheal lymph node (2:40), nonspecific. No axillary lymphadenopathy    VESSELS: Calcified atherosclerotic plaques in aorta and coronary arteries.    HEART: Multifocal cardiac enlargement is noted. No pericardial effusion. Mitral annular calcifications.    CHEST WALL AND LOWER NECK: Within normal limits.    VISUALIZED UPPER ABDOMEN: Partially imaged large hypodense hepatic lesion measuring 6.9 x 8.8 cm, similar in appearance compared to prior CT abdomen pelvis 10/11/2021. Simple cyst in the left kidney    BONES: Degenerative changes of the spine.    IMPRESSION:    Small bilateral pleural effusions with minimal bilateral groundglass opacities and a few areas of interlobular septal thickening. Findings are suggestive of mild interstitial edema. This is superimposed on a background of emphysema.    Redemonstration of large hypodense hepatic lesion, similar appearance whencompared to prior abdominal pelvis 10/11/2021. As mentioned on prior report, consider contrast enhanced MRI for further evaluation.      ALIYA BERTRAND MD; Resident Radiology  This document has been electronicallysigned.  ABRIL PINEDA MD; Attending Radiologist  This document has been electronically signed. Oct 16 2021  9:09AM    < end of copied text >  < from: MR Abdomen w/ IV Cont (10.22.21 @ 23:26) >    EXAM:  MR ABDOMEN IC                          PROCEDURE DATE:  10/22/2021      INTERPRETATION:  CLINICAL INFORMATION: Hepatic mass.    COMPARISON: Noncontrast CT abdomen and pelvis 10/11/2021    CONTRAST/COMPLICATIONS:  IV Contrast: Gadavist  7.5 cc administered   0 cc discarded  Oral Contrast: NONE  Complications: None reported at time of study completion    PROCEDURE:  MRI of the abdomen was performed.  MRCP was performed.    FINDINGS:  LOWER CHEST: Within normal limits.    LIVER: No morphologic evidence of cirrhosis. An 8.9 x 7.1 cm central hepatic lesion involving hepatic segments 4, 5 and 6 with possible extension into superior right hepatic lobe segments demonstrates restricted diffusion with peripheral rim enhancement and delayed central enhancement. While post contrast imaging is limited, MR characteristics strongly favor cholangiocarcinoma.  BILE DUCTS: Normal caliber.  GALLBLADDER: Cholelithiasis.  SPLEEN: Within normal limits.  PANCREAS: Within normal limits.  ADRENALS: Within normal limits.  KIDNEYS/URETERS: Bilateral renal cysts including a 3.3 cm hemorrhagic right renal cyst.    VISUALIZED PORTIONS:  BOWEL: Within normal limits.  PERITONEUM: No ascites.  VESSELS: Hepatic and portal veins are patent.  RETROPERITONEUM/LYMPH NODES: Upper abdominal adenopathy including peripancreatic, portacaval and savage hepatis nodes. Reference lesions as follows.    Peripancreatic node anterior to the portal vein (6:22) 3.7 x 2.6 cm.    Portacaval node (6:24) 2.9 x 1.5 cm.    ABDOMINAL WALL: Within normal limits.  BONES: Within normal limits.    IMPRESSION:  Limited post contrast imaging.    Central hepatic mass as above for which the primary differential consideration is cholangiocarcinoma.    Adenopathy in the peripancreatic, savage hepatis and portacaval regions.    --- End of Report ---      CARLOS ARMSTRONG MD; Attending Radiologist  This document has been electronically signed. Oct 23 2021  9:47AM    < end of copied text >

## 2021-10-29 NOTE — PROGRESS NOTE ADULT - ASSESSMENT
71 yo M w/ PMHx of CKD IV (unknown baseline), uncontrolled HTN, ?CAD (patient states he has "heart problems;" TTE performed at [facility] on 10/5 w/ normal LVF, severely enlarged LA, Grade III diastolic dysfunction, mild pulm HTN) who was transfered from [facility] to where he presented w/ hx of multiple falls, found to have acute right MCA territory ischemic infarct w/ extensive chronic microvascular ischemic changes on CT, acute/subacute right-sided MCA distribution infarct with associated cytotoxic edema, on MRI, and Pcomm aneurysm vs thrombus [id number], mild R M1 stenosis on MRA. 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 Medicine re-consulted for liver lesion suspicious for cholangiocarcinoma.

## 2021-10-29 NOTE — DIETITIAN INITIAL EVALUATION ADULT. - CHIEF COMPLAINT
Pt is a "72 yo M w/ PMHx of CKD IV (unknown baseline), uncontrolled HTN, ?CAD (patient states he has "heart problems;" TTE performed at [facility] on 10/5 w/ normal LVF, severely enlarged LA, Grade III diastolic dysfunction, mild pulm HTN) who was transfered from [facility] to where he presented w/ hx of multiple falls, found to have acute right MCA territory ischemic infarct w/ extensive chronic microvascular ischemic changes on CT, acute/subacute right-sided MCA distribution infarct with associated cytotoxic edema, on MRI, and Pcomm aneurysm vs thrombus [id number], mild R M1 stenosis on MRA. 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 Medicine re-consulted for liver lesion suspicious for cholangiocarcinoma."

## 2021-10-29 NOTE — PROGRESS NOTE ADULT - PROBLEM SELECTOR PLAN 8
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

## 2021-10-29 NOTE — PROGRESS NOTE ADULT - ASSESSMENT
71 yo male with CKD (baseline Cr > 3), HTN, and HLD who presented as a transfer from [facility] for higher level of care. Patient initially presented to [facility] on 10/01 after multiple falls the since the day prior. MRI Head at [facility] showed acute vs subacute R MCA territory infarct with associated cytotoxic edema. MRA H showed multilobulated right-sided P-comm aneurysm directed posteriorly and laterally, measuring 8.9 x 7.3 mm.TTE: EF 65-70%, severely enlarged L atrium. CT abdomen 10/11  reveals large hypodense 8.5 cm hepatic lesion concerning for malignancy. 10/12 s/p coiling right PCOMM aneurysm. Post op CT head with evolution of previous infarcts in right MCA, now with new acute lacunar infarctions in the right temporal lobe and cerebellum. On DAPT.    Plan:  - BM was done; Colonoscopy today.   - MRI performed, read as likely cholangio involving segments 4,5,6 with peripancreatic and portocaval nodes  - Patient should have PET Scan outpatient as part of workup  - Per GI will do EGD 11/8 after dapt has been on board for 3 weeks    Red Surgery  [id number]  Kay Caal

## 2021-10-29 NOTE — DIETITIAN INITIAL EVALUATION ADULT. - ADD RECOMMEND
Provide encouragement with PO intake, menu selections, and assistance with meals as needed. Continue to monitor PO intake, labs, weights, BM, skin, clinical course.

## 2021-10-29 NOTE — PROGRESS NOTE ADULT - ASSESSMENT
70yoM w/ PMHx of CKD IV (unknown baseline), uncontrolled HTN, ?CAD (patient states he has "heart problems;" TTE performed at [facility] on 10/5 w/ normal LVF, severely enlarged LA, Grade III diastolic dysfunction, mild pulm HTN) who was transfered from [facility] to where he presented w/ hx of multiple falls, found to have acute right MCA territory ischemic infarct w/ extensive chronic microvascular ischemic changes on CT, acute/subacute right-sided MCA distribution infarct with associated cytotoxic edema, on MRI, and Pcomm aneurysm vs thrombus [id number], mild R M1 stenosis on MRA.

## 2021-10-29 NOTE — DIETITIAN INITIAL EVALUATION ADULT. - PERTINENT MEDS FT
MEDICATIONS  (STANDING):  aspirin  chewable 81 milliGRAM(s) Oral daily  atorvastatin 20 milliGRAM(s) Oral at bedtime  bisacodyl 5 milliGRAM(s) Oral at bedtime  cloNIDine 0.3 milliGRAM(s) Oral every 8 hours  clopidogrel Tablet 75 milliGRAM(s) Oral daily  doxazosin 4 milliGRAM(s) Oral at bedtime  hydrALAZINE 100 milliGRAM(s) Oral <User Schedule>  influenza   Vaccine 0.5 milliLiter(s) IntraMuscular once  isosorbide   mononitrate ER Tablet (IMDUR) 120 milliGRAM(s) Oral daily  lactulose Syrup 20 Gram(s) Oral two times a day  metoprolol succinate ER 25 milliGRAM(s) Oral daily  NIFEdipine  milliGRAM(s) Oral daily  polyethylene glycol 3350 17 Gram(s) Oral every 12 hours  senna 2 Tablet(s) Oral at bedtime  sodium bicarbonate 650 milliGRAM(s) Oral three times a day  sodium chloride 0.9%. 500 milliLiter(s) (30 mL/Hr) IV Continuous <Continuous>    MEDICATIONS  (PRN):  acetaminophen    Suspension .. 650 milliGRAM(s) Oral every 6 hours PRN Mild Pain (1 - 3)  bisacodyl 5 milliGRAM(s) Oral once PRN Constipation  simethicone 80 milliGRAM(s) Chew two times a day PRN Upset Stomach

## 2021-10-30 LAB
ALBUMIN SERPL ELPH-MCNC: 3.6 G/DL — SIGNIFICANT CHANGE UP (ref 3.3–5)
ALP SERPL-CCNC: 92 U/L — SIGNIFICANT CHANGE UP (ref 40–120)
ALT FLD-CCNC: 10 U/L — SIGNIFICANT CHANGE UP (ref 10–45)
ANION GAP SERPL CALC-SCNC: 15 MMOL/L — SIGNIFICANT CHANGE UP (ref 5–17)
APTT BLD: 29.9 SEC — SIGNIFICANT CHANGE UP (ref 27.5–35.5)
AST SERPL-CCNC: 15 U/L — SIGNIFICANT CHANGE UP (ref 10–40)
BASOPHILS # BLD AUTO: 0.05 K/UL — SIGNIFICANT CHANGE UP (ref 0–0.2)
BASOPHILS NFR BLD AUTO: 0.7 % — SIGNIFICANT CHANGE UP (ref 0–2)
BILIRUB SERPL-MCNC: 0.2 MG/DL — SIGNIFICANT CHANGE UP (ref 0.2–1.2)
BUN SERPL-MCNC: 32 MG/DL — HIGH (ref 7–23)
CALCIUM SERPL-MCNC: 9 MG/DL — SIGNIFICANT CHANGE UP (ref 8.4–10.5)
CHLORIDE SERPL-SCNC: 103 MMOL/L — SIGNIFICANT CHANGE UP (ref 96–108)
CO2 SERPL-SCNC: 19 MMOL/L — LOW (ref 22–31)
CREAT SERPL-MCNC: 3.01 MG/DL — HIGH (ref 0.5–1.3)
EOSINOPHIL # BLD AUTO: 0.22 K/UL — SIGNIFICANT CHANGE UP (ref 0–0.5)
EOSINOPHIL NFR BLD AUTO: 3 % — SIGNIFICANT CHANGE UP (ref 0–6)
GLUCOSE SERPL-MCNC: 109 MG/DL — HIGH (ref 70–99)
HCT VFR BLD CALC: 31.4 % — LOW (ref 39–50)
HGB BLD-MCNC: 10.1 G/DL — LOW (ref 13–17)
IMM GRANULOCYTES NFR BLD AUTO: 0.4 % — SIGNIFICANT CHANGE UP (ref 0–1.5)
INR BLD: 1.09 RATIO — SIGNIFICANT CHANGE UP (ref 0.88–1.16)
LYMPHOCYTES # BLD AUTO: 1.56 K/UL — SIGNIFICANT CHANGE UP (ref 1–3.3)
LYMPHOCYTES # BLD AUTO: 21.4 % — SIGNIFICANT CHANGE UP (ref 13–44)
MAGNESIUM SERPL-MCNC: 2.1 MG/DL — SIGNIFICANT CHANGE UP (ref 1.6–2.6)
MCHC RBC-ENTMCNC: 29.9 PG — SIGNIFICANT CHANGE UP (ref 27–34)
MCHC RBC-ENTMCNC: 32.2 GM/DL — SIGNIFICANT CHANGE UP (ref 32–36)
MCV RBC AUTO: 92.9 FL — SIGNIFICANT CHANGE UP (ref 80–100)
MONOCYTES # BLD AUTO: 0.66 K/UL — SIGNIFICANT CHANGE UP (ref 0–0.9)
MONOCYTES NFR BLD AUTO: 9.1 % — SIGNIFICANT CHANGE UP (ref 2–14)
NEUTROPHILS # BLD AUTO: 4.76 K/UL — SIGNIFICANT CHANGE UP (ref 1.8–7.4)
NEUTROPHILS NFR BLD AUTO: 65.4 % — SIGNIFICANT CHANGE UP (ref 43–77)
NRBC # BLD: 0 /100 WBCS — SIGNIFICANT CHANGE UP (ref 0–0)
PHOSPHATE SERPL-MCNC: 3.6 MG/DL — SIGNIFICANT CHANGE UP (ref 2.5–4.5)
PLATELET # BLD AUTO: 713 K/UL — HIGH (ref 150–400)
POTASSIUM SERPL-MCNC: 4.3 MMOL/L — SIGNIFICANT CHANGE UP (ref 3.5–5.3)
POTASSIUM SERPL-SCNC: 4.3 MMOL/L — SIGNIFICANT CHANGE UP (ref 3.5–5.3)
PROT SERPL-MCNC: 6.7 G/DL — SIGNIFICANT CHANGE UP (ref 6–8.3)
PROTHROM AB SERPL-ACNC: 13 SEC — SIGNIFICANT CHANGE UP (ref 10.6–13.6)
RBC # BLD: 3.38 M/UL — LOW (ref 4.2–5.8)
RBC # FLD: 15.7 % — HIGH (ref 10.3–14.5)
SODIUM SERPL-SCNC: 137 MMOL/L — SIGNIFICANT CHANGE UP (ref 135–145)
WBC # BLD: 7.28 K/UL — SIGNIFICANT CHANGE UP (ref 3.8–10.5)
WBC # FLD AUTO: 7.28 K/UL — SIGNIFICANT CHANGE UP (ref 3.8–10.5)

## 2021-10-30 PROCEDURE — 99233 SBSQ HOSP IP/OBS HIGH 50: CPT | Mod: GC

## 2021-10-30 RX ADMIN — Medication 650 MILLIGRAM(S): at 05:49

## 2021-10-30 RX ADMIN — Medication 0.3 MILLIGRAM(S): at 21:16

## 2021-10-30 RX ADMIN — Medication 81 MILLIGRAM(S): at 12:36

## 2021-10-30 RX ADMIN — Medication 4 MILLIGRAM(S): at 21:17

## 2021-10-30 RX ADMIN — Medication 650 MILLIGRAM(S): at 14:13

## 2021-10-30 RX ADMIN — Medication 0.3 MILLIGRAM(S): at 05:49

## 2021-10-30 RX ADMIN — CLOPIDOGREL BISULFATE 75 MILLIGRAM(S): 75 TABLET, FILM COATED ORAL at 12:36

## 2021-10-30 RX ADMIN — Medication 120 MILLIGRAM(S): at 05:49

## 2021-10-30 RX ADMIN — LACTULOSE 20 GRAM(S): 10 SOLUTION ORAL at 05:49

## 2021-10-30 RX ADMIN — Medication 0.3 MILLIGRAM(S): at 14:13

## 2021-10-30 RX ADMIN — Medication 650 MILLIGRAM(S): at 21:16

## 2021-10-30 RX ADMIN — Medication 100 MILLIGRAM(S): at 11:11

## 2021-10-30 RX ADMIN — ATORVASTATIN CALCIUM 20 MILLIGRAM(S): 80 TABLET, FILM COATED ORAL at 21:17

## 2021-10-30 RX ADMIN — ISOSORBIDE MONONITRATE 120 MILLIGRAM(S): 60 TABLET, EXTENDED RELEASE ORAL at 12:36

## 2021-10-30 NOTE — PROGRESS NOTE ADULT - ASSESSMENT
70yoM w/ PMHx of CKD IV (unknown baseline), uncontrolled HTN, ?CAD (patient states he has "heart problems;" TTE performed at [facility] on 10/5 w/ normal LVF, severely enlarged LA, Grade III diastolic dysfunction, mild pulm HTN) who was transfered from [facility] to where he presented w/ hx of multiple falls, found to have acute right MCA territory ischemic infarct w/ extensive chronic microvascular ischemic changes on CT, acute/subacute right-sided MCA distribution infarct with associated cytotoxic edema, on MRI, and Pcomm aneurysm vs thrombus [id number], mild R M1 stenosis on MRA.

## 2021-10-30 NOTE — PROGRESS NOTE ADULT - SUBJECTIVE AND OBJECTIVE BOX
Patient seen and examined  no complaints    No Known Allergies    Hospital Medications:   MEDICATIONS  (STANDING):  aspirin  chewable 81 milliGRAM(s) Oral daily  atorvastatin 20 milliGRAM(s) Oral at bedtime  bisacodyl 5 milliGRAM(s) Oral at bedtime  cloNIDine 0.3 milliGRAM(s) Oral every 8 hours  clopidogrel Tablet 75 milliGRAM(s) Oral daily  doxazosin 4 milliGRAM(s) Oral at bedtime  hydrALAZINE 100 milliGRAM(s) Oral <User Schedule>  influenza   Vaccine 0.5 milliLiter(s) IntraMuscular once  isosorbide   mononitrate ER Tablet (IMDUR) 120 milliGRAM(s) Oral daily  lactulose Syrup 20 Gram(s) Oral two times a day  metoprolol succinate ER 25 milliGRAM(s) Oral daily  NIFEdipine  milliGRAM(s) Oral daily  polyethylene glycol 3350 17 Gram(s) Oral every 12 hours  senna 2 Tablet(s) Oral at bedtime  sodium bicarbonate 650 milliGRAM(s) Oral three times a day        VITALS:  T(F): 98.1 (10-30-21 @ 08:00), Max: 99.2 (10-30-21 @ 05:13)  HR: 52 (10-30-21 @ 11:12)  BP: 127/72 (10-30-21 @ 11:12)  RR: 18 (10-30-21 @ 08:00)  SpO2: 100% (10-30-21 @ 08:00)  Wt(kg): --    10-29 @ 07:01  -  10-30 @ 07:00  --------------------------------------------------------  IN: 120 mL / OUT: 600 mL / NET: -480 mL      Physical Exam :-  Constitutional: NAD  Neck: Supple.  Respiratory: Bilateral equal breath sounds, few Crackles present.  Cardiovascular: S1, S2 normal, positive Murmur  Gastrointestinal: Bowel Sounds present, soft, non tender.  Extremities: no Edema Feet  Neurological: Alert and Oriented x 3  Psychiatric: Normal mood, normal affect    LABS:  10-30    137  |  103  |  32<H>  ----------------------------<  109<H>  4.3   |  19<L>  |  3.01<H>    Ca    9.0      30 Oct 2021 08:02  Phos  3.6     10-30  Mg     2.1     10-30    TPro  6.7  /  Alb  3.6  /  TBili  0.2  /  DBili      /  AST  15  /  ALT  10  /  AlkPhos  92  10-30    Creatinine Trend: 3.01 <--, 2.71 <--, 2.80 <--, 2.71 <--, 3.12 <--, 2.87 <--                        10.1   7.28  )-----------( 713      ( 30 Oct 2021 08:02 )             31.4     Urine Studies:      RADIOLOGY & ADDITIONAL STUDIES:

## 2021-10-30 NOTE — PROGRESS NOTE ADULT - ASSESSMENT
69 yo M w/ PMHx of CKD IV (unknown baseline), uncontrolled HTN, ?CAD (patient states he has "heart problems;" TTE performed at [facility] on 10/5 w/ normal LVF, severely enlarged LA, Grade III diastolic dysfunction, mild pulm HTN) who was transfered from [facility] to where he presented w/ hx of multiple falls, found to have acute right MCA territory ischemic infarct w/ extensive chronic microvascular ischemic changes on CT, acute/subacute right-sided MCA distribution infarct with associated cytotoxic edema, on MRI, and Pcomm aneurysm vs thrombus [id number], mild R M1 stenosis on MRA. 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 Medicine re-consulted for liver lesion suspicious for cholangiocarcinoma.

## 2021-10-30 NOTE — PROGRESS NOTE ADULT - PROBLEM SELECTOR PLAN 1
MRI abd shows central hepatic mass for which the primary differential consideration is cholangiocarcinoma and adenopathy in the peripancreatic, savage hepatis and portacaval regions.  - CEA elevated, Ca 19-9 mildly elevated  - Surgical Onc and hepatology recs appreciated  - Would require biopsy for diagnosis  - IR consulted, deferred IR guided bx as patient on DAPT - would need to be held prior to procedure  - [facility] consulted, recs appreciated. Per their review, likely iCCA, recommending against biopsy at this time due to high risk of seeding   - colonoscopy today 10/29  - will need EGD to clear upper GI tract +/- EUS/FNA of LN if malignant appearing once off plavix x 5 days (needs 3 weeks of plavix per neurosugery) - can be done outpatient   - will need outpatient PET scan  - formal heme/onc consult pending biopsy results MRI abd shows central hepatic mass for which the primary differential consideration is cholangiocarcinoma and adenopathy in the peripancreatic, savage hepatis and portacaval regions.  - CEA elevated, Ca 19-9 mildly elevated  - Surgical Onc and hepatology recs appreciated  - Would require biopsy for diagnosis  - IR consulted, deferred IR guided bx as patient on DAPT - would need to be held prior to procedure  - [facility] consulted, recs appreciated. 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

## 2021-10-30 NOTE — PROGRESS NOTE ADULT - SUBJECTIVE AND OBJECTIVE BOX
Neurology Progress Note    S: Patient seen and examined on floor. no complaints MR obtained liver mass. resting in bed     Medication:  MEDICATIONS  (STANDING):  aspirin  chewable 81 milliGRAM(s) Oral daily  atorvastatin 20 milliGRAM(s) Oral at bedtime  bisacodyl 5 milliGRAM(s) Oral at bedtime  cloNIDine 0.3 milliGRAM(s) Oral every 8 hours  clopidogrel Tablet 75 milliGRAM(s) Oral daily  doxazosin 4 milliGRAM(s) Oral at bedtime  hydrALAZINE 100 milliGRAM(s) Oral <User Schedule>  influenza   Vaccine 0.5 milliLiter(s) IntraMuscular once  isosorbide   mononitrate ER Tablet (IMDUR) 120 milliGRAM(s) Oral daily  lactulose Syrup 20 Gram(s) Oral two times a day  metoprolol succinate ER 25 milliGRAM(s) Oral daily  NIFEdipine  milliGRAM(s) Oral daily  polyethylene glycol 3350 17 Gram(s) Oral every 12 hours  senna 2 Tablet(s) Oral at bedtime  sodium bicarbonate 650 milliGRAM(s) Oral three times a day    MEDICATIONS  (PRN):  acetaminophen    Suspension .. 650 milliGRAM(s) Oral every 6 hours PRN Mild Pain (1 - 3)  bisacodyl 5 milliGRAM(s) Oral once PRN Constipation  simethicone 80 milliGRAM(s) Chew two times a day PRN Upset Stomach    Vitals:  Vital Signs Last 24 Hrs  T(C): 36.8 (10-30-21 @ 12:33), Max: 37.3 (10-30-21 @ 05:13)  T(F): 98.3 (10-30-21 @ 12:33), Max: 99.2 (10-30-21 @ 05:13)  HR: 52 (10-30-21 @ 14:10) (49 - 64)  BP: 136/65 (10-30-21 @ 14:10) (109/61 - 152/82)  BP(mean): --  RR: 18 (10-30-21 @ 12:33) (13 - 18)  SpO2: 98% (10-30-21 @ 12:33) (98% - 100%)          Orthostatic VS  10-20-21 @ 11:10  Lying BP: 183/88 HR: 46  Sitting BP: 163/81 HR: 48  Standing BP: 152/51 HR: --  Site: upper right arm  Mode: electronic    Orthostatic VS  10-20-21 @ 11:10  Lying BP: 183/88 HR: 46  Sitting BP: 163/81 HR: 48  Standing BP: 152/51 HR: --  Site: upper right arm  Mode: electronic      Orthostatic VS  10-16-21 @ 07:27  Lying BP: 198/98 HR: 46  Sitting BP: 182/95 HR: 50  Standing BP: 174/96 HR: 58  Site: --  Mode: --  Orthostatic VS  10-15-21 @ 16:00  Lying BP: 163/76 HR: 49  Sitting BP: 136/77 HR: 51  Standing BP: 136/70 HR: 59  Site: upper right arm  Mode: electronic        General Exam:   General Appearance: Appropriately dressed and in no acute distress       Head: Normocephalic, atraumatic and no dysmorphic features  Ear, Nose, and Throat: Moist mucous membranes  CVS: S1S2+  Resp: No SOB, no wheeze or rhonchi  Abd: soft NTND  Extremities: No edema, no cyanosis  Skin: No bruises, no rashes     Neurological Exam:  Mental Status: Awake, alert and oriented x 3.  Able to follow simple and complex verbal commands. Able to name and repeat. fluent speech. No obvious aphasia or dysarthria noted.   Cranial Nerves: PERRL, EOMI, VFFC, sensation V1-V3 intact,  no obvious facial asymmetry , equal elevation of palate, scm/trap 5/5, tongue is midline on protrusion. no obvious papilledema on fundoscopic exam. Hearing is grossly intact.   Motor: Normal bulk, tone and strength throughout. Fine finger movements were intact and symmetric. no tremors or drift noted except LUE with 4+/5 mild drift noted   Sensation: Intact to light touch and pinprick throughout. no right/left confusion. no extinction to tactile on DSS.    Reflexes: 1+ throughout at biceps, brachioradialis, triceps, patellars and ankles bilaterally and equal. No clonus. R toe and L toe were both downgoing.  Coordination: No dysmetria on FNF   Gait: deferred in ICU    I personally reviewed the below data/images/labs:    CBC Full  -  ( 30 Oct 2021 08:02 )  WBC Count : 7.28 K/uL  RBC Count : 3.38 M/uL  Hemoglobin : 10.1 g/dL  Hematocrit : 31.4 %  Platelet Count - Automated : 713 K/uL  Mean Cell Volume : 92.9 fl  Mean Cell Hemoglobin : 29.9 pg  Mean Cell Hemoglobin Concentration : 32.2 gm/dL  Auto Neutrophil # : 4.76 K/uL  Auto Lymphocyte # : 1.56 K/uL  Auto Monocyte # : 0.66 K/uL  Auto Eosinophil # : 0.22 K/uL  Auto Basophil # : 0.05 K/uL  Auto Neutrophil % : 65.4 %  Auto Lymphocyte % : 21.4 %  Auto Monocyte % : 9.1 %  Auto Eosinophil % : 3.0 %  Auto Basophil % : 0.7 %        < from: CT Head No Cont (10.01.21 @ 18:37) >    EXAM:  CT BRAIN                              PROCEDURE DATE:  10/01/2021          INTERPRETATION:  CT OF THE HEAD WITHOUT CONTRAST    CLINICAL INDICATION: Weakness. Falls.    TECHNIQUE: Volumetric CT acquisition was performed through the brain and reviewed using brain and bone window technique. Dose optimization techniques were utilized including kVp/mA modulation along with iterative reconstructions.      COMPARISON: No prior studies have been submitted for comparison.    FINDINGS:    The ventricular and sulcal size and configuration is age appropriate.   There is no acute loss of gray-white differentiation. There are extensive patchy and confluent areas of hypodensity in the periventricular and subcortical white matter which are non-specific but likely related  chronic microangiopathic ischemic changes.  Small chronic infarction in the right cerebellar hemisphere. Chronic appearing left thalamic lacunar infarct.    There is no evidence of mass effect, midline shift, acute intracranial hemorrhage, or extra-axial collections.     The calvarium is intact. The paranasal sinuses are clear.The mastoid air cells are predominantly clear. The orbits are unremarkable.      IMPRESSION:  No acute intracranial hemorrhage or acute territorial infarction. Extensive chronic microvascular ischemic changes and several chronic infarctions as described. Further evaluation may be obtained with MRI of the brain if no contraindications.    --- End of Report ---            KAMILA SZYMANSKI MD; Attending Radiologist  This document has been electronically signed. Oct  1 2021  7:25PM    < end of copied text >  < from: MR Head No Cont (10.04.21 @ 18:09) >    EXAM:  MR BRAIN                            PROCEDURE DATE:  10/04/2021          INTERPRETATION:  .    CLINICAL INFORMATION: Frequent falls.    TECHNIQUE: Multiplanar multisequential MRI of the brain was acquired without the administration of IV gadolinium.    COMPARISON: Prior CT examination of the head dated 10/1/2021.    FINDINGS: Multiple areas of restricted diffusion are seen within the right MCA territory affecting the right frontal, parietal, and temporal lobes. Associated T2/FLAIR prolongation is seen, compatible with cytotoxic edema. No hemorrhagic transformation is noted.    Chronic lacunar infarcts are again seen within the right cerebellar hemisphere, left thalamus, and bilateral basal ganglia.    Multiple patchy confluent nonspecific T2/FLAIR hyperintense signal changes are noted throughout the bihemispheric white matter without associated mass effect or restricted diffusion.    Mild ventriculomegaly appears unchanged. No abnormal intra-axial fluid collections are notable. Flow-voids are noted throughout the major intracranial vessels, on the T2 weighted images, consistent with their patency. The sellar area appears unremarkable. The paranasal sinuses and mastoid air cells are grossly clear. Calvarial signal appears unremarkable. The orbits appear within normal limits.    IMPRESSION: Acute/subacute right-sided MCA distribution infarct with associated cytotoxic edema and without hemorrhagic transformation.    Multiple additional chronic lacunar infarcts and similar-appearing extensive chronic white matter microvascular type changes, as discussed.      --- End of Report ---            LUIS CARLOS BONNER MD; Attending Radiologist  This document has been electronically signed. Oct  5 2021  3:01PM    < end of copied text >  < from: MR Angio Head No Cont (10.06.21 @ 18:15) >    EXAM:  MR ANGIO BRAIN                            PROCEDURE DATE:  10/06/2021          INTERPRETATION:  INDICATION:  Right MCA infarct.    TECHNIQUE:  MR angiography of the brain was performed using three dimensional time-of-flight (3D-TOF) technique.  Imaging was performed on a 1.5 Lisa magnet.    FINDINGS:    INTERNAL CAROTID ARTERIES:  Bilaterally patent.    [place] OF [facility]:  A right-sided P-comm aneurysm is identified. The aneurysm is directed posteriorly and laterally, the aneurysm appears to be septated and/or bilobed., and measures 8.9 x 7.3 mm.    CEREBRAL ARTERIES:  Both anterior cerebral arteries are patent. Both A1 segments are well formed. Both middle cerebral arteries are patent. There is mild narrowing of the proximal right M1 segment.    VERTEBROBASILAR SYSTEM:  The vertebrobasilar system is patent. There are large posterior communicating arteries bilaterally with dominant supply of the posterior cerebral arteries. Left P1 segment is hypoplastic.    IMPRESSION:    1. 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  2. Mild narrowing of the proximal right middle cerebral artery in the M1 region.  3. The vessels are otherwise patent, as outlined above.    --- End of Report ---EXAM:  CT BRAIN                            PROCEDURE DATE:  10/13/2021      INTERPRETATION:  Noncontrast CT of the brain.    CLINICAL INDICATION: Status post rt pcomm aneurysm coiling    TECHNIQUE : Axial CT scanning of the brain was obtainedfrom the skull base to the vertex without the administration of intravenous contrast. Sagittal and coronal reformats were provided.    COMPARISON: CT brain 10/1/2021. MRI brain 10/4/2021    FINDINGS:    Interval placement of embolic coil mass right parasellar region.    Similar mild ventricular dilatation.    No midline shift. Basal cisterns poorly evaluated due to streak artifact.    No acute intracranial hemorrhage, mass effect, or brain edema. Extensive white matter microvascular ischemic disease.    The visualized paranasal sinuses and mastoid air cells are clear.    IMPRESSION:    Interval placement of embolic coil mass right parasellar region.  No acute intracranial hemorrhage, mass effect, or brain edema.  Similar mild ventricular dilatation.        --- End of Report ---      AFIA DAILY MD; Attending Radiologist  This document has been electronically signed. Oct 13 2021  9:31AM    < end of copied text >      < from: MR Head No Cont (10.15.21 @ 16:40) >    EXAM:  MR BRAIN                            PROCEDURE DATE:  10/15/2021    ATION:  MRI OF THE BRAIN WITHOUT CONTRAST    CLINICAL INDICATION: Status post right posterior communicating artery aneurysm status post balloon assisted coilembolization    TECHNIQUE: Multiplanar multisequence noncontrast MRI of the brain was performed.    COMPARISON: CT brain, 10/13/2021 and MRI brain, 10/4/2021.    FINDINGS:    The ventricular and sulcal size and configuration is age appropriate. Thereare scattered T2/FLAIR hyperintensities in the subcortical and periventricular white matter which are nonspecific but most commonly represent chronic microvascular ischemic changes.    The previously seen acute lacunar infarctions along left MCA distribution are decreased in diffusion restriction intensity, consistent with evolution of acute infarctions to late subacute stage. In addition, new punctate acute lacunar infarctions have developed in the posterior wall of the right temporal lobe and in the left cerebellar hemisphere along right parietal distribution. There is no susceptibility signal to suggest hemorrhagic conversion.  There is a chronic infarction in the right cerebellum and in the left thalamus.    There is susceptibility signalin the right paraclinoid region at the site of the previously coiled right P-comm aneurysm.    There is abnormal extra-axial fluid collection or hydrocephalus. The visualized globes are symmetric bilaterally.    The visualized paranasal sinuses and mastoid bones are adequately developed and aerated.    IMPRESSION:    Comparison previous studies,    There has been interval evolution of previously seen infarcts in right MCA distribution as described. There is development of new acute lacunar infarctions at the posterior pole of the right temporal lobe and in the right cerebellum as described.    There is no intracranial hemorrhage, midline shift or mass effect.    Coil mass in the right paraclinoid region.    Notification to clinician of alert:    Provider   Dr. BARGER  was notified about the final results at 10/15/2021 10:23 AM via telephone by neuroradiologist KEITH Tineo. Readback confirmation was obtained.      JULIO TINEO MD; Attending Radiologist  This document has been electronically signed. Oct 15 2021 10:35AM    < end of copied text >  < from: CT Chest No Cont (10.15.21 @ 23:27) >    EXAM:  CT CHEST                          INTERPRETATION:  CLINICAL INFORMATION: Shortness breath, evaluate for pulmonary infection or pulmonary edema.    COMPARISON: CT chest 9/6/2014. CT abdomen pelvis 10/11/2021.    CONTRAST/COMPLICATIONS:  IV Contrast: None  Oral Contrast: None  Complications: None    PROCEDURE:  CT of the Chest was performed.  Sagittal and coronal reformats were performed.    FINDINGS:    LUNGS AND AIRWAYS: Emphysema. No pulmonary nodules or parenchymal consolidations.  PLEURA: Small bilateral pleural effusions.    MEDIASTINUM AND RED: 1.2 cm right paratracheal lymph node (2:40), nonspecific. No axillary lymphadenopathy    VESSELS: Calcified atherosclerotic plaques in aorta and coronary arteries.    HEART: Multifocal cardiac enlargement is noted. No pericardial effusion. Mitral annular calcifications.    CHEST WALL AND LOWER NECK: Within normal limits.    VISUALIZED UPPER ABDOMEN: Partially imaged large hypodense hepatic lesion measuring 6.9 x 8.8 cm, similar in appearance compared to prior CT abdomen pelvis 10/11/2021. Simple cyst in the left kidney    BONES: Degenerative changes of the spine.    IMPRESSION:    Small bilateral pleural effusions with minimal bilateral groundglass opacities and a few areas of interlobular septal thickening. Findings are suggestive of mild interstitial edema. This is superimposed on a background of emphysema.    Redemonstration of large hypodense hepatic lesion, similar appearance whencompared to prior abdominal pelvis 10/11/2021. As mentioned on prior report, consider contrast enhanced MRI for further evaluation.      ALIYA BERTRAND MD; Resident Radiology  This document has been electronicallysigned.  ABRIL PINEDA MD; Attending Radiologist  This document has been electronically signed. Oct 16 2021  9:09AM    < end of copied text >  < from: MR Abdomen w/ IV Cont (10.22.21 @ 23:26) >    EXAM:  MR ABDOMEN IC                          PROCEDURE DATE:  10/22/2021      INTERPRETATION:  CLINICAL INFORMATION: Hepatic mass.    COMPARISON: Noncontrast CT abdomen and pelvis 10/11/2021    CONTRAST/COMPLICATIONS:  IV Contrast: Gadavist  7.5 cc administered   0 cc discarded  Oral Contrast: NONE  Complications: None reported at time of study completion    PROCEDURE:  MRI of the abdomen was performed.  MRCP was performed.    FINDINGS:  LOWER CHEST: Within normal limits.    LIVER: No morphologic evidence of cirrhosis. An 8.9 x 7.1 cm central hepatic lesion involving hepatic segments 4, 5 and 6 with possible extension into superior right hepatic lobe segments demonstrates restricted diffusion with peripheral rim enhancement and delayed central enhancement. While post contrast imaging is limited, MR characteristics strongly favor cholangiocarcinoma.  BILE DUCTS: Normal caliber.  GALLBLADDER: Cholelithiasis.  SPLEEN: Within normal limits.  PANCREAS: Within normal limits.  ADRENALS: Within normal limits.  KIDNEYS/URETERS: Bilateral renal cysts including a 3.3 cm hemorrhagic right renal cyst.    VISUALIZED PORTIONS:  BOWEL: Within normal limits.  PERITONEUM: No ascites.  VESSELS: Hepatic and portal veins are patent.  RETROPERITONEUM/LYMPH NODES: Upper abdominal adenopathy including peripancreatic, portacaval and savage hepatis nodes. Reference lesions as follows.    Peripancreatic node anterior to the portal vein (6:22) 3.7 x 2.6 cm.    Portacaval node (6:24) 2.9 x 1.5 cm.    ABDOMINAL WALL: Within normal limits.  BONES: Within normal limits.    IMPRESSION:  Limited post contrast imaging.    Central hepatic mass as above for which the primary differential consideration is cholangiocarcinoma.    Adenopathy in the peripancreatic, savage hepatis and portacaval regions.    --- End of Report ---      CARLOS ARMSTRONG MD; Attending Radiologist  This document has been electronically signed. Oct 23 2021  9:47AM    < end of copied text >

## 2021-10-30 NOTE — PROGRESS NOTE ADULT - ASSESSMENT
71 yo male with CKD (baseline Cr > 3), HTN, and HLD who presented as a transfer from [facility] for higher level of care. Patient initially presented to [facility] on 10/01 after multiple falls the since the day prior. MRI Head at [facility] showed acute vs subacute R MCA territory infarct with associated cytotoxic edema. MRA H showed multilobulated right-sided P-comm aneurysm directed posteriorly and laterally, measuring 8.9 x 7.3 mm.   A1c 5.5, LDL 25.  TTE: EF 65-70%, severely enlarged L atrium.   MRi with R MCA territory infarct   vessels with PCOM on R aneurysm    s/p angio and coil of PCOM aneurysm.   Impression: AMS + mild L hemineglect in setting of R MCA territory infarct seen on MRI Head, ESUS.etiology fo stroke may be hypercaog from maligiancy   o/e LUE drift doing well.  MRI abd with concern for hepatic mass/cholangiocarcinoma   - malignancy workup? pending liver bx.  [id number], CT with barium Monday   - send APLS labs. beta 2 glycoprotein antibodies, cardiolipin antibodies and lupus anticoagulant   - Dual antiplatelet therapy with ASA 81mg PO daily and Clopidogrel 75mg PO daily x 3 weeks followed by monotherapy with ASA 81mg PO daily. can hold for biopsy   - lipitor 40. should be high dose   - ARU and PRU therapeutic   - cardio for ILR. cardio following can do outpt   - telemetry  - PT/OT/SS/SLP, OOBC  - check FS, glucose control <180  - GI/DVT ppx  - Counseling on diet, exercise, and medication adherence was done  - Counseling on smoking cessation and alcohol consumption offered when appropriate.  - Pain assessed and judicious use of narcotics when appropriate was discussed.    - Stroke education given when appropriate.  - Importance of fall prevention discussed.   - Differential diagnosis and plan of care discussed with patient and/or family and primary team  - Thank you for allowing me to participate in the care of this patient. Call with questions.    - d/c plan eventually rehab  Sukh Pierre MD  Vascular Neurology  Office: [phone] .

## 2021-10-30 NOTE — PROGRESS NOTE ADULT - ASSESSMENT
70yoM w/ PMHx of CKD with a baseline Cr >3, HTN, HLD presented to OSH after multiple falls. He was transferred from OSH for MRI/MRA findings concerning for aneurysm vs. thrombus       1) CKD stage 4- overall stable  Likely has Hypertensive Nephropathy-> b/l 2.8mg/dl to 3.1mg/dl  Ua and urine lytes reviewed--> +UTI- s/p cipro  urine pro/cr 1.4grams  K/L 11.16/6.72---> ratio is < 2---> monitor for now  Renal US --> right kidney 9.3cm and left 9.5 cm with b/l renal cysts  s/p Coiling for PCOMM  Cr  fluctuating however overall stable- cont monitor with daily basic metabolic panel   s/p NIELS --> no signs of NSF--> will cont to monitor  avoid nephrotoxins such as ace/arb/nsaid  trend bmp    2) HTN-  bp rising again  on nifedipine clonidine  hydralazine, doxazosin  Imdur 120mg po qd  Trend bp    3) metabolic acidosis- likely from ckd-   na bicarb 650mg po tid  trend bicarb      Dr Hernadez  Nephrology   cell [phone]  office [phone]  ans serv- [phone]  [email or web address] ( wkend coverage for [facility])

## 2021-10-30 NOTE — PROGRESS NOTE ADULT - ATTENDING COMMENTS
71 yo M w/ PMHx of CKD IV (unknown baseline), uncontrolled HTN, ?CAD (patient states he has "heart problems;" TTE performed at [facility] on 10/5 w/ normal LVF, severely enlarged LA, Grade III diastolic dysfunction, mild pulm HTN) who was transfered from [facility] to where he presented w/ hx of multiple falls, found to have acute right MCA territory ischemic infarct w/ extensive chronic microvascular ischemic changes on CT, acute/subacute right-sided MCA distribution infarct with associated cytotoxic edema, on MRI, and Pcomm aneurysm vs thrombus [id number], mild R M1 stenosis on MRA. 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 Medicine re-consulted for liver lesion suspicious for cholangiocarcinoma.     Sigmoidoscopy on 10/29 was not completed due to mucosal edema and severe angulation and GI team recommends barium enema and GI series         Brandi [facility]   HOspitalist   [phone] 71 yo M w/ PMHx of CKD IV (unknown baseline), uncontrolled HTN, ?CAD (patient states he has "heart problems;" TTE performed at [facility] on 10/5 w/ normal LVF, severely enlarged LA, Grade III diastolic dysfunction, mild pulm HTN) who was transfered from [facility] to where he presented w/ hx of multiple falls, found to have acute right MCA territory ischemic infarct w/ extensive chronic microvascular ischemic changes on CT, acute/subacute right-sided MCA distribution infarct with associated cytotoxic edema, on MRI, and Pcomm aneurysm vs thrombus [id number], mild R M1 stenosis on MRA. 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 Medicine is on the case  for liver lesion suspicious for cholangiocarcinoma.     Sigmoidoscopy on 10/29 was not completed due to mucosal edema and severe angulation and GI team recommends barium enema and GI series with plan to be done most likely on 11/1         Brandi Ledesma   HOspitalist   [phone]

## 2021-10-30 NOTE — PROGRESS NOTE ADULT - SUBJECTIVE AND OBJECTIVE BOX
Subjective: Patient seen and examined. No new events except as noted.     REVIEW OF SYSTEMS:    CONSTITUTIONAL: No weakness, fevers or chills  EYES/ENT: No visual changes;  No vertigo or throat pain   NECK: No pain or stiffness  RESPIRATORY: No cough, wheezing, hemoptysis; No shortness of breath  CARDIOVASCULAR: No chest pain or palpitations  GASTROINTESTINAL: No abdominal or epigastric pain. No nausea, vomiting, or hematemesis; No diarrhea or constipation. No melena or hematochezia.  GENITOURINARY: No dysuria, frequency or hematuria  NEUROLOGICAL: No numbness or weakness  SKIN: No itching, burning, rashes, or lesions   All other review of systems is negative unless indicated above.    MEDICATIONS:  MEDICATIONS  (STANDING):  aspirin  chewable 81 milliGRAM(s) Oral daily  atorvastatin 20 milliGRAM(s) Oral at bedtime  bisacodyl 5 milliGRAM(s) Oral at bedtime  cloNIDine 0.3 milliGRAM(s) Oral every 8 hours  clopidogrel Tablet 75 milliGRAM(s) Oral daily  doxazosin 4 milliGRAM(s) Oral at bedtime  hydrALAZINE 100 milliGRAM(s) Oral <User Schedule>  influenza   Vaccine 0.5 milliLiter(s) IntraMuscular once  isosorbide   mononitrate ER Tablet (IMDUR) 120 milliGRAM(s) Oral daily  lactulose Syrup 20 Gram(s) Oral two times a day  metoprolol succinate ER 25 milliGRAM(s) Oral daily  NIFEdipine  milliGRAM(s) Oral daily  polyethylene glycol 3350 17 Gram(s) Oral every 12 hours  senna 2 Tablet(s) Oral at bedtime  sodium bicarbonate 650 milliGRAM(s) Oral three times a day      PHYSICAL EXAM:  T(C): 36.8 (10-30-21 @ 12:33), Max: 37.3 (10-30-21 @ 05:13)  HR: 50 (10-30-21 @ 17:47) (49 - 60)  BP: 151/71 (10-30-21 @ 17:47) (127/72 - 152/82)  RR: 18 (10-30-21 @ 12:33) (18 - 18)  SpO2: 98% (10-30-21 @ 12:33) (98% - 100%)  Wt(kg): --  I&O's Summary    29 Oct 2021 07:01  -  30 Oct 2021 07:00  --------------------------------------------------------  IN: 120 mL / OUT: 600 mL / NET: -480 mL          Appearance: Normal	  HEENT:   Normal oral mucosa, PERRL, EOMI	  Lymphatic: No lymphadenopathy , no edema  Cardiovascular: Normal S1 S2, No JVD, No murmurs , Peripheral pulses palpable 2+ bilaterally  Respiratory: Lungs clear to auscultation, normal effort 	  Gastrointestinal:  Soft, Non-tender, + BS	  Skin: No rashes, No ecchymoses, No cyanosis, warm to touch  Musculoskeletal: Normal range of motion, normal strength  Psychiatry:  Mood & affect appropriate  Ext: No edema      LABS:    CARDIAC MARKERS:                                10.1   7.28  )-----------( 713      ( 30 Oct 2021 08:02 )             31.4     10-30    137  |  103  |  32<H>  ----------------------------<  109<H>  4.3   |  19<L>  |  3.01<H>    Ca    9.0      30 Oct 2021 08:02  Phos  3.6     10-30  Mg     2.1     10-30    TPro  6.7  /  Alb  3.6  /  TBili  0.2  /  DBili  x   /  AST  15  /  ALT  10  /  AlkPhos  92  10-30    proBNP:   Lipid Profile:   HgA1c:   TSH:             TELEMETRY: 	    ECG:  	  RADIOLOGY:   DIAGNOSTIC TESTING:  [ ] Echocardiogram:  [ ]  Catheterization:  [ ] Stress Test:    OTHER:

## 2021-10-30 NOTE — PROGRESS NOTE ADULT - SUBJECTIVE AND OBJECTIVE BOX
Internal Medicine   More Weber | PGY-1    OVERNIGHT EVENTS:      SUBJECTIVE:       MEDICATIONS  (STANDING):  aspirin  chewable 81 milliGRAM(s) Oral daily  atorvastatin 20 milliGRAM(s) Oral at bedtime  bisacodyl 5 milliGRAM(s) Oral at bedtime  cloNIDine 0.3 milliGRAM(s) Oral every 8 hours  clopidogrel Tablet 75 milliGRAM(s) Oral daily  doxazosin 4 milliGRAM(s) Oral at bedtime  hydrALAZINE 100 milliGRAM(s) Oral <User Schedule>  influenza   Vaccine 0.5 milliLiter(s) IntraMuscular once  isosorbide   mononitrate ER Tablet (IMDUR) 120 milliGRAM(s) Oral daily  lactulose Syrup 20 Gram(s) Oral two times a day  metoprolol succinate ER 25 milliGRAM(s) Oral daily  NIFEdipine  milliGRAM(s) Oral daily  polyethylene glycol 3350 17 Gram(s) Oral every 12 hours  senna 2 Tablet(s) Oral at bedtime  sodium bicarbonate 650 milliGRAM(s) Oral three times a day    MEDICATIONS  (PRN):  acetaminophen    Suspension .. 650 milliGRAM(s) Oral every 6 hours PRN Mild Pain (1 - 3)  bisacodyl 5 milliGRAM(s) Oral once PRN Constipation  simethicone 80 milliGRAM(s) Chew two times a day PRN Upset Stomach        T(F): 98.3 (10-30-21 @ 12:33), Max: 99.2 (10-30-21 @ 05:13)  HR: 60 (10-30-21 @ 12:33) (49 - 64)  BP: 148/74 (10-30-21 @ 12:33) (109/61 - 152/82)  BP(mean): --  RR: 18 (10-30-21 @ 12:33) (13 - 18)  SpO2: 98% (10-30-21 @ 12:33) (98% - 100%)    PHYSICAL EXAM:     GENERAL: NAD, lying in bed comfortably.  HEAD:  Atraumatic, normocephalic.  EYES: EOMI, PERRLA, conjunctiva and sclera clear, no nystagmus noted.  ENT: Moist mucous membranes,   NECK: Supple. No JVD. Trachea midline.  CHEST/LUNG: CTAB. No rales, rhonchi, wheezing, or rubs. Unlabored respirations.  HEART: RRR, no M/R/G, normal S1/S2.  ABDOMEN: Soft, nontender, nondistended, no organomegaly. Normoactive bowel sounds.  EXTREMITIES:  2+ peripheral pulses b/l, brisk capillary refill. No clubbing, cyanosis, or edema.  MSK: No gross deformities noted.   NEURO:  AAOx3, no focal deficits.   SKIN: No rashes or lesions.  PSYCH: Normal mood, affect.    TELEMETRY:    LABS:                        10.1   7.28  )-----------( 713      ( 30 Oct 2021 08:02 )             31.4     10-30    137  |  103  |  32<H>  ----------------------------<  109<H>  4.3   |  19<L>  |  3.01<H>    Ca    9.0      30 Oct 2021 08:02  Phos  3.6     10-30  Mg     2.1     10-30    TPro  6.7  /  Alb  3.6  /  TBili  0.2  /  DBili  x   /  AST  15  /  ALT  10  /  AlkPhos  92  10-30        PT/INR - ( 30 Oct 2021 08:02 )   PT: 13.0 sec;   INR: 1.09 ratio         PTT - ( 30 Oct 2021 08:02 )  PTT:29.9 sec    Creatinine Trend: 3.01<--, 2.71<--, 2.80<--, 2.71<--, 3.12<--, 2.87<--  I&O's Summary    29 Oct 2021 07:01  -  30 Oct 2021 07:00  --------------------------------------------------------  IN: 120 mL / OUT: 600 mL / NET: -480 mL      BNP    RADIOLOGY & ADDITIONAL STUDIES:             Internal Medicine   More Weber | PGY-1    OVERNIGHT EVENTS: KATIE      SUBJECTIVE: Patient was seen and examined at bedside this morning. He is not in a good mood this morning, states he was denied solid foods yesterday; however, per RN patient did eat his meal. He is not cooperative with exam today.       MEDICATIONS  (STANDING):  aspirin  chewable 81 milliGRAM(s) Oral daily  atorvastatin 20 milliGRAM(s) Oral at bedtime  bisacodyl 5 milliGRAM(s) Oral at bedtime  cloNIDine 0.3 milliGRAM(s) Oral every 8 hours  clopidogrel Tablet 75 milliGRAM(s) Oral daily  doxazosin 4 milliGRAM(s) Oral at bedtime  hydrALAZINE 100 milliGRAM(s) Oral <User Schedule>  influenza   Vaccine 0.5 milliLiter(s) IntraMuscular once  isosorbide   mononitrate ER Tablet (IMDUR) 120 milliGRAM(s) Oral daily  lactulose Syrup 20 Gram(s) Oral two times a day  metoprolol succinate ER 25 milliGRAM(s) Oral daily  NIFEdipine  milliGRAM(s) Oral daily  polyethylene glycol 3350 17 Gram(s) Oral every 12 hours  senna 2 Tablet(s) Oral at bedtime  sodium bicarbonate 650 milliGRAM(s) Oral three times a day    MEDICATIONS  (PRN):  acetaminophen    Suspension .. 650 milliGRAM(s) Oral every 6 hours PRN Mild Pain (1 - 3)  bisacodyl 5 milliGRAM(s) Oral once PRN Constipation  simethicone 80 milliGRAM(s) Chew two times a day PRN Upset Stomach        T(F): 98.3 (10-30-21 @ 12:33), Max: 99.2 (10-30-21 @ 05:13)  HR: 60 (10-30-21 @ 12:33) (49 - 64)  BP: 148/74 (10-30-21 @ 12:33) (109/61 - 152/82)  BP(mean): --  RR: 18 (10-30-21 @ 12:33) (13 - 18)  SpO2: 98% (10-30-21 @ 12:33) (98% - 100%)    PHYSICAL EXAM:  Deferred    LABS:                        10.1   7.28  )-----------( 713      ( 30 Oct 2021 08:02 )             31.4     10-30    137  |  103  |  32<H>  ----------------------------<  109<H>  4.3   |  19<L>  |  3.01<H>    Ca    9.0      30 Oct 2021 08:02  Phos  3.6     10-30  Mg     2.1     10-30    TPro  6.7  /  Alb  3.6  /  TBili  0.2  /  DBili  x   /  AST  15  /  ALT  10  /  AlkPhos  92  10-30        PT/INR - ( 30 Oct 2021 08:02 )   PT: 13.0 sec;   INR: 1.09 ratio         PTT - ( 30 Oct 2021 08:02 )  PTT:29.9 sec    Creatinine Trend: 3.01<--, 2.71<--, 2.80<--, 2.71<--, 3.12<--, 2.87<--  I&O's Summary    29 Oct 2021 07:01  -  30 Oct 2021 07:00  --------------------------------------------------------  IN: 120 mL / OUT: 600 mL / NET: -480 mL      BNP    RADIOLOGY & ADDITIONAL STUDIES:

## 2021-10-30 NOTE — PROGRESS NOTE ADULT - PROBLEM SELECTOR PLAN 5
- w/ some fluctuations of high and low BPs  - c/w imdur, clonidine, hydral, procardia  - Hypertensive to 215/98 ON 10/28 - procardia increased to 120 with improvement  - KDOQI guideline suggests the usage of thiazides for CKD I-III and loop diuretics for CKD IV+ d/t decreased effectiveness. However there is literature evidence citing no difference in effectiveness of thiazide vs loop diuretics in CKD IV+ pts. In 2012 a double-blinded randomized trial by Fabio et al (doi 10.1111/j.9022-0929.2011.79560.x) demonstrated no difference between HCTZ and Furosemide treatment in lowering BP (average decrease in systolic ~10) in CKD IV+ pts with average age of 65, good external validity for this pt. If Procardia 90-->120 does not achieve sufficient antihypertensive effect, consider thiazide as next line management vs. changing metoprolol to coreg baby dose  - continue to monitor

## 2021-10-31 LAB
ALBUMIN SERPL ELPH-MCNC: 3.9 G/DL — SIGNIFICANT CHANGE UP (ref 3.3–5)
ALP SERPL-CCNC: 96 U/L — SIGNIFICANT CHANGE UP (ref 40–120)
ALT FLD-CCNC: 7 U/L — LOW (ref 10–45)
ANION GAP SERPL CALC-SCNC: 11 MMOL/L — SIGNIFICANT CHANGE UP (ref 5–17)
AST SERPL-CCNC: 14 U/L — SIGNIFICANT CHANGE UP (ref 10–40)
BASOPHILS # BLD AUTO: 0.08 K/UL — SIGNIFICANT CHANGE UP (ref 0–0.2)
BASOPHILS NFR BLD AUTO: 1.2 % — SIGNIFICANT CHANGE UP (ref 0–2)
BILIRUB SERPL-MCNC: 0.2 MG/DL — SIGNIFICANT CHANGE UP (ref 0.2–1.2)
BLD GP AB SCN SERPL QL: NEGATIVE — SIGNIFICANT CHANGE UP
BUN SERPL-MCNC: 34 MG/DL — HIGH (ref 7–23)
CALCIUM SERPL-MCNC: 9.5 MG/DL — SIGNIFICANT CHANGE UP (ref 8.4–10.5)
CHLORIDE SERPL-SCNC: 105 MMOL/L — SIGNIFICANT CHANGE UP (ref 96–108)
CO2 SERPL-SCNC: 21 MMOL/L — LOW (ref 22–31)
CREAT SERPL-MCNC: 3.09 MG/DL — HIGH (ref 0.5–1.3)
EOSINOPHIL # BLD AUTO: 0.24 K/UL — SIGNIFICANT CHANGE UP (ref 0–0.5)
EOSINOPHIL NFR BLD AUTO: 3.5 % — SIGNIFICANT CHANGE UP (ref 0–6)
GLUCOSE SERPL-MCNC: 111 MG/DL — HIGH (ref 70–99)
HCT VFR BLD CALC: 33.5 % — LOW (ref 39–50)
HGB BLD-MCNC: 10.4 G/DL — LOW (ref 13–17)
IMM GRANULOCYTES NFR BLD AUTO: 0.3 % — SIGNIFICANT CHANGE UP (ref 0–1.5)
LYMPHOCYTES # BLD AUTO: 1.96 K/UL — SIGNIFICANT CHANGE UP (ref 1–3.3)
LYMPHOCYTES # BLD AUTO: 28.7 % — SIGNIFICANT CHANGE UP (ref 13–44)
MAGNESIUM SERPL-MCNC: 2.1 MG/DL — SIGNIFICANT CHANGE UP (ref 1.6–2.6)
MCHC RBC-ENTMCNC: 29.4 PG — SIGNIFICANT CHANGE UP (ref 27–34)
MCHC RBC-ENTMCNC: 31 GM/DL — LOW (ref 32–36)
MCV RBC AUTO: 94.6 FL — SIGNIFICANT CHANGE UP (ref 80–100)
MONOCYTES # BLD AUTO: 0.61 K/UL — SIGNIFICANT CHANGE UP (ref 0–0.9)
MONOCYTES NFR BLD AUTO: 8.9 % — SIGNIFICANT CHANGE UP (ref 2–14)
NEUTROPHILS # BLD AUTO: 3.92 K/UL — SIGNIFICANT CHANGE UP (ref 1.8–7.4)
NEUTROPHILS NFR BLD AUTO: 57.4 % — SIGNIFICANT CHANGE UP (ref 43–77)
NRBC # BLD: 0 /100 WBCS — SIGNIFICANT CHANGE UP (ref 0–0)
PHOSPHATE SERPL-MCNC: 3.1 MG/DL — SIGNIFICANT CHANGE UP (ref 2.5–4.5)
PLATELET # BLD AUTO: 718 K/UL — HIGH (ref 150–400)
POTASSIUM SERPL-MCNC: 3.9 MMOL/L — SIGNIFICANT CHANGE UP (ref 3.5–5.3)
POTASSIUM SERPL-SCNC: 3.9 MMOL/L — SIGNIFICANT CHANGE UP (ref 3.5–5.3)
PROT SERPL-MCNC: 7.2 G/DL — SIGNIFICANT CHANGE UP (ref 6–8.3)
RBC # BLD: 3.54 M/UL — LOW (ref 4.2–5.8)
RBC # FLD: 15.3 % — HIGH (ref 10.3–14.5)
RH IG SCN BLD-IMP: POSITIVE — SIGNIFICANT CHANGE UP
SODIUM SERPL-SCNC: 137 MMOL/L — SIGNIFICANT CHANGE UP (ref 135–145)
WBC # BLD: 6.83 K/UL — SIGNIFICANT CHANGE UP (ref 3.8–10.5)
WBC # FLD AUTO: 6.83 K/UL — SIGNIFICANT CHANGE UP (ref 3.8–10.5)

## 2021-10-31 PROCEDURE — 99232 SBSQ HOSP IP/OBS MODERATE 35: CPT | Mod: GC

## 2021-10-31 RX ORDER — SODIUM CHLORIDE 9 MG/ML
1000 INJECTION, SOLUTION INTRAVENOUS ONCE
Refills: 0 | Status: COMPLETED | OUTPATIENT
Start: 2021-10-31 | End: 2021-10-31

## 2021-10-31 RX ADMIN — ISOSORBIDE MONONITRATE 120 MILLIGRAM(S): 60 TABLET, EXTENDED RELEASE ORAL at 13:13

## 2021-10-31 RX ADMIN — SODIUM CHLORIDE 500 MILLILITER(S): 9 INJECTION, SOLUTION INTRAVENOUS at 11:40

## 2021-10-31 RX ADMIN — CLOPIDOGREL BISULFATE 75 MILLIGRAM(S): 75 TABLET, FILM COATED ORAL at 13:16

## 2021-10-31 RX ADMIN — ATORVASTATIN CALCIUM 20 MILLIGRAM(S): 80 TABLET, FILM COATED ORAL at 21:45

## 2021-10-31 RX ADMIN — Medication 25 MILLIGRAM(S): at 05:30

## 2021-10-31 RX ADMIN — Medication 100 MILLIGRAM(S): at 13:13

## 2021-10-31 RX ADMIN — Medication 0.3 MILLIGRAM(S): at 21:45

## 2021-10-31 RX ADMIN — LACTULOSE 20 GRAM(S): 10 SOLUTION ORAL at 17:51

## 2021-10-31 RX ADMIN — Medication 100 MILLIGRAM(S): at 17:51

## 2021-10-31 RX ADMIN — Medication 650 MILLIGRAM(S): at 05:30

## 2021-10-31 RX ADMIN — Medication 100 MILLIGRAM(S): at 05:30

## 2021-10-31 RX ADMIN — Medication 4 MILLIGRAM(S): at 21:45

## 2021-10-31 RX ADMIN — Medication 0.3 MILLIGRAM(S): at 05:30

## 2021-10-31 RX ADMIN — LACTULOSE 20 GRAM(S): 10 SOLUTION ORAL at 05:33

## 2021-10-31 RX ADMIN — SENNA PLUS 2 TABLET(S): 8.6 TABLET ORAL at 21:45

## 2021-10-31 RX ADMIN — Medication 81 MILLIGRAM(S): at 13:16

## 2021-10-31 RX ADMIN — Medication 650 MILLIGRAM(S): at 21:45

## 2021-10-31 RX ADMIN — Medication 650 MILLIGRAM(S): at 13:14

## 2021-10-31 RX ADMIN — Medication 120 MILLIGRAM(S): at 05:30

## 2021-10-31 RX ADMIN — Medication 5 MILLIGRAM(S): at 21:41

## 2021-10-31 RX ADMIN — Medication 5 MILLIGRAM(S): at 21:46

## 2021-10-31 RX ADMIN — Medication 0.3 MILLIGRAM(S): at 13:13

## 2021-10-31 NOTE — PROGRESS NOTE ADULT - SUBJECTIVE AND OBJECTIVE BOX
Subjective: Patient seen and examined. No new events except as noted.     REVIEW OF SYSTEMS:    CONSTITUTIONAL: + weakness, fevers or chills  EYES/ENT: No visual changes;  No vertigo or throat pain   NECK: No pain or stiffness  RESPIRATORY: No cough, wheezing, hemoptysis; No shortness of breath  CARDIOVASCULAR: No chest pain or palpitations  GASTROINTESTINAL: No abdominal or epigastric pain. No nausea, vomiting, or hematemesis; No diarrhea or constipation. No melena or hematochezia.  GENITOURINARY: No dysuria, frequency or hematuria  NEUROLOGICAL: No numbness or weakness  SKIN: No itching, burning, rashes, or lesions   All other review of systems is negative unless indicated above.    MEDICATIONS:  MEDICATIONS  (STANDING):  aspirin  chewable 81 milliGRAM(s) Oral daily  atorvastatin 20 milliGRAM(s) Oral at bedtime  bisacodyl 5 milliGRAM(s) Oral at bedtime  cloNIDine 0.3 milliGRAM(s) Oral every 8 hours  clopidogrel Tablet 75 milliGRAM(s) Oral daily  doxazosin 4 milliGRAM(s) Oral at bedtime  hydrALAZINE 100 milliGRAM(s) Oral <User Schedule>  influenza   Vaccine 0.5 milliLiter(s) IntraMuscular once  isosorbide   mononitrate ER Tablet (IMDUR) 120 milliGRAM(s) Oral daily  lactulose Syrup 20 Gram(s) Oral two times a day  metoprolol succinate ER 25 milliGRAM(s) Oral daily  NIFEdipine  milliGRAM(s) Oral daily  polyethylene glycol 3350 17 Gram(s) Oral every 12 hours  senna 2 Tablet(s) Oral at bedtime  sodium bicarbonate 650 milliGRAM(s) Oral three times a day      PHYSICAL EXAM:  T(C): 36.6 (10-31-21 @ 07:55), Max: 36.8 (10-30-21 @ 12:33)  HR: 87 (10-31-21 @ 07:55) (49 - 87)  BP: 166/83 (10-31-21 @ 07:55) (127/72 - 166/83)  RR: 18 (10-31-21 @ 07:55) (18 - 18)  SpO2: 100% (10-31-21 @ 07:55) (98% - 100%)  Wt(kg): --  I&O's Summary    30 Oct 2021 07:01  -  31 Oct 2021 07:00  --------------------------------------------------------  IN: 240 mL / OUT: 550 mL / NET: -310 mL          Appearance: NAD  HEENT:   Dry  oral mucosa, PERRL, EOMI	  Lymphatic: No lymphadenopathy , no edema  Cardiovascular: Normal S1 S2, No JVD, No murmurs , Peripheral pulses palpable 2+ bilaterally  Respiratory: Lungs clear to auscultation, normal effort 	  Gastrointestinal:  Soft, Non-tender, + BS	  Skin: No rashes, No ecchymoses, No cyanosis, warm to touch  Musculoskeletal: Normal range of motion, normal strength  Psychiatry:  Mood & affect appropriate  Ext: No edema      LABS:    CARDIAC MARKERS:                                10.4   6.83  )-----------( 718      ( 31 Oct 2021 06:23 )             33.5     10-31    137  |  105  |  34<H>  ----------------------------<  111<H>  3.9   |  21<L>  |  3.09<H>    Ca    9.5      31 Oct 2021 06:22  Phos  3.1     10-31  Mg     2.1     10-31    TPro  7.2  /  Alb  3.9  /  TBili  0.2  /  DBili  x   /  AST  14  /  ALT  7<L>  /  AlkPhos  96  10-31    proBNP:   Lipid Profile:   HgA1c:   TSH:             TELEMETRY: 	    ECG:  	  RADIOLOGY:   DIAGNOSTIC TESTING:  [ ] Echocardiogram:  [ ]  Catheterization:  [ ] Stress Test:    OTHER: 	    < from: Flexible Sigmoidoscopy (10.29.21 @ 10:56) >    [facility]  ____________________________________________________________________________________________________  Patient Name: Suleiman Huertas                          MRN: [id number]  Account Number: [id number]                     YOB: 1950  Room: Endoscopy Room 4                           Gender: Male  Attending MD: Nima Mcbride MD                    Procedure Date No Time: 10/29/2021  ____________________________________________________________________________________________________     Procedure:           Flexible Sigmoidoscopy  Indications:         Evaluate for metastatic disease (known Hx liver mass)  Providers:           Nima Mcbride MD, Aleoj Belle MD (Fellow)  Referring MD:        Inpatient  Medicines:           Monitored Anesthesia Care  Complications:       No immediate complications.  ____________________________________________________________________________________________________  Procedure:           Pre-Anesthesia Assessment:             - Prior to the procedure, a History and Physical was performed, and patient                        medications and allergies were reviewed. The patient is competent. The risks                        and benefits of the procedure and the sedation options and risks were                        discussed with the patient. All questions were answered and informed consent                        was obtained. Patient identification and proposed procedure were verified by    the physician, the nurse, the anesthesiologist and the anesthetist in the                        pre-procedure area in the procedure room. Mental Status Examination: alert                        and oriented. Airway Examination: normal oropharyngealairway and neck                        mobility. Respiratory Examination: clear to auscultation. CV Examination:                        normal. Prophylactic Antibiotics: The patient does not require prophylactic                        antibiotics. Prior Anticoagulants: The patient has taken Plavix                        (clopidogrel), last dose was day of procedure. ASA Grade Assessment: III - A                        patient with severe systemic disease. After reviewing the risks and benefits,                        the patient was deemed in satisfactory condition to undergo the procedure.                        The anesthesia plan was to use monitored anesthesia care (MAC). Immediately                        prior to administration of medications, the patient was re-assessed for                        adequacy to receive sedatives. The heart rate, respiratory rate, oxygen                        saturations, blood pressure, adequacy of pulmonary ventilation, and response         to care were monitored throughout the procedure. The physical status of the                        patient was re-assessed after the procedure.                       The Colonoscope was introduced through the anus and advanced to the sigmoid                       colon. The patient tolerated the procedure well. The quality of the bowel                        preparation was adequate to identify polyps 6 mm and larger in size. The                        quality of the bowel preparation was fair. The flexible sigmoidoscopy was                        technically difficult and complex due to unusual anatomy.                                                                                                        Findings:       Multiple smalland large-mouthed diverticula were found in the sigmoid colon.       A severe angulated turn and moderately edematous mucosa at 35cm from the anal verge was found        in the sigmoid colon and was non-traversed despite several maneuvers including use of adult        and pediatric colonoscope, water infusion, and manual external abdominal pressure. Maneuvers        were attempted for approximately 45min however exam could not be completed.       The exam was otherwise normal throughout the examined colon.       The perianal and digital rectal examinations were normal.                                                                                                        Impression:          - Preparation of the colon was fair with liquid stool                       - Diverticulosis in the recto-sigmoid colon and in the sigmoid colon.                       - Severe angulated turn and edematous mucosa at 35cm from the anal verge in                        the sigmoid colon, could not be traversed with multiple maneuvers                       - No specimens collected.  Recommendation:      - Please obtain barium enema GI series to evaluate the colon for large                        colonic masses to evaluate for metastatic disease (could not complete                        colonoscopy and patient with renal insufficiency)                       - Can resume diet after barium enema                       - Rest of care per primary team                       - Return patient to hospital [facility] for ongoing care.                                                                                                        Attending Participation:       I personally performed the entire procedure.                                                        ______________  Nima Mcbride MD  10/29/2021 4:29:55 PM  Number of Addenda: 0    Note Initiated On: 10/29/2021 10:56 AM    < end of copied text >

## 2021-10-31 NOTE — PROGRESS NOTE ADULT - PROBLEM SELECTOR PLAN 1
MRI abd shows central hepatic mass for which the primary differential consideration is cholangiocarcinoma and adenopathy in the peripancreatic, savage hepatis and portacaval regions.  - CEA elevated, Ca 19-9 mildly elevated  - Surgical Onc and hepatology recs appreciated  - Would require biopsy for diagnosis  - IR consulted, deferred IR guided bx as patient on DAPT - would need to be held prior to procedure  - [facility] consulted, recs appreciated. 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

## 2021-10-31 NOTE — PROGRESS NOTE ADULT - SUBJECTIVE AND OBJECTIVE BOX
Neurology Progress Note    S: Patient seen and examined on floor. no complaints MR obtained liver mass. resting in bed     Medication:  MEDICATIONS  (STANDING):  aspirin  chewable 81 milliGRAM(s) Oral daily  atorvastatin 20 milliGRAM(s) Oral at bedtime  bisacodyl 5 milliGRAM(s) Oral at bedtime  cloNIDine 0.3 milliGRAM(s) Oral every 8 hours  clopidogrel Tablet 75 milliGRAM(s) Oral daily  doxazosin 4 milliGRAM(s) Oral at bedtime  hydrALAZINE 100 milliGRAM(s) Oral <User Schedule>  influenza   Vaccine 0.5 milliLiter(s) IntraMuscular once  isosorbide   mononitrate ER Tablet (IMDUR) 120 milliGRAM(s) Oral daily  lactulose Syrup 20 Gram(s) Oral two times a day  metoprolol succinate ER 25 milliGRAM(s) Oral daily  NIFEdipine  milliGRAM(s) Oral daily  polyethylene glycol 3350 17 Gram(s) Oral every 12 hours  senna 2 Tablet(s) Oral at bedtime  sodium bicarbonate 650 milliGRAM(s) Oral three times a day    MEDICATIONS  (PRN):  acetaminophen    Suspension .. 650 milliGRAM(s) Oral every 6 hours PRN Mild Pain (1 - 3)  bisacodyl 5 milliGRAM(s) Oral once PRN Constipation  simethicone 80 milliGRAM(s) Chew two times a day PRN Upset Stomach    Vitals:  Vital Signs Last 24 Hrs  T(C): 36.9 (10-31-21 @ 11:51), Max: 36.9 (10-31-21 @ 11:51)  T(F): 98.4 (10-31-21 @ 11:51), Max: 98.4 (10-31-21 @ 11:51)  HR: 51 (10-31-21 @ 11:51) (49 - 87)  BP: 109/65 (10-31-21 @ 11:51) (109/65 - 166/83)  BP(mean): --  RR: 18 (10-31-21 @ 11:51) (18 - 18)  SpO2: 98% (10-31-21 @ 11:51) (98% - 100%)            Orthostatic VS  10-20-21 @ 11:10  Lying BP: 183/88 HR: 46  Sitting BP: 163/81 HR: 48  Standing BP: 152/51 HR: --  Site: upper right arm  Mode: electronic    Orthostatic VS  10-20-21 @ 11:10  Lying BP: 183/88 HR: 46  Sitting BP: 163/81 HR: 48  Standing BP: 152/51 HR: --  Site: upper right arm  Mode: electronic      Orthostatic VS  10-16-21 @ 07:27  Lying BP: 198/98 HR: 46  Sitting BP: 182/95 HR: 50  Standing BP: 174/96 HR: 58  Site: --  Mode: --  Orthostatic VS  10-15-21 @ 16:00  Lying BP: 163/76 HR: 49  Sitting BP: 136/77 HR: 51  Standing BP: 136/70 HR: 59  Site: upper right arm  Mode: electronic        General Exam:   General Appearance: Appropriately dressed and in no acute distress       Head: Normocephalic, atraumatic and no dysmorphic features  Ear, Nose, and Throat: Moist mucous membranes  CVS: S1S2+  Resp: No SOB, no wheeze or rhonchi  Abd: soft NTND  Extremities: No edema, no cyanosis  Skin: No bruises, no rashes     Neurological Exam:  Mental Status: Awake, alert and oriented x 3.  Able to follow simple and complex verbal commands. Able to name and repeat. fluent speech. No obvious aphasia or dysarthria noted.   Cranial Nerves: PERRL, EOMI, VFFC, sensation V1-V3 intact,  no obvious facial asymmetry , equal elevation of palate, scm/trap 5/5, tongue is midline on protrusion. no obvious papilledema on fundoscopic exam. Hearing is grossly intact.   Motor: Normal bulk, tone and strength throughout. Fine finger movements were intact and symmetric. no tremors or drift noted except LUE with 4+/5 mild drift noted   Sensation: Intact to light touch and pinprick throughout. no right/left confusion. no extinction to tactile on DSS.    Reflexes: 1+ throughout at biceps, brachioradialis, triceps, patellars and ankles bilaterally and equal. No clonus. R toe and L toe were both downgoing.  Coordination: No dysmetria on FNF   Gait: deferred in ICU    I personally reviewed the below data/images/labs:    CBC Full  -  ( 31 Oct 2021 06:23 )  WBC Count : 6.83 K/uL  RBC Count : 3.54 M/uL  Hemoglobin : 10.4 g/dL  Hematocrit : 33.5 %  Platelet Count - Automated : 718 K/uL  Mean Cell Volume : 94.6 fl  Mean Cell Hemoglobin : 29.4 pg  Mean Cell Hemoglobin Concentration : 31.0 gm/dL  Auto Neutrophil # : 3.92 K/uL  Auto Lymphocyte # : 1.96 K/uL  Auto Monocyte # : 0.61 K/uL  Auto Eosinophil # : 0.24 K/uL  Auto Basophil # : 0.08 K/uL  Auto Neutrophil % : 57.4 %  Auto Lymphocyte % : 28.7 %  Auto Monocyte % : 8.9 %  Auto Eosinophil % : 3.5 %  Auto Basophil % : 1.2 %      10-31    137  |  105  |  34<H>  ----------------------------<  111<H>  3.9   |  21<L>  |  3.09<H>    Ca    9.5      31 Oct 2021 06:22  Phos  3.1     10-31  Mg     2.1     10-31    TPro  7.2  /  Alb  3.9  /  TBili  0.2  /  DBili  x   /  AST  14  /  ALT  7<L>  /  AlkPhos  96  10-31        < from: CT Head No Cont (10.01.21 @ 18:37) >    EXAM:  CT BRAIN                              PROCEDURE DATE:  10/01/2021          INTERPRETATION:  CT OF THE HEAD WITHOUT CONTRAST    CLINICAL INDICATION: Weakness. Falls.    TECHNIQUE: Volumetric CT acquisition was performed through the brain and reviewed using brain and bone window technique. Dose optimization techniques were utilized including kVp/mA modulation along with iterative reconstructions.      COMPARISON: No prior studies have been submitted for comparison.    FINDINGS:    The ventricular and sulcal size and configuration is age appropriate.   There is no acute loss of gray-white differentiation. There are extensive patchy and confluent areas of hypodensity in the periventricular and subcortical white matter which are non-specific but likely related  chronic microangiopathic ischemic changes.  Small chronic infarction in the right cerebellar hemisphere. Chronic appearing left thalamic lacunar infarct.    There is no evidence of mass effect, midline shift, acute intracranial hemorrhage, or extra-axial collections.     The calvarium is intact. The paranasal sinuses are clear.The mastoid air cells are predominantly clear. The orbits are unremarkable.      IMPRESSION:  No acute intracranial hemorrhage or acute territorial infarction. Extensive chronic microvascular ischemic changes and several chronic infarctions as described. Further evaluation may be obtained with MRI of the brain if no contraindications.    --- End of Report ---            KAMILA SZYMANSKI MD; Attending Radiologist  This document has been electronically signed. Oct  1 2021  7:25PM    < end of copied text >  < from: MR Head No Cont (10.04.21 @ 18:09) >    EXAM:  MR BRAIN                            PROCEDURE DATE:  10/04/2021          INTERPRETATION:  .    CLINICAL INFORMATION: Frequent falls.    TECHNIQUE: Multiplanar multisequential MRI of the brain was acquired without the administration of IV gadolinium.    COMPARISON: Prior CT examination of the head dated 10/1/2021.    FINDINGS: Multiple areas of restricted diffusion are seen within the right MCA territory affecting the right frontal, parietal, and temporal lobes. Associated T2/FLAIR prolongation is seen, compatible with cytotoxic edema. No hemorrhagic transformation is noted.    Chronic lacunar infarcts are again seen within the right cerebellar hemisphere, left thalamus, and bilateral basal ganglia.    Multiple patchy confluent nonspecific T2/FLAIR hyperintense signal changes are noted throughout the bihemispheric white matter without associated mass effect or restricted diffusion.    Mild ventriculomegaly appears unchanged. No abnormal intra-axial fluid collections are notable. Flow-voids are noted throughout the major intracranial vessels, on the T2 weighted images, consistent with their patency. The sellar area appears unremarkable. The paranasal sinuses and mastoid air cells are grossly clear. Calvarial signal appears unremarkable. The orbits appear within normal limits.    IMPRESSION: Acute/subacute right-sided MCA distribution infarct with associated cytotoxic edema and without hemorrhagic transformation.    Multiple additional chronic lacunar infarcts and similar-appearing extensive chronic white matter microvascular type changes, as discussed.      --- End of Report ---            LUIS CARLOS BONNER MD; Attending Radiologist  This document has been electronically signed. Oct  5 2021  3:01PM    < end of copied text >  < from: MR Angio Head No Cont (10.06.21 @ 18:15) >    EXAM:  MR ANGIO BRAIN                            PROCEDURE DATE:  10/06/2021          INTERPRETATION:  INDICATION:  Right MCA infarct.    TECHNIQUE:  MR angiography of the brain was performed using three dimensional time-of-flight (3D-TOF) technique.  Imaging was performed on a 1.5 Lisa magnet.    FINDINGS:    INTERNAL CAROTID ARTERIES:  Bilaterally patent.    [place] [facility]:  A right-sided P-comm aneurysm is identified. The aneurysm is directed posteriorly and laterally, the aneurysm appears to be septated and/or bilobed., and measures 8.9 x 7.3 mm.    CEREBRAL ARTERIES:  Both anterior cerebral arteries are patent. Both A1 segments are well formed. Both middle cerebral arteries are patent. There is mild narrowing of the proximal right M1 segment.    VERTEBROBASILAR SYSTEM:  The vertebrobasilar system is patent. There are large posterior communicating arteries bilaterally with dominant supply of the posterior cerebral arteries. Left P1 segment is hypoplastic.    IMPRESSION:    1. 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  2. Mild narrowing of the proximal right middle cerebral artery in the M1 region.  3. The vessels are otherwise patent, as outlined above.    --- End of Report ---EXAM:  CT BRAIN                            PROCEDURE DATE:  10/13/2021      INTERPRETATION:  Noncontrast CT of the brain.    CLINICAL INDICATION: Status post rt pcomm aneurysm coiling    TECHNIQUE : Axial CT scanning of the brain was obtainedfrom the skull base to the vertex without the administration of intravenous contrast. Sagittal and coronal reformats were provided.    COMPARISON: CT brain 10/1/2021. MRI brain 10/4/2021    FINDINGS:    Interval placement of embolic coil mass right parasellar region.    Similar mild ventricular dilatation.    No midline shift. Basal cisterns poorly evaluated due to streak artifact.    No acute intracranial hemorrhage, mass effect, or brain edema. Extensive white matter microvascular ischemic disease.    The visualized paranasal sinuses and mastoid air cells are clear.    IMPRESSION:    Interval placement of embolic coil mass right parasellar region.  No acute intracranial hemorrhage, mass effect, or brain edema.  Similar mild ventricular dilatation.        --- End of Report ---      AFIA DAILY MD; Attending Radiologist  This document has been electronically signed. Oct 13 2021  9:31AM    < end of copied text >      < from: MR Head No Cont (10.15.21 @ 16:40) >    EXAM:  MR BRAIN                            PROCEDURE DATE:  10/15/2021    ATION:  MRI OF THE BRAIN WITHOUT CONTRAST    CLINICAL INDICATION: Status post right posterior communicating artery aneurysm status post balloon assisted coilembolization    TECHNIQUE: Multiplanar multisequence noncontrast MRI of the brain was performed.    COMPARISON: CT brain, 10/13/2021 and MRI brain, 10/4/2021.    FINDINGS:    The ventricular and sulcal size and configuration is age appropriate. Thereare scattered T2/FLAIR hyperintensities in the subcortical and periventricular white matter which are nonspecific but most commonly represent chronic microvascular ischemic changes.    The previously seen acute lacunar infarctions along left MCA distribution are decreased in diffusion restriction intensity, consistent with evolution of acute infarctions to late subacute stage. In addition, new punctate acute lacunar infarctions have developed in the posterior wall of the right temporal lobe and in the left cerebellar hemisphere along right parietal distribution. There is no susceptibility signal to suggest hemorrhagic conversion.  There is a chronic infarction in the right cerebellum and in the left thalamus.    There is susceptibility signalin the right paraclinoid region at the site of the previously coiled right P-comm aneurysm.    There is abnormal extra-axial fluid collection or hydrocephalus. The visualized globes are symmetric bilaterally.    The visualized paranasal sinuses and mastoid bones are adequately developed and aerated.    IMPRESSION:    Comparison previous studies,    There has been interval evolution of previously seen infarcts in right MCA distribution as described. There is development of new acute lacunar infarctions at the posterior pole of the right temporal lobe and in the right cerebellum as described.    There is no intracranial hemorrhage, midline shift or mass effect.    Coil mass in the right paraclinoid region.    Notification to clinician of alert:    Provider   Dr. BARGER  was notified about the final results at 10/15/2021 10:23 AM via telephone by neuroradiologist KEITH Tineo. Readback confirmation was obtained.      JULIO TINEO MD; Attending Radiologist  This document has been electronically signed. Oct 15 2021 10:35AM    < end of copied text >  < from: CT Chest No Cont (10.15.21 @ 23:27) >    EXAM:  CT CHEST                          INTERPRETATION:  CLINICAL INFORMATION: Shortness breath, evaluate for pulmonary infection or pulmonary edema.    COMPARISON: CT chest 9/6/2014. CT abdomen pelvis 10/11/2021.    CONTRAST/COMPLICATIONS:  IV Contrast: None  Oral Contrast: None  Complications: None    PROCEDURE:  CT of the Chest was performed.  Sagittal and coronal reformats were performed.    FINDINGS:    LUNGS AND AIRWAYS: Emphysema. No pulmonary nodules or parenchymal consolidations.  PLEURA: Small bilateral pleural effusions.    MEDIASTINUM AND RED: 1.2 cm right paratracheal lymph node (2:40), nonspecific. No axillary lymphadenopathy    VESSELS: Calcified atherosclerotic plaques in aorta and coronary arteries.    HEART: Multifocal cardiac enlargement is noted. No pericardial effusion. Mitral annular calcifications.    CHEST WALL AND LOWER NECK: Within normal limits.    VISUALIZED UPPER ABDOMEN: Partially imaged large hypodense hepatic lesion measuring 6.9 x 8.8 cm, similar in appearance compared to prior CT abdomen pelvis 10/11/2021. Simple cyst in the left kidney    BONES: Degenerative changes of the spine.    IMPRESSION:    Small bilateral pleural effusions with minimal bilateral groundglass opacities and a few areas of interlobular septal thickening. Findings are suggestive of mild interstitial edema. This is superimposed on a background of emphysema.    Redemonstration of large hypodense hepatic lesion, similar appearance whencompared to prior abdominal pelvis 10/11/2021. As mentioned on prior report, consider contrast enhanced MRI for further evaluation.      ALIYA BERTRAND MD; Resident Radiology  This document has been electronicallysigned.  ABRIL PINEDA MD; Attending Radiologist  This document has been electronically signed. Oct 16 2021  9:09AM    < end of copied text >  < from: MR Abdomen w/ IV Cont (10.22.21 @ 23:26) >    EXAM:  MR ABDOMEN IC                          PROCEDURE DATE:  10/22/2021      INTERPRETATION:  CLINICAL INFORMATION: Hepatic mass.    COMPARISON: Noncontrast CT abdomen and pelvis 10/11/2021    CONTRAST/COMPLICATIONS:  IV Contrast: Gadavist  7.5 cc administered   0 cc discarded  Oral Contrast: NONE  Complications: None reported at time of study completion    PROCEDURE:  MRI of the abdomen was performed.  MRCP was performed.    FINDINGS:  LOWER CHEST: Within normal limits.    LIVER: No morphologic evidence of cirrhosis. An 8.9 x 7.1 cm central hepatic lesion involving hepatic segments 4, 5 and 6 with possible extension into superior right hepatic lobe segments demonstrates restricted diffusion with peripheral rim enhancement and delayed central enhancement. While post contrast imaging is limited, MR characteristics strongly favor cholangiocarcinoma.  BILE DUCTS: Normal caliber.  GALLBLADDER: Cholelithiasis.  SPLEEN: Within normal limits.  PANCREAS: Within normal limits.  ADRENALS: Within normal limits.  KIDNEYS/URETERS: Bilateral renal cysts including a 3.3 cm hemorrhagic right renal cyst.    VISUALIZED PORTIONS:  BOWEL: Within normal limits.  PERITONEUM: No ascites.  VESSELS: Hepatic and portal veins are patent.  RETROPERITONEUM/LYMPH NODES: Upper abdominal adenopathy including peripancreatic, portacaval and savage hepatis nodes. Reference lesions as follows.    Peripancreatic node anterior to the portal vein (6:22) 3.7 x 2.6 cm.    Portacaval node (6:24) 2.9 x 1.5 cm.    ABDOMINAL WALL: Within normal limits.  BONES: Within normal limits.    IMPRESSION:  Limited post contrast imaging.    Central hepatic mass as above for which the primary differential consideration is cholangiocarcinoma.    Adenopathy in the peripancreatic, savage hepatis and portacaval regions.    --- End of Report ---      CARLOS ARMSTRONG MD; Attending Radiologist  This document has been electronically signed. Oct 23 2021  9:47AM    < end of copied text >

## 2021-10-31 NOTE — PROGRESS NOTE ADULT - SUBJECTIVE AND OBJECTIVE BOX
Patient seen and examined  no complaints    No Known Allergies    Hospital Medications:   MEDICATIONS  (STANDING):  aspirin  chewable 81 milliGRAM(s) Oral daily  atorvastatin 20 milliGRAM(s) Oral at bedtime  bisacodyl 5 milliGRAM(s) Oral at bedtime  cloNIDine 0.3 milliGRAM(s) Oral every 8 hours  clopidogrel Tablet 75 milliGRAM(s) Oral daily  doxazosin 4 milliGRAM(s) Oral at bedtime  hydrALAZINE 100 milliGRAM(s) Oral <User Schedule>  influenza   Vaccine 0.5 milliLiter(s) IntraMuscular once  isosorbide   mononitrate ER Tablet (IMDUR) 120 milliGRAM(s) Oral daily  lactulose Syrup 20 Gram(s) Oral two times a day  metoprolol succinate ER 25 milliGRAM(s) Oral daily  NIFEdipine  milliGRAM(s) Oral daily  polyethylene glycol 3350 17 Gram(s) Oral every 12 hours  senna 2 Tablet(s) Oral at bedtime  sodium bicarbonate 650 milliGRAM(s) Oral three times a day        VITALS:  T(F): 98.4 (10-31-21 @ 11:51), Max: 98.4 (10-31-21 @ 11:51)  HR: 51 (10-31-21 @ 11:51)  BP: 109/65 (10-31-21 @ 11:51)  RR: 18 (10-31-21 @ 11:51)  SpO2: 98% (10-31-21 @ 11:51)  Wt(kg): --    10-30 @ 07:01  -  10-31 @ 07:00  --------------------------------------------------------  IN: 240 mL / OUT: 550 mL / NET: -310 mL        Physical Exam :-  Constitutional: NAD  Neck: Supple.  Respiratory: Bilateral equal breath sounds, few Crackles present.  Cardiovascular: S1, S2 normal, positive Murmur  Gastrointestinal: Bowel Sounds present, soft, non tender.  Extremities: no Edema Feet  Neurological: Alert and Oriented x 3  Psychiatric: Normal mood, normal affect  LABS:  10-31    137  |  105  |  34<H>  ----------------------------<  111<H>  3.9   |  21<L>  |  3.09<H>    Ca    9.5      31 Oct 2021 06:22  Phos  3.1     10-31  Mg     2.1     10-31    TPro  7.2  /  Alb  3.9  /  TBili  0.2  /  DBili      /  AST  14  /  ALT  7<L>  /  AlkPhos  96  10-31    Creatinine Trend: 3.09 <--, 3.01 <--, 2.71 <--, 2.80 <--, 2.71 <--, 3.12 <--, 2.87 <--                        10.4   6.83  )-----------( 718      ( 31 Oct 2021 06:23 )             33.5     Urine Studies:      RADIOLOGY & ADDITIONAL STUDIES:

## 2021-10-31 NOTE — PROGRESS NOTE ADULT - ASSESSMENT
70yoM w/ PMHx of CKD with a baseline Cr >3, HTN, HLD presented to OSH after multiple falls. He was transferred from OSH for MRI/MRA findings concerning for aneurysm vs. thrombus       1) CKD stage 4- overall stable  Likely has Hypertensive Nephropathy-> b/l 2.8mg/dl to 3.1mg/dl  Ua and urine lytes reviewed--> +UTI- s/p cipro  urine pro/cr 1.4grams  K/L 11.16/6.72---> ratio is < 2---> monitor for now  Renal US --> right kidney 9.3cm and left 9.5 cm with b/l renal cysts  s/p Coiling for PCOMM  Cr  fluctuating however overall stable- cont monitor with daily basic metabolic panel   s/p NIELS --> no signs of NSF--> will cont to monitor  avoid nephrotoxins such as ace/arb/nsaid  trend bmp    2) HTN-  bp high but overall better  on nifedipine clonidine  hydralazine, doxazosin  Imdur 120mg po qd  Trend bp    3) metabolic acidosis- likely from ckd-   na bicarb 650mg po tid  trend bicarb      Dr Hernadez  Nephrology   cell [phone]  office [phone]  ans serv- [phone]  [email or web address] ( wkend coverage for Hospital)

## 2021-10-31 NOTE — PROGRESS NOTE ADULT - PROBLEM SELECTOR PLAN 5
- w/ some fluctuations of high and low BPs  - c/w imdur, clonidine, hydral, procardia  - Hypertensive to 215/98 ON 10/28 - procardia increased to 120 with improvement  - KDOQI guideline suggests the usage of thiazides for CKD I-III and loop diuretics for CKD IV+ d/t decreased effectiveness. However there is literature evidence citing no difference in effectiveness of thiazide vs loop diuretics in CKD IV+ pts. In 2012 a double-blinded randomized trial by Fabio et al (doi 10.1111/j.0425-4803.2011.66825.x) demonstrated no difference between HCTZ and Furosemide treatment in lowering BP (average decrease in systolic ~10) in CKD IV+ pts with average age of 65, good external validity for this pt. If Procardia 90-->120 does not achieve sufficient antihypertensive effect, consider thiazide as next line management vs. changing metoprolol to coreg baby dose  - continue to monitor

## 2021-10-31 NOTE — PROGRESS NOTE ADULT - SUBJECTIVE AND OBJECTIVE BOX
Misty Bar MD  Internal Medicine PGY-3  Pager [phone]  Pager DLE [phone]      Patient is a 71y old  Male who presents with a chief complaint of Transfer from [facility] for MRA findings (30 Oct 2021 20:53)        SUBJECTIVE / OVERNIGHT EVENTS: Patient had no acute events overnight. Patient seen and examined at bedside this morning.     ROS: [ - ] Fever [ - ] Chills [ - ] Nausea/Vomiting [ - ] Chest Pain [ - ] Shortness of breath     MEDICATIONS  (STANDING):  aspirin  chewable 81 milliGRAM(s) Oral daily  atorvastatin 20 milliGRAM(s) Oral at bedtime  bisacodyl 5 milliGRAM(s) Oral at bedtime  cloNIDine 0.3 milliGRAM(s) Oral every 8 hours  clopidogrel Tablet 75 milliGRAM(s) Oral daily  doxazosin 4 milliGRAM(s) Oral at bedtime  hydrALAZINE 100 milliGRAM(s) Oral <User Schedule>  influenza   Vaccine 0.5 milliLiter(s) IntraMuscular once  isosorbide   mononitrate ER Tablet (IMDUR) 120 milliGRAM(s) Oral daily  lactulose Syrup 20 Gram(s) Oral two times a day  metoprolol succinate ER 25 milliGRAM(s) Oral daily  NIFEdipine  milliGRAM(s) Oral daily  polyethylene glycol 3350 17 Gram(s) Oral every 12 hours  senna 2 Tablet(s) Oral at bedtime  sodium bicarbonate 650 milliGRAM(s) Oral three times a day    MEDICATIONS  (PRN):  acetaminophen    Suspension .. 650 milliGRAM(s) Oral every 6 hours PRN Mild Pain (1 - 3)  bisacodyl 5 milliGRAM(s) Oral once PRN Constipation  simethicone 80 milliGRAM(s) Chew two times a day PRN Upset Stomach      Vital Signs Last 24 Hrs  T(C): 36.6 (31 Oct 2021 07:55), Max: 36.8 (30 Oct 2021 12:33)  T(F): 97.9 (31 Oct 2021 07:55), Max: 98.3 (30 Oct 2021 12:33)  HR: 87 (31 Oct 2021 07:55) (49 - 87)  BP: 166/83 (31 Oct 2021 07:55) (127/72 - 166/83)  BP(mean): --  RR: 18 (31 Oct 2021 07:55) (18 - 18)  SpO2: 100% (31 Oct 2021 07:55) (98% - 100%)  CAPILLARY BLOOD GLUCOSE        I&O's Summary    30 Oct 2021 07:01  -  31 Oct 2021 07:00  --------------------------------------------------------  IN: 240 mL / OUT: 550 mL / NET: -310 mL        PHYSICAL EXAM  GENERAL: NAD, lying comfortably in bed   HEENT:  Atraumatic, Normocephalic, EOMI, conjunctiva and sclera clear, no LAD  CHEST/LUNG: Clear to auscultation bilaterally; No wheeze  HEART: RRR, S1 and S2 No murmurs, rubs, or gallops  ABDOMEN: Soft, Nontender, Nondistended; Bowel sounds present  EXTREMITIES:  2+ Peripheral Pulses, No clubbing, cyanosis, or edema  NEURO: AAOx3, non-focal  SKIN: No rashes or lesions    LABS:                        10.4   6.83  )-----------( 718      ( 31 Oct 2021 06:23 )             33.5     10-31    137  |  105  |  34<H>  ----------------------------<  111<H>  3.9   |  21<L>  |  3.09<H>    Ca    9.5      31 Oct 2021 06:22  Phos  3.1     10-31  Mg     2.1     10-31    TPro  7.2  /  Alb  3.9  /  TBili  0.2  /  DBili  x   /  AST  14  /  ALT  7<L>  /  AlkPhos  96  10-31    PT/INR - ( 30 Oct 2021 08:02 )   PT: 13.0 sec;   INR: 1.09 ratio         PTT - ( 30 Oct 2021 08:02 )  PTT:29.9 sec            RADIOLOGY & ADDITIONAL TESTS:    Imaging Personally Reviewed:  Consultant(s) Notes Reviewed:     Misty Bar MD  Internal Medicine PGY-3  Pager [phone]  Pager [phone] [phone]      Patient is a 71y old  Male who presents with a chief complaint of Transfer from [facility] for MRA findings (30 Oct 2021 20:53)        SUBJECTIVE / OVERNIGHT EVENTS: Patient had no acute events overnight. Patient seen and examined at bedside this morning. No BM last 2 days. Denies abdominal pain, N/V, F/C. Denies weakness. No acute complaints    ROS: [ - ] Fever [ - ] Chills [ - ] Nausea/Vomiting [ - ] Chest Pain [ - ] Shortness of breath     MEDICATIONS  (STANDING):  aspirin  chewable 81 milliGRAM(s) Oral daily  atorvastatin 20 milliGRAM(s) Oral at bedtime  bisacodyl 5 milliGRAM(s) Oral at bedtime  cloNIDine 0.3 milliGRAM(s) Oral every 8 hours  clopidogrel Tablet 75 milliGRAM(s) Oral daily  doxazosin 4 milliGRAM(s) Oral at bedtime  hydrALAZINE 100 milliGRAM(s) Oral <User Schedule>  influenza   Vaccine 0.5 milliLiter(s) IntraMuscular once  isosorbide   mononitrate ER Tablet (IMDUR) 120 milliGRAM(s) Oral daily  lactulose Syrup 20 Gram(s) Oral two times a day  metoprolol succinate ER 25 milliGRAM(s) Oral daily  NIFEdipine  milliGRAM(s) Oral daily  polyethylene glycol 3350 17 Gram(s) Oral every 12 hours  senna 2 Tablet(s) Oral at bedtime  sodium bicarbonate 650 milliGRAM(s) Oral three times a day    MEDICATIONS  (PRN):  acetaminophen    Suspension .. 650 milliGRAM(s) Oral every 6 hours PRN Mild Pain (1 - 3)  bisacodyl 5 milliGRAM(s) Oral once PRN Constipation  simethicone 80 milliGRAM(s) Chew two times a day PRN Upset Stomach      Vital Signs Last 24 Hrs  T(C): 36.6 (31 Oct 2021 07:55), Max: 36.8 (30 Oct 2021 12:33)  T(F): 97.9 (31 Oct 2021 07:55), Max: 98.3 (30 Oct 2021 12:33)  HR: 87 (31 Oct 2021 07:55) (49 - 87)  BP: 166/83 (31 Oct 2021 07:55) (127/72 - 166/83)  BP(mean): --  RR: 18 (31 Oct 2021 07:55) (18 - 18)  SpO2: 100% (31 Oct 2021 07:55) (98% - 100%)  CAPILLARY BLOOD GLUCOSE        I&O's Summary    30 Oct 2021 07:01  -  31 Oct 2021 07:00  --------------------------------------------------------  IN: 240 mL / OUT: 550 mL / NET: -310 mL        PHYSICAL EXAM  GENERAL: NAD, lying comfortably in bed   HEENT:  Atraumatic, Normocephalic, conjunctiva and sclera clear  CHEST/LUNG: Clear to auscultation bilaterally; No wheeze  HEART: RRR, S1 and S2 No murmurs, rubs, or gallops  ABDOMEN: Soft, Nontender, Nondistended; Bowel sounds present  EXTREMITIES:  2+ Peripheral Pulses, No edema  NEURO: AAOx3, non-focal, moves all extremities   SKIN: No rashes or lesions    LABS:                        10.4   6.83  )-----------( 718      ( 31 Oct 2021 06:23 )             33.5     10-31    137  |  105  |  34<H>  ----------------------------<  111<H>  3.9   |  21<L>  |  3.09<H>    Ca    9.5      31 Oct 2021 06:22  Phos  3.1     10-31  Mg     2.1     10-31    TPro  7.2  /  Alb  3.9  /  TBili  0.2  /  DBili  x   /  AST  14  /  ALT  7<L>  /  AlkPhos  96  10-31    PT/INR - ( 30 Oct 2021 08:02 )   PT: 13.0 sec;   INR: 1.09 ratio         PTT - ( 30 Oct 2021 08:02 )  PTT:29.9 sec            RADIOLOGY & ADDITIONAL TESTS:    Imaging Personally Reviewed:  Consultant(s) Notes Reviewed:

## 2021-10-31 NOTE — PROGRESS NOTE ADULT - ATTENDING COMMENTS
71 yo M w/ PMHx of CKD IV (unknown baseline), uncontrolled HTN, ?CAD (patient states he has "heart problems;" TTE performed at [facility] on 10/5 w/ normal LVF, severely enlarged LA, Grade III diastolic dysfunction, mild pulm HTN) who was transfered from [facility] to where he presented w/ hx of multiple falls, found to have acute right MCA territory ischemic infarct w/ extensive chronic microvascular ischemic changes on CT, acute/subacute right-sided MCA distribution infarct with associated cytotoxic edema, on MRI, and Pcomm aneurysm vs thrombus [id number], mild R M1 stenosis on MRA. 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 Medicine re-consulted for liver lesion suspicious for cholangiocarcinoma.     Sigmoidoscopy on 10/29 was not completed due to mucosal edema and severe angulation and GI team recommends barium enema and GI series         [facility]   HOspitalist   [phone] .

## 2021-10-31 NOTE — PROGRESS NOTE ADULT - ASSESSMENT
70yoM w/ PMHx of CKD IV (unknown baseline), uncontrolled HTN, ?CAD (patient states he has "heart problems;" TTE performed at [facility] on 10/5 w/ normal LVF, severely enlarged LA, Grade III diastolic dysfunction, mild pulm HTN) who was transfered from [facility] to where he presented w/ hx of multiple falls, found to have acute right MCA territory ischemic infarct w/ extensive chronic microvascular ischemic changes on CT, acute/subacute right-sided MCA distribution infarct with associated cytotoxic edema, on MRI, and Pcomm aneurysm vs thrombus [id number], mild R M1 stenosis on MRA.

## 2021-10-31 NOTE — PROGRESS NOTE ADULT - ASSESSMENT
71 yo M w/ PMHx of CKD IV (unknown baseline), uncontrolled HTN, ?CAD (patient states he has "heart problems;" TTE performed at [facility] on 10/5 w/ normal LVF, severely enlarged LA, Grade III diastolic dysfunction, mild pulm HTN) who was transfered from [facility] to where he presented w/ hx of multiple falls, found to have acute right MCA territory ischemic infarct w/ extensive chronic microvascular ischemic changes on CT, acute/subacute right-sided MCA distribution infarct with associated cytotoxic edema, on MRI, and Pcomm aneurysm vs thrombus [id number], mild R M1 stenosis on MRA. 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 Medicine re-consulted for liver lesion suspicious for cholangiocarcinoma.

## 2021-10-31 NOTE — PROGRESS NOTE ADULT - ASSESSMENT
71 yo male with CKD (baseline Cr > 3), HTN, and HLD who presented as a transfer from [facility] for higher level of care. Patient initially presented to [facility] on 10/01 after multiple falls the since the day prior. MRI Head at [facility] showed acute vs subacute R MCA territory infarct with associated cytotoxic edema. MRA H showed multilobulated right-sided P-comm aneurysm directed posteriorly and laterally, measuring 8.9 x 7.3 mm.   A1c 5.5, LDL 25.  TTE: EF 65-70%, severely enlarged L atrium.   MRi with R MCA territory infarct   vessels with PCOM on R aneurysm    s/p angio and coil of PCOM aneurysm.   Impression: AMS + mild L hemineglect in setting of R MCA territory infarct seen on MRI Head, ESUS.etiology fo stroke may be hypercaog from maligiancy   o/e LUE drift doing well.  MRI abd with concern for hepatic mass/cholangiocarcinoma   - malignancy workup? pending liver bx.  [id number], CT with barium Monday   - send APLS labs. beta 2 glycoprotein antibodies, cardiolipin antibodies and lupus anticoagulant   - Dual antiplatelet therapy with ASA 81mg PO daily and Clopidogrel 75mg PO daily x 3 weeks followed by monotherapy with ASA 81mg PO daily. can hold for biopsy   - lipitor 40. should be high dose   - ARU and PRU therapeutic   - cardio for ILR. cardio following can do outpt   - telemetry  - PT/OT/SS/SLP, OOBC  - check FS, glucose control <180  - GI/DVT ppx  - Counseling on diet, exercise, and medication adherence was done  - Counseling on smoking cessation and alcohol consumption offered when appropriate.  - Pain assessed and judicious use of narcotics when appropriate was discussed.    - Stroke education given when appropriate.  - Importance of fall prevention discussed.   - Differential diagnosis and plan of care discussed with patient and/or family and primary team  - Thank you for allowing me to participate in the care of this patient. Call with questions.    - d/c plan eventually rehab CLYDE Pierre MD  Vascular Neurology  Office: [phone] .

## 2021-11-01 LAB
ALBUMIN SERPL ELPH-MCNC: 3.9 G/DL — SIGNIFICANT CHANGE UP (ref 3.3–5)
ALP SERPL-CCNC: 92 U/L — SIGNIFICANT CHANGE UP (ref 40–120)
ALT FLD-CCNC: 9 U/L — LOW (ref 10–45)
ANION GAP SERPL CALC-SCNC: 13 MMOL/L — SIGNIFICANT CHANGE UP (ref 5–17)
AST SERPL-CCNC: 14 U/L — SIGNIFICANT CHANGE UP (ref 10–40)
B2 GLYCOPROT1 AB SER QL: NEGATIVE — SIGNIFICANT CHANGE UP
BASOPHILS # BLD AUTO: 0.05 K/UL — SIGNIFICANT CHANGE UP (ref 0–0.2)
BASOPHILS NFR BLD AUTO: 0.7 % — SIGNIFICANT CHANGE UP (ref 0–2)
BILIRUB SERPL-MCNC: 0.2 MG/DL — SIGNIFICANT CHANGE UP (ref 0.2–1.2)
BUN SERPL-MCNC: 34 MG/DL — HIGH (ref 7–23)
CALCIUM SERPL-MCNC: 9.4 MG/DL — SIGNIFICANT CHANGE UP (ref 8.4–10.5)
CHLORIDE SERPL-SCNC: 103 MMOL/L — SIGNIFICANT CHANGE UP (ref 96–108)
CO2 SERPL-SCNC: 21 MMOL/L — LOW (ref 22–31)
CREAT SERPL-MCNC: 2.93 MG/DL — HIGH (ref 0.5–1.3)
EOSINOPHIL # BLD AUTO: 0.29 K/UL — SIGNIFICANT CHANGE UP (ref 0–0.5)
EOSINOPHIL NFR BLD AUTO: 3.9 % — SIGNIFICANT CHANGE UP (ref 0–6)
GLUCOSE SERPL-MCNC: 105 MG/DL — HIGH (ref 70–99)
HCT VFR BLD CALC: 32.7 % — LOW (ref 39–50)
HGB BLD-MCNC: 10.1 G/DL — LOW (ref 13–17)
IMM GRANULOCYTES NFR BLD AUTO: 0.3 % — SIGNIFICANT CHANGE UP (ref 0–1.5)
LYMPHOCYTES # BLD AUTO: 1.47 K/UL — SIGNIFICANT CHANGE UP (ref 1–3.3)
LYMPHOCYTES # BLD AUTO: 20 % — SIGNIFICANT CHANGE UP (ref 13–44)
MAGNESIUM SERPL-MCNC: 2 MG/DL — SIGNIFICANT CHANGE UP (ref 1.6–2.6)
MCHC RBC-ENTMCNC: 29.4 PG — SIGNIFICANT CHANGE UP (ref 27–34)
MCHC RBC-ENTMCNC: 30.9 GM/DL — LOW (ref 32–36)
MCV RBC AUTO: 95.1 FL — SIGNIFICANT CHANGE UP (ref 80–100)
MONOCYTES # BLD AUTO: 0.71 K/UL — SIGNIFICANT CHANGE UP (ref 0–0.9)
MONOCYTES NFR BLD AUTO: 9.7 % — SIGNIFICANT CHANGE UP (ref 2–14)
NEUTROPHILS # BLD AUTO: 4.81 K/UL — SIGNIFICANT CHANGE UP (ref 1.8–7.4)
NEUTROPHILS NFR BLD AUTO: 65.4 % — SIGNIFICANT CHANGE UP (ref 43–77)
NRBC # BLD: 0 /100 WBCS — SIGNIFICANT CHANGE UP (ref 0–0)
PHOSPHATE SERPL-MCNC: 2.9 MG/DL — SIGNIFICANT CHANGE UP (ref 2.5–4.5)
PLATELET # BLD AUTO: 718 K/UL — HIGH (ref 150–400)
POTASSIUM SERPL-MCNC: 3.8 MMOL/L — SIGNIFICANT CHANGE UP (ref 3.5–5.3)
POTASSIUM SERPL-SCNC: 3.8 MMOL/L — SIGNIFICANT CHANGE UP (ref 3.5–5.3)
PROT SERPL-MCNC: 6.8 G/DL — SIGNIFICANT CHANGE UP (ref 6–8.3)
RBC # BLD: 3.44 M/UL — LOW (ref 4.2–5.8)
RBC # FLD: 15.4 % — HIGH (ref 10.3–14.5)
SARS-COV-2 RNA SPEC QL NAA+PROBE: SIGNIFICANT CHANGE UP
SODIUM SERPL-SCNC: 137 MMOL/L — SIGNIFICANT CHANGE UP (ref 135–145)
WBC # BLD: 7.35 K/UL — SIGNIFICANT CHANGE UP (ref 3.8–10.5)
WBC # FLD AUTO: 7.35 K/UL — SIGNIFICANT CHANGE UP (ref 3.8–10.5)

## 2021-11-01 PROCEDURE — 99232 SBSQ HOSP IP/OBS MODERATE 35: CPT | Mod: GC

## 2021-11-01 PROCEDURE — 99233 SBSQ HOSP IP/OBS HIGH 50: CPT | Mod: GC

## 2021-11-01 RX ORDER — SOD SULF/SODIUM/NAHCO3/KCL/PEG
1 SOLUTION, RECONSTITUTED, ORAL ORAL ONCE
Refills: 0 | Status: DISCONTINUED | OUTPATIENT
Start: 2021-11-01 | End: 2021-11-01

## 2021-11-01 RX ORDER — HYDRALAZINE HCL 50 MG
5 TABLET ORAL ONCE
Refills: 0 | Status: COMPLETED | OUTPATIENT
Start: 2021-11-01 | End: 2021-11-01

## 2021-11-01 RX ADMIN — Medication 0.3 MILLIGRAM(S): at 04:43

## 2021-11-01 RX ADMIN — LACTULOSE 20 GRAM(S): 10 SOLUTION ORAL at 04:42

## 2021-11-01 RX ADMIN — Medication 650 MILLIGRAM(S): at 15:30

## 2021-11-01 RX ADMIN — Medication 100 MILLIGRAM(S): at 17:24

## 2021-11-01 RX ADMIN — Medication 0.3 MILLIGRAM(S): at 15:31

## 2021-11-01 RX ADMIN — SENNA PLUS 2 TABLET(S): 8.6 TABLET ORAL at 21:37

## 2021-11-01 RX ADMIN — Medication 100 MILLIGRAM(S): at 10:27

## 2021-11-01 RX ADMIN — Medication 120 MILLIGRAM(S): at 04:43

## 2021-11-01 RX ADMIN — Medication 4 MILLIGRAM(S): at 21:37

## 2021-11-01 RX ADMIN — Medication 650 MILLIGRAM(S): at 21:37

## 2021-11-01 RX ADMIN — Medication 100 MILLIGRAM(S): at 02:48

## 2021-11-01 RX ADMIN — Medication 0.3 MILLIGRAM(S): at 21:36

## 2021-11-01 RX ADMIN — Medication 650 MILLIGRAM(S): at 04:43

## 2021-11-01 RX ADMIN — LACTULOSE 20 GRAM(S): 10 SOLUTION ORAL at 17:24

## 2021-11-01 RX ADMIN — CLOPIDOGREL BISULFATE 75 MILLIGRAM(S): 75 TABLET, FILM COATED ORAL at 12:06

## 2021-11-01 RX ADMIN — POLYETHYLENE GLYCOL 3350 17 GRAM(S): 17 POWDER, FOR SOLUTION ORAL at 04:42

## 2021-11-01 RX ADMIN — Medication 5 MILLIGRAM(S): at 00:19

## 2021-11-01 RX ADMIN — ISOSORBIDE MONONITRATE 120 MILLIGRAM(S): 60 TABLET, EXTENDED RELEASE ORAL at 12:06

## 2021-11-01 RX ADMIN — POLYETHYLENE GLYCOL 3350 17 GRAM(S): 17 POWDER, FOR SOLUTION ORAL at 17:26

## 2021-11-01 RX ADMIN — Medication 5 MILLIGRAM(S): at 21:36

## 2021-11-01 RX ADMIN — Medication 81 MILLIGRAM(S): at 12:06

## 2021-11-01 RX ADMIN — ATORVASTATIN CALCIUM 20 MILLIGRAM(S): 80 TABLET, FILM COATED ORAL at 21:37

## 2021-11-01 NOTE — PROGRESS NOTE ADULT - ASSESSMENT
71yo M with PMH of CKD (baseline Cr >3), HTN, HLD who presents as transfer from [facility]SH for cerebral aneurysm. Advanced GI consulted for a liver mass (to be biopsied off DAPT x 3 weeks) and pending inpatient colonoscopy for malignancy work up.     Impression:  # Liver mass - with radiologic findings concerning for cholangiocarcinoma. Adenopathy noted. Would prefer to avoid directly sampling the mass as it can introduce peritoneal seeding. Can plan for EUS and peripancreatic LN sampling when patient is off DAPT (planned for 3 weeks total per neurology's latest note). Attempted inpatient colonoscopy to evaluate for metastatic disease, however, unable to complete 2/2 sharp angulation at sigmoid colon.   # Cerebral Aneurysm - s/p coiling on DAPT (planned for 3 weeks total)   # CKD4   # Thrombocytosis - +JAK2 mutation    Recommendations:  - Please obtain barium enema LGI series to evaluate for metastatic disease as colonoscopy could not be completed  - Can plan for EGD+EUS + LN sampling when off DAPT (can be done as outpatient) - per neurosurgery note patient is planned for 3 weeks total of DAPT starting 10/12 - so can tentatively plan for EUS the week of 11/8.      Thank you for involving us in the care of this patient, please reach out if any further questions.     Alejo Belle MD  Gastroenterology Fellow, PGY5    Available on [employer] Teams  [phone] ([facility])  [phone] ([facility])  Please contact on call fellow weekdays after 5pm-7am and weekends: [phone]     69yo M with PMH of CKD (baseline Cr >3), HTN, HLD who presents as transfer from OSH for cerebral aneurysm. Advanced GI consulted for a liver mass (to be biopsied off DAPT x 3 weeks) and pending inpatient colonoscopy for malignancy work up.     Impression:  # Liver mass - with radiologic findings concerning for cholangiocarcinoma. Adenopathy noted. Would prefer to avoid directly sampling the mass as it can introduce peritoneal seeding. Can plan for EUS and peripancreatic LN sampling when patient is off DAPT (planned for 3 weeks total per neurology's latest note). Attempted inpatient colonoscopy to evaluate for metastatic disease, however, unable to complete 2/2 sharp angulation at sigmoid colon.   # Cerebral Aneurysm - s/p coiling on DAPT (planned for 3 weeks total)   # CKD4   # Thrombocytosis - +JAK2 mutation    Recommendations:  - Please obtain MRI colonography to evaluate for metastatic disease as colonoscopy could not be completed  - Can plan for EGD+EUS + LN sampling when off DAPT (can be done as outpatient) - per neurosurgery note patient is planned for 3 weeks total of DAPT starting 10/12 - so can tentatively plan for EUS the week of 11/8.      Thank you for involving us in the care of this patient, please reach out if any further questions.     Alejo Belle MD  Gastroenterology Fellow, PGY5    Available on [employer] Teams  [phone] ([facility])  [phone] ([facility])  Please contact on call fellow weekdays after 5pm-7am and weekends: [phone]

## 2021-11-01 NOTE — PROGRESS NOTE ADULT - SUBJECTIVE AND OBJECTIVE BOX
Interval Events:   - Was unable to receive colonoscopy 2/2 angulation in sigmoid, awaiting barium enema LGIS to r/o metastatic malignancy  - Pt denies abd pain, n/v/f/c      Allergies:  No Known Allergies      Hospital Medications:  acetaminophen    Suspension .. 650 milliGRAM(s) Oral every 6 hours PRN  aspirin  chewable 81 milliGRAM(s) Oral daily  atorvastatin 20 milliGRAM(s) Oral at bedtime  bisacodyl 5 milliGRAM(s) Oral at bedtime  bisacodyl 5 milliGRAM(s) Oral once PRN  cloNIDine 0.3 milliGRAM(s) Oral every 8 hours  clopidogrel Tablet 75 milliGRAM(s) Oral daily  doxazosin 4 milliGRAM(s) Oral at bedtime  hydrALAZINE 100 milliGRAM(s) Oral <User Schedule>  influenza   Vaccine 0.5 milliLiter(s) IntraMuscular once  isosorbide   mononitrate ER Tablet (IMDUR) 120 milliGRAM(s) Oral daily  lactulose Syrup 20 Gram(s) Oral two times a day  metoprolol succinate ER 25 milliGRAM(s) Oral daily  NIFEdipine XL 90 milliGRAM(s) Oral daily  polyethylene glycol 3350 17 Gram(s) Oral every 12 hours  polyethylene glycol/electrolyte Solution. 4000 milliLiter(s) Oral once  senna 2 Tablet(s) Oral at bedtime  simethicone 80 milliGRAM(s) Chew two times a day PRN  sodium bicarbonate 650 milliGRAM(s) Oral three times a day      PMHX/PSHX:  Frequent falls    Hypertension    Chronic kidney disease, unspecified CKD stage        Family history:      ROS:   General:  No wt loss, fevers, chills, night sweats, fatigue,   Eyes:  Good vision, no reported pain  ENT:  No sore throat, pain, runny nose, dysphagia  CV:  No pain, palpitations, hypo/hypertension  Pulm:  No dyspnea, cough, tachypnea, wheezing  GI:  As per HPI  :  No pain, bleeding, incontinence, nocturia  Muscle:  No pain, weakness  Neuro:  No weakness, tingling, memory problems  Psych:  No fatigue, insomnia, mood problems, depression  Endocrine:  No polyuria, polydipsia, cold/heat intolerance  Heme:  No petechiae, ecchymosis, easy bruisability  Skin:  No rash, tattoos, scars, edema      PHYSICAL EXAM:   Vital Signs Last 24 Hrs  T(C): 36.6 (01 Nov 2021 04:30), Max: 36.9 (31 Oct 2021 11:51)  T(F): 97.9 (01 Nov 2021 04:30), Max: 98.4 (31 Oct 2021 11:51)  HR: 47 (01 Nov 2021 04:30) (47 - 87)  BP: 199/82 (01 Nov 2021 04:30) (109/65 - 208/83)  BP(mean): --  RR: 18 (01 Nov 2021 04:30) (18 - 18)  SpO2: 99% (01 Nov 2021 04:30) (96% - 100%)      GENERAL:  No acute distress  HEENT:  Normocephalic/atraumatic, no scleral icterus  CHEST:  No accessory muscle use  HEART:  Regular rate and rhythm  ABDOMEN:  Soft, non-tender, non-distended  EXTREMITIES: No cyanosis, clubbing, or edema  SKIN:  No rash  NEURO:  Alert and oriented x 3, no asterixis      LABS:                        10.4   6.83  )-----------( 718      ( 31 Oct 2021 06:23 )             33.5     10-31    137  |  105  |  34<H>  ----------------------------<  111<H>  3.9   |  21<L>  |  3.09<H>    Ca    9.5      31 Oct 2021 06:22  Phos  3.1     10-31  Mg     2.1     10-31    TPro  7.2  /  Alb  3.9  /  TBili  0.2  /  DBili  x   /  AST  14  /  ALT  7<L>  /  AlkPhos  96  10-31    LIVER FUNCTIONS - ( 31 Oct 2021 06:22 )  Alb: 3.9 g/dL / Pro: 7.2 g/dL / ALK PHOS: 96 U/L / ALT: 7 U/L / AST: 14 U/L / GGT: x           PT/INR - ( 30 Oct 2021 08:02 )   PT: 13.0 sec;   INR: 1.09 ratio         PTT - ( 30 Oct 2021 08:02 )  PTT:29.9 sec              Imaging:             Interval Events:   - Was unable to receive colonoscopy 2/2 angulation in sigmoid, awaiting MRI colonography to r/o metastatic malignancy  - Pt denies abd pain, n/v/f/c      Allergies:  No Known Allergies      Hospital Medications:  acetaminophen    Suspension .. 650 milliGRAM(s) Oral every 6 hours PRN  aspirin  chewable 81 milliGRAM(s) Oral daily  atorvastatin 20 milliGRAM(s) Oral at bedtime  bisacodyl 5 milliGRAM(s) Oral at bedtime  bisacodyl 5 milliGRAM(s) Oral once PRN  cloNIDine 0.3 milliGRAM(s) Oral every 8 hours  clopidogrel Tablet 75 milliGRAM(s) Oral daily  doxazosin 4 milliGRAM(s) Oral at bedtime  hydrALAZINE 100 milliGRAM(s) Oral <User Schedule>  influenza   Vaccine 0.5 milliLiter(s) IntraMuscular once  isosorbide   mononitrate ER Tablet (IMDUR) 120 milliGRAM(s) Oral daily  lactulose Syrup 20 Gram(s) Oral two times a day  metoprolol succinate ER 25 milliGRAM(s) Oral daily  NIFEdipine XL 90 milliGRAM(s) Oral daily  polyethylene glycol 3350 17 Gram(s) Oral every 12 hours  polyethylene glycol/electrolyte Solution. 4000 milliLiter(s) Oral once  senna 2 Tablet(s) Oral at bedtime  simethicone 80 milliGRAM(s) Chew two times a day PRN  sodium bicarbonate 650 milliGRAM(s) Oral three times a day      PMHX/PSHX:  Frequent falls    Hypertension    Chronic kidney disease, unspecified CKD stage        Family history:      ROS:   General:  No wt loss, fevers, chills, night sweats, fatigue,   Eyes:  Good vision, no reported pain  ENT:  No sore throat, pain, runny nose, dysphagia  CV:  No pain, palpitations, hypo/hypertension  Pulm:  No dyspnea, cough, tachypnea, wheezing  GI:  As per HPI  :  No pain, bleeding, incontinence, nocturia  Muscle:  No pain, weakness  Neuro:  No weakness, tingling, memory problems  Psych:  No fatigue, insomnia, mood problems, depression  Endocrine:  No polyuria, polydipsia, cold/heat intolerance  Heme:  No petechiae, ecchymosis, easy bruisability  Skin:  No rash, tattoos, scars, edema      PHYSICAL EXAM:   Vital Signs Last 24 Hrs  T(C): 36.6 (01 Nov 2021 04:30), Max: 36.9 (31 Oct 2021 11:51)  T(F): 97.9 (01 Nov 2021 04:30), Max: 98.4 (31 Oct 2021 11:51)  HR: 47 (01 Nov 2021 04:30) (47 - 87)  BP: 199/82 (01 Nov 2021 04:30) (109/65 - 208/83)  BP(mean): --  RR: 18 (01 Nov 2021 04:30) (18 - 18)  SpO2: 99% (01 Nov 2021 04:30) (96% - 100%)      GENERAL:  No acute distress  HEENT:  Normocephalic/atraumatic, no scleral icterus  CHEST:  No accessory muscle use  HEART:  Regular rate and rhythm  ABDOMEN:  Soft, non-tender, non-distended  EXTREMITIES: No cyanosis, clubbing, or edema  SKIN:  No rash  NEURO:  Alert and oriented x 3, no asterixis      LABS:                        10.4   6.83  )-----------( 718      ( 31 Oct 2021 06:23 )             33.5     10-31    137  |  105  |  34<H>  ----------------------------<  111<H>  3.9   |  21<L>  |  3.09<H>    Ca    9.5      31 Oct 2021 06:22  Phos  3.1     10-31  Mg     2.1     10-31    TPro  7.2  /  Alb  3.9  /  TBili  0.2  /  DBili  x   /  AST  14  /  ALT  7<L>  /  AlkPhos  96  10-31    LIVER FUNCTIONS - ( 31 Oct 2021 06:22 )  Alb: 3.9 g/dL / Pro: 7.2 g/dL / ALK PHOS: 96 U/L / ALT: 7 U/L / AST: 14 U/L / GGT: x           PT/INR - ( 30 Oct 2021 08:02 )   PT: 13.0 sec;   INR: 1.09 ratio         PTT - ( 30 Oct 2021 08:02 )  PTT:29.9 sec              Imaging:

## 2021-11-01 NOTE — PROGRESS NOTE ADULT - ATTENDING COMMENTS
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  Please call the advanced GI service with any questions or concerns.

## 2021-11-01 NOTE — PROVIDER CONTACT NOTE (OTHER) - ASSESSMENT
Pt AXOX4 but confused. No neuro changes. Pt denies pain. 191/86
C/o abdominal discomfort. Pt did not take hydralazine due at b3am
Pt VSS, neurologically intact
Pt admitted for R acute MCA Infarct.  Pt preping for colonoscopy 10/28.  Ordered for Golytely.  Pt agitated and refusing to drink at this time.  Pt able to tolerate about 2000mls.  Pt did not have and BM overnight.

## 2021-11-01 NOTE — PROGRESS NOTE ADULT - PROBLEM SELECTOR PLAN 5
- w/ some fluctuations of high and low BPs  - c/w imdur, clonidine, hydral, procardia  - Hypertensive to 215/98 ON 10/28 - procardia increased to 120 with improvement  - KDOQI guideline suggests the usage of thiazides for CKD I-III and loop diuretics for CKD IV+ d/t decreased effectiveness. However there is literature evidence citing no difference in effectiveness of thiazide vs loop diuretics in CKD IV+ pts. In 2012 a double-blinded randomized trial by Fabio et al (doi 10.1111/j.1636-7460.2011.28218.x) demonstrated no difference between HCTZ and Furosemide treatment in lowering BP (average decrease in systolic ~10) in CKD IV+ pts with average age of 65, good external validity for this pt. If Procardia 90-->120 does not achieve sufficient antihypertensive effect, consider thiazide as next line management vs. changing metoprolol to coreg baby dose  - continue to monitor

## 2021-11-01 NOTE — PROGRESS NOTE ADULT - PROBLEM SELECTOR PLAN 1
MRI abd shows central hepatic mass for which the primary differential consideration is cholangiocarcinoma and adenopathy in the peripancreatic, savage hepatis and portacaval regions.  - CEA elevated, Ca 19-9 mildly elevated  - Surgical Onc and hepatology recs appreciated  - Would require biopsy for diagnosis  - IR consulted, deferred IR guided bx as patient on DAPT - would need to be held prior to procedure  - Advanced GI consulted, recs appreciated. Per their review, likely iCCA, recommending against biopsy at this time due to high risk of seeding   - s/p flex sig 10/29; unable to do colonoscopy as colonic angle too sharp  - plan for CT colonography tomorrow 12pm   - will need EGD to clear upper GI tract +/- EUS/FNA of LN if malignant appearing once off plavix x 5 days (needs 3 weeks of plavix per neurosugery) - can be done outpatient   - will need outpatient PET scan  - formal heme/onc consult pending biopsy results

## 2021-11-01 NOTE — PROVIDER CONTACT NOTE (OTHER) - RECOMMENDATIONS
Educate pt on risk vs benefits.  Promote Golytely.  Pt maintained NPO.
Hold meds that lower HR? EKG?
Evaluate and treat
Notified MD

## 2021-11-01 NOTE — PROVIDER CONTACT NOTE (OTHER) - ACTION/TREATMENT ORDERED:
MD ordered labs, EKG, urine samples.
Simethicone ordered. she will come and see him.
will place order for Hydralazine
Educate pt on risk vs benefits.  Promote Golytely.  Pt maintained NPO.

## 2021-11-01 NOTE — PROGRESS NOTE ADULT - ASSESSMENT
69 yo M w/ PMHx of CKD IV (unknown baseline), uncontrolled HTN, ?CAD (patient states he has "heart problems;" TTE performed at [facility] on 10/5 w/ normal LVF, severely enlarged LA, Grade III diastolic dysfunction, mild pulm HTN) who was transfered from [facility] to where he presented w/ hx of multiple falls, found to have acute right MCA territory ischemic infarct w/ extensive chronic microvascular ischemic changes on CT, acute/subacute right-sided MCA distribution infarct with associated cytotoxic edema, on MRI, and Pcomm aneurysm vs thrombus 1ull2rs, mild R M1 stenosis on MRA. 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 Medicine re-consulted for liver lesion suspicious for cholangiocarcinoma.

## 2021-11-01 NOTE — PROGRESS NOTE ADULT - SUBJECTIVE AND OBJECTIVE BOX
Neurology Progress Note    S: Patient seen and examined on floor. no complaints resting in bed     Medication:  MEDICATIONS  (STANDING):  aspirin  chewable 81 milliGRAM(s) Oral daily  atorvastatin 20 milliGRAM(s) Oral at bedtime  bisacodyl 5 milliGRAM(s) Oral at bedtime  cloNIDine 0.3 milliGRAM(s) Oral every 8 hours  clopidogrel Tablet 75 milliGRAM(s) Oral daily  doxazosin 4 milliGRAM(s) Oral at bedtime  hydrALAZINE 100 milliGRAM(s) Oral <User Schedule>  influenza   Vaccine 0.5 milliLiter(s) IntraMuscular once  isosorbide   mononitrate ER Tablet (IMDUR) 120 milliGRAM(s) Oral daily  lactulose Syrup 20 Gram(s) Oral two times a day  metoprolol succinate ER 25 milliGRAM(s) Oral daily  NIFEdipine  milliGRAM(s) Oral daily  polyethylene glycol 3350 17 Gram(s) Oral every 12 hours  senna 2 Tablet(s) Oral at bedtime  sodium bicarbonate 650 milliGRAM(s) Oral three times a day    MEDICATIONS  (PRN):  acetaminophen    Suspension .. 650 milliGRAM(s) Oral every 6 hours PRN Mild Pain (1 - 3)  simethicone 80 milliGRAM(s) Chew two times a day PRN Upset Stomach      Vitals:  Vital Signs Last 24 Hrs  T(C): 36.7 (11-01-21 @ 15:15), Max: 36.8 (10-31-21 @ 19:23)  T(F): 98.1 (11-01-21 @ 15:15), Max: 98.2 (10-31-21 @ 19:23)  HR: 43 (11-01-21 @ 15:15) (43 - 53)  BP: 136/76 (11-01-21 @ 15:15) (116/54 - 208/83)  BP(mean): --  RR: 18 (11-01-21 @ 15:15) (18 - 18)  SpO2: 98% (11-01-21 @ 15:15) (98% - 100%)            Orthostatic VS  10-20-21 @ 11:10  Lying BP: 183/88 HR: 46  Sitting BP: 163/81 HR: 48  Standing BP: 152/51 HR: --  Site: upper right arm  Mode: electronic    Orthostatic VS  10-20-21 @ 11:10  Lying BP: 183/88 HR: 46  Sitting BP: 163/81 HR: 48  Standing BP: 152/51 HR: --  Site: upper right arm  Mode: electronic      Orthostatic VS  10-16-21 @ 07:27  Lying BP: 198/98 HR: 46  Sitting BP: 182/95 HR: 50  Standing BP: 174/96 HR: 58  Site: --  Mode: --  Orthostatic VS  10-15-21 @ 16:00  Lying BP: 163/76 HR: 49  Sitting BP: 136/77 HR: 51  Standing BP: 136/70 HR: 59  Site: upper right arm  Mode: electronic        General Exam:   General Appearance: Appropriately dressed and in no acute distress       Head: Normocephalic, atraumatic and no dysmorphic features  Ear, Nose, and Throat: Moist mucous membranes  CVS: S1S2+  Resp: No SOB, no wheeze or rhonchi  Abd: soft NTND  Extremities: No edema, no cyanosis  Skin: No bruises, no rashes     Neurological Exam:  Mental Status: Awake, alert and oriented x 3.  Able to follow simple and complex verbal commands. Able to name and repeat. fluent speech. No obvious aphasia or dysarthria noted.   Cranial Nerves: PERRL, EOMI, VFFC, sensation V1-V3 intact,  no obvious facial asymmetry , equal elevation of palate, scm/trap 5/5, tongue is midline on protrusion. no obvious papilledema on fundoscopic exam. Hearing is grossly intact.   Motor: Normal bulk, tone and strength throughout. Fine finger movements were intact and symmetric. no tremors or drift noted except LUE with 4+/5 mild drift noted   Sensation: Intact to light touch and pinprick throughout. no right/left confusion. no extinction to tactile on DSS.    Reflexes: 1+ throughout at biceps, brachioradialis, triceps, patellars and ankles bilaterally and equal. No clonus. R toe and L toe were both downgoing.  Coordination: No dysmetria on FNF   Gait: deferred in ICU    I personally reviewed the below data/images/labs:    CBC Full  -  ( 01 Nov 2021 10:52 )  WBC Count : 7.35 K/uL  RBC Count : 3.44 M/uL  Hemoglobin : 10.1 g/dL  Hematocrit : 32.7 %  Platelet Count - Automated : 718 K/uL  Mean Cell Volume : 95.1 fl  Mean Cell Hemoglobin : 29.4 pg  Mean Cell Hemoglobin Concentration : 30.9 gm/dL  Auto Neutrophil # : 4.81 K/uL  Auto Lymphocyte # : 1.47 K/uL  Auto Monocyte # : 0.71 K/uL  Auto Eosinophil # : 0.29 K/uL  Auto Basophil # : 0.05 K/uL  Auto Neutrophil % : 65.4 %  Auto Lymphocyte % : 20.0 %  Auto Monocyte % : 9.7 %  Auto Eosinophil % : 3.9 %  Auto Basophil % : 0.7 %      11-01    137  |  103  |  34<H>  ----------------------------<  105<H>  3.8   |  21<L>  |  2.93<H>    Ca    9.4      01 Nov 2021 10:52  Phos  2.9     11-01  Mg     2.0     11-01    TPro  6.8  /  Alb  3.9  /  TBili  0.2  /  DBili  x   /  AST  14  /  ALT  9<L>  /  AlkPhos  92  11-01          < from: CT Head No Cont (10.01.21 @ 18:37) >    EXAM:  CT BRAIN                              PROCEDURE DATE:  10/01/2021          INTERPRETATION:  CT OF THE HEAD WITHOUT CONTRAST    CLINICAL INDICATION: Weakness. Falls.    TECHNIQUE: Volumetric CT acquisition was performed through the brain and reviewed using brain and bone window technique. Dose optimization techniques were utilized including kVp/mA modulation along with iterative reconstructions.      COMPARISON: No prior studies have been submitted for comparison.    FINDINGS:    The ventricular and sulcal size and configuration is age appropriate.   There is no acute loss of gray-white differentiation. There are extensive patchy and confluent areas of hypodensity in the periventricular and subcortical white matter which are non-specific but likely related  chronic microangiopathic ischemic changes.  Small chronic infarction in the right cerebellar hemisphere. Chronic appearing left thalamic lacunar infarct.    There is no evidence of mass effect, midline shift, acute intracranial hemorrhage, or extra-axial collections.     The calvarium is intact. The paranasal sinuses are clear.The mastoid air cells are predominantly clear. The orbits are unremarkable.      IMPRESSION:  No acute intracranial hemorrhage or acute territorial infarction. Extensive chronic microvascular ischemic changes and several chronic infarctions as described. Further evaluation may be obtained with MRI of the brain if no contraindications.    --- End of Report ---            KAMILA SZYMANSKI MD; Attending Radiologist  This document has been electronically signed. Oct  1 2021  7:25PM    < end of copied text >  < from: MR Head No Cont (10.04.21 @ 18:09) >    EXAM:  MR BRAIN                            PROCEDURE DATE:  10/04/2021          INTERPRETATION:  .    CLINICAL INFORMATION: Frequent falls.    TECHNIQUE: Multiplanar multisequential MRI of the brain was acquired without the administration of IV gadolinium.    COMPARISON: Prior CT examination of the head dated 10/1/2021.    FINDINGS: Multiple areas of restricted diffusion are seen within the right MCA territory affecting the right frontal, parietal, and temporal lobes. Associated T2/FLAIR prolongation is seen, compatible with cytotoxic edema. No hemorrhagic transformation is noted.    Chronic lacunar infarcts are again seen within the right cerebellar hemisphere, left thalamus, and bilateral basal ganglia.    Multiple patchy confluent nonspecific T2/FLAIR hyperintense signal changes are noted throughout the bihemispheric white matter without associated mass effect or restricted diffusion.    Mild ventriculomegaly appears unchanged. No abnormal intra-axial fluid collections are notable. Flow-voids are noted throughout the major intracranial vessels, on the T2 weighted images, consistent with their patency. The sellar area appears unremarkable. The paranasal sinuses and mastoid air cells are grossly clear. Calvarial signal appears unremarkable. The orbits appear within normal limits.    IMPRESSION: Acute/subacute right-sided MCA distribution infarct with associated cytotoxic edema and without hemorrhagic transformation.    Multiple additional chronic lacunar infarcts and similar-appearing extensive chronic white matter microvascular type changes, as discussed.      --- End of Report ---            LUIS CARLOS BONNER MD; Attending Radiologist  This document has been electronically signed. Oct  5 2021  3:01PM    < end of copied text >  < from: MR Angio Head No Cont (10.06.21 @ 18:15) >    EXAM:  MR ANGIO BRAIN                            PROCEDURE DATE:  10/06/2021          INTERPRETATION:  INDICATION:  Right MCA infarct.    TECHNIQUE:  MR angiography of the brain was performed using three dimensional time-of-flight (3D-TOF) technique.  Imaging was performed on a 1.5 Lisa magnet.    FINDINGS:    INTERNAL CAROTID ARTERIES:  Bilaterally patent.    [place] [facility]:  A right-sided P-comm aneurysm is identified. The aneurysm is directed posteriorly and laterally, the aneurysm appears to be septated and/or bilobed., and measures 8.9 x 7.3 mm.    CEREBRAL ARTERIES:  Both anterior cerebral arteries are patent. Both A1 segments are well formed. Both middle cerebral arteries are patent. There is mild narrowing of the proximal right M1 segment.    VERTEBROBASILAR SYSTEM:  The vertebrobasilar system is patent. There are large posterior communicating arteries bilaterally with dominant supply of the posterior cerebral arteries. Left P1 segment is hypoplastic.    IMPRESSION:    1. 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  2. Mild narrowing of the proximal right middle cerebral artery in the M1 region.  3. The vessels are otherwise patent, as outlined above.    --- End of Report ---EXAM:  CT BRAIN                            PROCEDURE DATE:  10/13/2021      INTERPRETATION:  Noncontrast CT of the brain.    CLINICAL INDICATION: Status post rt pcomm aneurysm coiling    TECHNIQUE : Axial CT scanning of the brain was obtainedfrom the skull base to the vertex without the administration of intravenous contrast. Sagittal and coronal reformats were provided.    COMPARISON: CT brain 10/1/2021. MRI brain 10/4/2021    FINDINGS:    Interval placement of embolic coil mass right parasellar region.    Similar mild ventricular dilatation.    No midline shift. Basal cisterns poorly evaluated due to streak artifact.    No acute intracranial hemorrhage, mass effect, or brain edema. Extensive white matter microvascular ischemic disease.    The visualized paranasal sinuses and mastoid air cells are clear.    IMPRESSION:    Interval placement of embolic coil mass right parasellar region.  No acute intracranial hemorrhage, mass effect, or brain edema.  Similar mild ventricular dilatation.        --- End of Report ---      AFIA DAILY MD; Attending Radiologist  This document has been electronically signed. Oct 13 2021  9:31AM    < end of copied text >      < from: MR Head No Cont (10.15.21 @ 16:40) >    EXAM:  MR BRAIN                            PROCEDURE DATE:  10/15/2021    ATION:  MRI OF THE BRAIN WITHOUT CONTRAST    CLINICAL INDICATION: Status post right posterior communicating artery aneurysm status post balloon assisted coilembolization    TECHNIQUE: Multiplanar multisequence noncontrast MRI of the brain was performed.    COMPARISON: CT brain, 10/13/2021 and MRI brain, 10/4/2021.    FINDINGS:    The ventricular and sulcal size and configuration is age appropriate. Thereare scattered T2/FLAIR hyperintensities in the subcortical and periventricular white matter which are nonspecific but most commonly represent chronic microvascular ischemic changes.    The previously seen acute lacunar infarctions along left MCA distribution are decreased in diffusion restriction intensity, consistent with evolution of acute infarctions to late subacute stage. In addition, new punctate acute lacunar infarctions have developed in the posterior wall of the right temporal lobe and in the left cerebellar hemisphere along right parietal distribution. There is no susceptibility signal to suggest hemorrhagic conversion.  There is a chronic infarction in the right cerebellum and in the left thalamus.    There is susceptibility signalin the right paraclinoid region at the site of the previously coiled right P-comm aneurysm.    There is abnormal extra-axial fluid collection or hydrocephalus. The visualized globes are symmetric bilaterally.    The visualized paranasal sinuses and mastoid bones are adequately developed and aerated.    IMPRESSION:    Comparison previous studies,    There has been interval evolution of previously seen infarcts in right MCA distribution as described. There is development of new acute lacunar infarctions at the posterior pole of the right temporal lobe and in the right cerebellum as described.    There is no intracranial hemorrhage, midline shift or mass effect.    Coil mass in the right paraclinoid region.    Notification to clinician of alert:    Provider   Dr. BARGER  was notified about the final results at 10/15/2021 10:23 AM via telephone by neuroradiologist KEITH Tineo. Readback confirmation was obtained.      JULIO TINEO MD; Attending Radiologist  This document has been electronically signed. Oct 15 2021 10:35AM    < end of copied text >  < from: CT Chest No Cont (10.15.21 @ 23:27) >    EXAM:  CT CHEST                          INTERPRETATION:  CLINICAL INFORMATION: Shortness breath, evaluate for pulmonary infection or pulmonary edema.    COMPARISON: CT chest 9/6/2014. CT abdomen pelvis 10/11/2021.    CONTRAST/COMPLICATIONS:  IV Contrast: None  Oral Contrast: None  Complications: None    PROCEDURE:  CT of the Chest was performed.  Sagittal and coronal reformats were performed.    FINDINGS:    LUNGS AND AIRWAYS: Emphysema. No pulmonary nodules or parenchymal consolidations.  PLEURA: Small bilateral pleural effusions.    MEDIASTINUM AND RED: 1.2 cm right paratracheal lymph node (2:40), nonspecific. No axillary lymphadenopathy    VESSELS: Calcified atherosclerotic plaques in aorta and coronary arteries.    HEART: Multifocal cardiac enlargement is noted. No pericardial effusion. Mitral annular calcifications.    CHEST WALL AND LOWER NECK: Within normal limits.    VISUALIZED UPPER ABDOMEN: Partially imaged large hypodense hepatic lesion measuring 6.9 x 8.8 cm, similar in appearance compared to prior CT abdomen pelvis 10/11/2021. Simple cyst in the left kidney    BONES: Degenerative changes of the spine.    IMPRESSION:    Small bilateral pleural effusions with minimal bilateral groundglass opacities and a few areas of interlobular septal thickening. Findings are suggestive of mild interstitial edema. This is superimposed on a background of emphysema.    Redemonstration of large hypodense hepatic lesion, similar appearance whencompared to prior abdominal pelvis 10/11/2021. As mentioned on prior report, consider contrast enhanced MRI for further evaluation.      ALIYA BERTRAND MD; Resident Radiology  This document has been electronicallysigned.  ABRIL PINEDA MD; Attending Radiologist  This document has been electronically signed. Oct 16 2021  9:09AM    < end of copied text >  < from: MR Abdomen w/ IV Cont (10.22.21 @ 23:26) >    EXAM:  MR ABDOMEN IC                          PROCEDURE DATE:  10/22/2021      INTERPRETATION:  CLINICAL INFORMATION: Hepatic mass.    COMPARISON: Noncontrast CT abdomen and pelvis 10/11/2021    CONTRAST/COMPLICATIONS:  IV Contrast: Gadavist  7.5 cc administered   0 cc discarded  Oral Contrast: NONE  Complications: None reported at time of study completion    PROCEDURE:  MRI of the abdomen was performed.  MRCP was performed.    FINDINGS:  LOWER CHEST: Within normal limits.    LIVER: No morphologic evidence of cirrhosis. An 8.9 x 7.1 cm central hepatic lesion involving hepatic segments 4, 5 and 6 with possible extension into superior right hepatic lobe segments demonstrates restricted diffusion with peripheral rim enhancement and delayed central enhancement. While post contrast imaging is limited, MR characteristics strongly favor cholangiocarcinoma.  BILE DUCTS: Normal caliber.  GALLBLADDER: Cholelithiasis.  SPLEEN: Within normal limits.  PANCREAS: Within normal limits.  ADRENALS: Within normal limits.  KIDNEYS/URETERS: Bilateral renal cysts including a 3.3 cm hemorrhagic right renal cyst.    VISUALIZED PORTIONS:  BOWEL: Within normal limits.  PERITONEUM: No ascites.  VESSELS: Hepatic and portal veins are patent.  RETROPERITONEUM/LYMPH NODES: Upper abdominal adenopathy including peripancreatic, portacaval and savage hepatis nodes. Reference lesions as follows.    Peripancreatic node anterior to the portal vein (6:22) 3.7 x 2.6 cm.    Portacaval node (6:24) 2.9 x 1.5 cm.    ABDOMINAL WALL: Within normal limits.  BONES: Within normal limits.    IMPRESSION:  Limited post contrast imaging.    Central hepatic mass as above for which the primary differential consideration is cholangiocarcinoma.    Adenopathy in the peripancreatic, savage hepatis and portacaval regions.    --- End of Report ---      CARLOS ARMSTRONG MD; Attending Radiologist  This document has been electronically signed. Oct 23 2021  9:47AM    < end of copied text >

## 2021-11-01 NOTE — PROVIDER CONTACT NOTE (OTHER) - REASON
Tele monitor shows 13 beats wide complex and pt HR sustaining high 40s
Pt refusing Golytely
BP Out of parameter 191/86
C/o abdominal discomfort. Pt did not take hydralazine due at b3am

## 2021-11-01 NOTE — PROVIDER CONTACT NOTE (OTHER) - SITUATION
C/o abdominal discomfort. Pt did not take hydralazine due at b3am
pt w unsecured aneursym bp 181/79
Tele monitor shows 13 beats wide complex and pt HR sustaining high 40s
BP Out of parameter 191/86
Pt admitted for R acute MCA Infarct.  Pt preping for colonoscopy 10/28.  Ordered for Golytely.  Pt agitated and refusing to drink at this time.

## 2021-11-01 NOTE — PROGRESS NOTE ADULT - ASSESSMENT
69 yo male with CKD (baseline Cr > 3), HTN, and HLD who presented as a transfer from [facility] for higher level of care. Patient initially presented to [facility] on 10/01 after multiple falls the since the day prior. MRI Head at [facility] showed acute vs subacute R MCA territory infarct with associated cytotoxic edema. MRA H showed multilobulated right-sided P-comm aneurysm directed posteriorly and laterally, measuring 8.9 x 7.3 mm.   A1c 5.5, LDL 25.  TTE: EF 65-70%, severely enlarged L atrium.   MRi with R MCA territory infarct   vessels with PCOM on R aneurysm    s/p angio and coil of PCOM aneurysm.   Impression: AMS + mild L hemineglect in setting of R MCA territory infarct seen on MRI Head, ESUS.etiology fo stroke may be hypercaog from maligiancy   o/e LUE drift doing well.  MRI abd with concern for hepatic mass/cholangiocarcinoma   - malignancy workup, liver bx.  [id number], GI following   - send APLS labs. beta 2 glycoprotein antibodies, cardiolipin antibodies and lupus anticoagulant   - Dual antiplatelet therapy with ASA 81mg PO daily and Clopidogrel 75mg PO daily x 3 weeks followed by monotherapy with ASA 81mg PO daily. can hold for biopsy   - lipitor 40. should be high dose   - ARU and PRU therapeutic   - cardio for ILR. cardio following can do outpt   - telemetry  - PT/OT/SS/SLP, OOBC  - check FS, glucose control <180  - GI/DVT ppx  - Counseling on diet, exercise, and medication adherence was done  - Counseling on smoking cessation and alcohol consumption offered when appropriate.  - Pain assessed and judicious use of narcotics when appropriate was discussed.    - Stroke education given when appropriate.  - Importance of fall prevention discussed.   - Differential diagnosis and plan of care discussed with patient and/or family and primary team  - Thank you for allowing me to participate in the care of this patient. Call with questions.    - d/c plan eventually rehab [facility] Pierre MD  Vascular Neurology  Office: [phone] .

## 2021-11-01 NOTE — PROGRESS NOTE ADULT - ASSESSMENT
70yoM w/ PMHx of CKD with a baseline Cr >3, HTN, HLD presented to OSH after multiple falls. He was transferred from OSH for MRI/MRA findings concerning for aneurysm vs. thrombus       1) CKD stage 4- overall stable  Likely has Hypertensive Nephropathy-> b/l 2.8mg/dl to 3.1mg/dl  urine pro/cr 1.4grams  K/L 11.16/6.72---> ratio is < 2---> monitor for now  Renal US --> right kidney 9.3cm and left 9.5 cm with b/l renal cysts  s/p Coiling for PCOMM  Cr  fluctuating however overall stable- cont monitor with daily basic metabolic panel   s/p NIELS --> no signs of NSF--> will cont to monitor  avoid nephrotoxins such as ace/arb/nsaid/IV contrast  trend bmp    2) HTN-  bp improving  on nifedipine clonidine  hydralazine, doxazosin  Imdur 120mg po qd  Trend bp    3) metabolic acidosis- likely from ckd-   na bicarb 650mg po tid  trend bicarb      Dr Hernadez  Nephrology   cell [phone]  office [phone]  ans serv- [phone]  [email or web address] ( wkend coverage for [facility])

## 2021-11-01 NOTE — PROGRESS NOTE ADULT - ATTENDING COMMENTS
71 yo M w/ PMHx of CKD IV (unknown baseline), uncontrolled HTN, ?CAD (patient states he has "heart problems;" TTE performed at [facility] on 10/5 w/ normal LVF, severely enlarged LA, Grade III diastolic dysfunction, mild pulm HTN) who was transfered from [facility] to where he presented w/ hx of multiple falls, found to have acute right MCA territory ischemic infarct w/ extensive chronic microvascular ischemic changes on CT, acute/subacute right-sided MCA distribution infarct with associated cytotoxic edema, on MRI, and Pcomm aneurysm vs thrombus [id number], mild R M1 stenosis on MRA. 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 Medicine re-consulted for liver lesion suspicious for cholangiocarcinoma.     Sigmoidoscopy on 10/29 was not completed due to mucosal edema and severe angulation and GI team recommends CT colonography  prep today, to be performed tomorrow  Discussed in detail with pt  pending dc to [facility]

## 2021-11-01 NOTE — PROGRESS NOTE ADULT - SUBJECTIVE AND OBJECTIVE BOX
Subjective: Patient seen and examined. No new events except as noted.     REVIEW OF SYSTEMS:    CONSTITUTIONAL: + weakness, fevers or chills  EYES/ENT: No visual changes;  No vertigo or throat pain   NECK: No pain or stiffness  RESPIRATORY: No cough, wheezing, hemoptysis; No shortness of breath  CARDIOVASCULAR: No chest pain or palpitations  GASTROINTESTINAL: No abdominal or epigastric pain. No nausea, vomiting, or hematemesis; No diarrhea or constipation. No melena or hematochezia.  GENITOURINARY: No dysuria, frequency or hematuria  NEUROLOGICAL: No numbness or weakness  SKIN: No itching, burning, rashes, or lesions   All other review of systems is negative unless indicated above.    MEDICATIONS:  MEDICATIONS  (STANDING):  aspirin  chewable 81 milliGRAM(s) Oral daily  atorvastatin 20 milliGRAM(s) Oral at bedtime  bisacodyl 5 milliGRAM(s) Oral at bedtime  cloNIDine 0.3 milliGRAM(s) Oral every 8 hours  clopidogrel Tablet 75 milliGRAM(s) Oral daily  doxazosin 4 milliGRAM(s) Oral at bedtime  hydrALAZINE 100 milliGRAM(s) Oral <User Schedule>  influenza   Vaccine 0.5 milliLiter(s) IntraMuscular once  isosorbide   mononitrate ER Tablet (IMDUR) 120 milliGRAM(s) Oral daily  lactulose Syrup 20 Gram(s) Oral two times a day  metoprolol succinate ER 25 milliGRAM(s) Oral daily  NIFEdipine  milliGRAM(s) Oral daily  polyethylene glycol 3350 17 Gram(s) Oral every 12 hours  senna 2 Tablet(s) Oral at bedtime  sodium bicarbonate 650 milliGRAM(s) Oral three times a day      PHYSICAL EXAM:  T(C): 36.6 (11-01-21 @ 19:40), Max: 36.8 (10-31-21 @ 23:33)  HR: 43 (11-01-21 @ 19:40) (43 - 50)  BP: 144/68 (11-01-21 @ 19:40) (116/54 - 208/83)  RR: 17 (11-01-21 @ 19:40) (17 - 18)  SpO2: 99% (11-01-21 @ 19:40) (98% - 100%)  Wt(kg): --  I&O's Summary    31 Oct 2021 07:01  -  01 Nov 2021 07:00  --------------------------------------------------------  IN: 0 mL / OUT: 650 mL / NET: -650 mL    01 Nov 2021 07:01  -  01 Nov 2021 20:03  --------------------------------------------------------  IN: 0 mL / OUT: 350 mL / NET: -350 mL          Appearance: Normal	  HEENT:   Normal oral mucosa, PERRL, EOMI	  Lymphatic: No lymphadenopathy , no edema  Cardiovascular: Normal S1 S2, No JVD, No murmurs , Peripheral pulses palpable 2+ bilaterally  Respiratory: Lungs clear to auscultation, normal effort 	  Gastrointestinal:  Soft, Non-tender, + BS	  Skin: No rashes, No ecchymoses, No cyanosis, warm to touch  Musculoskeletal: Normal range of motion, normal strength  Psychiatry:  Mood & affect appropriate  Ext: No edema      LABS:    CARDIAC MARKERS:                                10.1   7.35  )-----------( 718      ( 01 Nov 2021 10:52 )             32.7     11-01    137  |  103  |  34<H>  ----------------------------<  105<H>  3.8   |  21<L>  |  2.93<H>    Ca    9.4      01 Nov 2021 10:52  Phos  2.9     11-01  Mg     2.0     11-01    TPro  6.8  /  Alb  3.9  /  TBili  0.2  /  DBili  x   /  AST  14  /  ALT  9<L>  /  AlkPhos  92  11-01    proBNP:   Lipid Profile:   HgA1c:   TSH:             TELEMETRY: 	    ECG:  	  RADIOLOGY:   DIAGNOSTIC TESTING:  [ ] Echocardiogram:  [ ]  Catheterization:  [ ] Stress Test:    OTHER:

## 2021-11-01 NOTE — PROGRESS NOTE ADULT - SUBJECTIVE AND OBJECTIVE BOX
Internal Medicine   More Weber | PGY-1    OVERNIGHT EVENTS: KATIE      SUBJECTIVE: Patient was seen and examined at bedside this morning. He is doing well. Denies any nausea/vomiting, abdominal pain, headache, shortness of breath, abdominal pain or chest pain. Patient responding appropriately to questions and able to make needs known.       MEDICATIONS  (STANDING):  aspirin  chewable 81 milliGRAM(s) Oral daily  atorvastatin 20 milliGRAM(s) Oral at bedtime  bisacodyl 5 milliGRAM(s) Oral at bedtime  cloNIDine 0.3 milliGRAM(s) Oral every 8 hours  clopidogrel Tablet 75 milliGRAM(s) Oral daily  doxazosin 4 milliGRAM(s) Oral at bedtime  hydrALAZINE 100 milliGRAM(s) Oral <User Schedule>  influenza   Vaccine 0.5 milliLiter(s) IntraMuscular once  isosorbide   mononitrate ER Tablet (IMDUR) 120 milliGRAM(s) Oral daily  lactulose Syrup 20 Gram(s) Oral two times a day  metoprolol succinate ER 25 milliGRAM(s) Oral daily  NIFEdipine  milliGRAM(s) Oral daily  polyethylene glycol 3350 17 Gram(s) Oral every 12 hours  senna 2 Tablet(s) Oral at bedtime  sodium bicarbonate 650 milliGRAM(s) Oral three times a day    MEDICATIONS  (PRN):  acetaminophen    Suspension .. 650 milliGRAM(s) Oral every 6 hours PRN Mild Pain (1 - 3)  simethicone 80 milliGRAM(s) Chew two times a day PRN Upset Stomach        T(F): 97.4 (11-01-21 @ 17:03), Max: 98.2 (10-31-21 @ 19:23)  HR: 45 (11-01-21 @ 17:03) (43 - 53)  BP: 131/67 (11-01-21 @ 17:03) (116/54 - 208/83)  BP(mean): --  RR: 18 (11-01-21 @ 17:03) (18 - 18)  SpO2: 100% (11-01-21 @ 17:03) (98% - 100%)    PHYSICAL EXAM:     GENERAL: NAD, lying in bed comfortably.  HEAD:  Atraumatic, normocephalic.  CHEST/LUNG: CTAB. No rales, rhonchi, wheezing, or rubs. Unlabored respirations.  HEART: Bradycardic, no M/R/G, normal S1/S2.  ABDOMEN: Soft, normoactive bowel sounds.  EXTREMITIES:  2+ peripheral pulses b/l. No clubbing, cyanosis, or edema.  SKIN: No rashes or lesions.  PSYCH: Normal mood, affect.      LABS:                        10.1   7.35  )-----------( 718      ( 01 Nov 2021 10:52 )             32.7     11-01    137  |  103  |  34<H>  ----------------------------<  105<H>  3.8   |  21<L>  |  2.93<H>    Ca    9.4      01 Nov 2021 10:52  Phos  2.9     11-01  Mg     2.0     11-01    TPro  6.8  /  Alb  3.9  /  TBili  0.2  /  DBili  x   /  AST  14  /  ALT  9<L>  /  AlkPhos  92  11-01            Creatinine Trend: 2.93<--, 3.09<--, 3.01<--, 2.71<--, 2.80<--, 2.71<--  I&O's Summary    31 Oct 2021 07:01  -  01 Nov 2021 07:00  --------------------------------------------------------  IN: 0 mL / OUT: 650 mL / NET: -650 mL    01 Nov 2021 07:01  -  01 Nov 2021 18:33  --------------------------------------------------------  IN: 0 mL / OUT: 350 mL / NET: -350 mL

## 2021-11-01 NOTE — PROGRESS NOTE ADULT - ASSESSMENT
70yoM w/ PMHx of CKD IV (unknown baseline), uncontrolled HTN, ?CAD (patient states he has "heart problems;" TTE performed at [facility] on 10/5 w/ normal LVF, severely enlarged LA, Grade III diastolic dysfunction, mild pulm HTN) who was transfered from [facility] to where he presented w/ hx of multiple falls, found to have acute right MCA territory ischemic infarct w/ extensive chronic microvascular ischemic changes on CT, acute/subacute right-sided MCA distribution infarct with associated cytotoxic edema, on MRI, and Pcomm aneurysm vs thrombus [id number], mild R M1 stenosis on MRA.

## 2021-11-01 NOTE — PROGRESS NOTE ADULT - SUBJECTIVE AND OBJECTIVE BOX
Patient seen and examined  no complaints    No Known Allergies    Hospital Medications:   MEDICATIONS  (STANDING):  aspirin  chewable 81 milliGRAM(s) Oral daily  atorvastatin 20 milliGRAM(s) Oral at bedtime  bisacodyl 5 milliGRAM(s) Oral at bedtime  cloNIDine 0.3 milliGRAM(s) Oral every 8 hours  clopidogrel Tablet 75 milliGRAM(s) Oral daily  doxazosin 4 milliGRAM(s) Oral at bedtime  hydrALAZINE 100 milliGRAM(s) Oral <User Schedule>  influenza   Vaccine 0.5 milliLiter(s) IntraMuscular once  isosorbide   mononitrate ER Tablet (IMDUR) 120 milliGRAM(s) Oral daily  lactulose Syrup 20 Gram(s) Oral two times a day  metoprolol succinate ER 25 milliGRAM(s) Oral daily  NIFEdipine  milliGRAM(s) Oral daily  polyethylene glycol 3350 17 Gram(s) Oral every 12 hours  senna 2 Tablet(s) Oral at bedtime  sodium bicarbonate 650 milliGRAM(s) Oral three times a day      VITALS:  T(F): 97.9 (11-01-21 @ 12:02), Max: 98.2 (10-31-21 @ 15:42)  HR: 44 (11-01-21 @ 12:26)  BP: 127/65 (11-01-21 @ 12:26)  RR: 18 (11-01-21 @ 12:02)  SpO2: 100% (11-01-21 @ 12:26)  Wt(kg): --    10-31 @ 07:01  -  11-01 @ 07:00  --------------------------------------------------------  IN: 0 mL / OUT: 650 mL / NET: -650 mL        Physical Exam :-  Constitutional: NAD  Neck: Supple.  Respiratory: Bilateral equal breath sounds, few Crackles present.  Cardiovascular: S1, S2 normal, positive Murmur  Gastrointestinal: Bowel Sounds present, soft, non tender.  Extremities: no Edema Feet  Neurological: Alert and Oriented x 3  Psychiatric: Normal mood, normal affect    LABS:  11-01    137  |  103  |  34<H>  ----------------------------<  105<H>  3.8   |  21<L>  |  2.93<H>    Ca    9.4      01 Nov 2021 10:52  Phos  2.9     11-01  Mg     2.0     11-01    TPro  6.8  /  Alb  3.9  /  TBili  0.2  /  DBili      /  AST  14  /  ALT  9<L>  /  AlkPhos  92  11-01    Creatinine Trend: 2.93 <--, 3.09 <--, 3.01 <--, 2.71 <--, 2.80 <--, 2.71 <--, 3.12 <--                        10.1   7.35  )-----------( 718      ( 01 Nov 2021 10:52 )             32.7     Urine Studies:      RADIOLOGY & ADDITIONAL STUDIES:

## 2021-11-02 LAB
ALBUMIN SERPL ELPH-MCNC: 3.9 G/DL — SIGNIFICANT CHANGE UP (ref 3.3–5)
ALP SERPL-CCNC: 100 U/L — SIGNIFICANT CHANGE UP (ref 40–120)
ALT FLD-CCNC: 13 U/L — SIGNIFICANT CHANGE UP (ref 10–45)
ANION GAP SERPL CALC-SCNC: 14 MMOL/L — SIGNIFICANT CHANGE UP (ref 5–17)
AST SERPL-CCNC: 17 U/L — SIGNIFICANT CHANGE UP (ref 10–40)
BASOPHILS # BLD AUTO: 0.07 K/UL — SIGNIFICANT CHANGE UP (ref 0–0.2)
BASOPHILS NFR BLD AUTO: 1.1 % — SIGNIFICANT CHANGE UP (ref 0–2)
BILIRUB SERPL-MCNC: 0.3 MG/DL — SIGNIFICANT CHANGE UP (ref 0.2–1.2)
BUN SERPL-MCNC: 30 MG/DL — HIGH (ref 7–23)
CALCIUM SERPL-MCNC: 9.8 MG/DL — SIGNIFICANT CHANGE UP (ref 8.4–10.5)
CARDIOLIPIN AB SER-ACNC: NEGATIVE — SIGNIFICANT CHANGE UP
CHLORIDE SERPL-SCNC: 102 MMOL/L — SIGNIFICANT CHANGE UP (ref 96–108)
CO2 SERPL-SCNC: 20 MMOL/L — LOW (ref 22–31)
CREAT SERPL-MCNC: 2.73 MG/DL — HIGH (ref 0.5–1.3)
EOSINOPHIL # BLD AUTO: 0.37 K/UL — SIGNIFICANT CHANGE UP (ref 0–0.5)
EOSINOPHIL NFR BLD AUTO: 5.6 % — SIGNIFICANT CHANGE UP (ref 0–6)
GLUCOSE SERPL-MCNC: 98 MG/DL — SIGNIFICANT CHANGE UP (ref 70–99)
HCT VFR BLD CALC: 36.3 % — LOW (ref 39–50)
HGB BLD-MCNC: 11 G/DL — LOW (ref 13–17)
IMM GRANULOCYTES NFR BLD AUTO: 0.2 % — SIGNIFICANT CHANGE UP (ref 0–1.5)
LYMPHOCYTES # BLD AUTO: 1.54 K/UL — SIGNIFICANT CHANGE UP (ref 1–3.3)
LYMPHOCYTES # BLD AUTO: 23.2 % — SIGNIFICANT CHANGE UP (ref 13–44)
MAGNESIUM SERPL-MCNC: 2.3 MG/DL — SIGNIFICANT CHANGE UP (ref 1.6–2.6)
MCHC RBC-ENTMCNC: 29.4 PG — SIGNIFICANT CHANGE UP (ref 27–34)
MCHC RBC-ENTMCNC: 30.3 GM/DL — LOW (ref 32–36)
MCV RBC AUTO: 97.1 FL — SIGNIFICANT CHANGE UP (ref 80–100)
MONOCYTES # BLD AUTO: 0.63 K/UL — SIGNIFICANT CHANGE UP (ref 0–0.9)
MONOCYTES NFR BLD AUTO: 9.5 % — SIGNIFICANT CHANGE UP (ref 2–14)
NEUTROPHILS # BLD AUTO: 4.03 K/UL — SIGNIFICANT CHANGE UP (ref 1.8–7.4)
NEUTROPHILS NFR BLD AUTO: 60.4 % — SIGNIFICANT CHANGE UP (ref 43–77)
NRBC # BLD: 0 /100 WBCS — SIGNIFICANT CHANGE UP (ref 0–0)
PHOSPHATE SERPL-MCNC: 3.2 MG/DL — SIGNIFICANT CHANGE UP (ref 2.5–4.5)
PLATELET # BLD AUTO: 728 K/UL — HIGH (ref 150–400)
POTASSIUM SERPL-MCNC: 4.3 MMOL/L — SIGNIFICANT CHANGE UP (ref 3.5–5.3)
POTASSIUM SERPL-SCNC: 4.3 MMOL/L — SIGNIFICANT CHANGE UP (ref 3.5–5.3)
PROT SERPL-MCNC: 7.3 G/DL — SIGNIFICANT CHANGE UP (ref 6–8.3)
RBC # BLD: 3.74 M/UL — LOW (ref 4.2–5.8)
RBC # FLD: 15.5 % — HIGH (ref 10.3–14.5)
SODIUM SERPL-SCNC: 136 MMOL/L — SIGNIFICANT CHANGE UP (ref 135–145)
WBC # BLD: 6.65 K/UL — SIGNIFICANT CHANGE UP (ref 3.8–10.5)
WBC # FLD AUTO: 6.65 K/UL — SIGNIFICANT CHANGE UP (ref 3.8–10.5)

## 2021-11-02 PROCEDURE — 99232 SBSQ HOSP IP/OBS MODERATE 35: CPT | Mod: GC

## 2021-11-02 PROCEDURE — 99233 SBSQ HOSP IP/OBS HIGH 50: CPT | Mod: GC

## 2021-11-02 PROCEDURE — 74263 CT COLONOGRAPHY SCREENING: CPT | Mod: 26

## 2021-11-02 PROCEDURE — 74018 RADEX ABDOMEN 1 VIEW: CPT | Mod: 26

## 2021-11-02 RX ORDER — HYDRALAZINE HCL 50 MG
100 TABLET ORAL
Refills: 0 | Status: DISCONTINUED | OUTPATIENT
Start: 2021-11-02 | End: 2021-11-03

## 2021-11-02 RX ADMIN — Medication 81 MILLIGRAM(S): at 11:16

## 2021-11-02 RX ADMIN — Medication 650 MILLIGRAM(S): at 21:35

## 2021-11-02 RX ADMIN — Medication 650 MILLIGRAM(S): at 13:31

## 2021-11-02 RX ADMIN — Medication 5 MILLIGRAM(S): at 21:35

## 2021-11-02 RX ADMIN — CLOPIDOGREL BISULFATE 75 MILLIGRAM(S): 75 TABLET, FILM COATED ORAL at 11:16

## 2021-11-02 RX ADMIN — Medication 100 MILLIGRAM(S): at 17:19

## 2021-11-02 RX ADMIN — Medication 0.3 MILLIGRAM(S): at 13:31

## 2021-11-02 RX ADMIN — Medication 0.3 MILLIGRAM(S): at 21:35

## 2021-11-02 RX ADMIN — ISOSORBIDE MONONITRATE 120 MILLIGRAM(S): 60 TABLET, EXTENDED RELEASE ORAL at 11:16

## 2021-11-02 RX ADMIN — Medication 120 MILLIGRAM(S): at 11:16

## 2021-11-02 RX ADMIN — SENNA PLUS 2 TABLET(S): 8.6 TABLET ORAL at 21:34

## 2021-11-02 RX ADMIN — ATORVASTATIN CALCIUM 20 MILLIGRAM(S): 80 TABLET, FILM COATED ORAL at 21:35

## 2021-11-02 RX ADMIN — Medication 100 MILLIGRAM(S): at 21:35

## 2021-11-02 RX ADMIN — Medication 4 MILLIGRAM(S): at 21:35

## 2021-11-02 RX ADMIN — Medication 100 MILLIGRAM(S): at 11:16

## 2021-11-02 NOTE — PROGRESS NOTE ADULT - ATTENDING COMMENTS
71yo M with PMH of CKD (baseline Cr >3), HTN, HLD who presents as transfer from OSH for cerebral aneurysm. Advanced GI consulted for a liver mass (to be biopsied off DAPT x 3 weeks).  Unsuccessful colonoscopy due to sharp angulation  Pending virtual colonoscopy to rule out primary colon malignancy  When plavix off x 5 days will plan for EGD/EUS with FNA of lymph node    Thank you for this interesting consult.  Please call the advanced GI service with any questions or concerns.

## 2021-11-02 NOTE — PROGRESS NOTE ADULT - ASSESSMENT
70yoM w/ PMHx of CKD with a baseline Cr >3, HTN, HLD presented to OSH after multiple falls. He was transferred from OSH for MRI/MRA findings concerning for aneurysm vs. thrombus       1) CKD stage 4- overall stable  Likely has Hypertensive Nephropathy-> b/l 2.8mg/dl to 3.1mg/dl  urine pro/cr 1.4grams  K/L 11.16/6.72---> ratio is < 2---> monitor for now  Renal US --> right kidney 9.3cm and left 9.5 cm with b/l renal cysts  s/p Coiling for PCOMM  Cr  fluctuating however overall stable- cont monitor with daily basic metabolic panel   s/p NIELS --> no signs of NSF--> will cont to monitor  avoid nephrotoxins such as ace/arb/nsaid/IV contrast  trend bmp    2) HTN-  bp improving  on nifedipine clonidine  hydralazine, doxazosin  Imdur 120mg po qd  Trend bp    3) metabolic acidosis- likely from ckd-   na bicarb 650mg po tid  trend bicarb      Dr Hernadez  Nephrology   cell [phone]  office [phone]  ans serv- [phone]  [email or web address] ( wkend coverage for Hospital)

## 2021-11-02 NOTE — PROGRESS NOTE ADULT - ASSESSMENT
69 yo male with CKD (baseline Cr > 3), HTN, and HLD who presented as a transfer from [facility] for higher level of care. Patient initially presented to [facility] on 10/01 after multiple falls the since the day prior. MRI Head at [facility] showed acute vs subacute R MCA territory infarct with associated cytotoxic edema. MRA H showed multilobulated right-sided P-comm aneurysm directed posteriorly and laterally, measuring 8.9 x 7.3 mm.   A1c 5.5, LDL 25.  TTE: EF 65-70%, severely enlarged L atrium.   MRi with R MCA territory infarct   vessels with PCOM on R aneurysm    s/p angio and coil of PCOM aneurysm.   Impression: AMS + mild L hemineglect in setting of R MCA territory infarct seen on MRI Head, ESUS.etiology fo stroke may be hypercaog from maligiancy   o/e LUE drift doing well.  MRI abd with concern for hepatic mass/cholangiocarcinoma   - malignancy workup, liver bx.  [id number], GI following pending CT colonography  - send APLS labs. beta 2 glycoprotein antibodies, cardiolipin antibodies and lupus anticoagulant   - Dual antiplatelet therapy with ASA 81mg PO daily and Clopidogrel 75mg PO daily x 3 weeks followed by monotherapy with ASA 81mg PO daily. can hold for biopsy   - lipitor 40. should be high dose   - ARU and PRU therapeutic   - cardio for ILR. cardio following can do outpt   - telemetry  - PT/OT/SS/SLP, OOBC  - check FS, glucose control <180  - GI/DVT ppx  - Counseling on diet, exercise, and medication adherence was done  - Counseling on smoking cessation and alcohol consumption offered when appropriate.  - Pain assessed and judicious use of narcotics when appropriate was discussed.    - Stroke education given when appropriate.  - Importance of fall prevention discussed.   - Differential diagnosis and plan of care discussed with patient and/or family and primary team  - Thank you for allowing me to participate in the care of this patient. Call with questions.    - d/c plan eventually rehab CLYDE Pierre MD  Vascular Neurology  Office: [phone] .

## 2021-11-02 NOTE — PROGRESS NOTE ADULT - SUBJECTIVE AND OBJECTIVE BOX
Internal Medicine   More Weber | PGY-1    OVERNIGHT EVENTS: KATIE      SUBJECTIVE: Patient was seen and examined at bedside this morning. No acute complaints. Denies any nausea, abdominal pain, CP, SOB, dizziness, imbalance. Patient responding appropriately to questions and able to make needs known.       MEDICATIONS  (STANDING):  aspirin  chewable 81 milliGRAM(s) Oral daily  atorvastatin 20 milliGRAM(s) Oral at bedtime  bisacodyl 5 milliGRAM(s) Oral at bedtime  cloNIDine 0.3 milliGRAM(s) Oral every 8 hours  clopidogrel Tablet 75 milliGRAM(s) Oral daily  doxazosin 4 milliGRAM(s) Oral at bedtime  hydrALAZINE 100 milliGRAM(s) Oral <User Schedule>  influenza   Vaccine 0.5 milliLiter(s) IntraMuscular once  isosorbide   mononitrate ER Tablet (IMDUR) 120 milliGRAM(s) Oral daily  lactulose Syrup 20 Gram(s) Oral two times a day  metoprolol succinate ER 25 milliGRAM(s) Oral daily  NIFEdipine  milliGRAM(s) Oral daily  polyethylene glycol 3350 17 Gram(s) Oral every 12 hours  senna 2 Tablet(s) Oral at bedtime  sodium bicarbonate 650 milliGRAM(s) Oral three times a day    MEDICATIONS  (PRN):  acetaminophen    Suspension .. 650 milliGRAM(s) Oral every 6 hours PRN Mild Pain (1 - 3)  simethicone 80 milliGRAM(s) Chew two times a day PRN Upset Stomach        T(F): 98.6 (11-02-21 @ 15:44), Max: 98.9 (11-02-21 @ 07:27)  HR: 45 (11-02-21 @ 15:44) (43 - 54)  BP: 130/75 (11-02-21 @ 15:44) (128/60 - 168/82)  BP(mean): --  RR: 18 (11-02-21 @ 15:44) (17 - 18)  SpO2: 98% (11-02-21 @ 15:44) (98% - 100%)    PHYSICAL EXAM:     GENERAL: NAD, lying in bed comfortably.  HEAD:  Atraumatic, normocephalic.  CHEST/LUNG: CTAB. No rales, rhonchi, wheezing, or rubs. Unlabored respirations.  HEART: +Bradycardic, no M/R/G, normal S1/S2.  ABDOMEN: Normoactive bowel sounds.  EXTREMITIES:  2+ peripheral pulses b/l. No clubbing, cyanosis, or edema.  MSK: No gross deformities noted.   PSYCH: Normal mood, affect.        LABS:                        11.0   6.65  )-----------( 728      ( 02 Nov 2021 06:21 )             36.3     11-02    136  |  102  |  30<H>  ----------------------------<  98  4.3   |  20<L>  |  2.73<H>    Ca    9.8      02 Nov 2021 06:21  Phos  3.2     11-02  Mg     2.3     11-02    TPro  7.3  /  Alb  3.9  /  TBili  0.3  /  DBili  x   /  AST  17  /  ALT  13  /  AlkPhos  100  11-02            Creatinine Trend: 2.73<--, 2.93<--, 3.09<--, 3.01<--, 2.71<--, 2.80<--  I&O's Summary    01 Nov 2021 07:01  -  02 Nov 2021 07:00  --------------------------------------------------------  IN: 0 mL / OUT: 950 mL / NET: -950 mL    02 Nov 2021 07:01  -  02 Nov 2021 16:45  --------------------------------------------------------  IN: 0 mL / OUT: 1750 mL / NET: -1750 mL      BNP    RADIOLOGY & ADDITIONAL STUDIES:

## 2021-11-02 NOTE — PROGRESS NOTE ADULT - SUBJECTIVE AND OBJECTIVE BOX
Patient seen and examined  no complaints except that he feels hungry    No Known Allergies    Hospital Medications:   MEDICATIONS  (STANDING):  aspirin  chewable 81 milliGRAM(s) Oral daily  atorvastatin 20 milliGRAM(s) Oral at bedtime  bisacodyl 5 milliGRAM(s) Oral at bedtime  cloNIDine 0.3 milliGRAM(s) Oral every 8 hours  clopidogrel Tablet 75 milliGRAM(s) Oral daily  doxazosin 4 milliGRAM(s) Oral at bedtime  hydrALAZINE 100 milliGRAM(s) Oral <User Schedule>  influenza   Vaccine 0.5 milliLiter(s) IntraMuscular once  isosorbide   mononitrate ER Tablet (IMDUR) 120 milliGRAM(s) Oral daily  lactulose Syrup 20 Gram(s) Oral two times a day  metoprolol succinate ER 25 milliGRAM(s) Oral daily  NIFEdipine  milliGRAM(s) Oral daily  polyethylene glycol 3350 17 Gram(s) Oral every 12 hours  senna 2 Tablet(s) Oral at bedtime  sodium bicarbonate 650 milliGRAM(s) Oral three times a day        VITALS:  T(F): 97.9 (11-02-21 @ 11:15), Max: 98.9 (11-02-21 @ 07:27)  HR: 44 (11-02-21 @ 11:50)  BP: 168/82 (11-02-21 @ 11:50)  RR: 18 (11-02-21 @ 11:15)  SpO2: 98% (11-02-21 @ 11:15)  Wt(kg): --    11-01 @ 07:01  -  11-02 @ 07:00  --------------------------------------------------------  IN: 0 mL / OUT: 950 mL / NET: -950 mL    11-02 @ 07:01  -  11-02 @ 14:43  --------------------------------------------------------  IN: 0 mL / OUT: 1750 mL / NET: -1750 mL        Physical Exam :-  Constitutional: NAD  Neck: Supple.  Respiratory: Bilateral equal breath sounds, few Crackles present.  Cardiovascular: S1, S2 normal, positive Murmur  Gastrointestinal: Bowel Sounds present, soft, non tender.  Extremities: no Edema Feet  Neurological: Alert and Oriented x 3  Psychiatric: Normal mood, normal affect    LABS:  11-02    136  |  102  |  30<H>  ----------------------------<  98  4.3   |  20<L>  |  2.73<H>    Ca    9.8      02 Nov 2021 06:21  Phos  3.2     11-02  Mg     2.3     11-02    TPro  7.3  /  Alb  3.9  /  TBili  0.3  /  DBili      /  AST  17  /  ALT  13  /  AlkPhos  100  11-02    Creatinine Trend: 2.73 <--, 2.93 <--, 3.09 <--, 3.01 <--, 2.71 <--, 2.80 <--, 2.71 <--                        11.0   6.65  )-----------( 728      ( 02 Nov 2021 06:21 )             36.3     Urine Studies:      RADIOLOGY & ADDITIONAL STUDIES:

## 2021-11-02 NOTE — PROGRESS NOTE ADULT - SUBJECTIVE AND OBJECTIVE BOX
Interval Events:   - Was unable to receive colonoscopy 2/2 angulation in sigmoid, awaiting MRI colonography to r/o metastatic malignancy  - Pt denies abd pain, n/v/f/c      Allergies:  No Known Allergies      Hospital Medications:  acetaminophen    Suspension .. 650 milliGRAM(s) Oral every 6 hours PRN  aspirin  chewable 81 milliGRAM(s) Oral daily  atorvastatin 20 milliGRAM(s) Oral at bedtime  bisacodyl 5 milliGRAM(s) Oral at bedtime  bisacodyl 5 milliGRAM(s) Oral once PRN  cloNIDine 0.3 milliGRAM(s) Oral every 8 hours  clopidogrel Tablet 75 milliGRAM(s) Oral daily  doxazosin 4 milliGRAM(s) Oral at bedtime  hydrALAZINE 100 milliGRAM(s) Oral <User Schedule>  influenza   Vaccine 0.5 milliLiter(s) IntraMuscular once  isosorbide   mononitrate ER Tablet (IMDUR) 120 milliGRAM(s) Oral daily  lactulose Syrup 20 Gram(s) Oral two times a day  metoprolol succinate ER 25 milliGRAM(s) Oral daily  NIFEdipine XL 90 milliGRAM(s) Oral daily  polyethylene glycol 3350 17 Gram(s) Oral every 12 hours  polyethylene glycol/electrolyte Solution. 4000 milliLiter(s) Oral once  senna 2 Tablet(s) Oral at bedtime  simethicone 80 milliGRAM(s) Chew two times a day PRN  sodium bicarbonate 650 milliGRAM(s) Oral three times a day      PMHX/PSHX:  Frequent falls    Hypertension    Chronic kidney disease, unspecified CKD stage        Family history:      ROS:   General:  No wt loss, fevers, chills, night sweats, fatigue,   Eyes:  Good vision, no reported pain  ENT:  No sore throat, pain, runny nose, dysphagia  CV:  No pain, palpitations, hypo/hypertension  Pulm:  No dyspnea, cough, tachypnea, wheezing  GI:  As per HPI  :  No pain, bleeding, incontinence, nocturia  Muscle:  No pain, weakness  Neuro:  No weakness, tingling, memory problems  Psych:  No fatigue, insomnia, mood problems, depression  Endocrine:  No polyuria, polydipsia, cold/heat intolerance  Heme:  No petechiae, ecchymosis, easy bruisability  Skin:  No rash, tattoos, scars, edema      PHYSICAL EXAM:   Vital Signs Last 24 Hrs  T(C): 36.6 (02 Nov 2021 11:15), Max: 37.2 (02 Nov 2021 07:27)  T(F): 97.9 (02 Nov 2021 11:15), Max: 98.9 (02 Nov 2021 07:27)  HR: 44 (02 Nov 2021 11:50) (43 - 54)  BP: 168/82 (02 Nov 2021 11:50) (127/65 - 168/82)  BP(mean): --  RR: 18 (02 Nov 2021 11:15) (17 - 18)  SpO2: 98% (02 Nov 2021 11:15) (98% - 100%)      GENERAL:  No acute distress  HEENT:  Normocephalic/atraumatic, no scleral icterus  CHEST:  No accessory muscle use  HEART:  Regular rate and rhythm  ABDOMEN:  Soft, non-tender, non-distended  EXTREMITIES: No cyanosis, clubbing, or edema  SKIN:  No rash  NEURO:  Alert and oriented x 3, no asterixis    LABS:                        11.0   6.65  )-----------( 728      ( 02 Nov 2021 06:21 )             36.3     11-02    136  |  102  |  30<H>  ----------------------------<  98  4.3   |  20<L>  |  2.73<H>    Ca    9.8      02 Nov 2021 06:21  Phos  3.2     11-02  Mg     2.3     11-02    TPro  7.3  /  Alb  3.9  /  TBili  0.3  /  DBili  x   /  AST  17  /  ALT  13  /  AlkPhos  100  11-02    LIVER FUNCTIONS - ( 02 Nov 2021 06:21 )  Alb: 3.9 g/dL / Pro: 7.3 g/dL / ALK PHOS: 100 U/L / ALT: 13 U/L / AST: 17 U/L / GGT: x                   Imaging:

## 2021-11-02 NOTE — PROGRESS NOTE ADULT - PROBLEM SELECTOR PLAN 5
- w/ some fluctuations of high and low BPs  - c/w imdur, clonidine, hydral, procardia  - Hypertensive to 215/98 ON 10/28 - procardia increased to 120 with improvement  - KDOQI guideline suggests the usage of thiazides for CKD I-III and loop diuretics for CKD IV+ d/t decreased effectiveness. However there is literature evidence citing no difference in effectiveness of thiazide vs loop diuretics in CKD IV+ pts. In 2012 a double-blinded randomized trial by Fabio et al (doi 10.1111/j.4875-7602.2011.64798.x) demonstrated no difference between HCTZ and Furosemide treatment in lowering BP (average decrease in systolic ~10) in CKD IV+ pts with average age of 65, good external validity for this pt. If Procardia 90-->120 does not achieve sufficient antihypertensive effect, consider thiazide as next line management vs. changing metoprolol to coreg baby dose  - continue to monitor

## 2021-11-02 NOTE — PROGRESS NOTE ADULT - PROBLEM SELECTOR PLAN 1
MRI abd shows central hepatic mass for which the primary differential consideration is cholangiocarcinoma and adenopathy in the peripancreatic, savage hepatis and portacaval regions.  - CEA elevated, Ca 19-9 mildly elevated  - Surgical Onc and hepatology recs appreciated  - Would require biopsy for diagnosis  - IR consulted, deferred IR guided bx as patient on DAPT - would need to be held prior to procedure  - [facility] consulted, recs appreciated. Per their review, likely iCCA, recommending against biopsy at this time due to high risk of seeding   - s/p flex sig 10/29; unable to do colonoscopy as colonic angle too sharp  - CT colonography planned for today, pending confirmation from radiology - if unable to obtain today will plan for OP   - will need EGD to clear upper GI tract +/- EUS/FNA of LN if malignant appearing once off plavix x 5 days (needs 3 weeks of plavix per neurosugery) - can be done outpatient   - will need outpatient PET scan  - formal heme/onc consult pending biopsy results

## 2021-11-02 NOTE — PROGRESS NOTE ADULT - SUBJECTIVE AND OBJECTIVE BOX
Neurology Progress Note    S: Patient seen and examined on floor. no complaints resting in bed     Medication:  MEDICATIONS  (STANDING):  aspirin  chewable 81 milliGRAM(s) Oral daily  atorvastatin 20 milliGRAM(s) Oral at bedtime  bisacodyl 5 milliGRAM(s) Oral at bedtime  cloNIDine 0.3 milliGRAM(s) Oral every 8 hours  clopidogrel Tablet 75 milliGRAM(s) Oral daily  doxazosin 4 milliGRAM(s) Oral at bedtime  hydrALAZINE 100 milliGRAM(s) Oral <User Schedule>  influenza   Vaccine 0.5 milliLiter(s) IntraMuscular once  isosorbide   mononitrate ER Tablet (IMDUR) 120 milliGRAM(s) Oral daily  lactulose Syrup 20 Gram(s) Oral two times a day  metoprolol succinate ER 25 milliGRAM(s) Oral daily  NIFEdipine  milliGRAM(s) Oral daily  polyethylene glycol 3350 17 Gram(s) Oral every 12 hours  senna 2 Tablet(s) Oral at bedtime  sodium bicarbonate 650 milliGRAM(s) Oral three times a day    MEDICATIONS  (PRN):  acetaminophen    Suspension .. 650 milliGRAM(s) Oral every 6 hours PRN Mild Pain (1 - 3)  simethicone 80 milliGRAM(s) Chew two times a day PRN Upset Stomach      Vitals:  Vital Signs Last 24 Hrs  T(C): 37 (02 Nov 2021 15:44), Max: 37.2 (02 Nov 2021 07:27)  T(F): 98.6 (02 Nov 2021 15:44), Max: 98.9 (02 Nov 2021 07:27)  HR: 45 (02 Nov 2021 15:44) (43 - 54)  BP: 130/75 (02 Nov 2021 15:44) (128/60 - 168/82)  BP(mean): --  RR: 18 (02 Nov 2021 15:44) (17 - 18)  SpO2: 98% (02 Nov 2021 15:44) (98% - 100%)      Orthostatic VS  10-20-21 @ 11:10  Lying BP: 183/88 HR: 46  Sitting BP: 163/81 HR: 48  Standing BP: 152/51 HR: --  Site: upper right arm  Mode: electronic    Orthostatic VS  10-20-21 @ 11:10  Lying BP: 183/88 HR: 46  Sitting BP: 163/81 HR: 48  Standing BP: 152/51 HR: --  Site: upper right arm  Mode: electronic      Orthostatic VS  10-16-21 @ 07:27  Lying BP: 198/98 HR: 46  Sitting BP: 182/95 HR: 50  Standing BP: 174/96 HR: 58  Site: --  Mode: --  Orthostatic VS  10-15-21 @ 16:00  Lying BP: 163/76 HR: 49  Sitting BP: 136/77 HR: 51  Standing BP: 136/70 HR: 59  Site: upper right arm  Mode: electronic        General Exam:   General Appearance: Appropriately dressed and in no acute distress       Head: Normocephalic, atraumatic and no dysmorphic features  Ear, Nose, and Throat: Moist mucous membranes  CVS: S1S2+  Resp: No SOB, no wheeze or rhonchi  Abd: soft NTND  Extremities: No edema, no cyanosis  Skin: No bruises, no rashes     Neurological Exam:  Mental Status: Awake, alert and oriented x 3.  Able to follow simple and complex verbal commands. Able to name and repeat. fluent speech. No obvious aphasia or dysarthria noted.   Cranial Nerves: PERRL, EOMI, VFFC, sensation V1-V3 intact,  no obvious facial asymmetry , equal elevation of palate, scm/trap 5/5, tongue is midline on protrusion. no obvious papilledema on fundoscopic exam. Hearing is grossly intact.   Motor: Normal bulk, tone and strength throughout. Fine finger movements were intact and symmetric. no tremors or drift noted except LUE with 4+/5 mild drift noted   Sensation: Intact to light touch and pinprick throughout. no right/left confusion. no extinction to tactile on DSS.    Reflexes: 1+ throughout at biceps, brachioradialis, triceps, patellars and ankles bilaterally and equal. No clonus. R toe and L toe were both downgoing.  Coordination: No dysmetria on FNF   Gait: deferred in ICU    I personally reviewed the below data/images/labs:    CBC Full  -  ( 02 Nov 2021 06:21 )  WBC Count : 6.65 K/uL  RBC Count : 3.74 M/uL  Hemoglobin : 11.0 g/dL  Hematocrit : 36.3 %  Platelet Count - Automated : 728 K/uL  Mean Cell Volume : 97.1 fl  Mean Cell Hemoglobin : 29.4 pg  Mean Cell Hemoglobin Concentration : 30.3 gm/dL  Auto Neutrophil # : 4.03 K/uL  Auto Lymphocyte # : 1.54 K/uL  Auto Monocyte # : 0.63 K/uL  Auto Eosinophil # : 0.37 K/uL  Auto Basophil # : 0.07 K/uL  Auto Neutrophil % : 60.4 %  Auto Lymphocyte % : 23.2 %  Auto Monocyte % : 9.5 %  Auto Eosinophil % : 5.6 %  Auto Basophil % : 1.1 %    < from: CT Head No Cont (10.01.21 @ 18:37) >    EXAM:  CT BRAIN                              PROCEDURE DATE:  10/01/2021          INTERPRETATION:  CT OF THE HEAD WITHOUT CONTRAST    CLINICAL INDICATION: Weakness. Falls.    TECHNIQUE: Volumetric CT acquisition was performed through the brain and reviewed using brain and bone window technique. Dose optimization techniques were utilized including kVp/mA modulation along with iterative reconstructions.      COMPARISON: No prior studies have been submitted for comparison.    FINDINGS:    The ventricular and sulcal size and configuration is age appropriate.   There is no acute loss of gray-white differentiation. There are extensive patchy and confluent areas of hypodensity in the periventricular and subcortical white matter which are non-specific but likely related  chronic microangiopathic ischemic changes.  Small chronic infarction in the right cerebellar hemisphere. Chronic appearing left thalamic lacunar infarct.    There is no evidence of mass effect, midline shift, acute intracranial hemorrhage, or extra-axial collections.     The calvarium is intact. The paranasal sinuses are clear.The mastoid air cells are predominantly clear. The orbits are unremarkable.      IMPRESSION:  No acute intracranial hemorrhage or acute territorial infarction. Extensive chronic microvascular ischemic changes and several chronic infarctions as described. Further evaluation may be obtained with MRI of the brain if no contraindications.    --- End of Report ---            KAMILA SZYMANSKI MD; Attending Radiologist  This document has been electronically signed. Oct  1 2021  7:25PM    < end of copied text >  < from: MR Head No Cont (10.04.21 @ 18:09) >    EXAM:  MR BRAIN                            PROCEDURE DATE:  10/04/2021          INTERPRETATION:  .    CLINICAL INFORMATION: Frequent falls.    TECHNIQUE: Multiplanar multisequential MRI of the brain was acquired without the administration of IV gadolinium.    COMPARISON: Prior CT examination of the head dated 10/1/2021.    FINDINGS: Multiple areas of restricted diffusion are seen within the right MCA territory affecting the right frontal, parietal, and temporal lobes. Associated T2/FLAIR prolongation is seen, compatible with cytotoxic edema. No hemorrhagic transformation is noted.    Chronic lacunar infarcts are again seen within the right cerebellar hemisphere, left thalamus, and bilateral basal ganglia.    Multiple patchy confluent nonspecific T2/FLAIR hyperintense signal changes are noted throughout the bihemispheric white matter without associated mass effect or restricted diffusion.    Mild ventriculomegaly appears unchanged. No abnormal intra-axial fluid collections are notable. Flow-voids are noted throughout the major intracranial vessels, on the T2 weighted images, consistent with their patency. The sellar area appears unremarkable. The paranasal sinuses and mastoid air cells are grossly clear. Calvarial signal appears unremarkable. The orbits appear within normal limits.    IMPRESSION: Acute/subacute right-sided MCA distribution infarct with associated cytotoxic edema and without hemorrhagic transformation.    Multiple additional chronic lacunar infarcts and similar-appearing extensive chronic white matter microvascular type changes, as discussed.      --- End of Report ---            LUIS CARLOS BONNER MD; Attending Radiologist  This document has been electronically signed. Oct  5 2021  3:01PM    < end of copied text >  < from: MR Angio Head No Cont (10.06.21 @ 18:15) >    EXAM:  MR ANGIO BRAIN                            PROCEDURE DATE:  10/06/2021          INTERPRETATION:  INDICATION:  Right MCA infarct.    TECHNIQUE:  MR angiography of the brain was performed using three dimensional time-of-flight (3D-TOF) technique.  Imaging was performed on a 1.5 Lisa magnet.    FINDINGS:    INTERNAL CAROTID ARTERIES:  Bilaterally patent.    [place] OF [facility]:  A right-sided P-comm aneurysm is identified. The aneurysm is directed posteriorly and laterally, the aneurysm appears to be septated and/or bilobed., and measures 8.9 x 7.3 mm.    CEREBRAL ARTERIES:  Both anterior cerebral arteries are patent. Both A1 segments are well formed. Both middle cerebral arteries are patent. There is mild narrowing of the proximal right M1 segment.    VERTEBROBASILAR SYSTEM:  The vertebrobasilar system is patent. There are large posterior communicating arteries bilaterally with dominant supply of the posterior cerebral arteries. Left P1 segment is hypoplastic.    IMPRESSION:    1. 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  2. Mild narrowing of the proximal right middle cerebral artery in the M1 region.  3. The vessels are otherwise patent, as outlined above.    --- End of Report ---EXAM:  CT BRAIN                            PROCEDURE DATE:  10/13/2021      INTERPRETATION:  Noncontrast CT of the brain.    CLINICAL INDICATION: Status post rt pcomm aneurysm coiling    TECHNIQUE : Axial CT scanning of the brain was obtainedfrom the skull base to the vertex without the administration of intravenous contrast. Sagittal and coronal reformats were provided.    COMPARISON: CT brain 10/1/2021. MRI brain 10/4/2021    FINDINGS:    Interval placement of embolic coil mass right parasellar region.    Similar mild ventricular dilatation.    No midline shift. Basal cisterns poorly evaluated due to streak artifact.    No acute intracranial hemorrhage, mass effect, or brain edema. Extensive white matter microvascular ischemic disease.    The visualized paranasal sinuses and mastoid air cells are clear.    IMPRESSION:    Interval placement of embolic coil mass right parasellar region.  No acute intracranial hemorrhage, mass effect, or brain edema.  Similar mild ventricular dilatation.        --- End of Report ---      AFIA DAILY MD; Attending Radiologist  This document has been electronically signed. Oct 13 2021  9:31AM    < end of copied text >      < from: MR Head No Cont (10.15.21 @ 16:40) >    EXAM:  MR BRAIN                            PROCEDURE DATE:  10/15/2021    ATION:  MRI OF THE BRAIN WITHOUT CONTRAST    CLINICAL INDICATION: Status post right posterior communicating artery aneurysm status post balloon assisted coilembolization    TECHNIQUE: Multiplanar multisequence noncontrast MRI of the brain was performed.    COMPARISON: CT brain, 10/13/2021 and MRI brain, 10/4/2021.    FINDINGS:    The ventricular and sulcal size and configuration is age appropriate. Thereare scattered T2/FLAIR hyperintensities in the subcortical and periventricular white matter which are nonspecific but most commonly represent chronic microvascular ischemic changes.    The previously seen acute lacunar infarctions along left MCA distribution are decreased in diffusion restriction intensity, consistent with evolution of acute infarctions to late subacute stage. In addition, new punctate acute lacunar infarctions have developed in the posterior wall of the right temporal lobe and in the left cerebellar hemisphere along right parietal distribution. There is no susceptibility signal to suggest hemorrhagic conversion.  There is a chronic infarction in the right cerebellum and in the left thalamus.    There is susceptibility signalin the right paraclinoid region at the site of the previously coiled right P-comm aneurysm.    There is abnormal extra-axial fluid collection or hydrocephalus. The visualized globes are symmetric bilaterally.    The visualized paranasal sinuses and mastoid bones are adequately developed and aerated.    IMPRESSION:    Comparison previous studies,    There has been interval evolution of previously seen infarcts in right MCA distribution as described. There is development of new acute lacunar infarctions at the posterior pole of the right temporal lobe and in the right cerebellum as described.    There is no intracranial hemorrhage, midline shift or mass effect.    Coil mass in the right paraclinoid region.    Notification to clinician of alert:    Provider   Dr. BARGER  was notified about the final results at 10/15/2021 10:23 AM via telephone by neuroradiologist KEITH Tineo. Readback confirmation was obtained.      JULIO TINEO MD; Attending Radiologist  This document has been electronically signed. Oct 15 2021 10:35AM    < end of copied text >  < from: CT Chest No Cont (10.15.21 @ 23:27) >    EXAM:  CT CHEST                          INTERPRETATION:  CLINICAL INFORMATION: Shortness breath, evaluate for pulmonary infection or pulmonary edema.    COMPARISON: CT chest 9/6/2014. CT abdomen pelvis 10/11/2021.    CONTRAST/COMPLICATIONS:  IV Contrast: None  Oral Contrast: None  Complications: None    PROCEDURE:  CT of the Chest was performed.  Sagittal and coronal reformats were performed.    FINDINGS:    LUNGS AND AIRWAYS: Emphysema. No pulmonary nodules or parenchymal consolidations.  PLEURA: Small bilateral pleural effusions.    MEDIASTINUM AND RED: 1.2 cm right paratracheal lymph node (2:40), nonspecific. No axillary lymphadenopathy    VESSELS: Calcified atherosclerotic plaques in aorta and coronary arteries.    HEART: Multifocal cardiac enlargement is noted. No pericardial effusion. Mitral annular calcifications.    CHEST WALL AND LOWER NECK: Within normal limits.    VISUALIZED UPPER ABDOMEN: Partially imaged large hypodense hepatic lesion measuring 6.9 x 8.8 cm, similar in appearance compared to prior CT abdomen pelvis 10/11/2021. Simple cyst in the left kidney    BONES: Degenerative changes of the spine.    IMPRESSION:    Small bilateral pleural effusions with minimal bilateral groundglass opacities and a few areas of interlobular septal thickening. Findings are suggestive of mild interstitial edema. This is superimposed on a background of emphysema.    Redemonstration of large hypodense hepatic lesion, similar appearance whencompared to prior abdominal pelvis 10/11/2021. As mentioned on prior report, consider contrast enhanced MRI for further evaluation.      ALIYA BERTRAND MD; Resident Radiology  This document has been electronicallysigned.  ABRIL PINEDA MD; Attending Radiologist  This document has been electronically signed. Oct 16 2021  9:09AM    < end of copied text >  < from: MR Abdomen w/ IV Cont (10.22.21 @ 23:26) >    EXAM:  MR ABDOMEN IC                          PROCEDURE DATE:  10/22/2021      INTERPRETATION:  CLINICAL INFORMATION: Hepatic mass.    COMPARISON: Noncontrast CT abdomen and pelvis 10/11/2021    CONTRAST/COMPLICATIONS:  IV Contrast: Gadavist  7.5 cc administered   0 cc discarded  Oral Contrast: NONE  Complications: None reported at time of study completion    PROCEDURE:  MRI of the abdomen was performed.  MRCP was performed.    FINDINGS:  LOWER CHEST: Within normal limits.    LIVER: No morphologic evidence of cirrhosis. An 8.9 x 7.1 cm central hepatic lesion involving hepatic segments 4, 5 and 6 with possible extension into superior right hepatic lobe segments demonstrates restricted diffusion with peripheral rim enhancement and delayed central enhancement. While post contrast imaging is limited, MR characteristics strongly favor cholangiocarcinoma.  BILE DUCTS: Normal caliber.  GALLBLADDER: Cholelithiasis.  SPLEEN: Within normal limits.  PANCREAS: Within normal limits.  ADRENALS: Within normal limits.  KIDNEYS/URETERS: Bilateral renal cysts including a 3.3 cm hemorrhagic right renal cyst.    VISUALIZED PORTIONS:  BOWEL: Within normal limits.  PERITONEUM: No ascites.  VESSELS: Hepatic and portal veins are patent.  RETROPERITONEUM/LYMPH NODES: Upper abdominal adenopathy including peripancreatic, portacaval and savage hepatis nodes. Reference lesions as follows.    Peripancreatic node anterior to the portal vein (6:22) 3.7 x 2.6 cm.    Portacaval node (6:24) 2.9 x 1.5 cm.    ABDOMINAL WALL: Within normal limits.  BONES: Within normal limits.    IMPRESSION:  Limited post contrast imaging.    Central hepatic mass as above for which the primary differential consideration is cholangiocarcinoma.    Adenopathy in the peripancreatic, savage hepatis and portacaval regions.    --- End of Report ---      CARLOS ARMSTRONG MD; Attending Radiologist  This document has been electronically signed. Oct 23 2021  9:47AM    < end of copied text >

## 2021-11-02 NOTE — PROGRESS NOTE ADULT - SUBJECTIVE AND OBJECTIVE BOX
Subjective: Patient seen and examined. No new events except as noted.     REVIEW OF SYSTEMS:    CONSTITUTIONAL: No weakness, fevers or chills  EYES/ENT: No visual changes;  No vertigo or throat pain   NECK: No pain or stiffness  RESPIRATORY: No cough, wheezing, hemoptysis; No shortness of breath  CARDIOVASCULAR: No chest pain or palpitations  GASTROINTESTINAL: No abdominal or epigastric pain. No nausea, vomiting, or hematemesis; No diarrhea or constipation. No melena or hematochezia.  GENITOURINARY: No dysuria, frequency or hematuria  NEUROLOGICAL: No numbness or weakness  SKIN: No itching, burning, rashes, or lesions   All other review of systems is negative unless indicated above.    MEDICATIONS:  MEDICATIONS  (STANDING):  aspirin  chewable 81 milliGRAM(s) Oral daily  atorvastatin 20 milliGRAM(s) Oral at bedtime  bisacodyl 5 milliGRAM(s) Oral at bedtime  cloNIDine 0.3 milliGRAM(s) Oral every 8 hours  clopidogrel Tablet 75 milliGRAM(s) Oral daily  doxazosin 4 milliGRAM(s) Oral at bedtime  hydrALAZINE 100 milliGRAM(s) Oral <User Schedule>  influenza   Vaccine 0.5 milliLiter(s) IntraMuscular once  isosorbide   mononitrate ER Tablet (IMDUR) 120 milliGRAM(s) Oral daily  lactulose Syrup 20 Gram(s) Oral two times a day  metoprolol succinate ER 25 milliGRAM(s) Oral daily  NIFEdipine  milliGRAM(s) Oral daily  polyethylene glycol 3350 17 Gram(s) Oral every 12 hours  senna 2 Tablet(s) Oral at bedtime  sodium bicarbonate 650 milliGRAM(s) Oral three times a day      PHYSICAL EXAM:  T(C): 37.2 (11-02-21 @ 07:27), Max: 37.2 (11-02-21 @ 07:27)  HR: 52 (11-02-21 @ 07:27) (43 - 54)  BP: 136/78 (11-02-21 @ 07:27) (127/65 - 147/81)  RR: 18 (11-02-21 @ 07:27) (17 - 18)  SpO2: 99% (11-02-21 @ 07:27) (98% - 100%)  Wt(kg): --  I&O's Summary    01 Nov 2021 07:01  -  02 Nov 2021 07:00  --------------------------------------------------------  IN: 0 mL / OUT: 950 mL / NET: -950 mL    02 Nov 2021 07:01  -  02 Nov 2021 11:31  --------------------------------------------------------  IN: 0 mL / OUT: 1450 mL / NET: -1450 mL          Appearance: Normal	  HEENT:   Normal oral mucosa, PERRL, EOMI	  Lymphatic: No lymphadenopathy , no edema  Cardiovascular: Normal S1 S2, No JVD, No murmurs , Peripheral pulses palpable 2+ bilaterally  Respiratory: Lungs clear to auscultation, normal effort 	  Gastrointestinal:  Soft, Non-tender, + BS	  Skin: No rashes, No ecchymoses, No cyanosis, warm to touch  Musculoskeletal: Normal range of motion, normal strength  Psychiatry:  Mood & affect appropriate  Ext: No edema      LABS:    CARDIAC MARKERS:                                11.0   6.65  )-----------( 728      ( 02 Nov 2021 06:21 )             36.3     11-02    136  |  102  |  30<H>  ----------------------------<  98  4.3   |  20<L>  |  2.73<H>    Ca    9.8      02 Nov 2021 06:21  Phos  3.2     11-02  Mg     2.3     11-02    TPro  7.3  /  Alb  3.9  /  TBili  0.3  /  DBili  x   /  AST  17  /  ALT  13  /  AlkPhos  100  11-02    proBNP:   Lipid Profile:   HgA1c:   TSH:             TELEMETRY: 	    ECG:  	  RADIOLOGY:   DIAGNOSTIC TESTING:  [ ] Echocardiogram:  [ ]  Catheterization:  [ ] Stress Test:    OTHER:

## 2021-11-02 NOTE — PROGRESS NOTE ADULT - ASSESSMENT
70yoM w/ PMHx of CKD IV (unknown baseline), uncontrolled HTN, ?CAD (patient states he has "heart problems;" TTE performed at [facility] on 10/5 w/ normal LVF, severely enlarged LA, Grade III diastolic dysfunction, mild pulm HTN) who was transfered from [facility] to where he presented w/ hx of multiple falls, found to have acute right MCA territory ischemic infarct w/ extensive chronic microvascular ischemic changes on CT, acute/subacute right-sided MCA distribution infarct with associated cytotoxic edema, on MRI, and Pcomm aneurysm vs thrombus [id number], mild R M1 stenosis on MRA.

## 2021-11-02 NOTE — PROGRESS NOTE ADULT - ATTENDING COMMENTS
Seen, examined the patient this afternoon  Feels ok, resting in bed, feels tired, afebrile, no acute SOB, VSS  Reviewed imaging and labs personally    This is a 70 M with h/o CKD (baseline Cr > 3), HTN, and HLD who presented as a transfer from [facility] for higher level of care. Patient initially presented to [facility] on 10/01 after multiple falls the since the day prior. MRI Head at [facility] showed acute vs subacute R MCA territory infarct with associated cytotoxic edema. MRA H showed multilobulated right-sided P-comm aneurysm directed posteriorly and laterally, measuring 8.9 x 7.3 mm.   Found to have a liver mass with abdominal LN enlargement. Neuro, GI evaluated.    - GI was unable to complete colonoscopy 2/2 angulation in sigmoid, awaiting CT colonography to r/o metastatic malignancy    Liver mass- concerning for cholangiocarcinoma, rec- no directly sampling the mass as it can introduce peritoneal seeding.     EUS and peripancreatic LN sampling when patient is off DAPT (planned for 3 weeks total per neurology).   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 Has been on DAPT, statin    Repeat CT head stable    Neurology and cardiology f/u plans noted  - CKD stage 4- overall stable, Renal following  - PT in progress

## 2021-11-02 NOTE — PROGRESS NOTE ADULT - ASSESSMENT
69 yo M w/ PMHx of CKD IV (unknown baseline), uncontrolled HTN, ?CAD (patient states he has "heart problems;" TTE performed at [facility] on 10/5 w/ normal LVF, severely enlarged LA, Grade III diastolic dysfunction, mild pulm HTN) who was transfered from [facility] to where he presented w/ hx of multiple falls, found to have acute right MCA territory ischemic infarct w/ extensive chronic microvascular ischemic changes on CT, acute/subacute right-sided MCA distribution infarct with associated cytotoxic edema, on MRI, and Pcomm aneurysm vs thrombus [id number], mild R M1 stenosis on MRA. 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 Medicine re-consulted for liver lesion suspicious for cholangiocarcinoma.

## 2021-11-02 NOTE — PROGRESS NOTE ADULT - ASSESSMENT
69yo M with PMH of CKD (baseline Cr >3), HTN, HLD who presents as transfer from OSH for cerebral aneurysm. Advanced GI consulted for a liver mass (to be biopsied off DAPT x 3 weeks) and pending inpatient colonoscopy for malignancy work up.     Impression:  # Liver mass - with radiologic findings concerning for cholangiocarcinoma. Adenopathy noted. Would prefer to avoid directly sampling the mass as it can introduce peritoneal seeding. Can plan for EUS and peripancreatic LN sampling when patient is off DAPT (planned for 3 weeks total per neurology's latest note). Attempted inpatient colonoscopy to evaluate for metastatic disease, however, unable to complete 2/2 sharp angulation at sigmoid colon.   # Cerebral Aneurysm - s/p coiling on DAPT (planned for 3 weeks total)   # CKD4   # Thrombocytosis - +JAK2 mutation    Recommendations:  - Please obtain MRI colonography to evaluate for metastatic disease as colonoscopy could not be completed  - Can plan for EGD+EUS + LN sampling when off DAPT (can be done as outpatient) - per neurosurgery note patient is planned for 3 weeks total of DAPT starting 10/12 - continues to be on plavix at this time (once pt off plavix x 5 days can tentatively plan for EUS)      Thank you for involving us in the care of this patient, please reach out if any further questions.     Alejo Belle MD  Gastroenterology Fellow, PGY5    Available on [employer] Teams  [phone] ([facility])  [id number] ([facility])  Please contact on call fellow weekdays after 5pm-7am and weekends: [phone]

## 2021-11-03 VITALS
SYSTOLIC BLOOD PRESSURE: 139 MMHG | DIASTOLIC BLOOD PRESSURE: 75 MMHG | TEMPERATURE: 99 F | RESPIRATION RATE: 18 BRPM | HEART RATE: 75 BPM | OXYGEN SATURATION: 98 %

## 2021-11-03 PROBLEM — N18.9 CHRONIC KIDNEY DISEASE, UNSPECIFIED: Chronic | Status: ACTIVE | Noted: 2021-10-09

## 2021-11-03 LAB
ANION GAP SERPL CALC-SCNC: 17 MMOL/L — SIGNIFICANT CHANGE UP (ref 5–17)
BASOPHILS # BLD AUTO: 0.06 K/UL — SIGNIFICANT CHANGE UP (ref 0–0.2)
BASOPHILS NFR BLD AUTO: 1 % — SIGNIFICANT CHANGE UP (ref 0–2)
BUN SERPL-MCNC: 36 MG/DL — HIGH (ref 7–23)
CALCIUM SERPL-MCNC: 9.3 MG/DL — SIGNIFICANT CHANGE UP (ref 8.4–10.5)
CHLORIDE SERPL-SCNC: 100 MMOL/L — SIGNIFICANT CHANGE UP (ref 96–108)
CO2 SERPL-SCNC: 20 MMOL/L — LOW (ref 22–31)
CREAT SERPL-MCNC: 3.29 MG/DL — HIGH (ref 0.5–1.3)
EOSINOPHIL # BLD AUTO: 0.23 K/UL — SIGNIFICANT CHANGE UP (ref 0–0.5)
EOSINOPHIL NFR BLD AUTO: 3.9 % — SIGNIFICANT CHANGE UP (ref 0–6)
GLUCOSE SERPL-MCNC: 219 MG/DL — HIGH (ref 70–99)
HCT VFR BLD CALC: 34.6 % — LOW (ref 39–50)
HGB BLD-MCNC: 10.8 G/DL — LOW (ref 13–17)
IMM GRANULOCYTES NFR BLD AUTO: 0.3 % — SIGNIFICANT CHANGE UP (ref 0–1.5)
LYMPHOCYTES # BLD AUTO: 1.04 K/UL — SIGNIFICANT CHANGE UP (ref 1–3.3)
LYMPHOCYTES # BLD AUTO: 17.5 % — SIGNIFICANT CHANGE UP (ref 13–44)
MAGNESIUM SERPL-MCNC: 2.4 MG/DL — SIGNIFICANT CHANGE UP (ref 1.6–2.6)
MCHC RBC-ENTMCNC: 29.3 PG — SIGNIFICANT CHANGE UP (ref 27–34)
MCHC RBC-ENTMCNC: 31.2 GM/DL — LOW (ref 32–36)
MCV RBC AUTO: 93.8 FL — SIGNIFICANT CHANGE UP (ref 80–100)
MONOCYTES # BLD AUTO: 0.4 K/UL — SIGNIFICANT CHANGE UP (ref 0–0.9)
MONOCYTES NFR BLD AUTO: 6.7 % — SIGNIFICANT CHANGE UP (ref 2–14)
NEUTROPHILS # BLD AUTO: 4.19 K/UL — SIGNIFICANT CHANGE UP (ref 1.8–7.4)
NEUTROPHILS NFR BLD AUTO: 70.6 % — SIGNIFICANT CHANGE UP (ref 43–77)
NRBC # BLD: 0 /100 WBCS — SIGNIFICANT CHANGE UP (ref 0–0)
PHOSPHATE SERPL-MCNC: 5.2 MG/DL — HIGH (ref 2.5–4.5)
PLATELET # BLD AUTO: 768 K/UL — HIGH (ref 150–400)
POTASSIUM SERPL-MCNC: 3.7 MMOL/L — SIGNIFICANT CHANGE UP (ref 3.5–5.3)
POTASSIUM SERPL-SCNC: 3.7 MMOL/L — SIGNIFICANT CHANGE UP (ref 3.5–5.3)
RBC # BLD: 3.69 M/UL — LOW (ref 4.2–5.8)
RBC # FLD: 15.5 % — HIGH (ref 10.3–14.5)
SODIUM SERPL-SCNC: 137 MMOL/L — SIGNIFICANT CHANGE UP (ref 135–145)
WBC # BLD: 5.94 K/UL — SIGNIFICANT CHANGE UP (ref 3.8–10.5)
WBC # FLD AUTO: 5.94 K/UL — SIGNIFICANT CHANGE UP (ref 3.8–10.5)

## 2021-11-03 PROCEDURE — 74263 CT COLONOGRAPHY SCREENING: CPT

## 2021-11-03 PROCEDURE — 86146 BETA-2 GLYCOPROTEIN ANTIBODY: CPT

## 2021-11-03 PROCEDURE — 97129 THER IVNTJ 1ST 15 MIN: CPT

## 2021-11-03 PROCEDURE — C1760: CPT

## 2021-11-03 PROCEDURE — 84100 ASSAY OF PHOSPHORUS: CPT

## 2021-11-03 PROCEDURE — 84155 ASSAY OF PROTEIN SERUM: CPT

## 2021-11-03 PROCEDURE — 92610 EVALUATE SWALLOWING FUNCTION: CPT

## 2021-11-03 PROCEDURE — C1889: CPT

## 2021-11-03 PROCEDURE — 92526 ORAL FUNCTION THERAPY: CPT

## 2021-11-03 PROCEDURE — 86038 ANTINUCLEAR ANTIBODIES: CPT

## 2021-11-03 PROCEDURE — 84540 ASSAY OF URINE/UREA-N: CPT

## 2021-11-03 PROCEDURE — 71250 CT THORAX DX C-: CPT

## 2021-11-03 PROCEDURE — 84156 ASSAY OF PROTEIN URINE: CPT

## 2021-11-03 PROCEDURE — 97130 THER IVNTJ EA ADDL 15 MIN: CPT

## 2021-11-03 PROCEDURE — 97168 OT RE-EVAL EST PLAN CARE: CPT

## 2021-11-03 PROCEDURE — 36228 PLACE CATH INTRACRANIAL ART: CPT

## 2021-11-03 PROCEDURE — 86900 BLOOD TYPING SEROLOGIC ABO: CPT

## 2021-11-03 PROCEDURE — 82550 ASSAY OF CK (CPK): CPT

## 2021-11-03 PROCEDURE — C1769: CPT

## 2021-11-03 PROCEDURE — 97530 THERAPEUTIC ACTIVITIES: CPT

## 2021-11-03 PROCEDURE — 82553 CREATINE MB FRACTION: CPT

## 2021-11-03 PROCEDURE — 97116 GAIT TRAINING THERAPY: CPT

## 2021-11-03 PROCEDURE — U0003: CPT

## 2021-11-03 PROCEDURE — 82746 ASSAY OF FOLIC ACID SERUM: CPT

## 2021-11-03 PROCEDURE — 86850 RBC ANTIBODY SCREEN: CPT

## 2021-11-03 PROCEDURE — 87077 CULTURE AEROBIC IDENTIFY: CPT

## 2021-11-03 PROCEDURE — 97161 PT EVAL LOW COMPLEX 20 MIN: CPT

## 2021-11-03 PROCEDURE — 86147 CARDIOLIPIN ANTIBODY EA IG: CPT

## 2021-11-03 PROCEDURE — 93970 EXTREMITY STUDY: CPT

## 2021-11-03 PROCEDURE — 87086 URINE CULTURE/COLONY COUNT: CPT

## 2021-11-03 PROCEDURE — 85027 COMPLETE CBC AUTOMATED: CPT

## 2021-11-03 PROCEDURE — 76770 US EXAM ABDO BACK WALL COMP: CPT

## 2021-11-03 PROCEDURE — 97110 THERAPEUTIC EXERCISES: CPT

## 2021-11-03 PROCEDURE — 75894 X-RAYS TRANSCATH THERAPY: CPT

## 2021-11-03 PROCEDURE — 97535 SELF CARE MNGMENT TRAINING: CPT

## 2021-11-03 PROCEDURE — 70450 CT HEAD/BRAIN W/O DYE: CPT

## 2021-11-03 PROCEDURE — 86901 BLOOD TYPING SEROLOGIC RH(D): CPT

## 2021-11-03 PROCEDURE — 84300 ASSAY OF URINE SODIUM: CPT

## 2021-11-03 PROCEDURE — 97165 OT EVAL LOW COMPLEX 30 MIN: CPT

## 2021-11-03 PROCEDURE — 93005 ELECTROCARDIOGRAM TRACING: CPT

## 2021-11-03 PROCEDURE — 83935 ASSAY OF URINE OSMOLALITY: CPT

## 2021-11-03 PROCEDURE — 36415 COLL VENOUS BLD VENIPUNCTURE: CPT

## 2021-11-03 PROCEDURE — 86334 IMMUNOFIX E-PHORESIS SERUM: CPT

## 2021-11-03 PROCEDURE — 71045 X-RAY EXAM CHEST 1 VIEW: CPT

## 2021-11-03 PROCEDURE — 84133 ASSAY OF URINE POTASSIUM: CPT

## 2021-11-03 PROCEDURE — 99232 SBSQ HOSP IP/OBS MODERATE 35: CPT | Mod: GC

## 2021-11-03 PROCEDURE — 87186 SC STD MICRODIL/AGAR DIL: CPT

## 2021-11-03 PROCEDURE — 86769 SARS-COV-2 COVID-19 ANTIBODY: CPT

## 2021-11-03 PROCEDURE — 84165 PROTEIN E-PHORESIS SERUM: CPT

## 2021-11-03 PROCEDURE — 94640 AIRWAY INHALATION TREATMENT: CPT

## 2021-11-03 PROCEDURE — C9399: CPT

## 2021-11-03 PROCEDURE — 82784 ASSAY IGA/IGD/IGG/IGM EACH: CPT

## 2021-11-03 PROCEDURE — 86160 COMPLEMENT ANTIGEN: CPT

## 2021-11-03 PROCEDURE — 75898 FOLLOW-UP ANGIOGRAPHY: CPT

## 2021-11-03 PROCEDURE — 81270 JAK2 GENE: CPT

## 2021-11-03 PROCEDURE — 86301 IMMUNOASSAY TUMOR CA 19-9: CPT

## 2021-11-03 PROCEDURE — 82436 ASSAY OF URINE CHLORIDE: CPT

## 2021-11-03 PROCEDURE — 80048 BASIC METABOLIC PNL TOTAL CA: CPT

## 2021-11-03 PROCEDURE — C2628: CPT

## 2021-11-03 PROCEDURE — 83880 ASSAY OF NATRIURETIC PEPTIDE: CPT

## 2021-11-03 PROCEDURE — 61624 TCAT PERM OCCLS/EMBOLJ CNS: CPT

## 2021-11-03 PROCEDURE — 74176 CT ABD & PELVIS W/O CONTRAST: CPT

## 2021-11-03 PROCEDURE — U0005: CPT

## 2021-11-03 PROCEDURE — 99233 SBSQ HOSP IP/OBS HIGH 50: CPT | Mod: GC

## 2021-11-03 PROCEDURE — 83735 ASSAY OF MAGNESIUM: CPT

## 2021-11-03 PROCEDURE — 80076 HEPATIC FUNCTION PANEL: CPT

## 2021-11-03 PROCEDURE — 80053 COMPREHEN METABOLIC PANEL: CPT

## 2021-11-03 PROCEDURE — 85576 BLOOD PLATELET AGGREGATION: CPT

## 2021-11-03 PROCEDURE — 85610 PROTHROMBIN TIME: CPT

## 2021-11-03 PROCEDURE — 74182 MRI ABDOMEN W/CONTRAST: CPT

## 2021-11-03 PROCEDURE — 82105 ALPHA-FETOPROTEIN SERUM: CPT

## 2021-11-03 PROCEDURE — 82570 ASSAY OF URINE CREATININE: CPT

## 2021-11-03 PROCEDURE — 70551 MRI BRAIN STEM W/O DYE: CPT

## 2021-11-03 PROCEDURE — 36224 PLACE CATH CAROTD ART: CPT

## 2021-11-03 PROCEDURE — 82607 VITAMIN B-12: CPT

## 2021-11-03 PROCEDURE — C1887: CPT

## 2021-11-03 PROCEDURE — 83521 IG LIGHT CHAINS FREE EACH: CPT

## 2021-11-03 PROCEDURE — 82378 CARCINOEMBRYONIC ANTIGEN: CPT

## 2021-11-03 PROCEDURE — 85730 THROMBOPLASTIN TIME PARTIAL: CPT

## 2021-11-03 PROCEDURE — 85025 COMPLETE CBC W/AUTO DIFF WBC: CPT

## 2021-11-03 PROCEDURE — 84443 ASSAY THYROID STIM HORMONE: CPT

## 2021-11-03 PROCEDURE — 74018 RADEX ABDOMEN 1 VIEW: CPT

## 2021-11-03 PROCEDURE — 84484 ASSAY OF TROPONIN QUANT: CPT

## 2021-11-03 PROCEDURE — 81001 URINALYSIS AUTO W/SCOPE: CPT

## 2021-11-03 PROCEDURE — 86036 ANCA SCREEN EACH ANTIBODY: CPT

## 2021-11-03 PROCEDURE — 83605 ASSAY OF LACTIC ACID: CPT

## 2021-11-03 PROCEDURE — C1894: CPT

## 2021-11-03 RX ORDER — DOXAZOSIN MESYLATE 4 MG
1 TABLET ORAL
Qty: 0 | Refills: 0 | DISCHARGE
Start: 2021-11-03

## 2021-11-03 RX ORDER — HYDRALAZINE HCL 50 MG
1 TABLET ORAL
Qty: 0 | Refills: 0 | DISCHARGE

## 2021-11-03 RX ORDER — ATORVASTATIN CALCIUM 80 MG/1
1 TABLET, FILM COATED ORAL
Qty: 0 | Refills: 0 | DISCHARGE

## 2021-11-03 RX ORDER — ISOSORBIDE MONONITRATE 60 MG/1
1 TABLET, EXTENDED RELEASE ORAL
Qty: 0 | Refills: 0 | DISCHARGE
Start: 2021-11-03

## 2021-11-03 RX ORDER — METOPROLOL TARTRATE 50 MG
1 TABLET ORAL
Qty: 0 | Refills: 0 | DISCHARGE
Start: 2021-11-03

## 2021-11-03 RX ORDER — NIFEDIPINE 30 MG
2 TABLET, EXTENDED RELEASE 24 HR ORAL
Qty: 0 | Refills: 0 | DISCHARGE
Start: 2021-11-03

## 2021-11-03 RX ORDER — POLYETHYLENE GLYCOL 3350 17 G/17G
17 POWDER, FOR SOLUTION ORAL
Qty: 0 | Refills: 0 | DISCHARGE
Start: 2021-11-03

## 2021-11-03 RX ORDER — HYDRALAZINE HCL 50 MG
1 TABLET ORAL
Qty: 0 | Refills: 0 | DISCHARGE
Start: 2021-11-03

## 2021-11-03 RX ORDER — LACTULOSE 10 G/15ML
30 SOLUTION ORAL
Qty: 0 | Refills: 0 | DISCHARGE
Start: 2021-11-03

## 2021-11-03 RX ORDER — SENNA PLUS 8.6 MG/1
2 TABLET ORAL
Qty: 0 | Refills: 0 | DISCHARGE
Start: 2021-11-03

## 2021-11-03 RX ORDER — NIFEDIPINE 30 MG
1 TABLET, EXTENDED RELEASE 24 HR ORAL
Qty: 0 | Refills: 0 | DISCHARGE

## 2021-11-03 RX ORDER — ATORVASTATIN CALCIUM 80 MG/1
1 TABLET, FILM COATED ORAL
Qty: 0 | Refills: 0 | DISCHARGE
Start: 2021-11-03

## 2021-11-03 RX ORDER — ISOSORBIDE MONONITRATE 60 MG/1
1 TABLET, EXTENDED RELEASE ORAL
Qty: 0 | Refills: 0 | DISCHARGE

## 2021-11-03 RX ORDER — SODIUM BICARBONATE 1 MEQ/ML
1 SYRINGE (ML) INTRAVENOUS
Qty: 0 | Refills: 0 | DISCHARGE
Start: 2021-11-03

## 2021-11-03 RX ORDER — CLOPIDOGREL BISULFATE 75 MG/1
1 TABLET, FILM COATED ORAL
Qty: 0 | Refills: 0 | DISCHARGE

## 2021-11-03 RX ORDER — SIMETHICONE 80 MG/1
1 TABLET, CHEWABLE ORAL
Qty: 0 | Refills: 0 | DISCHARGE
Start: 2021-11-03

## 2021-11-03 RX ADMIN — CLOPIDOGREL BISULFATE 75 MILLIGRAM(S): 75 TABLET, FILM COATED ORAL at 11:21

## 2021-11-03 RX ADMIN — Medication 650 MILLIGRAM(S): at 15:48

## 2021-11-03 RX ADMIN — Medication 650 MILLIGRAM(S): at 05:32

## 2021-11-03 RX ADMIN — POLYETHYLENE GLYCOL 3350 17 GRAM(S): 17 POWDER, FOR SOLUTION ORAL at 05:32

## 2021-11-03 RX ADMIN — Medication 100 MILLIGRAM(S): at 02:10

## 2021-11-03 RX ADMIN — Medication 0.3 MILLIGRAM(S): at 15:47

## 2021-11-03 RX ADMIN — Medication 81 MILLIGRAM(S): at 11:20

## 2021-11-03 RX ADMIN — Medication 100 MILLIGRAM(S): at 11:20

## 2021-11-03 RX ADMIN — Medication 120 MILLIGRAM(S): at 05:34

## 2021-11-03 RX ADMIN — Medication 0.3 MILLIGRAM(S): at 05:33

## 2021-11-03 NOTE — PROGRESS NOTE ADULT - PROBLEM SELECTOR PROBLEM 8
Stage 4 chronic kidney disease
Monoclonal gammopathy
Stage 4 chronic kidney disease
Monoclonal gammopathy
Stage 4 chronic kidney disease

## 2021-11-03 NOTE — PROGRESS NOTE ADULT - SUBJECTIVE AND OBJECTIVE BOX
Red Team Progress Note  [id number]    Subjective:   Patient seen at bedside this AM. Patient resting, no acute events overnight.     Objective:  Vital Signs  T(C): 36.6 (11-03 @ 04:00), Max: 37 (11-02 @ 15:44)  HR: 47 (11-03 @ 04:00) (44 - 53)  BP: 117/68 (11-03 @ 04:00) (117/68 - 168/82)  RR: 18 (11-03 @ 04:00) (18 - 18)  SpO2: 100% (11-03 @ 04:00) (98% - 100%)  11-02-21 @ 07:01  -  11-03-21 @ 07:00  --------------------------------------------------------  IN:  Total IN: 0 mL    OUT:    Voided (mL): 2200 mL  Total OUT: 2200 mL    Total NET: -2200 mL          I&O's Detail    11-02-21 @ 07:01  -  11-03-21 @ 07:00  --------------------------------------------------------  IN: 1110 mL / OUT: 2200 mL / NET: -1090 mL        Physical Exam:  GEN: resting in bed comfortably in NAD  RESP: no increased WOB  CV: Hemodynamically stable    Labs:                        11.0   6.65  )-----------( 728      ( 02 Nov 2021 06:21 )             36.3   11-02    136  |  102  |  30<H>  ----------------------------<  98  4.3   |  20<L>  |  2.73<H>    Ca    9.8      02 Nov 2021 06:21  Phos  3.2     11-02  Mg     2.3     11-02    TPro  7.3  /  Alb  3.9  /  TBili  0.3  /  DBili  x   /  AST  17  /  ALT  13  /  AlkPhos  100  11-02    CAPILLARY BLOOD GLUCOSE          Medications:   MEDICATIONS  (STANDING):  aspirin  chewable 81 milliGRAM(s) Oral daily  atorvastatin 20 milliGRAM(s) Oral at bedtime  bisacodyl 5 milliGRAM(s) Oral at bedtime  cloNIDine 0.3 milliGRAM(s) Oral every 8 hours  clopidogrel Tablet 75 milliGRAM(s) Oral daily  doxazosin 4 milliGRAM(s) Oral at bedtime  hydrALAZINE 100 milliGRAM(s) Oral <User Schedule>  influenza   Vaccine 0.5 milliLiter(s) IntraMuscular once  isosorbide   mononitrate ER Tablet (IMDUR) 120 milliGRAM(s) Oral daily  lactulose Syrup 20 Gram(s) Oral two times a day  metoprolol succinate ER 25 milliGRAM(s) Oral daily  NIFEdipine  milliGRAM(s) Oral daily  polyethylene glycol 3350 17 Gram(s) Oral every 12 hours  senna 2 Tablet(s) Oral at bedtime  sodium bicarbonate 650 milliGRAM(s) Oral three times a day    MEDICATIONS  (PRN):  acetaminophen    Suspension .. 650 milliGRAM(s) Oral every 6 hours PRN Mild Pain (1 - 3)  simethicone 80 milliGRAM(s) Chew two times a day PRN Upset Stomach      Imaging:

## 2021-11-03 NOTE — PROGRESS NOTE ADULT - TIME BILLING
Advanced care planning was discussed with patient and family.  Advanced care planning forms were reviewed and discussed as appropriate.  Differential diagnosis and plan of care discussed with patient after the evaluation.   Pain assessed and judicious use of narcotics when appropriate was discussed.  Importance of Fall prevention discussed.  Counseling on Smoking and Alcohol cessation was offered when appropriate.  Counseling on Diet, exercise, and medication compliance was done.
Updating patient; coordinating care with NSGY and family
Advanced care planning was discussed with patient and family.  Advanced care planning forms were reviewed and discussed as appropriate.  Differential diagnosis and plan of care discussed with patient after the evaluation.   Pain assessed and judicious use of narcotics when appropriate was discussed.  Importance of Fall prevention discussed.  Counseling on Smoking and Alcohol cessation was offered when appropriate.  Counseling on Diet, exercise, and medication compliance was done.
Reviewed hospital course. Reviewed need for on-going hospitalization and treatment plan. Answered questions.
Advanced care planning was discussed with patient and family.  Advanced care planning forms were reviewed and discussed as appropriate.  Differential diagnosis and plan of care discussed with patient after the evaluation.   Pain assessed and judicious use of narcotics when appropriate was discussed.  Importance of Fall prevention discussed.  Counseling on Smoking and Alcohol cessation was offered when appropriate.  Counseling on Diet, exercise, and medication compliance was done.

## 2021-11-03 NOTE — PROGRESS NOTE ADULT - REASON FOR ADMISSION
Transfer from [facility] for MRA findings
Transfer from [facility] for MRA findings for Right PCOMM aneurysm
Transfer from [facility] for MRA findings
Transfer from [facility] for MRA findings
Transfer from [facility] for MRA findings
Transfer from [facility] for MRA findings
Transfer from [facility] for MRA findings
Transfer from [facility] for MRA findings
Transfer from [facility] for MRA findings
Transfer from [facility] for MRA findings
Transfer from [facility] for MRA findings
Transfer from [facility] for MRA findings
Transfer from [facility] for MRA findings
Transfer from [facility] for MRA findings
Transfer from [facility] for MRA findings
Transfer from [facility] for MRA findings
Transfer from [facility] for MRA findings
Transfer from [facility] for MRA findings
Transfer from [facility] for MRA findings
Transfer from [facility] for MRA findings
Transfer from [facility] for MRA findings
Transfer from [facility] for MRA findings
Transfer from [facility] for MRA findings
Transfer from [facility] for MRA findings
Transfer from [facility] for MRA findings
Transfer from [facility] for MRA findings
Transfer from [facility] for MRA findings
Transfer from [facility] for MRA findings
Transfer from [facility] for MRA findings
Transfer from [facility] for MRA findings
Transfer from [facility] for MRA findings
Transfer from [facility] for MRA findings
Transfer from [facility] for MRA findings
Transfer from [facility] for MRA findings
Transfer from [facility] for MRA findings
Transfer from [facility] for MRA findings
Transfer from [facility] for MRA findings
Transfer from [facility] for MRA findings
Transfer from [facility] for MRA findings
Transfer from [facility] for MRA findings
Transfer from [facility] for MRA findings
Transfer from [facility] for MRA findings
Transfer from [facility] for MRA findings
Transfer from [facility] for MRA findings
Transfer from [facility] for MRA findings
Transfer from [facility] for MRA findings
Transfer from [facility] for MRA findings
Transfer from [facility] for MRA findings
Transfer from [facility] for MRA findings
Transfer from [facility] for MRA findings
Transfer from [facility] for MRA findings
Transfer from [facility] for MRA findings
Transfer from [facility] for MRA findings
Transfer from [facility] for MRA findings
Transfer from [facility] for MRA findings
Transfer from [facility] for MRA findings
Transfer from [facility] for MRA findings
Transfer from [facility] for MRA findings
Transfer from [facility] for MRA findings
Transfer from [facility] for MRA findings for Right PCOMM aneurysm
Transfer from [facility] for MRA findings
Transfer from [facility] for MRA findings
Transfer from [facility] for MRA findings
Transfer from [facility] for MRA findings
Transfer from [facility] for MRA findings
Transfer [facility] for MRA findings
Transfer from [facility] for MRA findings
Transfer from [facility] for MRA findings
Transfer from [facility] for MRA findings
Transfer from [facility] for MRA findings
Transfer from [facility] for MRA findings
Transfer from [facility] for MRA findings
Transfer from [facility] for MRA findings
[facility] for [facility]RA findings
Transfer from [facility] for MRA findings
Transfer from [facility] for MRA findings
Transfer from [place] for MRA findings
Transfer from [facility] for MRA findings
Transfer from [facility] for MRA findings
Transfer from [facility] for MRA findings
Transfer from [facility] for MRA findings
Transfer from [facility] for MRA findings
Transfer from [facility] for MRA findings
Transfer from [facility] for MRA findings
Transfer from [facility] for MRA findings
Transfer from [facility] for MRA findings
Transfer from [facility] for MRA findings
Transfer from [facility] for MRA findings
Transfer from [facility] for MRA findings
[facility] for [facility]RA findings
Transfer [facility] for MRA findings
Transfer from [facility] for MRA findings
Transfer from [facility] for MRA findings
Transfer from [facility] for MRA findings
Transfer from [facility] for MRA findings
Transfer from [facility] for MRA findings
Transfer from [facility] for MRA findings
[facility] for MRA findings
Transfer from [facility] for MRA findings
Transfer from [facility] for MRA findings
Transfer from [facility] for MRA findings
Transfer from [facility] for MRA findings
Transfer from [facility] for MRA findings
Transfer [facility] for MRA findings
Transfer from [facility] for MRA findings
Transfer from [facility] for MRA findings
Transfer from [facility] for MRA findings
Transfer from [facility] for MRA findings
Transfer from [facility] for MRA findings
Transfer from [facility] for MRA findings
Transfer from [facility] for MRA findings
Transfer from [facility] for MRA findings
Transfer from [facility] for MRA findings
[facility] for [facility]RA findings
Transfer [facility] for MRA findings
Transfer from [facility] for MRA findings
Transfer from [facility] for MRA findings
Transfer from [facility] for MRA findings
Transfer from [facility] for MRA findings

## 2021-11-03 NOTE — PROGRESS NOTE ADULT - ASSESSMENT
70yoM w/ PMHx of CKD with a baseline Cr >3, HTN, HLD presented to OSH after multiple falls. He was transferred from OSH for MRI/MRA findings concerning for aneurysm vs. thrombus       1) CKD stage 4- overall stable  Likely has Hypertensive Nephropathy-> b/l 2.8mg/dl to 3.1mg/dl  urine pro/cr 1.4grams  K/L 11.16/6.72---> ratio is < 2---> monitor for now  Renal US --> right kidney 9.3cm and left 9.5 cm with b/l renal cysts  s/p Coiling for PCOMM  Cr  fluctuating however overall stable- cont monitor with daily basic metabolic panel   s/p NIELS --> no signs of NSF--> will cont to monitor  avoid nephrotoxins such as ace/arb/nsaid/IV contrast  trend bmp    2) HTN-  bp improving  on nifedipine clonidine  hydralazine, doxazosin  Imdur 120mg po qd  Trend bp    3) metabolic acidosis- likely from ckd-   na bicarb 650mg po tid  trend bicarb      Dr Hernadez  Nephrology   cell [phone]  office [phone]  ans serv- [phone]  [email or web address] [facility] ( wkend coverage for Hospital)

## 2021-11-03 NOTE — PROGRESS NOTE ADULT - PROBLEM SELECTOR PLAN 5
- w/ some fluctuations of high and low BPs  - c/w imdur, clonidine, hydral, procardia  - Hypertensive to 215/98 ON 10/28 - procardia increased to 120 with improvement  - KDOQI guideline suggests the usage of thiazides for CKD I-III and loop diuretics for CKD IV+ d/t decreased effectiveness. However there is literature evidence citing no difference in effectiveness of thiazide vs loop diuretics in CKD IV+ pts. In 2012 a double-blinded randomized trial by Fabio et al (doi 10.1111/j.9197-1700.2011.53007.x) demonstrated no difference between HCTZ and Furosemide treatment in lowering BP (average decrease in systolic ~10) in CKD IV+ pts with average age of 65, good external validity for this pt. If Procardia 90-->120 does not achieve sufficient antihypertensive effect, consider thiazide as next line management vs. changing metoprolol to coreg baby dose  - continue to monitor

## 2021-11-03 NOTE — PROGRESS NOTE ADULT - PROVIDER SPECIALTY LIST ADULT
Gastroenterology
Hospitalist
Internal Medicine
NSICU
Nephrology
Neurology
Neurosurgery
Surgery
Surgery
Internal Medicine
NSICU
Nephrology
Neurology
Neurosurgery
Neurosurgery
Cardiology
Cardiology
Gastroenterology
Internal Medicine
NSICU
NSICU
Nephrology
Neurology
Neurosurgery
Surgery
Hepatology
Nephrology
Neurology
Neurosurgery
Surgery
Cardiology
Hospitalist
Neurology
Neurosurgery
Cardiology
Hospitalist
Internal Medicine
Cardiology
Hospitalist
Cardiology
Hospitalist
Cardiology
Hospitalist
Cardiology
Hospitalist
Cardiology
Cardiology
Hospitalist
Internal Medicine

## 2021-11-03 NOTE — PROGRESS NOTE ADULT - ATTENDING COMMENTS
As above.    Impression:    #1.  Liver lesion concerning for cholangiocarcinoma  #2.  Peripancreatc lymph node  #3.  Colon angulation of sigmoid colon leading to incomplete colonoscpoy.    Recommendations:    #1.  Awaiting Colonography (CT or MRI, radiologists choice) after incomplete colonoscopy due to angulation at level of sigmoid colon.    #2.  Awaiting EUS for pancreas mass after neurology approval for holding Plavix for 5 days before procedure to decrease risk of bleeding, if possible.  If not possible to hold Plavix, could consider EUS/FNB with higher risk of bleeding. As above.    Impression:    #1.  Liver lesion concerning for cholangiocarcinoma  #2.  Peripancreatc lymph node  #3.  Colon angulation of sigmoid colon leading to incomplete colonoscpoy.    Recommendations:    #1.  Awaiting Colonography (CT or MRI, radiologists choice) after incomplete colonoscopy due to angulation at level of sigmoid colon.    #2.  Awaiting EUS for pancreas mass after neurology approval for holding Plavix for 5 days before procedure to decrease risk of bleeding, if possible.  If not possible to hold Plavix, could consider EUS/FNB with higher risk of bleeding.  Note that there is no need to hold aspirin for procedure under any circumstance.

## 2021-11-03 NOTE — PROGRESS NOTE ADULT - ASSESSMENT
69yo M with PMH of CKD (baseline Cr >3), HTN, HLD who presents as transfer from OSH for cerebral aneurysm. Advanced GI consulted for a liver mass (to be biopsied off DAPT x 3 weeks) and pending inpatient colonoscopy for malignancy work up.     Impression:  # Liver mass - with radiologic findings concerning for cholangiocarcinoma. Adenopathy noted. Would prefer to avoid directly sampling the mass as it can introduce peritoneal seeding. Can plan for EUS and peripancreatic LN sampling when patient is off DAPT (planned for 3 weeks total per neurology's latest note). Attempted inpatient colonoscopy to evaluate for metastatic disease, however, unable to complete 2/2 sharp angulation at sigmoid colon.   # Cerebral Aneurysm - s/p coiling on DAPT (planned for 3 weeks total)   # CKD4   # Thrombocytosis - +JAK2 mutation    Recommendations:  - Please obtain MRI colonography to evaluate for metastatic disease as colonoscopy could not be completed  - Can plan for EGD+EUS + LN sampling when off DAPT (can be done as outpatient) - per neurosurgery note patient is planned for 3 weeks total of DAPT starting 10/12 - continues to be on plavix at this time (pt continues on plavix -- once pt off plavix x 5 days can tentatively plan for EUS)      Thank you for involving us in the care of this patient, please reach out if any further questions.     Alejo Belle MD  Gastroenterology Fellow, PGY5    Available on [employer] Teams  [phone] ([facility])  [phone] ([facility])  Please contact on call fellow weekdays after 5pm-7am and weekends: [phone]

## 2021-11-03 NOTE — PROGRESS NOTE ADULT - ASSESSMENT
71 yo male with CKD (baseline Cr > 3), HTN, and HLD who presented as a transfer from [facility] for higher level of care. Patient initially presented to [facility] on 10/01 after multiple falls the since the day prior. MRI Head at [facility] showed acute vs subacute R MCA territory infarct with associated cytotoxic edema. MRA H showed multilobulated right-sided P-comm aneurysm directed posteriorly and laterally, measuring 8.9 x 7.3 mm.   A1c 5.5, LDL 25.  TTE: EF 65-70%, severely enlarged L atrium.   MRi with R MCA territory infarct   vessels with PCOM on R aneurysm    s/p angio and coil of PCOM aneurysm.   Impression: AMS + mild L hemineglect in setting of R MCA territory infarct seen on MRI Head, ESUS.etiology fo stroke may be hypercaog from maligiancy   o/e LUE drift doing well.  MRI abd with concern for hepatic mass/cholangiocarcinoma   - malignancy workup, liver bx.  [id number], GI following pending colonography r/o malignancy   - send APLS labs. beta 2 glycoprotein antibodies, cardiolipin antibodies and lupus anticoagulant   - Dual antiplatelet therapy with ASA 81mg PO daily and Clopidogrel 75mg PO daily x 3 weeks followed by monotherapy with ASA 81mg PO daily. can hold for biopsy   - lipitor 40. should be high dose   - ARU and PRU therapeutic   - cardio for ILR. cardio following can do outpt   - telemetry  - PT/OT/SS/SLP, OOBC  - check FS, glucose control <180  - GI/DVT ppx  - Counseling on diet, exercise, and medication adherence was done  - Counseling on smoking cessation and alcohol consumption offered when appropriate.  - Pain assessed and judicious use of narcotics when appropriate was discussed.    - Stroke education given when appropriate.  - Importance of fall prevention discussed.   - Differential diagnosis and plan of care discussed with patient and/or family and primary team  - Thank you for allowing me to participate in the care of this patient. Call with questions.    - d/c plan eventually rehab CLYDE Pierre MD  Vascular Neurology  Office: [phone] .

## 2021-11-03 NOTE — PROGRESS NOTE ADULT - SUBJECTIVE AND OBJECTIVE BOX
Patient seen and examined  no complaints    No Known Allergies    Hospital Medications:   MEDICATIONS  (STANDING):  aspirin  chewable 81 milliGRAM(s) Oral daily  atorvastatin 20 milliGRAM(s) Oral at bedtime  bisacodyl 5 milliGRAM(s) Oral at bedtime  cloNIDine 0.3 milliGRAM(s) Oral every 8 hours  doxazosin 4 milliGRAM(s) Oral at bedtime  hydrALAZINE 100 milliGRAM(s) Oral <User Schedule>  influenza   Vaccine 0.5 milliLiter(s) IntraMuscular once  isosorbide   mononitrate ER Tablet (IMDUR) 120 milliGRAM(s) Oral daily  lactulose Syrup 20 Gram(s) Oral two times a day  metoprolol succinate ER 25 milliGRAM(s) Oral daily  NIFEdipine  milliGRAM(s) Oral daily  polyethylene glycol 3350 17 Gram(s) Oral every 12 hours  senna 2 Tablet(s) Oral at bedtime  sodium bicarbonate 650 milliGRAM(s) Oral three times a day      VITALS:  T(F): 97.8 (11-03-21 @ 11:07), Max: 98.6 (11-02-21 @ 15:44)  HR: 49 (11-03-21 @ 11:07)  BP: 115/62 (11-03-21 @ 11:07)  RR: 18 (11-03-21 @ 11:07)  SpO2: 98% (11-03-21 @ 11:07)  Wt(kg): --    11-02 @ 07:01  -  11-03 @ 07:00  --------------------------------------------------------  IN: 1110 mL / OUT: 2200 mL / NET: -1090 mL      Physical Exam :-  Constitutional: NAD  Neck: Supple.  Respiratory: Bilateral equal breath sounds, few Crackles present.  Cardiovascular: S1, S2 normal, positive Murmur  Gastrointestinal: Bowel Sounds present, soft, non tender.  Extremities: no Edema Feet  Neurological: Alert and Oriented x 3  Psychiatric: Normal mood, normal affect    LABS:  11-03    137  |  100  |  36<H>  ----------------------------<  219<H>  3.7   |  20<L>  |  3.29<H>    Ca    9.3      03 Nov 2021 10:50  Phos  5.2     11-03  Mg     2.4     11-03    TPro  7.3  /  Alb  3.9  /  TBili  0.3  /  DBili      /  AST  17  /  ALT  13  /  AlkPhos  100  11-02    Creatinine Trend: 3.29 <--, 2.73 <--, 2.93 <--, 3.09 <--, 3.01 <--, 2.71 <--, 2.80 <--                        10.8   5.94  )-----------( 768      ( 03 Nov 2021 10:50 )             34.6     Urine Studies:      RADIOLOGY & ADDITIONAL STUDIES:

## 2021-11-03 NOTE — PROGRESS NOTE ADULT - PROBLEM SELECTOR PLAN 1
MRI abd shows central hepatic mass for which the primary differential consideration is cholangiocarcinoma and adenopathy in the peripancreatic, savage hepatis and portacaval regions.  - CEA elevated, Ca 19-9 mildly elevated  - Surgical Onc and hepatology recs appreciated  - Would require biopsy for diagnosis  - IR consulted, deferred IR guided bx as patient on DAPT - would need to be held prior to procedure  - Advanced GI consulted, recs appreciated. Per their review, likely iCCA, recommending against biopsy at this time due to high risk of seeding   - s/p flex sig 10/29; unable to do colonoscopy as colonic angle too sharp  - s/p CT colonography - likely cholangiocarcinoma; no intrinsic polyp or mass seen. ?Cirrhosal R colon. Will need PET CT or multifocal CT w/ contrast  - will need EGD to clear upper GI tract +/- EUS/FNA of LN if malignant appearing once off plavix x 5 days (needs 3 weeks of plavix per neurosugery) - can be done outpatient   - will need outpatient PET scan  - formal heme/onc consult pending biopsy results

## 2021-11-03 NOTE — PROGRESS NOTE ADULT - ATTENDING SUPERVISION STATEMENT
Resident
Resident
Fellow
ACP
Fellow
ACP
Fellow
Resident
ACP
Fellow
ACP
Fellow
Resident

## 2021-11-03 NOTE — PROGRESS NOTE ADULT - ASSESSMENT
69 yo M w/ PMHx of CKD IV (unknown baseline), uncontrolled HTN, ?CAD (patient states he has "heart problems;" TTE performed at [facility] on 10/5 w/ normal LVF, severely enlarged LA, Grade III diastolic dysfunction, mild pulm HTN) who was transfered from [facility] to where he presented w/ hx of multiple falls, found to have acute right MCA territory ischemic infarct w/ extensive chronic microvascular ischemic changes on CT, acute/subacute right-sided MCA distribution infarct with associated cytotoxic edema, on MRI, and Pcomm aneurysm vs thrombus [id number], mild R M1 stenosis on MRA. 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 Medicine re-consulted for liver lesion suspicious for cholangiocarcinoma. Now s/p CT colonography.

## 2021-11-03 NOTE — PROGRESS NOTE ADULT - ASSESSMENT
70yoM w/ PMHx of CKD IV (unknown baseline), uncontrolled HTN, ?CAD (patient states he has "heart problems;" TTE performed at [facility] on 10/5 w/ normal LVF, severely enlarged LA, Grade III diastolic dysfunction, mild pulm HTN) who was transfered from [facility] to where he presented w/ hx of multiple falls, found to have acute right MCA territory ischemic infarct w/ extensive chronic microvascular ischemic changes on CT, acute/subacute right-sided MCA distribution infarct with associated cytotoxic edema, on MRI, and Pcomm aneurysm vs thrombus [id number], mild R M1 stenosis on MRA.

## 2021-11-03 NOTE — PROGRESS NOTE ADULT - SUBJECTIVE AND OBJECTIVE BOX
Internal Medicine   More Weber | PGY-1    OVERNIGHT EVENTS: KATIE      SUBJECTIVE: Patient was seen and examined at bedside this morning. Denies any F/C, CP, SOB, abdominal pain, nausea/vomiting, headache, dizziness. Patient responding appropriately to questions and able to make needs known.       MEDICATIONS  (STANDING):  aspirin  chewable 81 milliGRAM(s) Oral daily  atorvastatin 20 milliGRAM(s) Oral at bedtime  bisacodyl 5 milliGRAM(s) Oral at bedtime  cloNIDine 0.3 milliGRAM(s) Oral every 8 hours  clopidogrel Tablet 75 milliGRAM(s) Oral daily  doxazosin 4 milliGRAM(s) Oral at bedtime  hydrALAZINE 100 milliGRAM(s) Oral <User Schedule>  influenza   Vaccine 0.5 milliLiter(s) IntraMuscular once  isosorbide   mononitrate ER Tablet (IMDUR) 120 milliGRAM(s) Oral daily  lactulose Syrup 20 Gram(s) Oral two times a day  metoprolol succinate ER 25 milliGRAM(s) Oral daily  NIFEdipine  milliGRAM(s) Oral daily  polyethylene glycol 3350 17 Gram(s) Oral every 12 hours  senna 2 Tablet(s) Oral at bedtime  sodium bicarbonate 650 milliGRAM(s) Oral three times a day    MEDICATIONS  (PRN):  acetaminophen    Suspension .. 650 milliGRAM(s) Oral every 6 hours PRN Mild Pain (1 - 3)  simethicone 80 milliGRAM(s) Chew two times a day PRN Upset Stomach        T(F): 97.8 (11-03-21 @ 08:15), Max: 98.6 (11-02-21 @ 15:44)  HR: 46 (11-03-21 @ 08:15) (44 - 53)  BP: 112/68 (11-03-21 @ 08:15) (112/68 - 168/82)  BP(mean): --  RR: 18 (11-03-21 @ 08:15) (18 - 18)  SpO2: 100% (11-03-21 @ 08:15) (98% - 100%)    PHYSICAL EXAM:     GENERAL: NAD, lying in bed comfortably.  HEAD:  Atraumatic, normocephalic.  EYES: Scleral icterus.  CHEST/LUNG: CTAB. No rales, rhonchi, wheezing, or rubs. Unlabored respirations.  HEART: Bradycardic, no M/R/G, normal S1/S2.  ABDOMEN: Normoactive bowel sounds.  EXTREMITIES:  2+ peripheral pulses b/l. No clubbing, cyanosis, or edema.  PSYCH: Normal mood, affect.        LABS:                        10.8   5.94  )-----------( 768      ( 03 Nov 2021 10:50 )             34.6     11-02    136  |  102  |  30<H>  ----------------------------<  98  4.3   |  20<L>  |  2.73<H>    Ca    9.8      02 Nov 2021 06:21  Phos  3.2     11-02  Mg     2.3     11-02    TPro  7.3  /  Alb  3.9  /  TBili  0.3  /  DBili  x   /  AST  17  /  ALT  13  /  AlkPhos  100  11-02            Creatinine Trend: 2.73<--, 2.93<--, 3.09<--, 3.01<--, 2.71<--, 2.80<--  I&O's Summary    02 Nov 2021 07:01  -  03 Nov 2021 07:00  --------------------------------------------------------  IN: 1110 mL / OUT: 2200 mL / NET: -1090 mL          RADIOLOGY & ADDITIONAL STUDIES:    CT Virtual Colonoscopy, Screening (11.02.21 @ 12:33)   IMPRESSION:  No intrinsic colonic polyp or mass.    Questionable soft tissue lesion along the right colon as above.    Vague low-density foci in the left hepatic lobe lateral segment appear new from prior imaging and may be related to streak artifact.    Given the above findings, consider contrast-enhanced CT of the abdomen and pelvis versus PET CT for further evaluation.

## 2021-11-03 NOTE — PROGRESS NOTE ADULT - ATTENDING COMMENTS
70 M with h/o CKD (baseline Cr > 3), HTN, and HLD who presented as a transfer from [facility] for higher level of care. Patient initially presented to [facility] on 10/01 after multiple falls the since the day prior. MRI Head at [facility] showed acute vs subacute R MCA territory infarct with associated cytotoxic edema. MRA H showed multilobulated right-sided P-comm aneurysm directed posteriorly and laterally, measuring 8.9 x 7.3 mm.   Found to have a liver mass with abdominal LN enlargement. Neuro, GI evaluated.    - GI was unable to complete colonoscopy 2/2 angulation in sigmoid, CT colonography to r/o metastatic malignancy performed. No mass identified.    Liver mass- concerning for cholangiocarcinoma, rec- no directly sampling the mass as it can introduce peritoneal seeding.     EUS and peripancreatic LN sampling when patient is off DAPT (planned for 3 weeks total per neurology).   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 Has been on DAPT, statin    Repeat CT head stable    Neurology and cardiology f/u plans noted  - CKD stage 4- overall stable, Renal following    CT colonography did demonstrate circumferential bladder wall thickening with prostatomegaly - will obtain Urology evaluation outpatient  - PT in progress - dispo: CLYDE

## 2021-11-03 NOTE — PROGRESS NOTE ADULT - PROBLEM SELECTOR PROBLEM 1
Cerebral aneurysm
Cerebral aneurysm
Hepatic lesion
Cerebral aneurysm
Cerebral aneurysm
Hepatic lesion
Hepatic lesion
Cerebral aneurysm
Hepatic lesion
Cerebral aneurysm
Hepatic lesion
Cerebral aneurysm
Cerebral aneurysm
Hepatic lesion
Cerebral aneurysm
Cerebral aneurysm
Hepatic lesion
Cerebral aneurysm
Cerebral aneurysm
Hepatic lesion
Cerebral aneurysm
Hepatic lesion
Cerebral aneurysm
Hepatic lesion
Hepatic lesion

## 2021-11-03 NOTE — PROGRESS NOTE ADULT - SUBJECTIVE AND OBJECTIVE BOX
Neurology Progress Note    S: Patient seen and examined on floor. no complaints resting in bed     Medication:  MEDICATIONS  (STANDING):  aspirin  chewable 81 milliGRAM(s) Oral daily  atorvastatin 20 milliGRAM(s) Oral at bedtime  bisacodyl 5 milliGRAM(s) Oral at bedtime  cloNIDine 0.3 milliGRAM(s) Oral every 8 hours  clopidogrel Tablet 75 milliGRAM(s) Oral daily  doxazosin 4 milliGRAM(s) Oral at bedtime  hydrALAZINE 100 milliGRAM(s) Oral <User Schedule>  influenza   Vaccine 0.5 milliLiter(s) IntraMuscular once  isosorbide   mononitrate ER Tablet (IMDUR) 120 milliGRAM(s) Oral daily  lactulose Syrup 20 Gram(s) Oral two times a day  metoprolol succinate ER 25 milliGRAM(s) Oral daily  NIFEdipine  milliGRAM(s) Oral daily  polyethylene glycol 3350 17 Gram(s) Oral every 12 hours  senna 2 Tablet(s) Oral at bedtime  sodium bicarbonate 650 milliGRAM(s) Oral three times a day    MEDICATIONS  (PRN):  acetaminophen    Suspension .. 650 milliGRAM(s) Oral every 6 hours PRN Mild Pain (1 - 3)  simethicone 80 milliGRAM(s) Chew two times a day PRN Upset Stomach      Vitals:  Vital Signs Last 24 Hrs  T(C): 36.6 (11-03-21 @ 11:07), Max: 37 (11-02-21 @ 15:44)  T(F): 97.8 (11-03-21 @ 11:07), Max: 98.6 (11-02-21 @ 15:44)  HR: 49 (11-03-21 @ 11:07) (45 - 53)  BP: 115/62 (11-03-21 @ 11:07) (112/68 - 146/79)  BP(mean): --  RR: 18 (11-03-21 @ 11:07) (18 - 18)  SpO2: 98% (11-03-21 @ 11:07) (98% - 100%)              Orthostatic VS  10-20-21 @ 11:10  Lying BP: 183/88 HR: 46  Sitting BP: 163/81 HR: 48  Standing BP: 152/51 HR: --  Site: upper right arm  Mode: electronic    Orthostatic VS  10-20-21 @ 11:10  Lying BP: 183/88 HR: 46  Sitting BP: 163/81 HR: 48  Standing BP: 152/51 HR: --  Site: upper right arm  Mode: electronic      Orthostatic VS  10-16-21 @ 07:27  Lying BP: 198/98 HR: 46  Sitting BP: 182/95 HR: 50  Standing BP: 174/96 HR: 58  Site: --  Mode: --  Orthostatic VS  10-15-21 @ 16:00  Lying BP: 163/76 HR: 49  Sitting BP: 136/77 HR: 51  Standing BP: 136/70 HR: 59  Site: upper right arm  Mode: electronic        General Exam:   General Appearance: Appropriately dressed and in no acute distress       Head: Normocephalic, atraumatic and no dysmorphic features  Ear, Nose, and Throat: Moist mucous membranes  CVS: S1S2+  Resp: No SOB, no wheeze or rhonchi  Abd: soft NTND  Extremities: No edema, no cyanosis  Skin: No bruises, no rashes     Neurological Exam:  Mental Status: Awake, alert and oriented x 3.  Able to follow simple and complex verbal commands. Able to name and repeat. fluent speech. No obvious aphasia or dysarthria noted.   Cranial Nerves: PERRL, EOMI, VFFC, sensation V1-V3 intact,  no obvious facial asymmetry , equal elevation of palate, scm/trap 5/5, tongue is midline on protrusion. no obvious papilledema on fundoscopic exam. Hearing is grossly intact.   Motor: Normal bulk, tone and strength throughout. Fine finger movements were intact and symmetric. no tremors or drift noted except LUE with 4+/5 mild drift noted   Sensation: Intact to light touch and pinprick throughout. no right/left confusion. no extinction to tactile on DSS.    Reflexes: 1+ throughout at biceps, brachioradialis, triceps, patellars and ankles bilaterally and equal. No clonus. R toe and L toe were both downgoing.  Coordination: No dysmetria on FNF   Gait: deferred in ICU    I personally reviewed the below data/images/labs:    CBC Full  -  ( 03 Nov 2021 10:50 )  WBC Count : 5.94 K/uL  RBC Count : 3.69 M/uL  Hemoglobin : 10.8 g/dL  Hematocrit : 34.6 %  Platelet Count - Automated : 768 K/uL  Mean Cell Volume : 93.8 fl  Mean Cell Hemoglobin : 29.3 pg  Mean Cell Hemoglobin Concentration : 31.2 gm/dL  Auto Neutrophil # : 4.19 K/uL  Auto Lymphocyte # : 1.04 K/uL  Auto Monocyte # : 0.40 K/uL  Auto Eosinophil # : 0.23 K/uL  Auto Basophil # : 0.06 K/uL  Auto Neutrophil % : 70.6 %  Auto Lymphocyte % : 17.5 %  Auto Monocyte % : 6.7 %  Auto Eosinophil % : 3.9 %  Auto Basophil % : 1.0 %      11-03    137  |  100  |  36<H>  ----------------------------<  219<H>  3.7   |  20<L>  |  3.29<H>    Ca    9.3      03 Nov 2021 10:50  Phos  5.2     11-03  Mg     2.4     11-03    TPro  7.3  /  Alb  3.9  /  TBili  0.3  /  DBili  x   /  AST  17  /  ALT  13  /  AlkPhos  100  11-02        < from: CT Head No Cont (10.01.21 @ 18:37) >    EXAM:  CT BRAIN                              PROCEDURE DATE:  10/01/2021          INTERPRETATION:  CT OF THE HEAD WITHOUT CONTRAST    CLINICAL INDICATION: Weakness. Falls.    TECHNIQUE: Volumetric CT acquisition was performed through the brain and reviewed using brain and bone window technique. Dose optimization techniques were utilized including kVp/mA modulation along with iterative reconstructions.      COMPARISON: No prior studies have been submitted for comparison.    FINDINGS:    The ventricular and sulcal size and configuration is age appropriate.   There is no acute loss of gray-white differentiation. There are extensive patchy and confluent areas of hypodensity in the periventricular and subcortical white matter which are non-specific but likely related  chronic microangiopathic ischemic changes.  Small chronic infarction in the right cerebellar hemisphere. Chronic appearing left thalamic lacunar infarct.    There is no evidence of mass effect, midline shift, acute intracranial hemorrhage, or extra-axial collections.     The calvarium is intact. The paranasal sinuses are clear.The mastoid air cells are predominantly clear. The orbits are unremarkable.      IMPRESSION:  No acute intracranial hemorrhage or acute territorial infarction. Extensive chronic microvascular ischemic changes and several chronic infarctions as described. Further evaluation may be obtained with MRI of the brain if no contraindications.    --- End of Report ---            KAMILA SZYMANSKI MD; Attending Radiologist  This document has been electronically signed. Oct  1 2021  7:25PM    < end of copied text >  < from: MR Head No Cont (10.04.21 @ 18:09) >    EXAM:  MR BRAIN                            PROCEDURE DATE:  10/04/2021          INTERPRETATION:  .    CLINICAL INFORMATION: Frequent falls.    TECHNIQUE: Multiplanar multisequential MRI of the brain was acquired without the administration of IV gadolinium.    COMPARISON: Prior CT examination of the head dated 10/1/2021.    FINDINGS: Multiple areas of restricted diffusion are seen within the right MCA territory affecting the right frontal, parietal, and temporal lobes. Associated T2/FLAIR prolongation is seen, compatible with cytotoxic edema. No hemorrhagic transformation is noted.    Chronic lacunar infarcts are again seen within the right cerebellar hemisphere, left thalamus, and bilateral basal ganglia.    Multiple patchy confluent nonspecific T2/FLAIR hyperintense signal changes are noted throughout the bihemispheric white matter without associated mass effect or restricted diffusion.    Mild ventriculomegaly appears unchanged. No abnormal intra-axial fluid collections are notable. Flow-voids are noted throughout the major intracranial vessels, on the T2 weighted images, consistent with their patency. The sellar area appears unremarkable. The paranasal sinuses and mastoid air cells are grossly clear. Calvarial signal appears unremarkable. The orbits appear within normal limits.    IMPRESSION: Acute/subacute right-sided MCA distribution infarct with associated cytotoxic edema and without hemorrhagic transformation.    Multiple additional chronic lacunar infarcts and similar-appearing extensive chronic white matter microvascular type changes, as discussed.      --- End of Report ---            LUIS CARLOS BONNER MD; Attending Radiologist  This document has been electronically signed. Oct  5 2021  3:01PM    < end of copied text >  < from: MR Angio Head No Cont (10.06.21 @ 18:15) >    EXAM:  MR ANGIO BRAIN                            PROCEDURE DATE:  10/06/2021          INTERPRETATION:  INDICATION:  Right MCA infarct.    TECHNIQUE:  MR angiography of the brain was performed using three dimensional time-of-flight (3D-TOF) technique.  Imaging was performed on a 1.5 Lisa magnet.    FINDINGS:    INTERNAL CAROTID ARTERIES:  Bilaterally patent.    [place] [facility]:  A right-sided P-comm aneurysm is identified. The aneurysm is directed posteriorly and laterally, the aneurysm appears to be septated and/or bilobed., and measures 8.9 x 7.3 mm.    CEREBRAL ARTERIES:  Both anterior cerebral arteries are patent. Both A1 segments are well formed. Both middle cerebral arteries are patent. There is mild narrowing of the proximal right M1 segment.    VERTEBROBASILAR SYSTEM:  The vertebrobasilar system is patent. There are large posterior communicating arteries bilaterally with dominant supply of the posterior cerebral arteries. Left P1 segment is hypoplastic.    IMPRESSION:    1. 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  2. Mild narrowing of the proximal right middle cerebral artery in the M1 region.  3. The vessels are otherwise patent, as outlined above.    --- End of Report ---EXAM:  CT BRAIN                            PROCEDURE DATE:  10/13/2021      INTERPRETATION:  Noncontrast CT of the brain.    CLINICAL INDICATION: Status post rt pcomm aneurysm coiling    TECHNIQUE : Axial CT scanning of the brain was obtainedfrom the skull base to the vertex without the administration of intravenous contrast. Sagittal and coronal reformats were provided.    COMPARISON: CT brain 10/1/2021. MRI brain 10/4/2021    FINDINGS:    Interval placement of embolic coil mass right parasellar region.    Similar mild ventricular dilatation.    No midline shift. Basal cisterns poorly evaluated due to streak artifact.    No acute intracranial hemorrhage, mass effect, or brain edema. Extensive white matter microvascular ischemic disease.    The visualized paranasal sinuses and mastoid air cells are clear.    IMPRESSION:    Interval placement of embolic coil mass right parasellar region.  No acute intracranial hemorrhage, mass effect, or brain edema.  Similar mild ventricular dilatation.        --- End of Report ---      AFIA DAILY MD; Attending Radiologist  This document has been electronically signed. Oct 13 2021  9:31AM    < end of copied text >      < from: MR Head No Cont (10.15.21 @ 16:40) >    EXAM:  MR BRAIN                            PROCEDURE DATE:  10/15/2021    ATION:  MRI OF THE BRAIN WITHOUT CONTRAST    CLINICAL INDICATION: Status post right posterior communicating artery aneurysm status post balloon assisted coilembolization    TECHNIQUE: Multiplanar multisequence noncontrast MRI of the brain was performed.    COMPARISON: CT brain, 10/13/2021 and MRI brain, 10/4/2021.    FINDINGS:    The ventricular and sulcal size and configuration is age appropriate. Thereare scattered T2/FLAIR hyperintensities in the subcortical and periventricular white matter which are nonspecific but most commonly represent chronic microvascular ischemic changes.    The previously seen acute lacunar infarctions along left MCA distribution are decreased in diffusion restriction intensity, consistent with evolution of acute infarctions to late subacute stage. In addition, new punctate acute lacunar infarctions have developed in the posterior wall of the right temporal lobe and in the left cerebellar hemisphere along right parietal distribution. There is no susceptibility signal to suggest hemorrhagic conversion.  There is a chronic infarction in the right cerebellum and in the left thalamus.    There is susceptibility signalin the right paraclinoid region at the site of the previously coiled right P-comm aneurysm.    There is abnormal extra-axial fluid collection or hydrocephalus. The visualized globes are symmetric bilaterally.    The visualized paranasal sinuses and mastoid bones are adequately developed and aerated.    IMPRESSION:    Comparison previous studies,    There has been interval evolution of previously seen infarcts in right MCA distribution as described. There is development of new acute lacunar infarctions at the posterior pole of the right temporal lobe and in the right cerebellum as described.    There is no intracranial hemorrhage, midline shift or mass effect.    Coil mass in the right paraclinoid region.    Notification to clinician of alert:    Provider   Dr. BARGER  was notified about the final results at 10/15/2021 10:23 AM via telephone by neuroradiologist KEITH Tineo. Readback confirmation was obtained.      JULIO TINEO MD; Attending Radiologist  This document has been electronically signed. Oct 15 2021 10:35AM    < end of copied text >  < from: CT Chest No Cont (10.15.21 @ 23:27) >    EXAM:  CT CHEST                          INTERPRETATION:  CLINICAL INFORMATION: Shortness breath, evaluate for pulmonary infection or pulmonary edema.    COMPARISON: CT chest 9/6/2014. CT abdomen pelvis 10/11/2021.    CONTRAST/COMPLICATIONS:  IV Contrast: None  Oral Contrast: None  Complications: None    PROCEDURE:  CT of the Chest was performed.  Sagittal and coronal reformats were performed.    FINDINGS:    LUNGS AND AIRWAYS: Emphysema. No pulmonary nodules or parenchymal consolidations.  PLEURA: Small bilateral pleural effusions.    MEDIASTINUM AND RED: 1.2 cm right paratracheal lymph node (2:40), nonspecific. No axillary lymphadenopathy    VESSELS: Calcified atherosclerotic plaques in aorta and coronary arteries.    HEART: Multifocal cardiac enlargement is noted. No pericardial effusion. Mitral annular calcifications.    CHEST WALL AND LOWER NECK: Within normal limits.    VISUALIZED UPPER ABDOMEN: Partially imaged large hypodense hepatic lesion measuring 6.9 x 8.8 cm, similar in appearance compared to prior CT abdomen pelvis 10/11/2021. Simple cyst in the left kidney    BONES: Degenerative changes of the spine.    IMPRESSION:    Small bilateral pleural effusions with minimal bilateral groundglass opacities and a few areas of interlobular septal thickening. Findings are suggestive of mild interstitial edema. This is superimposed on a background of emphysema.    Redemonstration of large hypodense hepatic lesion, similar appearance whencompared to prior abdominal pelvis 10/11/2021. As mentioned on prior report, consider contrast enhanced MRI for further evaluation.      ALIYA BERTRAND MD; Resident Radiology  This document has been electronicallysigned.  ABRIL PINEDA MD; Attending Radiologist  This document has been electronically signed. Oct 16 2021  9:09AM    < end of copied text >  < from: MR Abdomen w/ IV Cont (10.22.21 @ 23:26) >    EXAM:  MR ABDOMEN IC                          PROCEDURE DATE:  10/22/2021      INTERPRETATION:  CLINICAL INFORMATION: Hepatic mass.    COMPARISON: Noncontrast CT abdomen and pelvis 10/11/2021    CONTRAST/COMPLICATIONS:  IV Contrast: Gadavist  7.5 cc administered   0 cc discarded  Oral Contrast: NONE  Complications: None reported at time of study completion    PROCEDURE:  MRI of the abdomen was performed.  MRCP was performed.    FINDINGS:  LOWER CHEST: Within normal limits.    LIVER: No morphologic evidence of cirrhosis. An 8.9 x 7.1 cm central hepatic lesion involving hepatic segments 4, 5 and 6 with possible extension into superior right hepatic lobe segments demonstrates restricted diffusion with peripheral rim enhancement and delayed central enhancement. While post contrast imaging is limited, MR characteristics strongly favor cholangiocarcinoma.  BILE DUCTS: Normal caliber.  GALLBLADDER: Cholelithiasis.  SPLEEN: Within normal limits.  PANCREAS: Within normal limits.  ADRENALS: Within normal limits.  KIDNEYS/URETERS: Bilateral renal cysts including a 3.3 cm hemorrhagic right renal cyst.    VISUALIZED PORTIONS:  BOWEL: Within normal limits.  PERITONEUM: No ascites.  VESSELS: Hepatic and portal veins are patent.  RETROPERITONEUM/LYMPH NODES: Upper abdominal adenopathy including peripancreatic, portacaval and savage hepatis nodes. Reference lesions as follows.    Peripancreatic node anterior to the portal vein (6:22) 3.7 x 2.6 cm.    Portacaval node (6:24) 2.9 x 1.5 cm.    ABDOMINAL WALL: Within normal limits.  BONES: Within normal limits.    IMPRESSION:  Limited post contrast imaging.    Central hepatic mass as above for which the primary differential consideration is cholangiocarcinoma.    Adenopathy in the peripancreatic, savage hepatis and portacaval regions.    --- End of Report ---      CAROLS ARMSTRONG MD; Attending Radiologist  This document has been electronically signed. Oct 23 2021  9:47AM    < end of copied text >

## 2021-11-03 NOTE — PROGRESS NOTE ADULT - SUBJECTIVE AND OBJECTIVE BOX
Subjective: Patient seen and examined. No new events except as noted.     REVIEW OF SYSTEMS:    CONSTITUTIONAL: +weakness, fevers or chills  EYES/ENT: No visual changes;  No vertigo or throat pain   NECK: No pain or stiffness  RESPIRATORY: No cough, wheezing, hemoptysis; No shortness of breath  CARDIOVASCULAR: No chest pain or palpitations  GASTROINTESTINAL: No abdominal or epigastric pain. No nausea, vomiting, or hematemesis; No diarrhea or constipation. No melena or hematochezia.  GENITOURINARY: No dysuria, frequency or hematuria  NEUROLOGICAL: No numbness or weakness  SKIN: No itching, burning, rashes, or lesions   All other review of systems is negative unless indicated above.    MEDICATIONS:  MEDICATIONS  (STANDING):  aspirin  chewable 81 milliGRAM(s) Oral daily  atorvastatin 20 milliGRAM(s) Oral at bedtime  bisacodyl 5 milliGRAM(s) Oral at bedtime  cloNIDine 0.3 milliGRAM(s) Oral every 8 hours  doxazosin 4 milliGRAM(s) Oral at bedtime  hydrALAZINE 100 milliGRAM(s) Oral <User Schedule>  influenza   Vaccine 0.5 milliLiter(s) IntraMuscular once  isosorbide   mononitrate ER Tablet (IMDUR) 120 milliGRAM(s) Oral daily  lactulose Syrup 20 Gram(s) Oral two times a day  metoprolol succinate ER 25 milliGRAM(s) Oral daily  NIFEdipine  milliGRAM(s) Oral daily  polyethylene glycol 3350 17 Gram(s) Oral every 12 hours  senna 2 Tablet(s) Oral at bedtime  sodium bicarbonate 650 milliGRAM(s) Oral three times a day      PHYSICAL EXAM:  T(C): 37 (11-03-21 @ 15:55), Max: 37 (11-03-21 @ 15:55)  HR: 75 (11-03-21 @ 15:55) (46 - 75)  BP: 139/75 (11-03-21 @ 15:55) (112/68 - 139/75)  RR: 18 (11-03-21 @ 15:55) (18 - 18)  SpO2: 98% (11-03-21 @ 15:55) (98% - 100%)  Wt(kg): --  I&O's Summary    02 Nov 2021 07:01  -  03 Nov 2021 07:00  --------------------------------------------------------  IN: 1110 mL / OUT: 2200 mL / NET: -1090 mL          Appearance: Normal	  HEENT:   Normal oral mucosa, PERRL, EOMI	  Lymphatic: No lymphadenopathy , no edema  Cardiovascular: Normal S1 S2, No JVD, No murmurs , Peripheral pulses palpable 2+ bilaterally  Respiratory: Lungs clear to auscultation, normal effort 	  Gastrointestinal:  Soft, Non-tender, + BS	  Skin: No rashes, No ecchymoses, No cyanosis, warm to touch  Musculoskeletal: Normal range of motion, normal strength  Psychiatry:  Mood & affect appropriate  Ext: No edema  Neurological Exam:  Mental Status: Awake, alert and oriented x 3.  Able to follow simple and complex verbal commands. Able to name and repeat. fluent speech. No obvious aphasia or dysarthria noted.   Cranial Nerves: PERRL, EOMI, VFFC, sensation V1-V3 intact,  no obvious facial asymmetry , equal elevation of palate, scm/trap 5/5, tongue is midline on protrusion. no obvious papilledema on fundoscopic exam. Hearing is grossly intact.   Motor: Normal bulk, tone and strength throughout. Fine finger movements were intact and symmetric. no tremors or drift noted except LUE with 4+/5 mild drift noted   Sensation: Intact to light touch and pinprick throughout. no right/left confusion. no extinction to tactile on DSS.    Reflexes: 1+ throughout at biceps, brachioradialis, triceps, patellars and ankles bilaterally and equal. No clonus. R toe and L toe were both downgoing.  Coordination: No dysmetria on FNF   Gait: deferred in ICU    LABS:    CARDIAC MARKERS:                                10.8   5.94  )-----------( 768      ( 03 Nov 2021 10:50 )             34.6     11-03    137  |  100  |  36<H>  ----------------------------<  219<H>  3.7   |  20<L>  |  3.29<H>    Ca    9.3      03 Nov 2021 10:50  Phos  5.2     11-03  Mg     2.4     11-03    TPro  7.3  /  Alb  3.9  /  TBili  0.3  /  DBili  x   /  AST  17  /  ALT  13  /  AlkPhos  100  11-02    proBNP:   Lipid Profile:   HgA1c:   TSH:             TELEMETRY: 	    ECG:  	  RADIOLOGY:   DIAGNOSTIC TESTING:  [ ] Echocardiogram:  [ ]  Catheterization:  [ ] Stress Test:    OTHER:

## 2021-11-03 NOTE — PROGRESS NOTE ADULT - SUBJECTIVE AND OBJECTIVE BOX
Interval Events:   - Continues to be on plavix   - Was unable to receive colonoscopy 2/2 angulation in sigmoid, awaiting MRI colonography to r/o metastatic malignancy  - Pt denies abd pain, n/v/f/c      Allergies:  No Known Allergies      Hospital Medications:  acetaminophen    Suspension .. 650 milliGRAM(s) Oral every 6 hours PRN  aspirin  chewable 81 milliGRAM(s) Oral daily  atorvastatin 20 milliGRAM(s) Oral at bedtime  bisacodyl 5 milliGRAM(s) Oral at bedtime  bisacodyl 5 milliGRAM(s) Oral once PRN  cloNIDine 0.3 milliGRAM(s) Oral every 8 hours  clopidogrel Tablet 75 milliGRAM(s) Oral daily  doxazosin 4 milliGRAM(s) Oral at bedtime  hydrALAZINE 100 milliGRAM(s) Oral <User Schedule>  influenza   Vaccine 0.5 milliLiter(s) IntraMuscular once  isosorbide   mononitrate ER Tablet (IMDUR) 120 milliGRAM(s) Oral daily  lactulose Syrup 20 Gram(s) Oral two times a day  metoprolol succinate ER 25 milliGRAM(s) Oral daily  NIFEdipine XL 90 milliGRAM(s) Oral daily  polyethylene glycol 3350 17 Gram(s) Oral every 12 hours  polyethylene glycol/electrolyte Solution. 4000 milliLiter(s) Oral once  senna 2 Tablet(s) Oral at bedtime  simethicone 80 milliGRAM(s) Chew two times a day PRN  sodium bicarbonate 650 milliGRAM(s) Oral three times a day      PMHX/PSHX:  Frequent falls    Hypertension    Chronic kidney disease, unspecified CKD stage        Family history:      ROS:   General:  No wt loss, fevers, chills, night sweats, fatigue,   Eyes:  Good vision, no reported pain  ENT:  No sore throat, pain, runny nose, dysphagia  CV:  No pain, palpitations, hypo/hypertension  Pulm:  No dyspnea, cough, tachypnea, wheezing  GI:  As per HPI  :  No pain, bleeding, incontinence, nocturia  Muscle:  No pain, weakness  Neuro:  No weakness, tingling, memory problems  Psych:  No fatigue, insomnia, mood problems, depression  Endocrine:  No polyuria, polydipsia, cold/heat intolerance  Heme:  No petechiae, ecchymosis, easy bruisability  Skin:  No rash, tattoos, scars, edema      PHYSICAL EXAM:   Vital Signs Last 24 Hrs  T(C): 36.6 (03 Nov 2021 11:07), Max: 37 (02 Nov 2021 15:44)  T(F): 97.8 (03 Nov 2021 11:07), Max: 98.6 (02 Nov 2021 15:44)  HR: 49 (03 Nov 2021 11:07) (45 - 53)  BP: 115/62 (03 Nov 2021 11:07) (112/68 - 146/79)  BP(mean): --  RR: 18 (03 Nov 2021 11:07) (18 - 18)  SpO2: 98% (03 Nov 2021 11:07) (98% - 100%)    GENERAL:  No acute distress  HEENT:  Normocephalic/atraumatic, no scleral icterus  CHEST:  No accessory muscle use  HEART:  Regular rate and rhythm  ABDOMEN:  Soft, non-tender, non-distended  EXTREMITIES: No cyanosis, clubbing, or edema  SKIN:  No rash  NEURO:  Alert and oriented x 3, no asterixis    LABS:                        10.8   5.94  )-----------( 768      ( 03 Nov 2021 10:50 )             34.6     11-03    137  |  100  |  36<H>  ----------------------------<  219<H>  3.7   |  20<L>  |  3.29<H>    Ca    9.3      03 Nov 2021 10:50  Phos  5.2     11-03  Mg     2.4     11-03    TPro  7.3  /  Alb  3.9  /  TBili  0.3  /  DBili  x   /  AST  17  /  ALT  13  /  AlkPhos  100  11-02    LIVER FUNCTIONS - ( 02 Nov 2021 06:21 )  Alb: 3.9 g/dL / Pro: 7.3 g/dL / ALK PHOS: 100 U/L / ALT: 13 U/L / AST: 17 U/L / GGT: x                   Imaging:

## 2021-11-03 NOTE — PROGRESS NOTE ADULT - ASSESSMENT
71 yo male with CKD (baseline Cr > 3), HTN, and HLD who presented as a transfer from [facility] for higher level of care. Patient initially presented to [facility] on 10/01 after multiple falls the since the day prior. MRI Head at [facility] showed acute vs subacute R MCA territory infarct with associated cytotoxic edema. MRA H showed multilobulated right-sided P-comm aneurysm directed posteriorly and laterally, measuring 8.9 x 7.3 mm.TTE: EF 65-70%, severely enlarged L atrium. CT abdomen 10/11  reveals large hypodense 8.5 cm hepatic lesion concerning for malignancy. 10/12 s/p coiling right PCOMM aneurysm. Post op CT head with evolution of previous infarcts in right MCA, now with new acute lacunar infarctions in the right temporal lobe and cerebellum. On DAPT.    Plan:  -  Colonography performed, fu results  - MRI performed, read as likely cholangio involving segments 4,5,6 with peripancreatic and portocaval nodes  - Patient should have PET Scan outpatient as part of workup  - Per GI will do EGD 11/8 after dapt has been on board for 3 weeks    [facility]  [id number]

## 2021-11-17 ENCOUNTER — APPOINTMENT (OUTPATIENT)
Dept: UROLOGY | Facility: CLINIC | Age: 71
End: 2021-11-17

## 2022-01-01 NOTE — DIETITIAN INITIAL EVALUATION ADULT. - PERTINENT LABORATORY DATA
10-29 @ 07:08: Na 137, BUN 27<H>, Cr 2.71<H>, <H>, K+ 4.1, Phos 2.8, Mg 2.3, Alk Phos --, ALT/SGPT --, AST/SGOT --, HbA1c --
negative

## 2022-02-03 NOTE — ED ADULT NURSE NOTE - MUSCULOSKELETAL ASSESSMENT
I was able to speak with mom and review the results of the brain MRI study  She notes Jack Juarez otherwise doing relatively well  Mom also notes the previously recommended sleep study not yet being scheduled -- will follow-up on this  Mom encouraged to contact the Clinic if with questions/concerns in the meantime  - - -

## 2022-06-14 NOTE — PATIENT PROFILE ADULT - CHOOSE INDICATION TO IMMUNIZE (AN ORDER WILL BE GENERATED WHEN THIS NOTE IS SAVED):
[facility] MEDICINE PROGRESS NOTE   Patient: Anupama Lara  Today's Date: 6/14/2022    YOB: 1959  Admission Date: 6/8/2022    MRN: [id number]  Inpatient LOS: 4    Room: [address]  Hospital Day: Hospital Day: 7    Subjective       HISTORY AND SUBJECTIVE COMPLAINTS     Chief Complaint:   stage IV rectal cancer with metastasis    Interval History / Subjective:   Pt with h/o stage IV rectal cancer with diffuse spinal mets now s/p placement of MRI incompatible TheraCath catheter at L3-L4 for pain control using an external pump as a temporary catheter by pain management/Dr Mahmood. Pt seen in postop recovery and d/w Dr Mahmood who has requested pt transfer to [facility] given need for specialized RN monitoring postprocedure. D/w pt and reviewed plan of care. Pt also followed by xrt and oncology and plans for xrt to help improve pain control noted.      Still having a lot of pain this morning, especially in C and upper T  spine, currently 7/10. Low grade temp overnight but no new cough, dysuria, diarrhea, other complaints    RN reports  Tachycardia , low grade fever and hypotension    Hospital Course:  Anupama Lraa is a 62 year old female who presented on 6/8/2022 with complaints of Abnormal Diagnostic Test and Pain (generalized bone pain, especially neck through waist)  .    ROS:  Pertinent systems negative except as above.    Objective       PHYSICAL EXAMINATION         Vital 24 Hour Range Most Recent Value   Temperature Temp  Min: 98.8 °F (37.1 °C)  Max: 101.6 °F (38.7 °C) 99.3 °F (37.4 °C)   Pulse Pulse  Min: 101  Max: 130 (!) 107   Respiratory Resp  Min: 18  Max: 19 18   Blood Pressure BP  Min: 95/56  Max: 103/59 97/55   Pulse Oximetry SpO2  Min: 95 %  Max: 100 % 95 %   Arterial BP No data recorded     O2 No data recorded       Intake and Output:      Intake/Output Summary (Last 24 hours) at 6/14/2022 1230  Last data filed at 6/14/2022 1133  Gross per 24 hour   Intake 1050 ml    Output 751 ml   Net 299 ml       Last Stool Occurrence:  1 (06/13/22 2758)    Vital Most Recent Value First Value   Weight 69.9 kg (154 lb) Weight: 70.1 kg (154 lb 8.7 oz)   Height 5' 9\" (175.3 cm) Height: 5' 9.02\" (175.3 cm)   BMI 22.74 N/A         General: AAOx3 No distress, speech is intact  Head:  Conjunctivae are clear  Neck:  Supple  Lungs:  Symmetric chest wall expansion, unlabored respirations  Cardiac:  Normal   Abdomen:  Soft, not distended  Musculoskeletal- lumbar spine could taste blood  Extremities/Musculoskeletal:  No deformities  Skin:  No rash   Neurologic:  Alert, moving all extremities  Psychiatric:  Pleasant, cooperative      TEST RESULTS     LABS AND STUDIES:      No results available in last 24 hours    Recent Labs     06/11/22  0607 06/13/22  0450 06/14/22  0539   WBC 7.8 8.4 13.3*   HGB 9.7* 8.8* 9.1*   MCV 94.2 92.2 93.5    304 285       Recent Labs     04/01/22  0555 04/02/22  0431 05/25/22  0944 06/08/22  1748 06/09/22  0631 06/10/22  0929 06/13/22  0450 06/13/22  2231 06/14/22  0539   SODIUM 141   < > 140   < > 135   < > 134*  --  135   POTASSIUM 3.9   < > 3.4   < > 4.5   < > 3.3* 4.2 3.9   CHLORIDE 112*   < > 106   < > 106   < > 102  --  106   CO2 22   < > 23   < > 22   < > 26  --  26   BUN 10   < > 11   < > 6   < > 6  --  7   CREATININE 0.61   < > 0.77   < > 0.53   < > 0.52  --  0.57   GLUCOSE 110*   < > 105*   < > 135*   < > 82  --  101*   CALCIUM 8.4   < > 8.0*   < > 9.0   < > 8.1*  --  8.3*   MG 2.4   < > 1.7  --  2.4  --   --   --   --    PHOS 4.2  --   --   --   --   --   --   --   --     < > = values in this interval not displayed.                Recent Labs     03/31/22  1619 04/01/22  0555 06/09/22  1419   INR 0.9 0.9  --    EF  --   --  65       Recent Labs     03/31/22  1617 05/25/22  0944   TSH  --  0.648   HGBA1C 5.4  --              Recent Labs     06/13/22  0450 06/14/22  0539   GPT 14 12   AST 56* 75*   BILIRUBIN 0.3 0.5   ALBUMIN 2.1* 2.1*       Recent Labs      05/25/22  0944   IRON 57   TIBC 296   FERR 262*   VB12 580   KAY >24.0       No results found    Recent Labs     06/13/22  0450   LACTA 0.8       Results for orders placed or performed during the hospital encounter of 03/31/22   Electrocardiogram 12-Lead   Result Value Ref Range    Ventricular Rate EKG/Min (BPM) 78     Atrial Rate (BPM) 78     ND-Interval (MSEC) 160     QRS-Interval (MSEC) 92     QT-Interval (MSEC) 392     QTc 446     P Axis (Degrees) 53     R Axis (Degrees) 38     T Axis (Degrees) 67     REPORT TEXT       Normal sinus rhythm  Normal ECG  When compared with ECG of  31-MAR-2022 16:26,  Criteria for  Septal infarct  are no longer  present  Confirmed by ЕЛЕНА DAVILA ANWER ([id number]) on 4/4/2022 6:17:50 AM             RADIOLOGY:    PET scan - 6/8/22    IMPRESSION:      1.  FDG avid metastatic disease involving the lungs, lymph nodes in the  chest, abdomen, and pelvis, the right adrenal gland, widespread osseous  lesions, and suspected peritoneal disease, overall progressed as above.  Additionally, there is suspected bowel involvement as above.     2.  Numerous destructive spinal lesions, several in the cervical and  thoracic spine with suspected spinal canal involvement. Findings are either  new or progressed from prior, as noted on recent MRI cervical spine.  Findings in the thoracic spine would be better evaluated with repeat MRI,  given previous incomplete MRI. Additionally, the lytic nature of several  lesions increases risk for pathologic fracture. Lesions in L2 and L3  demonstrate mildly greater areas of wedging/concavity.     3.  Increased uptake involving the left shoulder with effusion, potentially  degenerative or inflammatory though recommend clinical correlation.       Imaging:  EHR reviewed.       ANCILLARY ORDERS     Diet:  Regular Diet  Telemetry: Off  Consults:    IP CONSULT TO NEUROSURGERY  IP CONSULT TO RADIATION ONCOLOGY  IP CONSULT TO ONCOLOGY  IP CONSULT TO ONCOLOGY  IP CONSULT TO PAIN  MANAGEMENT  IP CONSULT TO PALLIATIVE CARE  IP CONSULT TO PAIN MANAGEMENT  Therapy Orders:   PT and OT Orders Placed this Encounter   Procedures   • Occupational Therapy   • Physical Therapy       Advance Care Planning        ADVANCED DIRECTIVES     Code Status: Do Not Resuscitate            ASSESSMENT AND PLAN       Assessment:  Anupama Lara is a 62 year old female who presents Pt with h/o stage IV rectal cancer with metastasis who was TAP transferred to [facility] for poor tolerance of xrt secondary to uncontrolled pain and need for xrt with sedation.      Plan:  Principal Problem:    Rectal cancer metastatic to bone (CMS/HCC)  Active Problems:    Metastatic cancer to spine (CMS/HCC)    Anxious depression    Moderate protein-calorie malnutrition (CMS/HCC)    PAF (paroxysmal atrial fibrillation) (CMS/HCC)    Mild intermittent asthma without complication    Debility    Chronic pain    Metastatic cancer to bone (CMS/HCC)         Rectal cancer metastatic to bone (CMS/HCC)    Metastatic cancer to spine (CMS/HCC)    Chronic pain  Pt with h/o stage IV rectal cancer with metastasis who was TAP transferred to [facility] for poor tolerance. Pt seen by NS, pain management, xrt, oncology, neuro oncology and palliative care. Mri c/t/l spine noted and NS notes extensive metastatic disease in cervical, thoracic and lumbar levels, not significant canal compromise from tumor, though there is stenosis from degenerative changes most notable at L 3 level. NS notes pt is poor surgical candidate and her overall prognosis quite poor and recommends non operative management with xrt/systemic chemoRx at this time. Rad onc deciding on timing of xrt treatments pending improved pain control. Pt is now s/p placement of MRI incompatible TheraCath catheter at L3-L4 on 6/12/22 for pain control using an external pump as a temporary catheter by pain management/Dr Mahmood. Will plan to initiate RT inpatient while response to pain pump is monitored, d/w   Dr Margie Mahmood.  Pt has met with palliative care team earlier and is a DNR.      Anxious depression  Continue with supportive care. Palliative care following.      Moderate protein-calorie malnutrition (CMS/HCC)  Optimize nutritional support.       PAF (paroxysmal atrial fibrillation) (CMS/HCC)  Pt not on chronic anticoagulation.      Mild intermittent asthma without complication      Debility  Therapy.     Fever  -with hypotension and tachycardia - check u/a, CXR, lactic acid - neg , blood cx obtained overnight -p  Start empiric abx - Ceftriaxone for UTI     For other chronic medical conditions  Patient is otherwise stable and continue current regimen with outpatient PCP follow up after discharge.      Smoking status: as noted in EPIC   Nutrition status: as noted in EPIC  Body mass index is 22.74 kg/m². -   DVT Prophylaxis: SCDs         DISCHARGE PLANNING       The patient's treatment plans were discussed with patient.     Recommendations for Discharge   SW     PT     OT     SLP        Anticipated discharge destination: Home with home health  Expected Discharge Date: 6/18  Barriers to Discharge: Patient is not medically ready and needs to remain in the hospital today due to metastatic rectal ca to spine/poor pain control    Krysta Benitez MD  Hospitalist  6/14/2022  12:30 PM         Patient is not pregnant (male or female)

## 2024-01-18 NOTE — OCCUPATIONAL THERAPY INITIAL EVALUATION ADULT - MUSCLE TONE ASSESSMENT, REHAB EVAL
Patient received the first dose of the Hep A vaccine in the right deltoid. Pt tolerated well. Pt asked to wait in the clinic 15 minutes after injection in the event of an allergic reaction. Pt verbalized understanding. Pt left in NAD.       normal

## 2024-04-22 NOTE — PROGRESS NOTE ADULT - PROBLEM SELECTOR PLAN 1
Patient reviewed his results.  MRI scheduled for tomorrow s/p balloon assisted coiling of right posterior communicating artery aneurysm on 10/12/21.  DAPT   CT head stable   Neuro check   PT

## 2024-04-30 NOTE — PROGRESS NOTE ADULT - ASSESSMENT
I spoke with Nicole 69 yo male with CKD (baseline Cr > 3), HTN, and HLD who presented as a transfer from [facility] for higher level of care. Patient initially presented to [facility] on 10/01 after multiple falls the since the day prior. MRI Head at [facility] showed acute vs subacute R MCA territory infarct with associated cytotoxic edema. MRA H showed multilobulated right-sided P-comm aneurysm directed posteriorly and laterally, measuring 8.9 x 7.3 mm.   A1c 5.5, LDL 25.  TTE: EF 65-70%, severely enlarged L atrium.   MRi with R MCA territory infarct   vessels with PCOM on R aneurysm    s/p angio and coil of PCOM aneurysm.   Impression: AMS + mild L hemineglect in setting of R MCA territory infarct seen on MRI Head, ESUS.  o/e 10/15 LUE drift   10/15 MRI for trial as above  10/19 okay  no complaints   10/21 awaiting telly   - Dual antiplatelet therapy with ASA 81mg PO daily and Clopidogrel 75mg PO daily x 3 weeks followed by monotherapy with ASA 81mg PO daily.  - lipitor 40. should be high dose   - ARU and PRU therapeutic   - cardio for ILR. cardio following can do outpt   - telemetry  - PT/OT/SS/SLP, OOBC  - check FS, glucose control <180  - GI/DVT ppx  - Counseling on diet, exercise, and medication adherence was done  - Counseling on smoking cessation and alcohol consumption offered when appropriate.  - Pain assessed and judicious use of narcotics when appropriate was discussed.    - Stroke education given when appropriate.  - Importance of fall prevention discussed.   - Differential diagnosis and plan of care discussed with patient and/or family and primary team  - Thank you for allowing me to participate in the care of this patient. Call with questions.   - d/c planning ILR in or outpt. outpt f/u on d/c 1-2 weeks no objection to d/c   Sukh Pierre MD  Vascular Neurology  Office: [phone] .

## 2024-05-23 NOTE — PROGRESS NOTE ADULT - SUBJECTIVE AND OBJECTIVE BOX
Principal Discharge DX:	Dizziness   SUBJECTIVE: Patient seen and examined at bedside. Denies any complaints at this time.     OVERNIGHT EVENTS: hypertensive overnight     Vital Signs Last 24 Hrs  T(C): 36.8 (16 Oct 2021 07:27), Max: 36.8 (15 Oct 2021 13:32)  T(F): 98.2 (16 Oct 2021 07:27), Max: 98.3 (15 Oct 2021 19:59)  HR: 46 (16 Oct 2021 07:27) (44 - 51)  BP: 198/98 (16 Oct 2021 07:27) (162/81 - 203/96)  BP(mean): --  RR: 18 (16 Oct 2021 04:01) (18 - 18)  SpO2: 98% (16 Oct 2021 04:01) (95% - 98%)    PHYSICAL EXAM:    General: No Acute Distress     Neurological: Awake, alert oriented to person, place and time, Following Commands, PERRL, EOMI, Face Symmetrical, Speech Fluent, Moving all extremities, LUE 4+/5, No Drift, Sensation to Light Touch Intact    Pulmonary: Clear to Auscultation, No Rales, No Rhonchi, No Wheezes     Cardiovascular: S1, S2, Regular Rate and Rhythm     Gastrointestinal: Soft, Nontender, Nondistended     Incision: c/d/i     LABS:                                   10.6   6.49  )-----------( 669      ( 16 Oct 2021 05:15 )             33.3     10-16    137  |  107  |  36<H>  ----------------------------<  98  4.1   |  16<L>  |  2.55<H>    Ca    9.4      16 Oct 2021 05:15  Phos  2.6     10-14  Mg     1.9     10-14    MEDICATIONS  (STANDING):  aspirin  chewable 81 milliGRAM(s) Oral daily  atorvastatin 20 milliGRAM(s) Oral at bedtime  bisacodyl 5 milliGRAM(s) Oral at bedtime  ciprofloxacin     Tablet 250 milliGRAM(s) Oral every 12 hours  cloNIDine 0.3 milliGRAM(s) Oral every 8 hours  cloNIDine 0.1 milliGRAM(s) Oral once  clopidogrel Tablet 75 milliGRAM(s) Oral daily  doxazosin 4 milliGRAM(s) Oral at bedtime  heparin   Injectable 5000 Unit(s) SubCutaneous every 12 hours  hydrALAZINE 100 milliGRAM(s) Oral three times a day  influenza   Vaccine 0.5 milliLiter(s) IntraMuscular once  isosorbide   mononitrate ER Tablet (IMDUR) 60 milliGRAM(s) Oral daily  lactulose Syrup 20 Gram(s) Oral two times a day  metoprolol succinate ER 12.5 milliGRAM(s) Oral two times a day  NIFEdipine XL 90 milliGRAM(s) Oral daily  polyethylene glycol 3350 17 Gram(s) Oral every 12 hours  senna 2 Tablet(s) Oral at bedtime  sodium bicarbonate 650 milliGRAM(s) Oral three times a day    MEDICATIONS  (PRN):  acetaminophen    Suspension .. 650 milliGRAM(s) Oral every 6 hours PRN Mild Pain (1 - 3)  oxyCODONE    IR 5 milliGRAM(s) Oral every 4 hours PRN Moderate Pain (4 - 6)  oxyCODONE    IR 10 milliGRAM(s) Oral every 4 hours PRN Severe Pain (7 - 10)          DIET: soft renal diet     IMAGING: < from: MR Head No Cont (10.15.21 @ 16:40) >  IMPRESSION:    Comparison previous studies,    There has been interval evolution of previously seen infarcts in right MCA distribution as described. There is development of new acute lacunar infarctions at the posterior pole of the right temporal lobe and in the right cerebellum as described.    There is no intracranial hemorrhage, midline shift or mass effect.    Coil mass in the right paraclinoid region.    Notification to clinician of alert:    Provider   Dr. BARGER  was notified about the final results at 10/15/2021 10:23 AM via telephone by neuroradiologist KEITH Francisco. Readback confirmation was obtained.    --- End of Report ---    < end of copied text >    < from: CT Chest No Cont (10.15.21 @ 23:27) >  IMPRESSION:    Small bilateral pleural effusions with minimal bilateral groundglass opacities and a few areas of interlobular septal thickening. Findings are suggestive of mild interstitial edema. This is superimposed on a background of emphysema.    Redemonstration of large hypodense hepatic lesion, similar appearance whencompared to prior abdominal pelvis 10/11/2021. As mentioned on prior report, consider contrast enhanced MRI for further evaluation.        --- End of Report ---      < end of copied text >     1

## 2024-09-10 NOTE — PATIENT PROFILE ADULT - BRAND OF COVID-19 VACCINATION
No care due was identified.  [facility] Embedded Care Due Messages. Reference number: [id number].   9/09/2024 7:38:03 PM CDT  
Refill Routing Note   Medication(s) are not appropriate for processing by [facility] for the following reason(s):      New or recently adjusted medication    [facility] action(s):  Defer Care Due:  None identified            Appointments  past 12m or future 3m with PCP    Date Provider   Last Visit   7/19/2024 Brunilda Osborn MD   Next Visit   9/30/2024 Brunilda Osborn MD   ED visits in past 90 days: 0        Note composed:9:24 AM 09/10/2024          
[employer] dose 1, 2, and 3
